# Patient Record
Sex: MALE | Race: BLACK OR AFRICAN AMERICAN | NOT HISPANIC OR LATINO | Employment: OTHER | URBAN - METROPOLITAN AREA
[De-identification: names, ages, dates, MRNs, and addresses within clinical notes are randomized per-mention and may not be internally consistent; named-entity substitution may affect disease eponyms.]

---

## 2017-01-16 ENCOUNTER — LAB VISIT (OUTPATIENT)
Dept: LAB | Facility: HOSPITAL | Age: 47
End: 2017-01-16
Attending: INTERNAL MEDICINE
Payer: MEDICARE

## 2017-01-16 ENCOUNTER — OFFICE VISIT (OUTPATIENT)
Dept: HEMATOLOGY/ONCOLOGY | Facility: CLINIC | Age: 47
End: 2017-01-16
Payer: MEDICARE

## 2017-01-16 VITALS
RESPIRATION RATE: 18 BRPM | HEART RATE: 101 BPM | BODY MASS INDEX: 29.86 KG/M2 | HEIGHT: 73 IN | DIASTOLIC BLOOD PRESSURE: 80 MMHG | OXYGEN SATURATION: 98 % | TEMPERATURE: 98 F | SYSTOLIC BLOOD PRESSURE: 122 MMHG | WEIGHT: 225.31 LBS

## 2017-01-16 DIAGNOSIS — B27.09 EBV MEDIATED PRIMARY LYMPHOMA OF LYMPH NODE: ICD-10-CM

## 2017-01-16 DIAGNOSIS — Z94.1 HISTORY OF HEART TRANSPLANT: ICD-10-CM

## 2017-01-16 DIAGNOSIS — D68.51 FACTOR V LEIDEN: Chronic | ICD-10-CM

## 2017-01-16 DIAGNOSIS — C83.38 DIFFUSE LARGE B-CELL LYMPHOMA OF LYMPH NODES OF MULTIPLE REGIONS: Primary | ICD-10-CM

## 2017-01-16 DIAGNOSIS — I82.90 VENOUS THROMBOEMBOLISM: ICD-10-CM

## 2017-01-16 DIAGNOSIS — C85.80 EBV MEDIATED PRIMARY LYMPHOMA OF LYMPH NODE: ICD-10-CM

## 2017-01-16 LAB — LDH SERPL L TO P-CCNC: 153 U/L

## 2017-01-16 PROCEDURE — 99214 OFFICE O/P EST MOD 30 MIN: CPT | Mod: S$PBB,,, | Performed by: INTERNAL MEDICINE

## 2017-01-16 PROCEDURE — 90688 IIV4 VACCINE SPLT 0.5 ML IM: CPT | Mod: PBBFAC,PO | Performed by: INTERNAL MEDICINE

## 2017-01-16 PROCEDURE — 99999 PR PBB SHADOW E&M-EST. PATIENT-LVL III: CPT | Mod: PBBFAC,,, | Performed by: INTERNAL MEDICINE

## 2017-01-16 PROCEDURE — 99213 OFFICE O/P EST LOW 20 MIN: CPT | Mod: PBBFAC,PO | Performed by: INTERNAL MEDICINE

## 2017-01-16 NOTE — PROGRESS NOTES
Reason for visit: Diffuse large B-cell lymphoma [PTLD]  Date of Diagnosis: August 2013    HPI:   The patient is a 46-year-old  male who presents to the hematology oncology clinic today for follow up of diffuse large B-cell lymphoma [PTLD].    The patient's diagnosis was made after a small bowel resection and liver biopsy. He has completed 6 cycles of chemotherapy with R-CHOP/CEOP in Dec 2013.   Today the patient reports that he feels well and has no specific complaints. He denies any abdominal pain, nausea or vomiting.  He denies any melena, hematochezia, hematemesis, hemoptysis or hematuria.  He denies any chest pain or shortness of breath.  He denies any fevers, chills or night sweats.  He denies any bowel or urinary complaints.  The patient is currently taking his therapeutic Lovenox injections and has been tolerating this well without any significant problems.  All of his interval clinical history available in [facility] has been reviewed.    PAST MEDICAL HISTORY:   1.  Dilated cardiomyopathy status post orthotopic heart transplant in October 2012 on chronic immunosuppression  2.  Factor V Leiden mutation  3.  Bilateral lower extremity DVT in August 2013  4.  Right brachial vein DVT in August 2013  5.  Peptic ulcer disease  6.  H. pylori gastritis  7.  Dyslipidemia  8.  Hypertension  9.  Corticosteroid-induced diabetes    SURGICAL HISTORY:   1.  Orthotopic heart transplantation in October 2012  2.  IVC filter placement in August 2011  3.  AICD placement and removal in September 2012  4.  Exploratory laparotomy with small bowel resection and wedge biopsy of the liver on August 7, 2013 for perforated viscus    FAMILY HISTORY: The patient stated that he cannot remember all of the details of his family history at this time.    SOCIAL HISTORY: He reports a 10-pack-year smoking history and quit in May 2011.  He reports drinking approximately 6 beers every day for about 20 years and quit this in May 2011.  He  reports a remote history of smoking marijuana and his last use was in 2001/2002.  He is single and does not have any children.  He lives with his mother in [place].  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  He is currently disabled.    ALLERGIES: NKDA    MEDICATIONS: [Medcard has been reviewed and/or reconciled.]    REVIEW OF SYSTEMS:   GENERAL: [No fevers, chills or sweats. No fatigue, weight loss or loss of appetite.]  HEENT: [No blurred vision, tinnitus, nasal discharge, sorethroat or dysphagia.]  HEART: [No chest pain, palpitations or shortness of breath.]    LUNGS: [No cough, hemoptysis or breathing problems.]  ABDOMEN: [No abdominal pain, nausea, vomiting, diarrhea, constipation or melena.]  GENITOURINARY: [No dysuria, bleeding or malodorous discharge.]  NEURO: [No headache, dizziness or vertigo.]  HEMATOLOGY: [No easy bruising, spontaneous bleeding or blood clots in the past].  MUSCULOSKELETAL: [No arthralgias, myalgias or bone pains.]  SKIN: [No rashes or skin lesions.]  PSYCHIATRY: [No depression or anxiety.]    PHYSICAL EXAMINATION:   VS: Reviewed on nurses notes.  APPEARANCE: The patient is a well-developed, well-nourished and well-groomed  male who appears in no acute distress.  HEENT: No scleral icterus. Both external auditory canals clear. No oral ulcers, lesions. Throat clear  HEAD: No sinus tenderness.  NECK: Supple. No palpable lymphadenopathy. Thyroid non-tender, no palpable masses  CHEST: Breath sounds clear bilaterally. No rales. No rhonchi. Unlabored respirations.  CARDIOVASCULAR: Normal S1, S2. Normal rate. Regular rhythm.  ABDOMEN: Bowel sounds normal. No tenderness. No abdominal distention. No hepatomegaly. No splenomegaly.  LYMPHATIC: No palpable supraclavicular, axillary nodes  EXTREMITIES: No clubbing, cyanosis, edema  SKIN: No lesions. No petechiae. No ecchymoses. No induration or nodules  NEUROLOGIC: No focal findings. Alert & Oriented x 3. Mood  appropriate to affect    LABS:   Reviewed    IMAGING:  Reviewed    IMPRESSION:  1.  Diffuse large B-cell lymphoma [stage IV] [PTLD] [EBV positive]  2.  Recurrent DVT    PLAN:  1. I had a detailed discussion with the patient today with regard to his current clinical situation.  His most recent PET/CT scan done in May 2015 showed that his lymphoma continues to be in complete remission after 6 cycles of chemotherapy with R-CHOP/R-CEOP.   2. Bone marrow biopsy in July 2014 which was done for further evaluation of leukopenia showed unremarkable results with no evidence of lymphoma/leukemia or myelodysplasia.  Cytogenetics were normal.  Immunoglobin gene rearrangement study was normal. Most recent CBC's show normal blood counts.  3.  He has been advised to follow up with the heart transplant team at [facility], [place] as per their recommendations.   4. Continue therapeutic lovenox injections for history of recurrent DVT. He will need lifelong anticoagulation.  5. Advised to maintain active lifestyle with regular exercise, good diet and to avoid excessive weight gain. He expressed understanding.    Follow up in 6 months with labs. He knows to call for any additional questions or new problems.    Pietro Martinez MD

## 2017-06-30 ENCOUNTER — TELEPHONE (OUTPATIENT)
Dept: TRANSPLANT | Facility: CLINIC | Age: 47
End: 2017-06-30

## 2017-06-30 NOTE — LETTER
[facility] [address] [place] [address]  Phone: [phone]   June 30, 2017    Alfonso Leger  [address]  [address]  [place] [address]  MRN: [id number]      Dear Alfonso Leger:    Congratulations on coming up to your 5 years post heart transplant!    We recommend you complete the following on an annual basis.  Our patients need to have a primary care physician near home.    For your annual visit, you will need the following tests completed:    · Annual visit with the transplant cardiologist and transplant coordinator  · Stress test or angiogram (cath)  · Chest x-ray within the last year  · Fasting labs: CBC, CMP, lipid panel, magnesium, medication levels, PSA screen for males,  and urinalysis  · You should see Dermatology annually for a full body skin exam  · You should see your Dentist twice a year  · Get a flu shot annually  · See your PCP for recommendations on colonoscopy and other preventative health measures    Please let me know if you have any further questions.  My direct number is [phone].  I have added your routine 3 month labs to your labs already scheduled for July 21 st. This does   Include a Prograf level. The prograf level needs to be drawn 12 hours after the night time dose.  Take your morning medications after you get your lab work drawn.  Sincerely,    Staci Reno RN, BSN  Post Heart Transplant Coordinator  [facility]  [address] [place] [phone]

## 2017-07-07 DIAGNOSIS — I82.90 VENOUS THROMBOEMBOLISM: ICD-10-CM

## 2017-07-09 RX ORDER — ENOXAPARIN SODIUM 100 MG/ML
INJECTION SUBCUTANEOUS
Qty: 60 SYRINGE | Refills: 4 | Status: SHIPPED | OUTPATIENT
Start: 2017-07-09 | End: 2018-11-02 | Stop reason: SDUPTHER

## 2017-07-11 RX ORDER — TACROLIMUS 1 MG/1
CAPSULE ORAL
Qty: 90 CAPSULE | Refills: 11 | Status: SHIPPED | OUTPATIENT
Start: 2017-07-11 | End: 2017-10-24 | Stop reason: SDUPTHER

## 2017-07-21 ENCOUNTER — LAB VISIT (OUTPATIENT)
Dept: LAB | Facility: HOSPITAL | Age: 47
End: 2017-07-21
Attending: INTERNAL MEDICINE
Payer: MEDICARE

## 2017-07-21 ENCOUNTER — OFFICE VISIT (OUTPATIENT)
Dept: HEMATOLOGY/ONCOLOGY | Facility: CLINIC | Age: 47
End: 2017-07-21
Payer: MEDICARE

## 2017-07-21 VITALS
BODY MASS INDEX: 29.27 KG/M2 | HEART RATE: 107 BPM | WEIGHT: 220.88 LBS | RESPIRATION RATE: 18 BRPM | OXYGEN SATURATION: 96 % | SYSTOLIC BLOOD PRESSURE: 120 MMHG | HEIGHT: 73 IN | TEMPERATURE: 98 F | DIASTOLIC BLOOD PRESSURE: 78 MMHG

## 2017-07-21 DIAGNOSIS — Z79.899 ENCOUNTER FOR LONG-TERM (CURRENT) USE OF MEDICATIONS: ICD-10-CM

## 2017-07-21 DIAGNOSIS — C85.80 EBV MEDIATED PRIMARY LYMPHOMA OF LYMPH NODE: ICD-10-CM

## 2017-07-21 DIAGNOSIS — R00.0 TACHYCARDIA: ICD-10-CM

## 2017-07-21 DIAGNOSIS — I82.90 VENOUS THROMBOEMBOLISM: ICD-10-CM

## 2017-07-21 DIAGNOSIS — Z94.1 STATUS POST HEART TRANSPLANT: ICD-10-CM

## 2017-07-21 DIAGNOSIS — B27.09 EBV MEDIATED PRIMARY LYMPHOMA OF LYMPH NODE: ICD-10-CM

## 2017-07-21 DIAGNOSIS — D68.51 FACTOR V LEIDEN: Chronic | ICD-10-CM

## 2017-07-21 DIAGNOSIS — C83.38 DIFFUSE LARGE B-CELL LYMPHOMA OF LYMPH NODES OF MULTIPLE REGIONS: Primary | ICD-10-CM

## 2017-07-21 DIAGNOSIS — C83.38 DIFFUSE LARGE B-CELL LYMPHOMA OF LYMPH NODES OF MULTIPLE REGIONS: ICD-10-CM

## 2017-07-21 LAB
ALBUMIN SERPL BCP-MCNC: 4.2 G/DL
ALP SERPL-CCNC: 133 U/L
ALT SERPL W/O P-5'-P-CCNC: 15 U/L
ANION GAP SERPL CALC-SCNC: 10 MMOL/L
AST SERPL-CCNC: 14 U/L
BASOPHILS # BLD AUTO: 0.02 K/UL
BASOPHILS NFR BLD: 0.4 %
BILIRUB SERPL-MCNC: 1 MG/DL
BUN SERPL-MCNC: 12 MG/DL
CALCIUM SERPL-MCNC: 9.7 MG/DL
CHLORIDE SERPL-SCNC: 104 MMOL/L
CO2 SERPL-SCNC: 26 MMOL/L
CREAT SERPL-MCNC: 1.8 MG/DL
DIFFERENTIAL METHOD: NORMAL
EOSINOPHIL # BLD AUTO: 0.2 K/UL
EOSINOPHIL NFR BLD: 4.2 %
ERYTHROCYTE [DISTWIDTH] IN BLOOD BY AUTOMATED COUNT: 13.2 %
EST. GFR  (AFRICAN AMERICAN): 51 ML/MIN/1.73 M^2
EST. GFR  (NON AFRICAN AMERICAN): 44 ML/MIN/1.73 M^2
GLUCOSE SERPL-MCNC: 105 MG/DL
HCT VFR BLD AUTO: 45.4 %
HGB BLD-MCNC: 15.3 G/DL
LDH SERPL L TO P-CCNC: 168 U/L
LYMPHOCYTES # BLD AUTO: 1.6 K/UL
LYMPHOCYTES NFR BLD: 33.9 %
MCH RBC QN AUTO: 29.7 PG
MCHC RBC AUTO-ENTMCNC: 33.7 G/DL
MCV RBC AUTO: 88 FL
MONOCYTES # BLD AUTO: 0.5 K/UL
MONOCYTES NFR BLD: 10.9 %
NEUTROPHILS # BLD AUTO: 2.4 K/UL
NEUTROPHILS NFR BLD: 50.6 %
PLATELET # BLD AUTO: 212 K/UL
PMV BLD AUTO: 9.2 FL
POTASSIUM SERPL-SCNC: 4.7 MMOL/L
PROT SERPL-MCNC: 8.4 G/DL
RBC # BLD AUTO: 5.16 M/UL
SODIUM SERPL-SCNC: 140 MMOL/L
WBC # BLD AUTO: 4.75 K/UL

## 2017-07-21 PROCEDURE — 99214 OFFICE O/P EST MOD 30 MIN: CPT | Mod: S$PBB,,, | Performed by: INTERNAL MEDICINE

## 2017-07-21 PROCEDURE — 99999 PR PBB SHADOW E&M-EST. PATIENT-LVL III: CPT | Mod: PBBFAC,,, | Performed by: INTERNAL MEDICINE

## 2017-07-21 PROCEDURE — 83615 LACTATE (LD) (LDH) ENZYME: CPT | Mod: PO

## 2017-07-21 PROCEDURE — 36415 COLL VENOUS BLD VENIPUNCTURE: CPT | Mod: PO

## 2017-07-21 PROCEDURE — 80053 COMPREHEN METABOLIC PANEL: CPT | Mod: PO

## 2017-07-21 PROCEDURE — 85025 COMPLETE CBC W/AUTO DIFF WBC: CPT | Mod: PO

## 2017-07-21 NOTE — PROGRESS NOTES
Reason for visit: Diffuse large B-cell lymphoma [PTLD]  Date of Diagnosis: August 2013    HPI:   The patient is a 47-year-old  male who presents to the hematology oncology clinic today for follow up of diffuse large B-cell lymphoma [PTLD].    The patient's diagnosis was made after a small bowel resection and liver biopsy. He has completed 6 cycles of chemotherapy with R-CHOP/CEOP in Dec 2013.   Today the patient reports that he feels well and has no specific complaints. He denies any abdominal pain, nausea or vomiting.  He denies any melena, hematochezia, hematemesis, hemoptysis or hematuria.  He denies any chest pain or shortness of breath.  He denies any fevers, chills or night sweats.  He denies any bowel or urinary complaints.  The patient is currently taking his therapeutic Lovenox injections and has been tolerating this well without any significant problems.  All of his interval clinical history available in [facility] has been reviewed.    PAST MEDICAL HISTORY:   1.  Dilated cardiomyopathy status post orthotopic heart transplant in October 2012 on chronic immunosuppression  2.  Factor V Leiden mutation  3.  Bilateral lower extremity DVT in August 2013  4.  Right brachial vein DVT in August 2013  5.  Peptic ulcer disease  6.  H. pylori gastritis  7.  Dyslipidemia  8.  Hypertension  9.  Corticosteroid-induced diabetes    SURGICAL HISTORY:   1.  Orthotopic heart transplantation in October 2012  2.  IVC filter placement in August 2011  3.  AICD placement and removal in September 2012  4.  Exploratory laparotomy with small bowel resection and wedge biopsy of the liver on August 7, 2013 for perforated viscus    FAMILY HISTORY: The patient stated that he cannot remember all of the details of his family history at this time.    SOCIAL HISTORY: He reports a 10-pack-year smoking history and quit in May 2011.  He reports drinking approximately 6 beers every day for about 20 years and quit this in May 2011.  He  reports a remote history of smoking marijuana and his last use was in 2001/2002.  He is single and does not have any children.  He lives with his mother in [place].  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    ALLERGIES: NKDA    MEDICATIONS: [Medcard has been reviewed and/or reconciled.]    REVIEW OF SYSTEMS:   GENERAL: [No fevers, chills or sweats. No fatigue, weight loss or loss of appetite.]  HEENT: [No blurred vision, tinnitus, nasal discharge, sorethroat or dysphagia.]  HEART: [No chest pain, palpitations or shortness of breath.]    LUNGS: [No cough, hemoptysis or breathing problems.]  ABDOMEN: [No abdominal pain, nausea, vomiting, diarrhea, constipation or melena.]  GENITOURINARY: [No dysuria, bleeding or malodorous discharge.]  NEURO: [No headache, dizziness or vertigo.]  HEMATOLOGY: [No easy bruising, spontaneous bleeding or blood clots in the past].  MUSCULOSKELETAL: [No arthralgias, myalgias or bone pains.]  SKIN: [No rashes or skin lesions.]  PSYCHIATRY: [No depression or anxiety.]    PHYSICAL EXAMINATION:   VS: Reviewed on nurses notes.  APPEARANCE: The patient is a well-developed, well-nourished and well-groomed  male who appears in no acute distress.  HEENT: No scleral icterus. Both external auditory canals clear. No oral ulcers, lesions. Throat clear  HEAD: No sinus tenderness.  NECK: Supple. No palpable lymphadenopathy. Thyroid non-tender, no palpable masses  CHEST: Breath sounds clear bilaterally. No rales. No rhonchi. Unlabored respirations.  CARDIOVASCULAR: Normal S1, S2. Normal rate. Regular rhythm.  ABDOMEN: Bowel sounds normal. No tenderness. No abdominal distention. No hepatomegaly. No splenomegaly.  LYMPHATIC: No palpable supraclavicular, axillary nodes  EXTREMITIES: No clubbing, cyanosis, edema  SKIN: No lesions. No petechiae. No ecchymoses. No induration or nodules  NEUROLOGIC: No focal findings. Alert & Oriented x 3. Mood  appropriate to affect    LABS:   Reviewed    IMAGING:  Reviewed    IMPRESSION:  1.  Diffuse large B-cell lymphoma [stage IV] [PTLD] [EBV positive]  2.  Recurrent DVT    PLAN:  1. I had a detailed discussion with the patient today with regard to his current clinical situation.  His most recent PET/CT scan done in May 2015 showed that his lymphoma continues to be in complete remission after 6 cycles of chemotherapy with R-CHOP/R-CEOP.   2. Bone marrow biopsy in July 2014 which was done for further evaluation of leukopenia showed unremarkable results with no evidence of lymphoma/leukemia or myelodysplasia.  Cytogenetics were normal.  Immunoglobin gene rearrangement study was normal. Most recent CBC's show normal blood counts.  3.  He has been advised to follow up with the heart transplant team at [facility], [place] as per their recommendations.   4. Continue therapeutic lovenox injections for history of recurrent DVT. He will need lifelong anticoagulation.  5. Advised to maintain active lifestyle with regular exercise, good diet and to avoid excessive weight gain. He expressed understanding.  6. He will follow up with Dr. Hale to update his routine health maintenance/annual physical exam.    Follow up in 6 months with labs. He knows to call for any additional questions or new problems.    Pietro Martinez MD

## 2017-07-24 ENCOUNTER — TELEPHONE (OUTPATIENT)
Dept: TRANSPLANT | Facility: CLINIC | Age: 47
End: 2017-07-24

## 2017-07-28 ENCOUNTER — TELEPHONE (OUTPATIENT)
Dept: INTERNAL MEDICINE | Facility: CLINIC | Age: 47
End: 2017-07-28

## 2017-07-28 ENCOUNTER — HOSPITAL ENCOUNTER (OUTPATIENT)
Dept: RADIOLOGY | Facility: HOSPITAL | Age: 47
Discharge: HOME | End: 2017-07-28
Attending: FAMILY MEDICINE
Payer: MEDICARE

## 2017-07-28 ENCOUNTER — OFFICE VISIT (OUTPATIENT)
Dept: INTERNAL MEDICINE | Facility: CLINIC | Age: 47
End: 2017-07-28
Payer: MEDICARE

## 2017-07-28 VITALS
TEMPERATURE: 97 F | HEIGHT: 72 IN | BODY MASS INDEX: 29.65 KG/M2 | WEIGHT: 218.94 LBS | DIASTOLIC BLOOD PRESSURE: 94 MMHG | HEART RATE: 110 BPM | SYSTOLIC BLOOD PRESSURE: 114 MMHG

## 2017-07-28 DIAGNOSIS — I10 ESSENTIAL HYPERTENSION: Primary | ICD-10-CM

## 2017-07-28 DIAGNOSIS — Z94.1 STATUS POST HEART TRANSPLANTATION: ICD-10-CM

## 2017-07-28 DIAGNOSIS — E11.9 CONTROLLED TYPE 2 DIABETES MELLITUS WITHOUT COMPLICATION, WITHOUT LONG-TERM CURRENT USE OF INSULIN: ICD-10-CM

## 2017-07-28 DIAGNOSIS — Z28.39 IMMUNIZATION DEFICIENCY: Primary | ICD-10-CM

## 2017-07-28 DIAGNOSIS — Z28.9 DELAYED IMMUNIZATIONS: ICD-10-CM

## 2017-07-28 DIAGNOSIS — C83.38 DIFFUSE LARGE B-CELL LYMPHOMA OF LYMPH NODES OF MULTIPLE REGIONS: ICD-10-CM

## 2017-07-28 PROCEDURE — 71020 XR CHEST PA AND LATERAL: CPT | Mod: TC,PO

## 2017-07-28 PROCEDURE — 99999 PR PBB SHADOW E&M-EST. PATIENT-LVL III: CPT | Mod: PBBFAC,,, | Performed by: FAMILY MEDICINE

## 2017-07-28 PROCEDURE — 71020 XR CHEST PA AND LATERAL: CPT | Mod: 26,,, | Performed by: RADIOLOGY

## 2017-07-28 PROCEDURE — 99214 OFFICE O/P EST MOD 30 MIN: CPT | Mod: S$PBB,,, | Performed by: FAMILY MEDICINE

## 2017-07-28 NOTE — TELEPHONE ENCOUNTER
Patient informed ok to get pneumo 23.  Scheduled nurse visit for 08/04 prior to ophthamology appt.

## 2017-07-28 NOTE — PROGRESS NOTES
Subjective:       Patient ID: Alfonso Leger is a 47 y.o. male.    Chief Complaint: Follow-up    Follow-up:       Pt is a 47 year old who is here for follow-up. Pt is a controlled DM with just diet. Is in need or Hga1C today with microalbumine and eye exam. Pt does have cardiac transplant and sees NO. Pt is in need of Optometry exam. Given immune compromise of DM consider Pneumo 23      Review of Systems   Constitutional: Negative.    Respiratory: Negative.    Cardiovascular: Negative.    Genitourinary: Negative.    Neurological: Negative.    Hematological: Negative.        Objective:      Physical Exam   Constitutional: He appears well-developed and well-nourished.   Cardiovascular: Normal rate and regular rhythm.    Pulmonary/Chest: Effort normal and breath sounds normal.   Feet:   Right Foot:   Protective Sensation: 10 sites tested. 9 sites sensed.   Skin Integrity: Positive for callus and dry skin.   Left Foot:   Protective Sensation: 10 sites tested. 9 sites sensed.   Skin Integrity: Positive for callus and dry skin.       Assessment:       1. Essential hypertension    2. Controlled type 2 diabetes mellitus without complication, without long-term current use of insulin    3. Diffuse large B-cell lymphoma of lymph nodes of multiple regions    4. Status post heart transplantation    5. Delayed immunizations        Plan:       Essential hypertension  Comments:  Pt is well controlled BP    Controlled type 2 diabetes mellitus without complication, without long-term current use of insulin  Comments:  Well controlled and will get HgA1C with micro  Orders:  -     Hemoglobin A1c; Future; Expected date: 07/28/2017  -     Microalbumin/creatinine urine ratio; Future; Expected date: 07/28/2017  -     Ambulatory referral to Optometry    Diffuse large B-cell lymphoma of lymph nodes of multiple regions  Comments:  1 year removed form chemo. Continue periodic check up with Hem/Onc    Status post heart  transplantation  Comments:  Will get Chest xray. pt does follow-up with transplant team  Orders:  -     X-Ray Chest PA And Lateral; Future; Expected date: 07/28/2017    Delayed immunizations  Comments:  Pt may need Pneumo 23 followed by 13.

## 2017-08-02 RX ORDER — PRAVASTATIN SODIUM 40 MG/1
40 TABLET ORAL DAILY
Qty: 30 TABLET | Refills: 6 | Status: SHIPPED | OUTPATIENT
Start: 2017-08-02 | End: 2018-03-30 | Stop reason: SDUPTHER

## 2017-08-03 ENCOUNTER — TELEPHONE (OUTPATIENT)
Dept: INTERNAL MEDICINE | Facility: CLINIC | Age: 47
End: 2017-08-03

## 2017-08-03 DIAGNOSIS — Z28.9 DELAYED IMMUNIZATIONS: Primary | ICD-10-CM

## 2017-08-04 ENCOUNTER — CLINICAL SUPPORT (OUTPATIENT)
Dept: INTERNAL MEDICINE | Facility: CLINIC | Age: 47
End: 2017-08-04
Payer: MEDICARE

## 2017-08-04 ENCOUNTER — OFFICE VISIT (OUTPATIENT)
Dept: OPHTHALMOLOGY | Facility: CLINIC | Age: 47
End: 2017-08-04
Payer: MEDICARE

## 2017-08-04 DIAGNOSIS — H52.203 MYOPIA WITH ASTIGMATISM AND PRESBYOPIA, BILATERAL: ICD-10-CM

## 2017-08-04 DIAGNOSIS — H52.13 MYOPIA WITH ASTIGMATISM AND PRESBYOPIA, BILATERAL: ICD-10-CM

## 2017-08-04 DIAGNOSIS — E11.9 TYPE 2 DIABETES MELLITUS WITHOUT RETINOPATHY: Primary | ICD-10-CM

## 2017-08-04 DIAGNOSIS — H40.003 GLAUCOMA SUSPECT, BILATERAL: ICD-10-CM

## 2017-08-04 DIAGNOSIS — H52.4 MYOPIA WITH ASTIGMATISM AND PRESBYOPIA, BILATERAL: ICD-10-CM

## 2017-08-04 PROCEDURE — 92015 DETERMINE REFRACTIVE STATE: CPT | Mod: ,,, | Performed by: OPTOMETRIST

## 2017-08-04 PROCEDURE — 99211 OFF/OP EST MAY X REQ PHY/QHP: CPT | Mod: PBBFAC,27,PO | Performed by: OPTOMETRIST

## 2017-08-04 PROCEDURE — 92004 COMPRE OPH EXAM NEW PT 1/>: CPT | Mod: S$PBB,,, | Performed by: OPTOMETRIST

## 2017-08-04 PROCEDURE — 99999 PR PBB SHADOW E&M-EST. PATIENT-LVL I: CPT | Mod: PBBFAC,,,

## 2017-08-04 PROCEDURE — 99999 PR PBB SHADOW E&M-EST. PATIENT-LVL I: CPT | Mod: PBBFAC,,, | Performed by: OPTOMETRIST

## 2017-08-04 PROCEDURE — 92250 FUNDUS PHOTOGRAPHY W/I&R: CPT | Mod: PBBFAC,PO | Performed by: OPTOMETRIST

## 2017-08-04 NOTE — PROGRESS NOTES
HPI     Diabetic Eye Exam    Additional comments: Yearly           Comments   NP to DNL. Last eye exam was about 5 years ago.  1. DM  HPI    Any vision changes since last exam: No  Eye pain: No  Other ocular symptoms: No    Do you wear currently wear glasses or contacts? Glasses    Interested in contacts today? No    Do you plan on getting new glasses today? If needed  Diabetic eye exam  Diagnosed with diabetes in 2013  Recent vision fluctuations No  Blood sugar: Doesn't check         Last edited by Tina Martínez, PCT on 8/4/2017  1:37 PM. (History)            Assessment /Plan     For exam results, see Encounter Report.    1. Type 2 diabetes mellitus without retinopathy  No diabetic retinopathy in either eye today. Reviewed importance of yearly dilated eye exams. Continue close care with PCP regarding diabetes.           2. Glaucoma suspect, bilateral  Color Fundus Photography - OU - Both Eyes    Posterior Segment OCT Optic Nerve- Both eyes    Lopez Visual Field - OU - Extended - Both Eyes       3. Myopia with astigmatism and presbyopia, bilateral  Eyeglass Final Rx     Eyeglass Final Rx       Sphere Cylinder Axis Dist VA    Right -2.75 +0.75 090 20/20-2    Left -2.75 +0.75 100 20/20    Type:  SVL    Expiration Date:  8/5/2018              Pt requested SV         RTC 1-6 wks for IOP check, 24-2VF, gOCT and pach  Discussed above and answered questions.

## 2017-09-05 ENCOUNTER — APPOINTMENT (OUTPATIENT)
Dept: OPHTHALMOLOGY | Facility: CLINIC | Age: 47
End: 2017-09-05
Payer: MEDICARE

## 2017-09-05 ENCOUNTER — OFFICE VISIT (OUTPATIENT)
Dept: OPHTHALMOLOGY | Facility: CLINIC | Age: 47
End: 2017-09-05
Payer: MEDICARE

## 2017-09-05 DIAGNOSIS — H40.003 GLAUCOMA SUSPECT, BILATERAL: Primary | ICD-10-CM

## 2017-09-05 PROCEDURE — 76514 ECHO EXAM OF EYE THICKNESS: CPT | Mod: PBBFAC,PO | Performed by: OPTOMETRIST

## 2017-09-05 PROCEDURE — 76514 ECHO EXAM OF EYE THICKNESS: CPT | Mod: 26,S$PBB,, | Performed by: OPTOMETRIST

## 2017-09-05 PROCEDURE — 92083 EXTENDED VISUAL FIELD XM: CPT | Mod: PBBFAC,PO | Performed by: OPTOMETRIST

## 2017-09-05 PROCEDURE — 99211 OFF/OP EST MAY X REQ PHY/QHP: CPT | Mod: PBBFAC,PO | Performed by: OPTOMETRIST

## 2017-09-05 PROCEDURE — 92012 INTRM OPH EXAM EST PATIENT: CPT | Mod: S$PBB,,, | Performed by: OPTOMETRIST

## 2017-09-05 PROCEDURE — 92133 CPTRZD OPH DX IMG PST SGM ON: CPT | Mod: PBBFAC,PO | Performed by: OPTOMETRIST

## 2017-09-05 PROCEDURE — 99999 PR PBB SHADOW E&M-EST. PATIENT-LVL I: CPT | Mod: PBBFAC,,, | Performed by: OPTOMETRIST

## 2017-09-06 NOTE — PROGRESS NOTES
HPI     Glaucoma Suspect    Additional comments: IOP check, HVF, gOCT and Pach           Comments   Last seen by DNL on 8/4/17 for DM exam. Patient here today for glaucoma   screening.  1. DM  2. Glaucoma Suspect  PT was last seen on 8/4/17 with DNL for full exam. PT was told to RTC  1-6 weeks for IOP check, gOCT, Pach and HVF.  Medication eye drops if any: None  Last HVF: 9/5/17  Last gOCT: 9/5/17  Last SDPs:8/4/17       Last edited by Tina Martínez, PCT on 9/5/2017  2:37 PM. (History)            Assessment /Plan     For exam results, see Encounter Report.    Glaucoma suspect, bilateral      Bilateral superior left quadrantanopsia-hx of stroke  No OAG VF defects  Borderline NFL thinning but bilateral PPA   IOP stable today  Monitor 6 months    RTC 6 months for IOP check or PRN  Discussed above and all questions were answered.

## 2017-09-12 ENCOUNTER — INITIAL CONSULT (OUTPATIENT)
Dept: DERMATOLOGY | Facility: CLINIC | Age: 47
End: 2017-09-12
Payer: MEDICARE

## 2017-09-12 DIAGNOSIS — D22.9 MULTIPLE NEVI: Primary | ICD-10-CM

## 2017-09-12 PROCEDURE — 99999 PR PBB SHADOW E&M-EST. PATIENT-LVL II: CPT | Mod: PBBFAC,,, | Performed by: DERMATOLOGY

## 2017-09-12 PROCEDURE — 99203 OFFICE O/P NEW LOW 30 MIN: CPT | Mod: S$PBB,,, | Performed by: DERMATOLOGY

## 2017-09-12 PROCEDURE — 99212 OFFICE O/P EST SF 10 MIN: CPT | Mod: PBBFAC,PO | Performed by: DERMATOLOGY

## 2017-09-12 NOTE — PROGRESS NOTES
Subjective:       Patient ID:  Alfonso Leger is a 47 y.o. male who presents for   Chief Complaint   Patient presents with    Skin Check     FBSE     Hx of dilated cardiomyopathy s/p orthotopic heart transplant in 2012 and diffuse large B-cell lymphoma completed 6 cycles of chemotherapy with R-CHOP/CEOP in 2013, currently followed Dr. Martinez.  He is currently on prograf. Here today to establish care and for skin check. This is a high risk patient here to check for the development of new lesions.          Review of Systems   Constitutional: Negative for fever and chills.   Gastrointestinal: Negative for nausea and vomiting.   Skin: Negative for daily sunscreen use, activity-related sunscreen use and recent sunburn.   Hematologic/Lymphatic: Does not bruise/bleed easily.        Objective:    Physical Exam   Constitutional: He appears well-developed and well-nourished. No distress.   Neurological: He is alert and oriented to person, place, and time. He is not disoriented.   Psychiatric: He has a normal mood and affect.   Skin:   Areas Examined (abnormalities noted in diagram):   Scalp / Hair Palpated and Inspected  Head / Face Inspection Performed  Neck Inspection Performed  Chest / Axilla Inspection Performed  Abdomen Inspection Performed  Genitals / Buttocks / Groin Inspection Performed  Back Inspection Performed  RUE Inspected  LUE Inspection Performed  RLE Inspected  LLE Inspection Performed  Nails and Digits Inspection Performed                   Diagram Legend     Evenly pigmented macule c/w junctional nevus     Flesh colored to evenly pigmented papule c/w intradermal nevus         Assessment / Plan:        Multiple nevi  Reassurance given.  Discussed ABCDEF of melanoma and changes for patient to look for.  AAD Handout given. Discussed importance of daily use of sunscreen.  Recommend yearly skin exam and monthly self exams, given hx of immunosuppression.              Return in about 1 year (around  9/12/2018).

## 2017-09-12 NOTE — PATIENT INSTRUCTIONS
Monitoring Moles  Moles, also called nevi, are small, pigmented (colored) marks on the skin. They have no known purpose. Many moles appear before age 30, but they also increase frequently as people age. Moles most often are benign (not cancer) and harmless. But some become cancerous. Thats why you need to watch the moles on your body and tell your health care provider about any concern you.    What are moles?  Moles are a type of pigmented jolene. Freckles, which often are sprinkled across the bridge of the nose, the cheeks, and the arms, are another type of pigmented jolene. Moles can appear on any part of the body. There are many types, sizes, and shapes of moles. Most moles are solid brown. In most cases, they are flat or dome-shaped, smooth, and have well-defined edges. Freckles are flat.  Why worry about moles?  Most moles are benign and dont require treatment. You can have moles removed if you dont like the way they look or feel. But moles that appear after you are 30 or that change in certain ways may become a problem. These moles may turn into melanoma, a type of skin cancer. Melanoma is one of the fastest growing cancers in the [place], but it is often curable if caught early. But this disease can be life-threatening, particularly when not diagnosed early. The more moles someone has, the higher the risk. Risk is also higher for those who have had more lifetime exposure to the sun, severe blistering sunburns, exposure to tanning beds, a prior personal history of cancer, and those with a family history of skin cancer. To manage your risk, its smart to check your moles for changes and ask your health care provider to perform a thorough skin exam when you have a physical exam. To do this, you first need to learn where your moles are. Then, be sure to check your moles each month.    Checking your moles  You can check many of your moles each month. You can do this right after you shower and before you get  dressed. Check your body from head to toe. Then, make a list of your moles. If you find any new moles or changes in your moles, call your health care provider. To check your moles, youll need:  · A full-length mirror  · A stool or chair to sit on while you check your feet  If you have a lot of moles, take digital photos of them each month. Make sure to take photos both up close and from a distance. These can help you see if any moles change over time.  When to seek medical treatment  See your health care provider if your moles hurt, itch, ooze, bleed, thicken, become crusty, or show other changes. Also, be sure to call your health care provider if your moles show any of the following signs of melanoma:  · A change in size, shape, color, or elevation  · Asymmetry (when the sides dont match)  · Ragged, notched, or blurred borders  · Varied colors within the same mole  · Size is larger than 5 mm or 6 mm in diameter (the size of a pencil eraser)  © 5900-3068 [employer]. [address] [place] [address]. All rights reserved. This information is not intended as a substitute for professional medical care. Always follow your healthcare professional's instructions.

## 2017-09-12 NOTE — LETTER
September 12, 2017      Pietro Martinez MD  [address] [place] [address]           [facility]  [address] [place] [address]  Phone: [phone]  Fax: [phone]          Patient: Alfonso Leger   MR Number: [id number]   YOB: 1970   Date of Visit: 9/12/2017       Dear Dr. Pietro Martinez:    Thank you for referring Alfonso Leger to me for evaluation. Attached you will find relevant portions of my assessment and plan of care.    If you have questions, please do not hesitate to call me. I look forward to following Alfonso Leger along with you.    Sincerely,    Nat Spears MD    Enclosure  CC:  No Recipients    If you would like to receive this communication electronically, please contact [email or web address] or [phone] to request more information on [employer] [facility] access.    For providers and/or their staff who would like to refer a patient to [facility], please contact us through our one-stop-shop provider referral line, [facility], at [phone].    If you feel you have received this communication in error or would no longer like to receive these types of communications, please e-mail [email or web address]

## 2017-10-24 RX ORDER — TACROLIMUS 1 MG/1
CAPSULE ORAL
Qty: 270 CAPSULE | Refills: 3 | Status: SHIPPED | OUTPATIENT
Start: 2017-10-24 | End: 2018-11-02 | Stop reason: SDUPTHER

## 2018-01-16 ENCOUNTER — TELEPHONE (OUTPATIENT)
Dept: TRANSPLANT | Facility: CLINIC | Age: 48
End: 2018-01-16

## 2018-01-16 ENCOUNTER — LAB VISIT (OUTPATIENT)
Dept: LAB | Facility: HOSPITAL | Age: 48
End: 2018-01-16
Attending: INTERNAL MEDICINE
Payer: MEDICARE

## 2018-01-16 ENCOUNTER — OFFICE VISIT (OUTPATIENT)
Dept: HEMATOLOGY/ONCOLOGY | Facility: CLINIC | Age: 48
End: 2018-01-16
Payer: MEDICARE

## 2018-01-16 ENCOUNTER — PATIENT MESSAGE (OUTPATIENT)
Dept: HEMATOLOGY/ONCOLOGY | Facility: CLINIC | Age: 48
End: 2018-01-16

## 2018-01-16 VITALS
OXYGEN SATURATION: 99 % | DIASTOLIC BLOOD PRESSURE: 79 MMHG | BODY MASS INDEX: 30.37 KG/M2 | HEART RATE: 108 BPM | HEIGHT: 72 IN | SYSTOLIC BLOOD PRESSURE: 114 MMHG | WEIGHT: 224.19 LBS | TEMPERATURE: 98 F

## 2018-01-16 DIAGNOSIS — D68.51 FACTOR V LEIDEN: Chronic | ICD-10-CM

## 2018-01-16 DIAGNOSIS — I82.90 VENOUS THROMBOEMBOLISM: ICD-10-CM

## 2018-01-16 DIAGNOSIS — C83.38 DIFFUSE LARGE B-CELL LYMPHOMA OF LYMPH NODES OF MULTIPLE REGIONS: ICD-10-CM

## 2018-01-16 DIAGNOSIS — Z79.01 CURRENT USE OF LONG TERM ANTICOAGULATION: ICD-10-CM

## 2018-01-16 DIAGNOSIS — Z85.72 HISTORY OF NON-HODGKIN'S LYMPHOMA: Primary | ICD-10-CM

## 2018-01-16 LAB
ALBUMIN SERPL BCP-MCNC: 4.2 G/DL
ALP SERPL-CCNC: 118 U/L
ALT SERPL W/O P-5'-P-CCNC: 15 U/L
ANION GAP SERPL CALC-SCNC: 11 MMOL/L
AST SERPL-CCNC: 17 U/L
BASOPHILS # BLD AUTO: 0.03 K/UL
BASOPHILS NFR BLD: 0.6 %
BILIRUB SERPL-MCNC: 1 MG/DL
BUN SERPL-MCNC: 13 MG/DL
CALCIUM SERPL-MCNC: 9.6 MG/DL
CHLORIDE SERPL-SCNC: 107 MMOL/L
CO2 SERPL-SCNC: 26 MMOL/L
CREAT SERPL-MCNC: 1.7 MG/DL
DIFFERENTIAL METHOD: NORMAL
EOSINOPHIL # BLD AUTO: 0.1 K/UL
EOSINOPHIL NFR BLD: 2.2 %
ERYTHROCYTE [DISTWIDTH] IN BLOOD BY AUTOMATED COUNT: 12.8 %
EST. GFR  (AFRICAN AMERICAN): 55 ML/MIN/1.73 M^2
EST. GFR  (NON AFRICAN AMERICAN): 47 ML/MIN/1.73 M^2
GLUCOSE SERPL-MCNC: 93 MG/DL
HCT VFR BLD AUTO: 44 %
HGB BLD-MCNC: 14.5 G/DL
LDH SERPL L TO P-CCNC: 169 U/L
LYMPHOCYTES # BLD AUTO: 1.6 K/UL
LYMPHOCYTES NFR BLD: 32.2 %
MCH RBC QN AUTO: 29.2 PG
MCHC RBC AUTO-ENTMCNC: 33 G/DL
MCV RBC AUTO: 89 FL
MONOCYTES # BLD AUTO: 0.4 K/UL
MONOCYTES NFR BLD: 8.8 %
NEUTROPHILS # BLD AUTO: 2.8 K/UL
NEUTROPHILS NFR BLD: 56.2 %
PLATELET # BLD AUTO: 221 K/UL
PMV BLD AUTO: 9.5 FL
POTASSIUM SERPL-SCNC: 3.8 MMOL/L
PROT SERPL-MCNC: 8.4 G/DL
RBC # BLD AUTO: 4.97 M/UL
SODIUM SERPL-SCNC: 144 MMOL/L
WBC # BLD AUTO: 4.9 K/UL

## 2018-01-16 PROCEDURE — 99999 PR PBB SHADOW E&M-EST. PATIENT-LVL III: CPT | Mod: PBBFAC,,, | Performed by: INTERNAL MEDICINE

## 2018-01-16 PROCEDURE — 99214 OFFICE O/P EST MOD 30 MIN: CPT | Mod: S$PBB,,, | Performed by: INTERNAL MEDICINE

## 2018-01-16 PROCEDURE — 36415 COLL VENOUS BLD VENIPUNCTURE: CPT | Mod: PO

## 2018-01-16 PROCEDURE — 83615 LACTATE (LD) (LDH) ENZYME: CPT | Mod: PO

## 2018-01-16 PROCEDURE — 85025 COMPLETE CBC W/AUTO DIFF WBC: CPT | Mod: PO

## 2018-01-16 PROCEDURE — 99213 OFFICE O/P EST LOW 20 MIN: CPT | Mod: PBBFAC,PO | Performed by: INTERNAL MEDICINE

## 2018-01-16 PROCEDURE — 80053 COMPREHEN METABOLIC PANEL: CPT | Mod: PO

## 2018-01-16 NOTE — TELEPHONE ENCOUNTER
Left a message on Pt's mother's phone to try and schedule some labs and appointments. Tried to call pt, phone does not take any incoming calls

## 2018-01-16 NOTE — PROGRESS NOTES
Reason for visit: Diffuse large B-cell lymphoma [PTLD]  Date of Diagnosis: August 2013    HPI:   The patient is a 47-year-old  male who presents to the hematology oncology clinic today for follow up of diffuse large B-cell lymphoma [PTLD].    The patient's diagnosis was made after a small bowel resection and liver biopsy. He has completed 6 cycles of chemotherapy with R-CHOP/CEOP in Dec 2013.   Today the patient reports that he feels well and has no specific complaints. He denies any abdominal pain, nausea or vomiting.  He denies any melena, hematochezia, hematemesis, hemoptysis or hematuria.  He denies any chest pain or shortness of breath.  He denies any fevers, chills or night sweats.  He denies any bowel or urinary complaints.  The patient is currently taking his therapeutic Lovenox injections and has been tolerating this well without any significant problems.  All of his interval clinical history available in [facility] has been reviewed.    PAST MEDICAL HISTORY:   1.  Dilated cardiomyopathy status post orthotopic heart transplant in October 2012 on chronic immunosuppression  2.  Factor V Leiden mutation  3.  Bilateral lower extremity DVT in August 2013  4.  Right brachial vein DVT in August 2013  5.  Peptic ulcer disease  6.  H. pylori gastritis  7.  Dyslipidemia  8.  Hypertension  9.  Corticosteroid-induced diabetes    SURGICAL HISTORY:   1.  Orthotopic heart transplantation in October 2012  2.  IVC filter placement in August 2011  3.  AICD placement and removal in September 2012  4.  Exploratory laparotomy with small bowel resection and wedge biopsy of the liver on August 7, 2013 for perforated viscus    FAMILY HISTORY: The patient stated that he cannot remember all of the details of his family history at this time.    SOCIAL HISTORY: He reports a 10-pack-year smoking history and quit in May 2011.  He reports drinking approximately 6 beers every day for about 20 years and quit this in May 2011.  He  reports a remote history of smoking marijuana and his last use was in 2001/2002.  He is single and does not have any children.  He lives with his mother in [place].  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  He is currently disabled.    ALLERGIES: NKDA    MEDICATIONS: [Medcard has been reviewed and/or reconciled.]    REVIEW OF SYSTEMS:   GENERAL: [No fevers, chills or sweats. No fatigue, weight loss or loss of appetite.]  HEENT: [No blurred vision, tinnitus, nasal discharge, sorethroat or dysphagia.]  HEART: [No chest pain, palpitations or shortness of breath.]    LUNGS: [No cough, hemoptysis or breathing problems.]  ABDOMEN: [No abdominal pain, nausea, vomiting, diarrhea, constipation or melena.]  GENITOURINARY: [No dysuria, bleeding or malodorous discharge.]  NEURO: [No headache, dizziness or vertigo.]  HEMATOLOGY: [No easy bruising, spontaneous bleeding or blood clots in the past].  MUSCULOSKELETAL: [No arthralgias, myalgias or bone pains.]  SKIN: [No rashes or skin lesions.]  PSYCHIATRY: [No depression or anxiety.]    PHYSICAL EXAMINATION:   VS: Reviewed on nurses notes.  APPEARANCE: The patient is a well-developed, well-nourished and well-groomed  male who appears in no acute distress.  HEENT: No scleral icterus. Both external auditory canals clear. No oral ulcers, lesions. Throat clear  HEAD: No sinus tenderness.  NECK: Supple. No palpable lymphadenopathy. Thyroid non-tender, no palpable masses  CHEST: Breath sounds clear bilaterally. No rales. No rhonchi. Unlabored respirations.  CARDIOVASCULAR: Normal S1, S2. Normal rate. Regular rhythm.  ABDOMEN: Bowel sounds normal. No tenderness. No abdominal distention. No hepatomegaly. No splenomegaly.  LYMPHATIC: No palpable supraclavicular, axillary nodes  EXTREMITIES: No clubbing, cyanosis, edema  SKIN: No lesions. No petechiae. No ecchymoses. No induration or nodules  NEUROLOGIC: No focal findings. Alert & Oriented x 3. Mood  appropriate to affect    LABS:   Reviewed    IMAGING:  Reviewed    IMPRESSION:  1.  Diffuse large B-cell lymphoma [stage IV] [PTLD] [EBV positive]  2.  Recurrent DVT    PLAN:  1. I had a detailed discussion with the patient today with regard to his current clinical situation.  His most recent PET/CT scan done in May 2015 showed that his lymphoma continues to be in complete remission after 6 cycles of chemotherapy with R-CHOP/R-CEOP.   2. Bone marrow biopsy in July 2014 which was done for further evaluation of leukopenia showed unremarkable results with no evidence of lymphoma/leukemia or myelodysplasia.  Cytogenetics were normal.  Immunoglobin gene rearrangement study was normal. Most recent CBC's show normal blood counts.  3.  He has been advised to follow up with the heart transplant team at [facility], [place] as per their recommendations.   4. Continue therapeutic lovenox injections for history of recurrent DVT. He will need lifelong anticoagulation.  5. Advised to maintain active lifestyle with regular exercise, good diet and to avoid excessive weight gain. He expressed understanding.  6. He will continue follow up with Dr. Hale for his routine health maintenance/annual physical exam.    Follow up in 6 months with labs. He knows to call for any additional questions or new problems.    Pietro Martinez MD

## 2018-01-16 NOTE — LETTER
[facility] [address] [place] [address]  Phone: [phone]   Alfonso Leger  [address]  [address]  [place] [address]  MRN: [id number]      Dear Alfonso Leger:    Congratulations on coming up to your 5 1/2 years post heart transplant!    We recommend you complete the following on an annual basis.  Our patients need to have a primary care physician near home.    For your annual visit, you will need the following tests completed:    · Annual visit with the transplant cardiologist and transplant coordinator  · Stress test or angiogram (cath)  · Chest x-ray within the last year  · Fasting labs: CBC, CMP, lipid panel, magnesium, medication (prograf) levels.  · You should see Dermatology annually for a full body skin exam  · You should see your Dentist twice a year  · Get a flu shot annually  · See your PCP for recommendations on colonoscopy and other preventative health measures    Please let me know if you have any further questions.  My direct number is [phone].  I would like to schedule your routine fasting lab work to check your prograf level and Cholesterol.  The prograf level needs to be drawn 12 hours after the night time dose. Take your morning medications after you get your lab work drawn.  Please call me at my direct phone number.  Sincerely,      Staci Reno RN, BSN  Post Heart Transplant Coordinator  [facility]  [address] [place] [id number] [phone]

## 2018-01-18 DIAGNOSIS — B27.09 EBV MEDIATED PRIMARY LYMPHOMA OF LYMPH NODE: ICD-10-CM

## 2018-01-18 DIAGNOSIS — C85.80 EBV MEDIATED PRIMARY LYMPHOMA OF LYMPH NODE: ICD-10-CM

## 2018-01-18 DIAGNOSIS — N18.30 STAGE 3 CHRONIC KIDNEY DISEASE: ICD-10-CM

## 2018-01-18 DIAGNOSIS — I82.90 VENOUS THROMBOEMBOLISM: ICD-10-CM

## 2018-01-18 DIAGNOSIS — I07.1 TRICUSPID VALVE INSUFFICIENCY, UNSPECIFIED ETIOLOGY: ICD-10-CM

## 2018-01-18 DIAGNOSIS — Z94.1 STATUS POST HEART TRANSPLANTATION: ICD-10-CM

## 2018-01-18 DIAGNOSIS — D84.9 IMMUNOSUPPRESSION: ICD-10-CM

## 2018-01-18 DIAGNOSIS — D68.51 FACTOR V LEIDEN: Chronic | ICD-10-CM

## 2018-01-18 DIAGNOSIS — I10 ESSENTIAL HYPERTENSION: ICD-10-CM

## 2018-01-18 DIAGNOSIS — C83.30 DIFFUSE LARGE B-CELL LYMPHOMA, UNSPECIFIED BODY REGION: ICD-10-CM

## 2018-01-19 RX ORDER — AMLODIPINE BESYLATE 10 MG/1
10 TABLET ORAL DAILY
Qty: 30 TABLET | Refills: 11 | Status: SHIPPED | OUTPATIENT
Start: 2018-01-19 | End: 2019-03-03 | Stop reason: SDUPTHER

## 2018-03-06 ENCOUNTER — OFFICE VISIT (OUTPATIENT)
Dept: OPHTHALMOLOGY | Facility: CLINIC | Age: 48
End: 2018-03-06
Payer: MEDICARE

## 2018-03-06 DIAGNOSIS — H40.013 OAG (OPEN ANGLE GLAUCOMA) SUSPECT, LOW RISK, BILATERAL: Primary | ICD-10-CM

## 2018-03-06 PROCEDURE — 92012 INTRM OPH EXAM EST PATIENT: CPT | Mod: S$PBB,,, | Performed by: OPTOMETRIST

## 2018-03-06 PROCEDURE — 99211 OFF/OP EST MAY X REQ PHY/QHP: CPT | Mod: PBBFAC,PO | Performed by: OPTOMETRIST

## 2018-03-06 PROCEDURE — 99999 PR PBB SHADOW E&M-EST. PATIENT-LVL I: CPT | Mod: PBBFAC,,, | Performed by: OPTOMETRIST

## 2018-03-06 NOTE — PROGRESS NOTES
HPI     Glaucoma Suspect    Additional comments: IOP check           Comments   PT was last seen on 9/5/17 with DNL for coag susp testing. PT was told to   rtc 6 months for IOP check.  Medication eye drops if any: None  Last HVF: 9/5/17  Last gOCT: 9/5/17  Last SDPs:8/4/17        Last edited by Shannan Collins MA on 3/6/2018  9:08 AM. (History)            Assessment /Plan     For exam results, see Encounter Report.    OAG (open angle glaucoma) suspect, low risk, bilateral      IOP stable today and within acceptable range OU  Monitor 6 months    RTC 6 months for DFE, 24-2VF and gOCT or PRN  Discussed above and all questions were answered.

## 2018-03-30 RX ORDER — PRAVASTATIN SODIUM 40 MG/1
40 TABLET ORAL DAILY
Qty: 30 TABLET | Refills: 3 | Status: SHIPPED | OUTPATIENT
Start: 2018-03-30 | End: 2018-09-27 | Stop reason: SDUPTHER

## 2018-06-08 ENCOUNTER — TELEPHONE (OUTPATIENT)
Dept: TRANSPLANT | Facility: CLINIC | Age: 48
End: 2018-06-08

## 2018-06-08 DIAGNOSIS — Z94.1 STATUS POST HEART TRANSPLANT: ICD-10-CM

## 2018-06-08 DIAGNOSIS — Z79.899 ENCOUNTER FOR LONG-TERM (CURRENT) USE OF MEDICATIONS: ICD-10-CM

## 2018-06-08 DIAGNOSIS — T86.20 COMPLICATION OF HEART TRANSPLANT, UNSPECIFIED COMPLICATION: ICD-10-CM

## 2018-06-08 NOTE — LETTER
[facility] [address] [place] [address]  Phone: [phone]   June 12, 2018    Alfonso Leger  [address]  [address]  [place] [address]  MRN: [id number]          Dear Alfonso Leger:    Congratulations to coming up to 6 years post heart transplant!    We recommend you complete the following on an annual basis.  Our patients need to have a primary care physician near home.    For your annual visit, you will need the following tests completed:    · Annual visit with the transplant cardiologist and transplant coordinator  · Stress test   · Chest x-ray within the last year  · Fasting labs: CBC, CMP, lipid panel, magnesium, Tacrolimus level, PSA screen for males, and urinalysis  · You should see Dermatology annually for a full body skin exam, Increased risk of skin cancer.  · You should see your Dentist twice a year  · Get a flu shot annually  · See your PCP for recommendations on colonoscopy and other preventative health measures    Please let me know if you have any further questions.  My direct number is [phone].    Sincerely,        Staci Reno RN, BSN  Heart Transplant Coordinator  [facility]  [address] [place] [id number] [phone]

## 2018-06-08 NOTE — TELEPHONE ENCOUNTER
Unable to reach pt at current home phone listed in Epic, Left a message on his mother's phone number asking for updated phone number and need for f/u. Understaing it is a hardship for him to travel to [place].

## 2018-06-12 NOTE — TELEPHONE ENCOUNTER
Unable to contact pt, letter sent for trying to have pt complete annual, has not had any tests done since 2016.

## 2018-06-15 ENCOUNTER — PATIENT MESSAGE (OUTPATIENT)
Dept: TRANSPLANT | Facility: CLINIC | Age: 48
End: 2018-06-15

## 2018-06-19 ENCOUNTER — LAB VISIT (OUTPATIENT)
Dept: LAB | Facility: HOSPITAL | Age: 48
End: 2018-06-19
Attending: INTERNAL MEDICINE
Payer: MEDICARE

## 2018-06-19 DIAGNOSIS — R00.0 TACHYCARDIA: ICD-10-CM

## 2018-06-19 DIAGNOSIS — Z79.899 ENCOUNTER FOR LONG-TERM (CURRENT) USE OF MEDICATIONS: ICD-10-CM

## 2018-06-19 DIAGNOSIS — Z94.1 STATUS POST HEART TRANSPLANT: ICD-10-CM

## 2018-06-19 LAB
ALBUMIN SERPL BCP-MCNC: 4 G/DL
ALP SERPL-CCNC: 100 U/L
ALT SERPL W/O P-5'-P-CCNC: 17 U/L
ANION GAP SERPL CALC-SCNC: 10 MMOL/L
AST SERPL-CCNC: 17 U/L
BASOPHILS # BLD AUTO: 0.03 K/UL
BASOPHILS NFR BLD: 0.7 %
BILIRUB SERPL-MCNC: 0.8 MG/DL
BNP SERPL-MCNC: 74 PG/ML
BUN SERPL-MCNC: 13 MG/DL
CALCIUM SERPL-MCNC: 8.9 MG/DL
CHLORIDE SERPL-SCNC: 105 MMOL/L
CHOLEST SERPL-MCNC: 132 MG/DL
CHOLEST/HDLC SERPL: 2.8 {RATIO}
CO2 SERPL-SCNC: 27 MMOL/L
CREAT SERPL-MCNC: 1.6 MG/DL
DIFFERENTIAL METHOD: ABNORMAL
EOSINOPHIL # BLD AUTO: 0 K/UL
EOSINOPHIL NFR BLD: 0.9 %
ERYTHROCYTE [DISTWIDTH] IN BLOOD BY AUTOMATED COUNT: 12.8 %
EST. GFR  (AFRICAN AMERICAN): 58.4 ML/MIN/1.73 M^2
EST. GFR  (NON AFRICAN AMERICAN): 50.6 ML/MIN/1.73 M^2
GLUCOSE SERPL-MCNC: 89 MG/DL
HCT VFR BLD AUTO: 42.9 %
HDLC SERPL-MCNC: 47 MG/DL
HDLC SERPL: 35.6 %
HGB BLD-MCNC: 13.8 G/DL
IMM GRANULOCYTES # BLD AUTO: 0.01 K/UL
IMM GRANULOCYTES NFR BLD AUTO: 0.2 %
LDLC SERPL CALC-MCNC: 72.2 MG/DL
LYMPHOCYTES # BLD AUTO: 1.4 K/UL
LYMPHOCYTES NFR BLD: 31.7 %
MAGNESIUM SERPL-MCNC: 1.9 MG/DL
MCH RBC QN AUTO: 29.2 PG
MCHC RBC AUTO-ENTMCNC: 32.2 G/DL
MCV RBC AUTO: 91 FL
MONOCYTES # BLD AUTO: 0.4 K/UL
MONOCYTES NFR BLD: 10 %
NEUTROPHILS # BLD AUTO: 2.5 K/UL
NEUTROPHILS NFR BLD: 56.5 %
NONHDLC SERPL-MCNC: 85 MG/DL
NRBC BLD-RTO: 0 /100 WBC
PLATELET # BLD AUTO: 205 K/UL
PMV BLD AUTO: 9.5 FL
POTASSIUM SERPL-SCNC: 3.6 MMOL/L
PROT SERPL-MCNC: 7.9 G/DL
RBC # BLD AUTO: 4.73 M/UL
SODIUM SERPL-SCNC: 142 MMOL/L
TRIGL SERPL-MCNC: 64 MG/DL
WBC # BLD AUTO: 4.41 K/UL

## 2018-06-19 PROCEDURE — 86833 HLA CLASS II HIGH DEFIN QUAL: CPT

## 2018-06-19 PROCEDURE — 83735 ASSAY OF MAGNESIUM: CPT

## 2018-06-19 PROCEDURE — 80061 LIPID PANEL: CPT

## 2018-06-19 PROCEDURE — 86833 HLA CLASS II HIGH DEFIN QUAL: CPT | Mod: 91

## 2018-06-19 PROCEDURE — 86832 HLA CLASS I HIGH DEFIN QUAL: CPT

## 2018-06-19 PROCEDURE — 36415 COLL VENOUS BLD VENIPUNCTURE: CPT | Mod: PO

## 2018-06-19 PROCEDURE — 80197 ASSAY OF TACROLIMUS: CPT

## 2018-06-19 PROCEDURE — 86977 RBC SERUM PRETX INCUBJ/INHIB: CPT | Mod: 91

## 2018-06-19 PROCEDURE — 80053 COMPREHEN METABOLIC PANEL: CPT

## 2018-06-19 PROCEDURE — 83880 ASSAY OF NATRIURETIC PEPTIDE: CPT

## 2018-06-19 PROCEDURE — 86832 HLA CLASS I HIGH DEFIN QUAL: CPT | Mod: 91

## 2018-06-19 PROCEDURE — 85025 COMPLETE CBC W/AUTO DIFF WBC: CPT

## 2018-06-20 LAB — TACROLIMUS BLD-MCNC: 5.5 NG/ML

## 2018-06-21 ENCOUNTER — PATIENT MESSAGE (OUTPATIENT)
Dept: TRANSPLANT | Facility: CLINIC | Age: 48
End: 2018-06-21

## 2018-06-21 LAB
CLASS I ANTIBODY COMMENTS - LUMINEX: NORMAL
CLASS II ANTIBODY COMMENTS - LUMINEX: NORMAL
DSA1 TESTING DATE: NORMAL
DSA2 TESTING DATE: NORMAL
SERUM COLLECTION DT - LUMINEX CLASS I: NORMAL
SERUM COLLECTION DT - LUMINEX CLASS II: NORMAL

## 2018-06-26 ENCOUNTER — TELEPHONE (OUTPATIENT)
Dept: TRANSPLANT | Facility: CLINIC | Age: 48
End: 2018-06-26

## 2018-06-26 DIAGNOSIS — Z94.1 STATUS POST HEART TRANSPLANT: Primary | ICD-10-CM

## 2018-06-26 DIAGNOSIS — Z79.899 LONG-TERM USE OF HIGH-RISK MEDICATION: ICD-10-CM

## 2018-06-26 DIAGNOSIS — R00.0 TACHYCARDIA: ICD-10-CM

## 2018-06-26 NOTE — TELEPHONE ENCOUNTER
Sent a message to pt on 6/21/18, pt has no phone this was to be the way of Comminication, Message unread   Today's date 6/26/18.

## 2018-07-02 LAB
C1Q1 TESTING DATE: NORMAL
C1Q1 TESTING DATE: NORMAL
C1Q2 TESTING DATE: NORMAL
CLASS I ANTIBODY COMMENTS - LUMINEX: NORMAL
CLASS II ANTIBODY COMMENTS - LUMINEX: NORMAL
SERUM COLLECTION DT - LUMINEX CLASS I: NORMAL
SERUM COLLECTION DT - LUMINEX CLASS II: NORMAL

## 2018-07-05 ENCOUNTER — TELEPHONE (OUTPATIENT)
Dept: TRANSPLANT | Facility: CLINIC | Age: 48
End: 2018-07-05

## 2018-07-05 ENCOUNTER — CONFERENCE (OUTPATIENT)
Dept: TRANSPLANT | Facility: CLINIC | Age: 48
End: 2018-07-05

## 2018-07-05 NOTE — TELEPHONE ENCOUNTER
Patient had labs drawn on 06/19/18. Patient doing well, tacrolimus level at range, on 10mg prednisone as well.     No changes to medications at this time, will be returning to see me in clinic in August at [facility] clinic, believe a DSE is scheduled at that time as well. This will be an early annual visit for him.

## 2018-07-05 NOTE — TELEPHONE ENCOUNTER
Sending a note in [facility] to patient regarding recent labwork that was reviewed by Dr. Manzano. No changes needed at present.

## 2018-07-27 ENCOUNTER — LAB VISIT (OUTPATIENT)
Dept: LAB | Facility: HOSPITAL | Age: 48
End: 2018-07-27
Attending: INTERNAL MEDICINE
Payer: MEDICARE

## 2018-07-27 ENCOUNTER — OFFICE VISIT (OUTPATIENT)
Dept: HEMATOLOGY/ONCOLOGY | Facility: CLINIC | Age: 48
End: 2018-07-27
Payer: MEDICARE

## 2018-07-27 VITALS
HEIGHT: 72 IN | SYSTOLIC BLOOD PRESSURE: 117 MMHG | TEMPERATURE: 98 F | WEIGHT: 222.88 LBS | OXYGEN SATURATION: 97 % | HEART RATE: 103 BPM | BODY MASS INDEX: 30.19 KG/M2 | DIASTOLIC BLOOD PRESSURE: 85 MMHG | RESPIRATION RATE: 18 BRPM

## 2018-07-27 DIAGNOSIS — I82.90 VENOUS THROMBOEMBOLISM: ICD-10-CM

## 2018-07-27 DIAGNOSIS — R00.0 TACHYCARDIA: ICD-10-CM

## 2018-07-27 DIAGNOSIS — D68.51 FACTOR V LEIDEN: Chronic | ICD-10-CM

## 2018-07-27 DIAGNOSIS — Z79.01 CURRENT USE OF LONG TERM ANTICOAGULATION: ICD-10-CM

## 2018-07-27 DIAGNOSIS — Z85.72 HISTORY OF NON-HODGKIN'S LYMPHOMA: Primary | ICD-10-CM

## 2018-07-27 DIAGNOSIS — Z94.1 STATUS POST HEART TRANSPLANT: ICD-10-CM

## 2018-07-27 DIAGNOSIS — Z79.899 ENCOUNTER FOR LONG-TERM (CURRENT) USE OF MEDICATIONS: ICD-10-CM

## 2018-07-27 LAB
ALBUMIN SERPL BCP-MCNC: 4.2 G/DL
ALP SERPL-CCNC: 98 U/L
ALT SERPL W/O P-5'-P-CCNC: 19 U/L
ANION GAP SERPL CALC-SCNC: 14 MMOL/L
AST SERPL-CCNC: 19 U/L
BASOPHILS # BLD AUTO: 0.04 K/UL
BASOPHILS NFR BLD: 0.8 %
BILIRUB SERPL-MCNC: 0.9 MG/DL
BNP SERPL-MCNC: 92 PG/ML
BUN SERPL-MCNC: 25 MG/DL
CALCIUM SERPL-MCNC: 9.9 MG/DL
CHLORIDE SERPL-SCNC: 104 MMOL/L
CHOLEST SERPL-MCNC: 127 MG/DL
CHOLEST/HDLC SERPL: 2.6 {RATIO}
CO2 SERPL-SCNC: 22 MMOL/L
CREAT SERPL-MCNC: 1.9 MG/DL
DIFFERENTIAL METHOD: NORMAL
EOSINOPHIL # BLD AUTO: 0.1 K/UL
EOSINOPHIL NFR BLD: 1.3 %
ERYTHROCYTE [DISTWIDTH] IN BLOOD BY AUTOMATED COUNT: 13.1 %
EST. GFR  (AFRICAN AMERICAN): 47.5 ML/MIN/1.73 M^2
EST. GFR  (NON AFRICAN AMERICAN): 41.1 ML/MIN/1.73 M^2
GLUCOSE SERPL-MCNC: 99 MG/DL
HCT VFR BLD AUTO: 46.1 %
HDLC SERPL-MCNC: 49 MG/DL
HDLC SERPL: 38.6 %
HGB BLD-MCNC: 15 G/DL
IMM GRANULOCYTES # BLD AUTO: 0 K/UL
IMM GRANULOCYTES NFR BLD AUTO: 0 %
LDLC SERPL CALC-MCNC: 66.8 MG/DL
LYMPHOCYTES # BLD AUTO: 1.6 K/UL
LYMPHOCYTES NFR BLD: 32.8 %
MAGNESIUM SERPL-MCNC: 1.9 MG/DL
MCH RBC QN AUTO: 29.4 PG
MCHC RBC AUTO-ENTMCNC: 32.5 G/DL
MCV RBC AUTO: 90 FL
MONOCYTES # BLD AUTO: 0.4 K/UL
MONOCYTES NFR BLD: 8.4 %
NEUTROPHILS # BLD AUTO: 2.7 K/UL
NEUTROPHILS NFR BLD: 56.7 %
NONHDLC SERPL-MCNC: 78 MG/DL
NRBC BLD-RTO: 0 /100 WBC
PLATELET # BLD AUTO: 265 K/UL
PMV BLD AUTO: 10.2 FL
POTASSIUM SERPL-SCNC: 4.2 MMOL/L
PROT SERPL-MCNC: 8.5 G/DL
RBC # BLD AUTO: 5.11 M/UL
SODIUM SERPL-SCNC: 140 MMOL/L
TRIGL SERPL-MCNC: 56 MG/DL
WBC # BLD AUTO: 4.76 K/UL

## 2018-07-27 PROCEDURE — 86833 HLA CLASS II HIGH DEFIN QUAL: CPT | Mod: 91

## 2018-07-27 PROCEDURE — 86833 HLA CLASS II HIGH DEFIN QUAL: CPT

## 2018-07-27 PROCEDURE — 99999 PR PBB SHADOW E&M-EST. PATIENT-LVL III: CPT | Mod: PBBFAC,,, | Performed by: INTERNAL MEDICINE

## 2018-07-27 PROCEDURE — 83880 ASSAY OF NATRIURETIC PEPTIDE: CPT

## 2018-07-27 PROCEDURE — 80197 ASSAY OF TACROLIMUS: CPT

## 2018-07-27 PROCEDURE — 86832 HLA CLASS I HIGH DEFIN QUAL: CPT | Mod: 91

## 2018-07-27 PROCEDURE — 80061 LIPID PANEL: CPT

## 2018-07-27 PROCEDURE — 99214 OFFICE O/P EST MOD 30 MIN: CPT | Mod: S$PBB,,, | Performed by: INTERNAL MEDICINE

## 2018-07-27 PROCEDURE — 86977 RBC SERUM PRETX INCUBJ/INHIB: CPT | Mod: 91

## 2018-07-27 PROCEDURE — 86832 HLA CLASS I HIGH DEFIN QUAL: CPT

## 2018-07-27 PROCEDURE — 83735 ASSAY OF MAGNESIUM: CPT

## 2018-07-27 PROCEDURE — 80053 COMPREHEN METABOLIC PANEL: CPT

## 2018-07-27 PROCEDURE — 85025 COMPLETE CBC W/AUTO DIFF WBC: CPT

## 2018-07-27 PROCEDURE — 99213 OFFICE O/P EST LOW 20 MIN: CPT | Mod: PBBFAC,PO | Performed by: INTERNAL MEDICINE

## 2018-07-27 PROCEDURE — 36415 COLL VENOUS BLD VENIPUNCTURE: CPT | Mod: PO

## 2018-07-27 NOTE — PROGRESS NOTES
Reason for visit: Diffuse large B-cell lymphoma [PTLD]  Date of Diagnosis: August 2013    HPI:   The patient is a 48-year-old  male who presents to the hematology oncology clinic today for follow up of diffuse large B-cell lymphoma [PTLD].    The patient's diagnosis was made after a small bowel resection and liver biopsy. He has completed 6 cycles of chemotherapy with R-CHOP/CEOP in Dec 2013.   Today the patient reports that he feels well and has no specific complaints. He denies any abdominal pain, nausea or vomiting.  He denies any melena, hematochezia, hematemesis, hemoptysis or hematuria.  He denies any chest pain or shortness of breath.  He denies any fevers, chills or night sweats.  He denies any bowel or urinary complaints.  The patient is currently taking his therapeutic Lovenox injections and has been tolerating this well without any significant problems.  All of his interval clinical history available in [facility] has been reviewed.    PAST MEDICAL HISTORY:   1.  Dilated cardiomyopathy status post orthotopic heart transplant in October 2012 on chronic immunosuppression  2.  Factor V Leiden mutation  3.  Bilateral lower extremity DVT in August 2013  4.  Right brachial vein DVT in August 2013  5.  Peptic ulcer disease  6.  H. pylori gastritis  7.  Dyslipidemia  8.  Hypertension  9.  Corticosteroid-induced diabetes    SURGICAL HISTORY:   1.  Orthotopic heart transplantation in October 2012  2.  IVC filter placement in August 2011  3.  AICD placement and removal in September 2012  4.  Exploratory laparotomy with small bowel resection and wedge biopsy of the liver on August 7, 2013 for perforated viscus    FAMILY HISTORY: The patient stated that he cannot remember all of the details of his family history at this time.    SOCIAL HISTORY: He reports a 10-pack-year smoking history and quit in May 2011.  He reports drinking approximately 6 beers every day for about 20 years and quit this in May 2011.  He  reports a remote history of smoking marijuana and his last use was in 2001/2002.  He is single and does not have any children.  He lives with his mother in [place].  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    ALLERGIES: NKDA    MEDICATIONS: [Medcard has been reviewed and/or reconciled.]    REVIEW OF SYSTEMS:   GENERAL: [No fevers, chills or sweats. No fatigue, weight loss or loss of appetite.]  HEENT: [No blurred vision, tinnitus, nasal discharge, sorethroat or dysphagia.]  HEART: [No chest pain, palpitations or shortness of breath.]    LUNGS: [No cough, hemoptysis or breathing problems.]  ABDOMEN: [No abdominal pain, nausea, vomiting, diarrhea, constipation or melena.]  GENITOURINARY: [No dysuria, bleeding or malodorous discharge.]  NEURO: [No headache, dizziness or vertigo.]  HEMATOLOGY: [No easy bruising, spontaneous bleeding or blood clots in the past].  MUSCULOSKELETAL: [No arthralgias, myalgias or bone pains.]  SKIN: [No rashes or skin lesions.]  PSYCHIATRY: [No depression or anxiety.]    PHYSICAL EXAMINATION:   VS: Reviewed on nurses notes.  APPEARANCE: The patient is a well-developed, well-nourished and well-groomed  male who appears in no acute distress.  HEENT: No scleral icterus. Both external auditory canals clear. No oral ulcers, lesions. Throat clear  HEAD: No sinus tenderness.  NECK: Supple. No palpable lymphadenopathy. Thyroid non-tender, no palpable masses  CHEST: Breath sounds clear bilaterally. No rales. No rhonchi. Unlabored respirations.  CARDIOVASCULAR: Normal S1, S2. Normal rate. Regular rhythm.  ABDOMEN: Bowel sounds normal. No tenderness. No abdominal distention. No hepatomegaly. No splenomegaly.  LYMPHATIC: No palpable supraclavicular, axillary nodes  EXTREMITIES: No clubbing, cyanosis, edema  SKIN: No lesions. No petechiae. No ecchymoses. No induration or nodules  NEUROLOGIC: No focal findings. Alert & Oriented x 3. Mood  appropriate to affect    LABS:   Reviewed    IMAGING:  Reviewed    IMPRESSION:  1.  Diffuse large B-cell lymphoma [stage IV] [PTLD] [EBV positive]  2.  Recurrent DVT    PLAN:  1. I had a detailed discussion with the patient today with regard to his current clinical situation.  His most recent PET/CT scan done in May 2015 showed that his lymphoma continues to be in complete remission after 6 cycles of chemotherapy with R-CHOP/R-CEOP.   2. Bone marrow biopsy in July 2014 which was done for further evaluation of leukopenia showed unremarkable results with no evidence of lymphoma/leukemia or myelodysplasia.  Cytogenetics were normal.  Immunoglobin gene rearrangement study was normal. Most recent CBC's show normal blood counts.  3.  He has been advised to follow up with the heart transplant team at [facility], [place] as per their recommendations.   4. Continue therapeutic lovenox injections for history of recurrent DVT. He will need lifelong anticoagulation.  5. Advised to maintain active lifestyle with regular exercise, good diet and to avoid excessive weight gain. He expressed understanding.  6. He will continue follow up with Dr. Hale for his routine health maintenance/annual physical exam.    Follow up in 6 months with labs. He knows to call for any additional questions or new problems.    Pietro Martinez MD

## 2018-07-28 LAB — TACROLIMUS BLD-MCNC: 7.8 NG/ML

## 2018-07-30 LAB
CLASS I ANTIBODY COMMENTS - LUMINEX: NORMAL
CLASS II ANTIBODY COMMENTS - LUMINEX: NORMAL
DSA1 TESTING DATE: NORMAL
DSA12 TESTING DATE: NORMAL
DSA2 TESTING DATE: NORMAL
SERUM COLLECTION DT - LUMINEX CLASS I: NORMAL
SERUM COLLECTION DT - LUMINEX CLASS II: NORMAL

## 2018-08-02 ENCOUNTER — OFFICE VISIT (OUTPATIENT)
Dept: INTERNAL MEDICINE | Facility: CLINIC | Age: 48
End: 2018-08-02
Payer: MEDICARE

## 2018-08-02 VITALS
HEIGHT: 72 IN | SYSTOLIC BLOOD PRESSURE: 116 MMHG | TEMPERATURE: 96 F | WEIGHT: 220.25 LBS | DIASTOLIC BLOOD PRESSURE: 82 MMHG | HEART RATE: 110 BPM | BODY MASS INDEX: 29.83 KG/M2

## 2018-08-02 DIAGNOSIS — B27.09 EBV MEDIATED PRIMARY LYMPHOMA OF LYMPH NODE: ICD-10-CM

## 2018-08-02 DIAGNOSIS — I42.8 NON-ISCHEMIC CARDIOMYOPATHY: ICD-10-CM

## 2018-08-02 DIAGNOSIS — D84.9 IMMUNOSUPPRESSION: ICD-10-CM

## 2018-08-02 DIAGNOSIS — I10 ESSENTIAL HYPERTENSION: ICD-10-CM

## 2018-08-02 DIAGNOSIS — Z12.5 SCREENING PSA (PROSTATE SPECIFIC ANTIGEN): ICD-10-CM

## 2018-08-02 DIAGNOSIS — Z94.1 STATUS POST HEART TRANSPLANTATION: ICD-10-CM

## 2018-08-02 DIAGNOSIS — E11.9 CONTROLLED TYPE 2 DIABETES MELLITUS WITHOUT COMPLICATION, WITHOUT LONG-TERM CURRENT USE OF INSULIN: Primary | ICD-10-CM

## 2018-08-02 DIAGNOSIS — C85.80 EBV MEDIATED PRIMARY LYMPHOMA OF LYMPH NODE: ICD-10-CM

## 2018-08-02 PROCEDURE — 99214 OFFICE O/P EST MOD 30 MIN: CPT | Mod: S$PBB,,, | Performed by: FAMILY MEDICINE

## 2018-08-02 PROCEDURE — 99999 PR PBB SHADOW E&M-EST. PATIENT-LVL III: CPT | Mod: PBBFAC,,, | Performed by: FAMILY MEDICINE

## 2018-08-02 PROCEDURE — 99213 OFFICE O/P EST LOW 20 MIN: CPT | Mod: PBBFAC,PO | Performed by: FAMILY MEDICINE

## 2018-08-02 NOTE — PROGRESS NOTES
Subjective:       Patient ID: Alfonso Leger is a 48 y.o. male.    Chief Complaint: Follow-up    F/U:      Pt is a 48 year old who is here for follow-up. Pt is a heart transplant pt who is followed by [facility]. Pt also see Hematology due to chronic DVT and on anticoagulation. BP is well controlled. Pt had issues of sugar when taking on a regular basis prednisone. Since lower dose the hgA1C has been well controlled      Review of Systems   Constitutional: Negative for activity change and unexpected weight change.   HENT: Negative for hearing loss, rhinorrhea and trouble swallowing.    Eyes: Negative for discharge and visual disturbance.   Respiratory: Negative for chest tightness and wheezing.    Cardiovascular: Negative for chest pain and palpitations.   Gastrointestinal: Negative for blood in stool, constipation, diarrhea and vomiting.   Endocrine: Negative for polydipsia and polyuria.   Genitourinary: Negative for difficulty urinating, hematuria and urgency.   Musculoskeletal: Negative for arthralgias, joint swelling and neck pain.   Neurological: Negative for weakness and headaches.   Psychiatric/Behavioral: Negative for confusion and dysphoric mood.       Objective:      Physical Exam   Constitutional: He is oriented to person, place, and time. He appears well-developed and well-nourished.   HENT:   Head: Normocephalic.   Eyes: EOM are normal. Pupils are equal, round, and reactive to light.   Neck: Normal range of motion. Neck supple. No JVD present. No thyromegaly present.   Cardiovascular: Normal rate and regular rhythm.    Pulmonary/Chest: Effort normal and breath sounds normal.   Abdominal: Soft. Bowel sounds are normal.   Musculoskeletal: Normal range of motion.   Lymphadenopathy:     He has no cervical adenopathy.   Neurological: He is alert and oriented to person, place, and time. He has normal reflexes.   Skin: Skin is warm and dry.   Psychiatric: He has a normal mood and affect. His behavior is  normal.       Assessment:       1. Controlled type 2 diabetes mellitus without complication, without long-term current use of insulin    2. Essential hypertension    3. Status post heart transplantation    4. 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 Immunosuppression    6. Non-ischemic cardiomyopathy    7. 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

## 2018-08-21 ENCOUNTER — HOSPITAL ENCOUNTER (OUTPATIENT)
Dept: RADIOLOGY | Facility: HOSPITAL | Age: 48
Discharge: HOME | End: 2018-08-21
Attending: INTERNAL MEDICINE
Payer: MEDICARE

## 2018-08-21 ENCOUNTER — CLINICAL SUPPORT (OUTPATIENT)
Dept: CARDIOLOGY | Facility: CLINIC | Age: 48
End: 2018-08-21
Attending: INTERNAL MEDICINE
Payer: MEDICARE

## 2018-08-21 ENCOUNTER — OFFICE VISIT (OUTPATIENT)
Dept: TRANSPLANT | Facility: CLINIC | Age: 48
End: 2018-08-21
Payer: MEDICARE

## 2018-08-21 VITALS
BODY MASS INDEX: 30.28 KG/M2 | SYSTOLIC BLOOD PRESSURE: 140 MMHG | DIASTOLIC BLOOD PRESSURE: 112 MMHG | HEART RATE: 96 BPM | WEIGHT: 223.56 LBS | HEIGHT: 72 IN

## 2018-08-21 DIAGNOSIS — Z79.899 LONG-TERM USE OF HIGH-RISK MEDICATION: ICD-10-CM

## 2018-08-21 DIAGNOSIS — N18.30 STAGE 3 CHRONIC KIDNEY DISEASE: ICD-10-CM

## 2018-08-21 DIAGNOSIS — Z79.01 CURRENT USE OF LONG TERM ANTICOAGULATION: ICD-10-CM

## 2018-08-21 DIAGNOSIS — Z85.72 HISTORY OF NON-HODGKIN'S LYMPHOMA: ICD-10-CM

## 2018-08-21 DIAGNOSIS — I07.1 TRICUSPID VALVE INSUFFICIENCY, UNSPECIFIED ETIOLOGY: ICD-10-CM

## 2018-08-21 DIAGNOSIS — Z94.1 STATUS POST HEART TRANSPLANT: ICD-10-CM

## 2018-08-21 DIAGNOSIS — Z94.1 STATUS POST HEART TRANSPLANTATION: ICD-10-CM

## 2018-08-21 DIAGNOSIS — I42.8 NON-ISCHEMIC CARDIOMYOPATHY: Primary | ICD-10-CM

## 2018-08-21 DIAGNOSIS — R00.0 TACHYCARDIA: ICD-10-CM

## 2018-08-21 LAB
DIASTOLIC DYSFUNCTION: YES
ESTIMATED PA SYSTOLIC PRESSURE: 48.18
RETIRED EF AND QEF - SEE NOTES: 35 (ref 55–65)
TRICUSPID VALVE REGURGITATION: ABNORMAL

## 2018-08-21 PROCEDURE — 99214 OFFICE O/P EST MOD 30 MIN: CPT | Mod: S$PBB,,, | Performed by: INTERNAL MEDICINE

## 2018-08-21 PROCEDURE — 71046 X-RAY EXAM CHEST 2 VIEWS: CPT | Mod: 26,,, | Performed by: RADIOLOGY

## 2018-08-21 PROCEDURE — 71046 X-RAY EXAM CHEST 2 VIEWS: CPT | Mod: TC

## 2018-08-21 PROCEDURE — 93306 TTE W/DOPPLER COMPLETE: CPT | Mod: PBBFAC | Performed by: INTERNAL MEDICINE

## 2018-08-21 PROCEDURE — 99212 OFFICE O/P EST SF 10 MIN: CPT | Mod: PBBFAC,25,PO | Performed by: INTERNAL MEDICINE

## 2018-08-21 PROCEDURE — 99999 PR PBB SHADOW E&M-EST. PATIENT-LVL II: CPT | Mod: PBBFAC,,, | Performed by: INTERNAL MEDICINE

## 2018-08-21 NOTE — PROGRESS NOTES
Subjective:   Mr. Leger is a 48 y.o. year old Black or  male who received a  - brain death heart transplant on 10/29/12.      CMV status:   Donor: negative   Recipient: negative    HPI      Mr. Leger is a 48 year old AAM  S/p OHT 10/29/12, with post-op severe TR, LVEF 40-45% (chronically down, repeat echo pending today), with Factor V leiden deficiency, with history of DVT on lovenox, HTN, CKD, HLP, PTLD, s/p R-CHOP/CEOP 2013, after which he remains followed closely by Dr. Martinez here in [place]. He states he feels great, denies having any problems with his cardiac status, breathing, or volume issues. Patient denies N/V/F/C, lightheadedness, dizziness, PND, orthopnea, LE edema, abdominal pain, abdominal pressure, chest pain, chest pressure, syncope, pre-syncope. Had angiogram last year in October with normal coronary arteries. Labs pending today. On tacrolimus and prednisone only for immunosuppression therapy.     [facility] 16:  Normal coronary arteries, diastolic dysfunction    Review of Systems   Constitution: Negative for decreased appetite, weight gain and weight loss.   Cardiovascular: Negative for chest pain, dyspnea on exertion, leg swelling, near-syncope, orthopnea and palpitations.   Respiratory: Negative for cough and shortness of breath.    Musculoskeletal: Negative for myalgias.   Gastrointestinal: Negative for jaundice.     DSE (16)  CONCLUSIONS     1 - Post-cardiac transplantation study.     2 - Moderately depressed left ventricular systolic function (EF 30-35%).     3 - Right ventricular enlargement with moderately depressed systolic function.     4 - Severe tricuspid regurgitation.     5 - Pulmonary hypertension. The estimated PA systolic pressure is 44 mmHg (may be an underestimation in the setting of such severe TR).     6 - Increased central venous pressure.     No evidence of stress induced myocardial ischemia.     Objective:   Physical Exam   Constitutional: He is  oriented to person, place, and time. He appears well-developed and well-nourished. He is active. He is not intubated.   BP (!) 140/112   Pulse 96   Ht 6' (1.829 m)   Wt 101.4 kg (223 lb 8.7 oz)   BMI 30.32 kg/m²      HENT:   Head: Normocephalic and atraumatic. Hair is normal.   Right Ear: External ear normal.   Left Ear: External ear normal.   Nose: Nose normal. No nasal deformity. No epistaxis.  No foreign bodies.   Mouth/Throat: Mucous membranes are normal. Mucous membranes are not cyanotic. No oropharyngeal exudate.   Eyes: Conjunctivae and EOM are normal. Pupils are equal, round, and reactive to light.   Neck: Neck supple. No hepatojugular reflux and no JVD present.   Cardiovascular: Normal rate, regular rhythm, normal heart sounds and normal pulses. Exam reveals no gallop.   Pulmonary/Chest: Effort normal and breath sounds normal. No apnea and no tachypnea. He is not intubated. No respiratory distress. He exhibits no tenderness.   Abdominal: Soft. Normal appearance and bowel sounds are normal. There is no tenderness. No hernia.   Musculoskeletal: Normal range of motion.   Neurological: He is alert and oriented to person, place, and time. He displays no seizure activity.   Skin: Skin is warm, dry and intact. No rash noted. No pallor.   Psychiatric: He has a normal mood and affect. His speech is normal and behavior is normal. Thought content normal. Cognition and memory are normal.   Nursing note and vitals reviewed.      Lab Results   Component Value Date    WBC 4.76 07/27/2018    HGB 15.0 07/27/2018    HCT 46.1 07/27/2018    MCV 90 07/27/2018     07/27/2018    CO2 22 (L) 07/27/2018    CREATININE 1.9 (H) 07/27/2018    CALCIUM 9.9 07/27/2018    ALBUMIN 4.2 07/27/2018    AST 19 07/27/2018    BNP 92 07/27/2018    ALT 19 07/27/2018    ALLOMAP 28 05/27/2016       Lab Results   Component Value Date    INR 1.1 07/08/2016    INR 1.1 10/16/2013    INR 1.4 (H) 08/07/2013       BNP   Date Value Ref Range Status    07/27/2018 92 0 - 99 pg/mL Final     Comment:     Values of less than 100 pg/ml are consistent with non-CHF populations.   06/19/2018 74 0 - 99 pg/mL Final     Comment:     Values of less than 100 pg/ml are consistent with non-CHF populations.   07/28/2017 72 0 - 99 pg/mL Final     Comment:     Values of less than 100 pg/ml are consistent with non-CHF populations.       Tacrolimus Lvl   Date Value Ref Range Status   07/27/2018 7.8 5.0 - 15.0 ng/mL Final     Comment:     Testing performed by Liquid Chromatography-Tandem  Mass Spectrometry (LC-MS/MS).  This test was developed and its performance characteristics  determined by [facility], Department of Pathology  and Laboratory Medicine in a manner consistent with CLIA  requirements. It has not been cleared or approved by the [place]  Food and Drug Administration.  This test is used for clinical   purposes.  It should not be regarded as investigational or for  research.           Assessment:     1. Non-ischemic cardiomyopathy    2. Status post heart transplantation    3. Tricuspid valve insufficiency, unspecified etiology    4. Stage 3 chronic kidney disease    5. Current use of long term anticoagulation    6. History of non-Hodgkin's lymphoma        Plan:   HTN stable.  Angiogram with no CAV, can either get regular echo or DSE this year, not as crucial given how recently he had angiogram, at time of annual evaluation.  CKD- baseline creatinine 1.7-1.9, will see what repeat labs are today.   Immunosuppression- goal tacrolimus level 5-8    Return instructions as set forth by post transplant schedule or as needed:    Clinic: Return for labs and/or biopsy weekly the first month, every two weeks during month 2 and then monthly for the first year at the provider or coordinator's discretion. During the second year, return to clinic every 3 months. Post transplant year 3-5 return every 6 months. There will be a comprehensive post transplant evaluation every year that  may include LHC/RHC/biopsy, stress test, echo, CXR, and other health screening exams.    In addition to the clinical assessment, I have ordered Allomap testing for this patient to assist in identification of moderate/severe acute cellular rejection (ACR) in a pt with stable Allograft function instead of endomyocardial biopsy.     Patient is reminded to call with any health changes as these can be early signs of transplant complications. Patient is advised to make sure any new medications or changes of old medications are discussed with a pharmacist or physician knowledgeable with transplant to avoid rejection/drug toxicity related to significant drug interactions.    Collette Manzano MD

## 2018-09-06 ENCOUNTER — OFFICE VISIT (OUTPATIENT)
Dept: OPHTHALMOLOGY | Facility: CLINIC | Age: 48
End: 2018-09-06
Payer: MEDICARE

## 2018-09-06 DIAGNOSIS — E11.9 TYPE 2 DIABETES MELLITUS WITHOUT RETINOPATHY: Primary | ICD-10-CM

## 2018-09-06 DIAGNOSIS — H52.13 MYOPIA WITH ASTIGMATISM AND PRESBYOPIA, BILATERAL: ICD-10-CM

## 2018-09-06 DIAGNOSIS — H40.013 OAG (OPEN ANGLE GLAUCOMA) SUSPECT, LOW RISK, BILATERAL: ICD-10-CM

## 2018-09-06 DIAGNOSIS — H52.203 MYOPIA WITH ASTIGMATISM AND PRESBYOPIA, BILATERAL: ICD-10-CM

## 2018-09-06 DIAGNOSIS — H52.4 MYOPIA WITH ASTIGMATISM AND PRESBYOPIA, BILATERAL: ICD-10-CM

## 2018-09-06 PROCEDURE — 92015 DETERMINE REFRACTIVE STATE: CPT | Mod: ,,, | Performed by: OPTOMETRIST

## 2018-09-06 PROCEDURE — 99999 PR PBB SHADOW E&M-EST. PATIENT-LVL I: CPT | Mod: PBBFAC,,, | Performed by: OPTOMETRIST

## 2018-09-06 PROCEDURE — 99211 OFF/OP EST MAY X REQ PHY/QHP: CPT | Mod: PBBFAC,PO,25 | Performed by: OPTOMETRIST

## 2018-09-06 PROCEDURE — 92083 EXTENDED VISUAL FIELD XM: CPT | Mod: PBBFAC,PO | Performed by: OPTOMETRIST

## 2018-09-06 PROCEDURE — 92014 COMPRE OPH EXAM EST PT 1/>: CPT | Mod: S$PBB,,, | Performed by: OPTOMETRIST

## 2018-09-06 PROCEDURE — 92133 CPTRZD OPH DX IMG PST SGM ON: CPT | Mod: PBBFAC,PO | Performed by: OPTOMETRIST

## 2018-09-06 NOTE — PROGRESS NOTES
HPI     Glaucoma Suspect      Additional comments: DFE, 24-2VF and gOCT              Comments     PTs last visit was 3/6/18 with DNL. PT was told to rtc 6 months for DFE,   24-2VF and gOCT.  Medication eye drops if any: none  Last HVF: 9/6/18  Last gOCT: 9/6/18  Last SDP: 8/4/17   Diabetic eye exam  Diagnosed with diabetes in 2013  Recent vision fluctuations no  Blood sugar: 5.7  HPI    Any vision changes since last exam: no  Eye pain: no  Other ocular symptoms: no    Do you wear currently wear glasses or contacts? gls    Interested in contacts today? no    Do you plan on getting new glasses today? If needed                  Last edited by Shannan Collins MA on 9/6/2018 10:43 AM. (History)            Assessment /Plan     For exam results, see Encounter Report.    Type 2 diabetes mellitus without retinopathy  No diabetic retinopathy OD, OS  Continue close care with PCP  Monitor 12 months    OAG (open angle glaucoma) suspect, low risk, bilateral  -     Lopez Visual Field - OU - Extended - Both Eyes  -     Posterior Segment OCT Optic Nerve- Both eyes  IOP stable as is gOCT and VF today  Monitor 6 months    Myopia with astigmatism and presbyopia, bilateral  Eyeglass Final Rx     Eyeglass Final Rx       Sphere Cylinder Axis    Right -2.75 +0.75 090    Left -2.75 +0.75 100    Expiration Date:  9/7/2019              Pt req SV distance only    RTC 6 months for IOP check or PRN  Discussed above and all questions were answered.

## 2018-09-27 RX ORDER — PRAVASTATIN SODIUM 40 MG/1
40 TABLET ORAL DAILY
Qty: 30 TABLET | Refills: 3 | Status: SHIPPED | OUTPATIENT
Start: 2018-09-27 | End: 2019-02-27 | Stop reason: SDUPTHER

## 2018-10-05 DIAGNOSIS — E11.9 TYPE 2 DIABETES MELLITUS WITHOUT COMPLICATION: ICD-10-CM

## 2018-11-02 DIAGNOSIS — I82.90 VENOUS THROMBOEMBOLISM: ICD-10-CM

## 2018-11-02 RX ORDER — TACROLIMUS 1 MG/1
CAPSULE ORAL
Qty: 270 CAPSULE | Refills: 2 | Status: SHIPPED | OUTPATIENT
Start: 2018-11-02 | End: 2019-08-30 | Stop reason: SDUPTHER

## 2018-11-02 RX ORDER — ENOXAPARIN SODIUM 100 MG/ML
INJECTION SUBCUTANEOUS
Qty: 60 SYRINGE | Refills: 5 | Status: SHIPPED | OUTPATIENT
Start: 2018-11-02

## 2019-01-25 ENCOUNTER — LAB VISIT (OUTPATIENT)
Dept: LAB | Facility: HOSPITAL | Age: 49
End: 2019-01-25
Attending: INTERNAL MEDICINE
Payer: MEDICARE

## 2019-01-25 DIAGNOSIS — Z94.1 STATUS POST HEART TRANSPLANT: ICD-10-CM

## 2019-01-25 DIAGNOSIS — I82.90 VENOUS THROMBOEMBOLISM: ICD-10-CM

## 2019-01-25 DIAGNOSIS — D68.51 FACTOR V LEIDEN: Chronic | ICD-10-CM

## 2019-01-25 DIAGNOSIS — T86.20 COMPLICATION OF HEART TRANSPLANT, UNSPECIFIED COMPLICATION: ICD-10-CM

## 2019-01-25 DIAGNOSIS — Z79.899 LONG-TERM USE OF HIGH-RISK MEDICATION: ICD-10-CM

## 2019-01-25 DIAGNOSIS — Z79.899 ENCOUNTER FOR LONG-TERM (CURRENT) USE OF MEDICATIONS: ICD-10-CM

## 2019-01-25 DIAGNOSIS — Z79.01 CURRENT USE OF LONG TERM ANTICOAGULATION: ICD-10-CM

## 2019-01-25 DIAGNOSIS — Z85.72 HISTORY OF NON-HODGKIN'S LYMPHOMA: ICD-10-CM

## 2019-01-25 DIAGNOSIS — R00.0 TACHYCARDIA: ICD-10-CM

## 2019-01-25 LAB
ALBUMIN SERPL BCP-MCNC: 4.2 G/DL
ALP SERPL-CCNC: 104 U/L
ALT SERPL W/O P-5'-P-CCNC: 14 U/L
ANION GAP SERPL CALC-SCNC: 12 MMOL/L
AST SERPL-CCNC: 14 U/L
BASOPHILS # BLD AUTO: 0.03 K/UL
BASOPHILS NFR BLD: 0.5 %
BILIRUB SERPL-MCNC: 1.2 MG/DL
BUN SERPL-MCNC: 21 MG/DL
CALCIUM SERPL-MCNC: 9.3 MG/DL
CHLORIDE SERPL-SCNC: 105 MMOL/L
CHOLEST SERPL-MCNC: 111 MG/DL
CHOLEST/HDLC SERPL: 2.2 {RATIO}
CO2 SERPL-SCNC: 26 MMOL/L
CREAT SERPL-MCNC: 1.8 MG/DL
DIFFERENTIAL METHOD: NORMAL
EOSINOPHIL # BLD AUTO: 0.1 K/UL
EOSINOPHIL NFR BLD: 0.9 %
ERYTHROCYTE [DISTWIDTH] IN BLOOD BY AUTOMATED COUNT: 12.6 %
EST. GFR  (AFRICAN AMERICAN): 51 ML/MIN/1.73 M^2
EST. GFR  (NON AFRICAN AMERICAN): 44 ML/MIN/1.73 M^2
GLUCOSE SERPL-MCNC: 96 MG/DL
HCT VFR BLD AUTO: 46.5 %
HDLC SERPL-MCNC: 51 MG/DL
HDLC SERPL: 45.9 %
HGB BLD-MCNC: 14.9 G/DL
IMM GRANULOCYTES # BLD AUTO: 0.01 K/UL
IMM GRANULOCYTES NFR BLD AUTO: 0.2 %
LDH SERPL L TO P-CCNC: 159 U/L
LDLC SERPL CALC-MCNC: 48.6 MG/DL
LYMPHOCYTES # BLD AUTO: 2 K/UL
LYMPHOCYTES NFR BLD: 33.6 %
MAGNESIUM SERPL-MCNC: 1.6 MG/DL
MCH RBC QN AUTO: 29.1 PG
MCHC RBC AUTO-ENTMCNC: 32 G/DL
MCV RBC AUTO: 91 FL
MONOCYTES # BLD AUTO: 0.5 K/UL
MONOCYTES NFR BLD: 8 %
NEUTROPHILS # BLD AUTO: 3.3 K/UL
NEUTROPHILS NFR BLD: 57 %
NONHDLC SERPL-MCNC: 60 MG/DL
NRBC BLD-RTO: 0 /100 WBC
PLATELET # BLD AUTO: 235 K/UL
PMV BLD AUTO: 9.3 FL
POTASSIUM SERPL-SCNC: 3.8 MMOL/L
PROT SERPL-MCNC: 8.5 G/DL
RBC # BLD AUTO: 5.12 M/UL
SODIUM SERPL-SCNC: 143 MMOL/L
TRIGL SERPL-MCNC: 57 MG/DL
WBC # BLD AUTO: 5.84 K/UL

## 2019-01-25 PROCEDURE — 80061 LIPID PANEL: CPT

## 2019-01-25 PROCEDURE — 83615 LACTATE (LD) (LDH) ENZYME: CPT

## 2019-01-25 PROCEDURE — 36415 COLL VENOUS BLD VENIPUNCTURE: CPT

## 2019-01-25 PROCEDURE — 83735 ASSAY OF MAGNESIUM: CPT

## 2019-01-25 PROCEDURE — 80197 ASSAY OF TACROLIMUS: CPT

## 2019-01-25 PROCEDURE — 80053 COMPREHEN METABOLIC PANEL: CPT

## 2019-01-25 PROCEDURE — 85025 COMPLETE CBC W/AUTO DIFF WBC: CPT

## 2019-01-26 LAB — TACROLIMUS BLD-MCNC: 8 NG/ML

## 2019-01-29 NOTE — PROGRESS NOTES
Subjective:       Patient ID: Alfonso Leger is a 48 y.o. male.    Chief Complaint: History of Hodgkins Lymphoma; Factor V Leiden; and Life long anticoagulation    HPI: Reason for visit: Diffuse large B-cell lymphoma [PTLD]  Date of Diagnosis: August 2013    48-year-old  male who presents to the hematology oncology clinic today for follow up of diffuse large B-cell lymphoma [PTLD].      The patient's diagnosis was made after a small bowel resection and liver biopsy. He has completed 6 cycles of chemotherapy with R-CHOP/CEOP in Dec 2013.     Today the patient reports that he feels well and has no specific complaints. He denies any abdominal pain, nausea or vomiting.  He denies any melena, hematochezia, hematemesis, hemoptysis or hematuria.  He denies any chest pain or shortness of breath.  He denies any fevers, chills or night sweats.  He denies any bowel or urinary complaints.    The patient is currently taking his therapeutic Lovenox injections and has been tolerating this well without any significant problems. History of Factor V Leiden mutation and  Bilateral lower extremity DVT in August 2013    All of his interval clinical history available in [facility] has been reviewed.    Pt denies any specific complaints- ROS is negative.     Not exercising- encouraged to exercise.     Review of Systems   Constitutional: Negative.  Negative for appetite change, chills, diaphoresis, fatigue, fever and unexpected weight change.   HENT: Negative.    Eyes: Negative.    Respiratory: Negative.  Negative for cough and shortness of breath.    Cardiovascular: Negative.  Negative for chest pain, palpitations and leg swelling.   Gastrointestinal: Negative.  Negative for abdominal distention, abdominal pain, blood in stool, constipation, diarrhea, nausea and vomiting.   Endocrine: Negative.    Genitourinary: Negative.  Negative for flank pain and hematuria.   Musculoskeletal: Negative.  Negative for arthralgias, back  pain and joint swelling.   Skin: Negative.  Negative for rash.   Allergic/Immunologic: Positive for immunocompromised state.   Neurological: Negative.  Negative for weakness, light-headedness and headaches.   Hematological: Negative.  Negative for adenopathy. Does not bruise/bleed easily.   Psychiatric/Behavioral: Negative.        Objective:      Physical Exam   Constitutional: He is oriented to person, place, and time. He appears well-developed and well-nourished. No distress.   HENT:   Head: Normocephalic and atraumatic.   Right Ear: External ear normal.   Left Ear: External ear normal.   Nose: Nose normal.   Mouth/Throat: Oropharynx is clear and moist. No oropharyngeal exudate.   Eyes: Conjunctivae and EOM are normal. Pupils are equal, round, and reactive to light. Right eye exhibits no discharge. Left eye exhibits no discharge. No scleral icterus.   Neck: Normal range of motion. Neck supple. No thyromegaly present.   Cardiovascular: Normal rate, regular rhythm and normal heart sounds. Exam reveals no gallop and no friction rub.   No murmur heard.  Pulmonary/Chest: Effort normal and breath sounds normal. No respiratory distress. He has no wheezes. He has no rales.   Abdominal: Soft. Bowel sounds are normal.   Musculoskeletal: Normal range of motion. He exhibits no edema.   Lymphadenopathy:        Head (right side): No submental, no submandibular, no tonsillar, no preauricular, no posterior auricular and no occipital adenopathy present.        Head (left side): No submental, no submandibular, no tonsillar, no preauricular, no posterior auricular and no occipital adenopathy present.     He has no cervical adenopathy.        Right cervical: No superficial cervical, no deep cervical and no posterior cervical adenopathy present.       Left cervical: No superficial cervical, no deep cervical and no posterior cervical adenopathy present.     He has no axillary adenopathy.        Right axillary: No pectoral and no  lateral adenopathy present.        Left axillary: No pectoral and no lateral adenopathy present.       Right: No inguinal and no supraclavicular adenopathy present.        Left: No inguinal and no supraclavicular adenopathy present.   Neurological: He is alert and oriented to person, place, and time. He has normal strength. He exhibits normal muscle tone. Gait normal.   Skin: Skin is warm and dry. No lesion and no rash noted.   Psychiatric: He has a normal mood and affect. His behavior is normal. Judgment and thought content normal.       Results for SAUL STEPHEN (MRN [id number]) as of 1/29/2019 18:01   Ref. Range 8/21/2018 08:07 1/25/2019 07:58   WBC Latest Ref Range: 3.90 - 12.70 K/uL 3.98 5.84   RBC Latest Ref Range: 4.60 - 6.20 M/uL 5.03 5.12   Hemoglobin Latest Ref Range: 14.0 - 18.0 g/dL 14.9 14.9   Hematocrit Latest Ref Range: 40.0 - 54.0 % 46.7 46.5   MCV Latest Ref Range: 82 - 98 fL 93 91   MCH Latest Ref Range: 27.0 - 31.0 pg 29.6 29.1   MCHC Latest Ref Range: 32.0 - 36.0 g/dL 31.9 (L) 32.0   RDW Latest Ref Range: 11.5 - 14.5 % 13.4 12.6   Platelets Latest Ref Range: 150 - 350 K/uL 243 235   MPV Latest Ref Range: 9.2 - 12.9 fL 9.9 9.3   Gran% Latest Ref Range: 38.0 - 73.0 % 48.2 57.0   Gran # (ANC) Latest Ref Range: 1.8 - 7.7 K/uL 1.9 3.3   Immature Granulocytes Latest Ref Range: 0.0 - 0.5 % 0.3 0.2   Immature Grans (Abs) Latest Ref Range: 0.00 - 0.04 K/uL 0.01 0.01   Lymph% Latest Ref Range: 18.0 - 48.0 % 39.4 33.6   Lymph # Latest Ref Range: 1.0 - 4.8 K/uL 1.6 2.0   Mono% Latest Ref Range: 4.0 - 15.0 % 10.1 8.0   Mono # Latest Ref Range: 0.3 - 1.0 K/uL 0.4 0.5   Eosinophil% Latest Ref Range: 0.0 - 8.0 % 1.0 0.9   Eos # Latest Ref Range: 0.0 - 0.5 K/uL 0.0 0.1   Basophil% Latest Ref Range: 0.0 - 1.9 % 1.0 0.5   Baso # Latest Ref Range: 0.00 - 0.20 K/uL 0.04 0.03   nRBC Latest Ref Range: 0 /100 WBC 0 0   Differential Method Unknown Automated Automated   Results for SAUL STEPHEN (MRN [id number])  as of 1/29/2019 18:01   Ref. Range 8/21/2018 08:07 1/25/2019 07:58   Sodium Latest Ref Range: 136 - 145 mmol/L 142 143   Potassium Latest Ref Range: 3.5 - 5.1 mmol/L 4.5 3.8   Chloride Latest Ref Range: 95 - 110 mmol/L 107 105   CO2 Latest Ref Range: 23 - 29 mmol/L 26 26   Anion Gap Latest Ref Range: 8 - 16 mmol/L 9 12   BUN, Bld Latest Ref Range: 6 - 20 mg/dL 8 21 (H)   Creatinine Latest Ref Range: 0.5 - 1.4 mg/dL 1.6 (H) 1.8 (H)   eGFR if non African American Latest Ref Range: >60 mL/min/1.73 m^2 50.6 (A) 44 (A)   eGFR if African American Latest Ref Range: >60 mL/min/1.73 m^2 58.4 (A) 51 (A)   Glucose Latest Ref Range: 70 - 110 mg/dL 101 96   Calcium Latest Ref Range: 8.7 - 10.5 mg/dL 9.6 9.3   Magnesium Latest Ref Range: 1.6 - 2.6 mg/dL  1.6   Alkaline Phosphatase Latest Ref Range: 55 - 135 U/L 105 104   Total Protein Latest Ref Range: 6.0 - 8.4 g/dL 8.0 8.5 (H)   Albumin Latest Ref Range: 3.5 - 5.2 g/dL 4.1 4.2   Total Bilirubin Latest Ref Range: 0.1 - 1.0 mg/dL 0.8 1.2 (H)   AST Latest Ref Range: 10 - 40 U/L 17 14   ALT Latest Ref Range: 10 - 44 U/L 16 14   Triglycerides Latest Ref Range: 30 - 150 mg/dL 72 57   Cholesterol Latest Ref Range: 120 - 199 mg/dL 109 (L) 111 (L)   HDL Latest Ref Range: 40 - 75 mg/dL 46 51   HDL/Chol Ratio Latest Ref Range: 20.0 - 50.0 % 42.2 45.9   LDL Cholesterol Latest Ref Range: 63.0 - 159.0 mg/dL 48.6 (L) 48.6 (L)   Non-HDL Cholesterol Latest Units: mg/dL 63 60   Total Cholesterol/HDL Ratio Latest Ref Range: 2.0 - 5.0  2.4 2.2   LD Latest Ref Range: 110 - 260 U/L  159     Assessment:       1. History of non-Hodgkin's lymphoma    2. Factor V Leiden    3. Venous thromboembolism    4. Current use of long term anticoagulation    5. 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 Immunosuppression    7. Status post heart transplantation        Plan:       1.      Most recent PET/CT scan done in May 2015 showed that his lymphoma continues to be in complete remission after 6 cycles of  chemotherapy with R-CHOP/R-CEOP  2.      Bone marrow biopsy in July 2014 which was done for further evaluation of leukopenia showed unremarkable results with no evidence of lymphoma/leukemia or myelodysplasia.  Cytogenetics were normal.  Immunoglobin gene rearrangement study was normal. Most recent CBC's show normal blood counts.  3.      Advised to follow up with the heart transplant team at [facility], [place] as per their recommendations  4.      Continue therapeutic lovenox injections for history of recurrent DVT. He will need lifelong anticoagulation.  5.      Maintain active lifestyle with regular exercise, good diet and to avoid excessive weight gain. He expressed understanding. Pt states not exercising now but will set a goal to start.   6.      follow up with Dr. Hale for his routine health maintenance/annual physical exam.  7.      Reviewed signs of recurrence and DVT to report to us ASAP- pt verbalizes understanding. RTC in 6 mons with cbc, cmp and ldh- Dr. Martinez reviewed labs and agrees with f/u plan.

## 2019-01-30 ENCOUNTER — OFFICE VISIT (OUTPATIENT)
Dept: HEMATOLOGY/ONCOLOGY | Facility: CLINIC | Age: 49
End: 2019-01-30
Payer: MEDICARE

## 2019-01-30 VITALS
OXYGEN SATURATION: 98 % | SYSTOLIC BLOOD PRESSURE: 128 MMHG | RESPIRATION RATE: 16 BRPM | DIASTOLIC BLOOD PRESSURE: 90 MMHG | WEIGHT: 227.31 LBS | BODY MASS INDEX: 30.79 KG/M2 | TEMPERATURE: 98 F | HEART RATE: 105 BPM | HEIGHT: 72 IN

## 2019-01-30 DIAGNOSIS — Z85.72 HISTORY OF NON-HODGKIN'S LYMPHOMA: Primary | ICD-10-CM

## 2019-01-30 DIAGNOSIS — B27.09 EBV MEDIATED PRIMARY LYMPHOMA OF LYMPH NODE: ICD-10-CM

## 2019-01-30 DIAGNOSIS — D68.51 FACTOR V LEIDEN: Chronic | ICD-10-CM

## 2019-01-30 DIAGNOSIS — C85.80 EBV MEDIATED PRIMARY LYMPHOMA OF LYMPH NODE: ICD-10-CM

## 2019-01-30 DIAGNOSIS — Z79.01 CURRENT USE OF LONG TERM ANTICOAGULATION: ICD-10-CM

## 2019-01-30 DIAGNOSIS — D84.9 IMMUNOSUPPRESSION: ICD-10-CM

## 2019-01-30 DIAGNOSIS — I82.90 VENOUS THROMBOEMBOLISM: ICD-10-CM

## 2019-01-30 DIAGNOSIS — Z94.1 STATUS POST HEART TRANSPLANTATION: ICD-10-CM

## 2019-01-30 PROCEDURE — 99213 OFFICE O/P EST LOW 20 MIN: CPT | Mod: S$PBB,,, | Performed by: NURSE PRACTITIONER

## 2019-01-30 PROCEDURE — 99213 PR OFFICE/OUTPT VISIT, EST, LEVL III, 20-29 MIN: ICD-10-PCS | Mod: S$PBB,,, | Performed by: NURSE PRACTITIONER

## 2019-01-30 PROCEDURE — 99214 OFFICE O/P EST MOD 30 MIN: CPT | Mod: PBBFAC,PN | Performed by: NURSE PRACTITIONER

## 2019-01-30 PROCEDURE — 99999 PR PBB SHADOW E&M-EST. PATIENT-LVL IV: ICD-10-PCS | Mod: PBBFAC,,, | Performed by: NURSE PRACTITIONER

## 2019-01-30 PROCEDURE — 99999 PR PBB SHADOW E&M-EST. PATIENT-LVL IV: CPT | Mod: PBBFAC,,, | Performed by: NURSE PRACTITIONER

## 2019-02-27 RX ORDER — PRAVASTATIN SODIUM 40 MG/1
40 TABLET ORAL DAILY
Qty: 90 TABLET | Refills: 3 | Status: SHIPPED | OUTPATIENT
Start: 2019-02-27 | End: 2022-01-15 | Stop reason: SDUPTHER

## 2019-03-03 DIAGNOSIS — D84.9 IMMUNOSUPPRESSION: ICD-10-CM

## 2019-03-03 DIAGNOSIS — I07.1 TRICUSPID VALVE INSUFFICIENCY, UNSPECIFIED ETIOLOGY: ICD-10-CM

## 2019-03-03 DIAGNOSIS — I10 ESSENTIAL HYPERTENSION: ICD-10-CM

## 2019-03-03 DIAGNOSIS — N18.30 STAGE 3 CHRONIC KIDNEY DISEASE: ICD-10-CM

## 2019-03-03 DIAGNOSIS — B27.09 EBV MEDIATED PRIMARY LYMPHOMA OF LYMPH NODE: ICD-10-CM

## 2019-03-03 DIAGNOSIS — Z94.1 STATUS POST HEART TRANSPLANTATION: ICD-10-CM

## 2019-03-03 DIAGNOSIS — I82.90 VENOUS THROMBOEMBOLISM: ICD-10-CM

## 2019-03-03 DIAGNOSIS — C85.80 EBV MEDIATED PRIMARY LYMPHOMA OF LYMPH NODE: ICD-10-CM

## 2019-03-03 DIAGNOSIS — D68.51 FACTOR V LEIDEN: Chronic | ICD-10-CM

## 2019-03-03 DIAGNOSIS — C83.30 DIFFUSE LARGE B-CELL LYMPHOMA, UNSPECIFIED BODY REGION: ICD-10-CM

## 2019-03-04 RX ORDER — AMLODIPINE BESYLATE 10 MG/1
10 TABLET ORAL DAILY
Qty: 30 TABLET | Refills: 11 | Status: SHIPPED | OUTPATIENT
Start: 2019-03-04 | End: 2021-01-06

## 2019-03-07 ENCOUNTER — OFFICE VISIT (OUTPATIENT)
Dept: OPHTHALMOLOGY | Facility: CLINIC | Age: 49
End: 2019-03-07
Payer: MEDICARE

## 2019-03-07 DIAGNOSIS — H40.013 OAG (OPEN ANGLE GLAUCOMA) SUSPECT, LOW RISK, BILATERAL: Primary | ICD-10-CM

## 2019-03-07 PROCEDURE — 99999 PR PBB SHADOW E&M-EST. PATIENT-LVL I: ICD-10-PCS | Mod: PBBFAC,,, | Performed by: OPTOMETRIST

## 2019-03-07 PROCEDURE — 92012 PR EYE EXAM, EST PATIENT,INTERMED: ICD-10-PCS | Mod: S$PBB,,, | Performed by: OPTOMETRIST

## 2019-03-07 PROCEDURE — 99211 OFF/OP EST MAY X REQ PHY/QHP: CPT | Mod: PBBFAC,PN | Performed by: OPTOMETRIST

## 2019-03-07 PROCEDURE — 92012 INTRM OPH EXAM EST PATIENT: CPT | Mod: S$PBB,,, | Performed by: OPTOMETRIST

## 2019-03-07 PROCEDURE — 99999 PR PBB SHADOW E&M-EST. PATIENT-LVL I: CPT | Mod: PBBFAC,,, | Performed by: OPTOMETRIST

## 2019-03-07 NOTE — PROGRESS NOTES
HPI     Glaucoma Suspect      Additional comments: IOP check              Comments     PT was last seen on 9/6/18 with DNL for full exam. PT was told to rtc 6   months for IOP check.   Medication eye drops if any: none  Last HVF: 9/6/18  Last gOCT: 9/6/18  Last SDP: 8/4/17           Last edited by Shannan Collins MA on 3/7/2019 10:09 AM. (History)            Assessment /Plan     For exam results, see Encounter Report.    OAG (open angle glaucoma) suspect, low risk, bilateral      IOP stable today and within acceptable range  No tx  Monitor 6 months    RTC 6 months for dilated exam with 24-2VF, gOCT and dilation or PRN  Discussed above and all questions were answered.

## 2019-03-08 DIAGNOSIS — E11.9 TYPE 2 DIABETES MELLITUS WITHOUT COMPLICATION: ICD-10-CM

## 2019-05-15 ENCOUNTER — LAB VISIT (OUTPATIENT)
Dept: LAB | Facility: HOSPITAL | Age: 49
End: 2019-05-15
Payer: MEDICARE

## 2019-05-15 DIAGNOSIS — T86.20 COMPLICATION OF HEART TRANSPLANT, UNSPECIFIED COMPLICATION: ICD-10-CM

## 2019-05-15 DIAGNOSIS — Z94.1 STATUS POST HEART TRANSPLANT: ICD-10-CM

## 2019-05-15 DIAGNOSIS — R00.0 TACHYCARDIA: ICD-10-CM

## 2019-05-15 DIAGNOSIS — Z79.899 LONG-TERM USE OF HIGH-RISK MEDICATION: ICD-10-CM

## 2019-05-15 DIAGNOSIS — Z79.899 ENCOUNTER FOR LONG-TERM (CURRENT) USE OF MEDICATIONS: ICD-10-CM

## 2019-05-15 LAB
ALBUMIN SERPL BCP-MCNC: 3.7 G/DL (ref 3.5–5.2)
ALP SERPL-CCNC: 107 U/L (ref 55–135)
ALT SERPL W/O P-5'-P-CCNC: 13 U/L (ref 10–44)
ANION GAP SERPL CALC-SCNC: 9 MMOL/L (ref 8–16)
AST SERPL-CCNC: 13 U/L (ref 10–40)
BASOPHILS # BLD AUTO: 0.03 K/UL (ref 0–0.2)
BASOPHILS NFR BLD: 0.7 % (ref 0–1.9)
BILIRUB SERPL-MCNC: 0.5 MG/DL (ref 0.1–1)
BUN SERPL-MCNC: 13 MG/DL (ref 6–20)
CALCIUM SERPL-MCNC: 9.9 MG/DL (ref 8.7–10.5)
CHLORIDE SERPL-SCNC: 104 MMOL/L (ref 95–110)
CHOLEST SERPL-MCNC: 120 MG/DL (ref 120–199)
CHOLEST/HDLC SERPL: 2.6 {RATIO} (ref 2–5)
CO2 SERPL-SCNC: 28 MMOL/L (ref 23–29)
CREAT SERPL-MCNC: 1.4 MG/DL (ref 0.5–1.4)
DIFFERENTIAL METHOD: ABNORMAL
EOSINOPHIL # BLD AUTO: 0.1 K/UL (ref 0–0.5)
EOSINOPHIL NFR BLD: 1.7 % (ref 0–8)
ERYTHROCYTE [DISTWIDTH] IN BLOOD BY AUTOMATED COUNT: 13.1 % (ref 11.5–14.5)
EST. GFR  (AFRICAN AMERICAN): >60 ML/MIN/1.73 M^2
EST. GFR  (NON AFRICAN AMERICAN): 59.4 ML/MIN/1.73 M^2
GLUCOSE SERPL-MCNC: 93 MG/DL (ref 70–110)
HCT VFR BLD AUTO: 44.5 % (ref 40–54)
HDLC SERPL-MCNC: 46 MG/DL (ref 40–75)
HDLC SERPL: 38.3 % (ref 20–50)
HGB BLD-MCNC: 14.2 G/DL (ref 14–18)
IMM GRANULOCYTES # BLD AUTO: 0.02 K/UL (ref 0–0.04)
IMM GRANULOCYTES NFR BLD AUTO: 0.5 % (ref 0–0.5)
LDLC SERPL CALC-MCNC: 57.6 MG/DL (ref 63–159)
LYMPHOCYTES # BLD AUTO: 1.7 K/UL (ref 1–4.8)
LYMPHOCYTES NFR BLD: 41 % (ref 18–48)
MCH RBC QN AUTO: 29.3 PG (ref 27–31)
MCHC RBC AUTO-ENTMCNC: 31.9 G/DL (ref 32–36)
MCV RBC AUTO: 92 FL (ref 82–98)
MONOCYTES # BLD AUTO: 0.5 K/UL (ref 0.3–1)
MONOCYTES NFR BLD: 11.1 % (ref 4–15)
NEUTROPHILS # BLD AUTO: 1.8 K/UL (ref 1.8–7.7)
NEUTROPHILS NFR BLD: 45 % (ref 38–73)
NONHDLC SERPL-MCNC: 74 MG/DL
NRBC BLD-RTO: 0 /100 WBC
PLATELET # BLD AUTO: 296 K/UL (ref 150–350)
PMV BLD AUTO: 9.9 FL (ref 9.2–12.9)
POTASSIUM SERPL-SCNC: 3.5 MMOL/L (ref 3.5–5.1)
PROT SERPL-MCNC: 8 G/DL (ref 6–8.4)
RBC # BLD AUTO: 4.84 M/UL (ref 4.6–6.2)
SODIUM SERPL-SCNC: 141 MMOL/L (ref 136–145)
TRIGL SERPL-MCNC: 82 MG/DL (ref 30–150)
WBC # BLD AUTO: 4.07 K/UL (ref 3.9–12.7)

## 2019-05-15 PROCEDURE — 80197 ASSAY OF TACROLIMUS: CPT

## 2019-05-15 PROCEDURE — 86833 HLA CLASS II HIGH DEFIN QUAL: CPT

## 2019-05-15 PROCEDURE — 36415 COLL VENOUS BLD VENIPUNCTURE: CPT

## 2019-05-15 PROCEDURE — 85025 COMPLETE CBC W/AUTO DIFF WBC: CPT

## 2019-05-15 PROCEDURE — 86832 HLA CLASS I HIGH DEFIN QUAL: CPT | Mod: 59

## 2019-05-15 PROCEDURE — 80053 COMPREHEN METABOLIC PANEL: CPT

## 2019-05-15 PROCEDURE — 86977 RBC SERUM PRETX INCUBJ/INHIB: CPT | Mod: 59

## 2019-05-15 PROCEDURE — 80061 LIPID PANEL: CPT

## 2019-05-15 PROCEDURE — 86832 HLA CLASS I HIGH DEFIN QUAL: CPT

## 2019-05-15 PROCEDURE — 86833 HLA CLASS II HIGH DEFIN QUAL: CPT | Mod: 59

## 2019-05-16 ENCOUNTER — PATIENT MESSAGE (OUTPATIENT)
Dept: TRANSPLANT | Facility: CLINIC | Age: 49
End: 2019-05-16

## 2019-05-16 DIAGNOSIS — Z94.1 STATUS POST HEART TRANSPLANT: Primary | ICD-10-CM

## 2019-05-16 LAB
CLASS I ANTIBODY COMMENTS - LUMINEX: NORMAL
CLASS II ANTIBODY COMMENTS - LUMINEX: NORMAL
DSA1 TESTING DATE: NORMAL
DSA12 TESTING DATE: NORMAL
DSA2 TESTING DATE: NORMAL
SERUM COLLECTION DT - LUMINEX CLASS I: NORMAL
SERUM COLLECTION DT - LUMINEX CLASS II: NORMAL
TACROLIMUS BLD-MCNC: 7.9 NG/ML (ref 5–15)

## 2019-05-24 LAB
C1Q1 TESTING DATE: NORMAL
C1Q2 TESTING DATE: NORMAL
CLASS I ANTIBODY COMMENTS - LUMINEX: NORMAL
CLASS II ANTIBODY COMMENTS - LUMINEX: NORMAL
HC1Q TESTING DATE: NORMAL
SERUM COLLECTION DT - LUMINEX CLASS I: NORMAL
SERUM COLLECTION DT - LUMINEX CLASS II: NORMAL

## 2019-06-13 DIAGNOSIS — I47.20 VENTRICULAR TACHYCARDIA: ICD-10-CM

## 2019-06-13 DIAGNOSIS — Z94.1 STATUS POST HEART TRANSPLANT: Primary | ICD-10-CM

## 2019-06-13 DIAGNOSIS — Z94.1 STATUS POST HEART TRANSPLANT: ICD-10-CM

## 2019-06-13 DIAGNOSIS — Z79.899 ENCOUNTER FOR LONG-TERM (CURRENT) USE OF MEDICATIONS: ICD-10-CM

## 2019-06-13 DIAGNOSIS — T86.20 COMPLICATION OF HEART TRANSPLANT, UNSPECIFIED COMPLICATION: ICD-10-CM

## 2019-06-13 DIAGNOSIS — I10 ESSENTIAL HYPERTENSION: ICD-10-CM

## 2019-07-23 ENCOUNTER — LAB VISIT (OUTPATIENT)
Dept: LAB | Facility: HOSPITAL | Age: 49
End: 2019-07-23
Attending: OBSTETRICS & GYNECOLOGY
Payer: MEDICARE

## 2019-07-23 DIAGNOSIS — I82.90 VENOUS THROMBOEMBOLISM: ICD-10-CM

## 2019-07-23 DIAGNOSIS — C85.80 EBV MEDIATED PRIMARY LYMPHOMA OF LYMPH NODE: ICD-10-CM

## 2019-07-23 DIAGNOSIS — D84.9 IMMUNOSUPPRESSION: ICD-10-CM

## 2019-07-23 DIAGNOSIS — Z85.72 HISTORY OF NON-HODGKIN'S LYMPHOMA: ICD-10-CM

## 2019-07-23 DIAGNOSIS — D68.51 FACTOR V LEIDEN: Chronic | ICD-10-CM

## 2019-07-23 DIAGNOSIS — B27.09 EBV MEDIATED PRIMARY LYMPHOMA OF LYMPH NODE: ICD-10-CM

## 2019-07-23 DIAGNOSIS — Z94.1 STATUS POST HEART TRANSPLANTATION: ICD-10-CM

## 2019-07-23 DIAGNOSIS — Z79.01 CURRENT USE OF LONG TERM ANTICOAGULATION: ICD-10-CM

## 2019-07-23 LAB
ALBUMIN SERPL BCP-MCNC: 3.9 G/DL (ref 3.5–5.2)
ALP SERPL-CCNC: 99 U/L (ref 55–135)
ALT SERPL W/O P-5'-P-CCNC: 12 U/L (ref 10–44)
ANION GAP SERPL CALC-SCNC: 8 MMOL/L (ref 8–16)
AST SERPL-CCNC: 11 U/L (ref 10–40)
BASOPHILS # BLD AUTO: 0.04 K/UL (ref 0–0.2)
BASOPHILS NFR BLD: 0.9 % (ref 0–1.9)
BILIRUB SERPL-MCNC: 0.8 MG/DL (ref 0.1–1)
BUN SERPL-MCNC: 9 MG/DL (ref 6–20)
CALCIUM SERPL-MCNC: 9.1 MG/DL (ref 8.7–10.5)
CHLORIDE SERPL-SCNC: 106 MMOL/L (ref 95–110)
CO2 SERPL-SCNC: 28 MMOL/L (ref 23–29)
CREAT SERPL-MCNC: 1.5 MG/DL (ref 0.5–1.4)
DIFFERENTIAL METHOD: ABNORMAL
EOSINOPHIL # BLD AUTO: 0.1 K/UL (ref 0–0.5)
EOSINOPHIL NFR BLD: 1.6 % (ref 0–8)
ERYTHROCYTE [DISTWIDTH] IN BLOOD BY AUTOMATED COUNT: 13.2 % (ref 11.5–14.5)
EST. GFR  (AFRICAN AMERICAN): >60 ML/MIN/1.73 M^2
EST. GFR  (NON AFRICAN AMERICAN): 54 ML/MIN/1.73 M^2
GLUCOSE SERPL-MCNC: 90 MG/DL (ref 70–110)
HCT VFR BLD AUTO: 43.4 % (ref 40–54)
HGB BLD-MCNC: 14.2 G/DL (ref 14–18)
IMM GRANULOCYTES # BLD AUTO: 0 K/UL (ref 0–0.04)
IMM GRANULOCYTES NFR BLD AUTO: 0 % (ref 0–0.5)
LDH SERPL L TO P-CCNC: 157 U/L (ref 110–260)
LYMPHOCYTES # BLD AUTO: 2 K/UL (ref 1–4.8)
LYMPHOCYTES NFR BLD: 48 % (ref 18–48)
MCH RBC QN AUTO: 29.3 PG (ref 27–31)
MCHC RBC AUTO-ENTMCNC: 32.7 G/DL (ref 32–36)
MCV RBC AUTO: 90 FL (ref 82–98)
MONOCYTES # BLD AUTO: 0.5 K/UL (ref 0.3–1)
MONOCYTES NFR BLD: 12.7 % (ref 4–15)
NEUTROPHILS # BLD AUTO: 1.6 K/UL (ref 1.8–7.7)
NEUTROPHILS NFR BLD: 36.8 % (ref 38–73)
NRBC BLD-RTO: 0 /100 WBC
PLATELET # BLD AUTO: 218 K/UL (ref 150–350)
PMV BLD AUTO: 9.1 FL (ref 9.2–12.9)
POTASSIUM SERPL-SCNC: 3.6 MMOL/L (ref 3.5–5.1)
PROT SERPL-MCNC: 8 G/DL (ref 6–8.4)
RBC # BLD AUTO: 4.85 M/UL (ref 4.6–6.2)
SODIUM SERPL-SCNC: 142 MMOL/L (ref 136–145)
WBC # BLD AUTO: 4.25 K/UL (ref 3.9–12.7)

## 2019-07-23 PROCEDURE — 36415 COLL VENOUS BLD VENIPUNCTURE: CPT

## 2019-07-23 PROCEDURE — 85025 COMPLETE CBC W/AUTO DIFF WBC: CPT

## 2019-07-23 PROCEDURE — 80053 COMPREHEN METABOLIC PANEL: CPT

## 2019-07-23 PROCEDURE — 83615 LACTATE (LD) (LDH) ENZYME: CPT

## 2019-07-30 ENCOUNTER — OFFICE VISIT (OUTPATIENT)
Dept: HEMATOLOGY/ONCOLOGY | Facility: CLINIC | Age: 49
End: 2019-07-30
Payer: MEDICARE

## 2019-07-30 VITALS
SYSTOLIC BLOOD PRESSURE: 133 MMHG | TEMPERATURE: 98 F | OXYGEN SATURATION: 96 % | HEIGHT: 72 IN | DIASTOLIC BLOOD PRESSURE: 93 MMHG | WEIGHT: 227.5 LBS | RESPIRATION RATE: 17 BRPM | HEART RATE: 103 BPM | BODY MASS INDEX: 30.81 KG/M2

## 2019-07-30 DIAGNOSIS — D68.51 FACTOR V LEIDEN: Primary | Chronic | ICD-10-CM

## 2019-07-30 DIAGNOSIS — Z85.72 HISTORY OF NON-HODGKIN'S LYMPHOMA: ICD-10-CM

## 2019-07-30 DIAGNOSIS — Z79.01 CURRENT USE OF LONG TERM ANTICOAGULATION: ICD-10-CM

## 2019-07-30 DIAGNOSIS — D84.9 IMMUNOSUPPRESSION: ICD-10-CM

## 2019-07-30 PROCEDURE — 99215 PR OFFICE/OUTPT VISIT, EST, LEVL V, 40-54 MIN: ICD-10-PCS | Mod: S$PBB,,, | Performed by: NURSE PRACTITIONER

## 2019-07-30 PROCEDURE — 99213 OFFICE O/P EST LOW 20 MIN: CPT | Mod: PBBFAC | Performed by: NURSE PRACTITIONER

## 2019-07-30 PROCEDURE — 99999 PR PBB SHADOW E&M-EST. PATIENT-LVL III: ICD-10-PCS | Mod: PBBFAC,,, | Performed by: NURSE PRACTITIONER

## 2019-07-30 PROCEDURE — 99215 OFFICE O/P EST HI 40 MIN: CPT | Mod: S$PBB,,, | Performed by: NURSE PRACTITIONER

## 2019-07-30 PROCEDURE — 99999 PR PBB SHADOW E&M-EST. PATIENT-LVL III: CPT | Mod: PBBFAC,,, | Performed by: NURSE PRACTITIONER

## 2019-07-30 NOTE — PROGRESS NOTES
Subjective:      Patient ID: Alfonso Leger is a 49 y.o. male.    Chief Complaint: History of lymphoma; lab discussion     Reason for visit: Diffuse large B-cell lymphoma [PTLD]  Date of Diagnosis: August 2013     HPI 49-year-old  male who presents to the hematology oncology clinic today for follow up of diffuse large B-cell lymphoma [PTLD] and positive Factor V Leiden.  The patient has previously been followed by Krys Loza NP and Dr. Pietro Martinez in the clinic.  Today is the first time I am evaluating/assessing the patient.       The patient's diagnosis was made after a small bowel resection and liver biopsy. He has completed 6 cycles of chemotherapy with R-CHOP/CEOP in Dec 2013.       The patient is currently taking his therapeutic Lovenox injections and has been tolerating this well without any significant problems. History of Factor V Leiden mutation and  Bilateral lower extremity DVT in August 2013.       All of his interval clinical history available in [facility] has been reviewed.     Today the patient reports that he feels well and has no specific complaints. He denies any abdominal pain, nausea or vomiting.  He denies any melena, hematochezia, hematemesis, hemoptysis or hematuria.  He denies any chest pain or shortness of breath.  He denies any fevers, chills or night sweats.  He denies any bowel or urinary complaints.  He denies swollen lymph nodes.  He had heart transplant in October 2012 and is followed by heart transplant team in [place] - currently on prograf.  States compliance with twice daily lovenox injections.         Social History     Socioeconomic History    Marital status: Single     Spouse name: Not on file    Number of children: Not on file    Years of education: Not on file    Highest education level: Not on file   Occupational History    Not on file   Social Needs    Financial resource strain: Not on file    Food insecurity:     Worry: Not on file      Inability: Not on file    Transportation needs:     Medical: Not on file     Non-medical: Not on file   Tobacco Use    Smoking status: Former Smoker     Last attempt to quit: 2011     Years since quittin.2    Smokeless tobacco: Former User   Substance and Sexual Activity    Alcohol use: No    Drug use: No    Sexual activity: Yes     Partners: Female     Birth control/protection: None   Lifestyle    Physical activity:     Days per week: Not on file     Minutes per session: Not on file    Stress: Not on file   Relationships    Social connections:     Talks on phone: Not on file     Gets together: Not on file     Attends Worship service: Not on file     Active member of club or organization: Not on file     Attends meetings of clubs or organizations: Not on file     Relationship status: Not on file   Other Topics Concern    Not on file   Social History Narrative    Single, lives alone, no children.  Worked at [employer] prior to becoming disabled.       Family History   Problem Relation Age of Onset    Diabetes Maternal Uncle     Cancer Paternal Grandfather     Celiac disease Neg Hx     Cirrhosis Neg Hx     Colon cancer Neg Hx     Colon polyps Neg Hx     Crohn's disease Neg Hx     Esophageal cancer Neg Hx     Inflammatory bowel disease Neg Hx     Liver cancer Neg Hx     Liver disease Neg Hx     Rectal cancer Neg Hx     Stomach cancer Neg Hx     Ulcerative colitis Neg Hx        Past Surgical History:   Procedure Laterality Date    ABDOMINAL SURGERY      CARDIAC CATHETERIZATION      CATHETERIZATION, HEART, LEFT N/A 2012    Performed by Nahum Wells MD at [facility] CATH LAB    CATHETERIZATION, HEART, RIGHT, WITH BIOPSY N/A 2012    Performed by Lizz Aguirre MD at [facility] CATH LAB    EGD (ESOPHAGOGASTRODUODENOSCOPY) N/A 2013    Performed by Patrick Walden MD at [facility] ENDO (2ND FLR)    EGD (ESOPHAGOGASTRODUODENOSCOPY) Left 2013    Performed by Messi HINDS  MD Juanito at [facility] ENDO (2ND FLR)    EXPLORATORY-LAPAROTOMY N/A 8/7/2013    Performed by Bulmaro Vicente MD at [facility] OR 2ND FLR    HEART CATH-LEFT Left 7/25/2016    Performed by Armond Obregon MD at [facility] CATH LAB    HEART TRANSPLANT      INSERTION, PORT-A-CATH Right 9/17/2013    [facility] Leonid Preston [facility] OR    IVC FILTER RETRIEVAL      RESECTION-TUMOR N/A 8/7/2013    Performed by Bulmaro Vicente MD at [facility] OR 2ND FLR       Past Medical History:   Diagnosis Date    Anticoagulant long-term use     Blood transfusion     Cardiomyopathy     CHF (congestive heart failure)     DVT (deep venous thrombosis)     5/2011    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  Negative for appetite change, chills, diaphoresis, fatigue, fever and unexpected weight change.   HENT: Negative.    Eyes: Negative.    Respiratory: Negative.  Negative for cough and shortness of breath.    Cardiovascular: Negative.  Negative for chest pain, palpitations and leg swelling.   Gastrointestinal: Negative.  Negative for abdominal distention, abdominal pain, blood in stool, constipation, diarrhea, nausea and vomiting.   Endocrine: Negative.    Genitourinary: Negative.  Negative for flank pain and hematuria.   Musculoskeletal: Negative.  Negative for arthralgias, back pain and joint swelling.   Skin: Negative.  Negative for rash.   Allergic/Immunologic: Positive for immunocompromised state.   Neurological: Negative.  Negative for weakness, light-headedness and headaches.   Hematological: Negative.  Negative for adenopathy. Does not bruise/bleed easily.   Psychiatric/Behavioral: Negative.           Medication List with Changes/Refills    Current Medications    AMLODIPINE (NORVASC) 10 MG TABLET    TAKE 1 TABLET (10 MG TOTAL) BY MOUTH ONCE DAILY.    BLOOD SUGAR DIAGNOSTIC STRP    1 strip by Misc.(Non-Drug; Combo Route) route 3 (three) times daily.    ENOXAPARIN (LOVENOX) 100 MG/ML SYRG    INJECT 1 ML (100 MG TOTAL) INTO THE SKIN EVERY 12 (TWELVE) HOURS. (60 SYRINGES)    PRAVASTATIN (PRAVACHOL) 40 MG TABLET    Take 1 tablet (40 mg total) by mouth once daily.    TACROLIMUS (PROGRAF) 1 MG CAP    TAKE 1 MG BY MOUTH IN THE MORNING AND 2 MG BY MOUTH IN THE EVENING 11/3/17        Objective:     Vitals:    07/30/19 1041   BP: (!) 133/93   Pulse: 103   Resp: 17   Temp: 98.1 °F (36.7 °C)       Physical Exam   Constitutional: He is oriented to person, place, and time. He appears well-developed and well-nourished. No distress.   HENT:   Head: Normocephalic and atraumatic.   Right Ear: External ear normal.   Left Ear: External ear normal.   Nose: Nose normal.   Mouth/Throat: No oropharyngeal exudate.   Eyes: Pupils are equal, round, and reactive to light. Conjunctivae and EOM are normal. Right eye exhibits no discharge. Left eye exhibits no discharge. No scleral icterus.   Neck: Normal range of motion. Neck supple. No thyromegaly present.   Cardiovascular: Normal rate, regular rhythm, S1 normal, S2 normal and normal heart sounds. Exam reveals no gallop and no friction rub.   No murmur heard.  Pulmonary/Chest: Effort normal and breath sounds normal. No accessory muscle usage. No apnea, no tachypnea and no bradypnea. No respiratory distress. He has no wheezes. He has no rales.   Abdominal: Soft. Bowel sounds are normal.   Musculoskeletal: Normal range of motion. He exhibits no edema.   Lymphadenopathy:        Head (right side): No submental, no submandibular, no tonsillar, no preauricular, no posterior auricular and no occipital adenopathy present.        Head (left side): No submental, no submandibular, no tonsillar, no preauricular, no posterior auricular and no  occipital adenopathy present.     He has no cervical adenopathy.        Right cervical: No superficial cervical, no deep cervical and no posterior cervical adenopathy present.       Left cervical: No superficial cervical, no deep cervical and no posterior cervical adenopathy present.        Right axillary: No pectoral and no lateral adenopathy present.        Left axillary: No pectoral and no lateral adenopathy present.No inguinal adenopathy noted on the right or left side.        Right: No supraclavicular adenopathy present.        Left: No supraclavicular adenopathy present.   Neurological: He is alert and oriented to person, place, and time. He has normal strength. He exhibits normal muscle tone. Gait normal.   Skin: Skin is warm and dry. No lesion and no rash noted.   Psychiatric: He has a normal mood and affect. His behavior is normal. Judgment and thought content normal.       Assessment:     Problem List Items Addressed This Visit        Immunology/Multi System    Immunosuppression    Relevant Orders    CBC auto differential    Comprehensive metabolic panel       Hematology    Factor V Leiden - Primary (Chronic)    Relevant Orders    CBC auto differential    Comprehensive metabolic panel    Current use of long term anticoagulation    Relevant Orders    CBC auto differential    Comprehensive metabolic panel       Oncology    History of non-Hodgkin's lymphoma    Relevant Orders    CBC auto differential    Comprehensive metabolic panel    Lactate dehydrogenase        Lab Results   Component Value Date    WBC 4.25 07/23/2019    HGB 14.2 07/23/2019    HCT 43.4 07/23/2019    MCV 90 07/23/2019     07/23/2019     CMP  Sodium   Date Value Ref Range Status   07/23/2019 142 136 - 145 mmol/L Final     Potassium   Date Value Ref Range Status   07/23/2019 3.6 3.5 - 5.1 mmol/L Final     Chloride   Date Value Ref Range Status   07/23/2019 106 95 - 110 mmol/L Final     CO2   Date Value Ref Range Status   07/23/2019 28  23 - 29 mmol/L Final     Glucose   Date Value Ref Range Status   07/23/2019 90 70 - 110 mg/dL Final     BUN, Bld   Date Value Ref Range Status   07/23/2019 9 6 - 20 mg/dL Final     Creatinine   Date Value Ref Range Status   07/23/2019 1.5 (H) 0.5 - 1.4 mg/dL Final     Calcium   Date Value Ref Range Status   07/23/2019 9.1 8.7 - 10.5 mg/dL Final     Total Protein   Date Value Ref Range Status   07/23/2019 8.0 6.0 - 8.4 g/dL Final     Albumin   Date Value Ref Range Status   07/23/2019 3.9 3.5 - 5.2 g/dL Final     Total Bilirubin   Date Value Ref Range Status   07/23/2019 0.8 0.1 - 1.0 mg/dL Final     Comment:     For infants and newborns, interpretation of results should be based  on gestational age, weight and in agreement with clinical  observations.  Premature Infant recommended reference ranges:  Up to 24 hours.............<8.0 mg/dL  Up to 48 hours............<12.0 mg/dL  3-5 days..................<15.0 mg/dL  6-29 days.................<15.0 mg/dL       Alkaline Phosphatase   Date Value Ref Range Status   07/23/2019 99 55 - 135 U/L Final     AST   Date Value Ref Range Status   07/23/2019 11 10 - 40 U/L Final     ALT   Date Value Ref Range Status   07/23/2019 12 10 - 44 U/L Final     Anion Gap   Date Value Ref Range Status   07/23/2019 8 8 - 16 mmol/L Final     eGFR if    Date Value Ref Range Status   07/23/2019 >60 >60 mL/min/1.73 m^2 Final     eGFR if non    Date Value Ref Range Status   07/23/2019 54 (A) >60 mL/min/1.73 m^2 Final     Comment:     Calculation used to obtain the estimated glomerular filtration  rate (eGFR) is the CKD-EPI equation.          Plan:   Factor V Leiden  -     CBC auto differential; Future; Expected date: 07/30/2019  -     Comprehensive metabolic panel; Future; Expected date: 07/30/2019    Current use of long term anticoagulation  -     CBC auto differential; Future; Expected date: 07/30/2019  -     Comprehensive metabolic panel; Future; Expected date:  07/30/2019    History of non-Hodgkin's lymphoma  -     CBC auto differential; Future; Expected date: 07/30/2019  -     Comprehensive metabolic panel; Future; Expected date: 07/30/2019  -     Lactate dehydrogenase; Future; Expected date: 07/30/2019    Immunosuppression  -     CBC auto differential; Future; Expected date: 07/30/2019  -     Comprehensive metabolic panel; Future; Expected date: 07/30/2019    He knows that he will need to continue life long anticoagulation due to Factor V Leiden mutation.  He will follow up in 6 months with repeat labs - cbc, cmp, LDH.  He will continue to monitor for s/s of bleeding.  He will follow up sooner for concerning symptoms including - fever, chills, night sweats, swollen lymph nodes, or unintentional weight loss.         I will review assessment/plan with collaborating physician, Dr. Pietro Martinez.    Thank You,  DONNA Block-C

## 2019-08-20 ENCOUNTER — DOCUMENTATION ONLY (OUTPATIENT)
Dept: CARDIOLOGY | Facility: CLINIC | Age: 49
End: 2019-08-20

## 2019-08-20 ENCOUNTER — CLINICAL SUPPORT (OUTPATIENT)
Dept: CARDIOLOGY | Facility: CLINIC | Age: 49
End: 2019-08-20
Attending: NURSE PRACTITIONER
Payer: MEDICARE

## 2019-08-20 ENCOUNTER — HOSPITAL ENCOUNTER (OUTPATIENT)
Dept: RADIOLOGY | Facility: HOSPITAL | Age: 49
Discharge: HOME | End: 2019-08-20
Attending: INTERNAL MEDICINE
Payer: MEDICARE

## 2019-08-20 ENCOUNTER — OFFICE VISIT (OUTPATIENT)
Dept: TRANSPLANT | Facility: CLINIC | Age: 49
End: 2019-08-20
Payer: MEDICARE

## 2019-08-20 ENCOUNTER — CLINICAL SUPPORT (OUTPATIENT)
Dept: CARDIOLOGY | Facility: CLINIC | Age: 49
End: 2019-08-20
Attending: INTERNAL MEDICINE
Payer: MEDICARE

## 2019-08-20 VITALS
SYSTOLIC BLOOD PRESSURE: 133 MMHG | BODY MASS INDEX: 30.54 KG/M2 | HEART RATE: 100 BPM | DIASTOLIC BLOOD PRESSURE: 93 MMHG | WEIGHT: 225.5 LBS | HEIGHT: 72 IN

## 2019-08-20 DIAGNOSIS — D68.51 FACTOR V LEIDEN: Chronic | ICD-10-CM

## 2019-08-20 DIAGNOSIS — Z94.1 STATUS POST HEART TRANSPLANT: ICD-10-CM

## 2019-08-20 DIAGNOSIS — I42.8 NON-ISCHEMIC CARDIOMYOPATHY: ICD-10-CM

## 2019-08-20 DIAGNOSIS — I10 ESSENTIAL HYPERTENSION: ICD-10-CM

## 2019-08-20 DIAGNOSIS — D84.9 IMMUNOSUPPRESSION: ICD-10-CM

## 2019-08-20 DIAGNOSIS — Z79.01 CURRENT USE OF LONG TERM ANTICOAGULATION: ICD-10-CM

## 2019-08-20 DIAGNOSIS — T86.20 COMPLICATION OF HEART TRANSPLANT, UNSPECIFIED COMPLICATION: ICD-10-CM

## 2019-08-20 DIAGNOSIS — Z94.1 STATUS POST HEART TRANSPLANTATION: ICD-10-CM

## 2019-08-20 DIAGNOSIS — I07.1 TRICUSPID VALVE INSUFFICIENCY, UNSPECIFIED ETIOLOGY: ICD-10-CM

## 2019-08-20 DIAGNOSIS — Z79.899 ENCOUNTER FOR LONG-TERM (CURRENT) USE OF MEDICATIONS: ICD-10-CM

## 2019-08-20 DIAGNOSIS — N18.30 STAGE 3 CHRONIC KIDNEY DISEASE: Primary | ICD-10-CM

## 2019-08-20 PROCEDURE — C8930 TTE W OR W/O CONTR, CONT ECG: HCPCS | Mod: PBBFAC | Performed by: INTERNAL MEDICINE

## 2019-08-20 PROCEDURE — 93351 PHARMACOLOGICAL STRESS ECHO: ICD-10-PCS | Mod: 26,S$PBB,, | Performed by: INTERNAL MEDICINE

## 2019-08-20 PROCEDURE — 99999 PR PBB SHADOW E&M-EST. PATIENT-LVL II: ICD-10-PCS | Mod: PBBFAC,,, | Performed by: INTERNAL MEDICINE

## 2019-08-20 PROCEDURE — 71046 XR CHEST PA AND LATERAL: ICD-10-PCS | Mod: 26,,, | Performed by: RADIOLOGY

## 2019-08-20 PROCEDURE — 99214 OFFICE O/P EST MOD 30 MIN: CPT | Mod: S$PBB,,, | Performed by: INTERNAL MEDICINE

## 2019-08-20 PROCEDURE — 71046 X-RAY EXAM CHEST 2 VIEWS: CPT | Mod: TC

## 2019-08-20 PROCEDURE — 99212 OFFICE O/P EST SF 10 MIN: CPT | Mod: PBBFAC,25,PO | Performed by: INTERNAL MEDICINE

## 2019-08-20 PROCEDURE — 99214 PR OFFICE/OUTPT VISIT, EST, LEVL IV, 30-39 MIN: ICD-10-PCS | Mod: S$PBB,,, | Performed by: INTERNAL MEDICINE

## 2019-08-20 PROCEDURE — 93321 DOPPLER ECHO F-UP/LMTD STD: CPT | Mod: 26,S$PBB,, | Performed by: INTERNAL MEDICINE

## 2019-08-20 PROCEDURE — 93321 PHARMACOLOGICAL STRESS ECHO: ICD-10-PCS | Mod: 26,S$PBB,, | Performed by: INTERNAL MEDICINE

## 2019-08-20 PROCEDURE — 93351 STRESS TTE COMPLETE: CPT | Mod: 26,S$PBB,, | Performed by: INTERNAL MEDICINE

## 2019-08-20 PROCEDURE — 71046 X-RAY EXAM CHEST 2 VIEWS: CPT | Mod: 26,,, | Performed by: RADIOLOGY

## 2019-08-20 PROCEDURE — 99999 PR PBB SHADOW E&M-EST. PATIENT-LVL II: CPT | Mod: PBBFAC,,, | Performed by: INTERNAL MEDICINE

## 2019-08-20 NOTE — PROGRESS NOTES
Subjective:   Mr. Leger is a 49 y.o. year old Black or  male who received a  - brain death heart transplant on 10/29/12.      CMV status:   Donor: negative   Recipient:negative    HPI  Mr. Leger is a 48 year old AAM  S/p OHT 10/29/12, with post-op severe TR, LVEF 35 - 40 % (chronically down), with Factor V leiden deficiency, with history of DVT on lovenox, HTN, CKD, HLP, PTLD, s/p R-CHOP/CEOP 2013, after which he remains followed closely by Dr. Martinez here in .    Despite taking only four meds (including lovenox) he feels well.  He states he feels great, denies having any problems with his cardiac status, breathing, or volume issues  Current immuno tacrolimus /2 NO prednisone   [facility] 16:  Normal coronary arteries, diastolic dysfunction       Review of Systems   Constitution: Negative for decreased appetite, weight gain and weight loss.   Cardiovascular: Negative for chest pain, dyspnea on exertion, leg swelling, near-syncope, orthopnea and palpitations.   Respiratory: Negative for cough and shortness of breath.    Musculoskeletal: Negative for myalgias.   Gastrointestinal: Negative for jaundice.       Objective:     Physical Exam   Constitutional: He is oriented to person, place, and time. He appears well-developed and well-nourished. He is active. He is not intubated.   BP (!) 133/93   Pulse 100   Ht 6' (1.829 m)   Wt 102.3 kg (225 lb 8.5 oz)   BMI 30.59 kg/m²         HENT:   Head: Normocephalic and atraumatic. Hair is normal.   Right Ear: External ear normal.   Left Ear: External ear normal.   Nose: Nose normal. No nasal deformity. No epistaxis.  No foreign bodies.   Mouth/Throat: Mucous membranes are normal. Mucous membranes are not cyanotic. No oropharyngeal exudate.   Eyes: Pupils are equal, round, and reactive to light. Conjunctivae and EOM are normal.   Neck: Neck supple. No hepatojugular reflux present. JVD: prominent TR wave    Cardiovascular: Normal rate, regular  rhythm and normal pulses. Exam reveals no gallop.   Murmur heard.   Medium-pitched blowing midsystolic murmur is present with a grade of 4/6 at the upper right sternal border radiating to the lower right sternal border. Consitent with TR   Pulmonary/Chest: Effort normal and breath sounds normal. No apnea and no tachypnea. He is not intubated. No respiratory distress. He exhibits no tenderness.   Abdominal: Soft. Normal appearance and bowel sounds are normal. There is no tenderness. No hernia.   Musculoskeletal: Normal range of motion.   Neurological: He is alert and oriented to person, place, and time. He displays no seizure activity.   Skin: Skin is warm, dry and intact. No rash noted. No pallor.   Psychiatric: He has a normal mood and affect. His speech is normal and behavior is normal. Thought content normal. Cognition and memory are normal.       Lab Results   Component Value Date    WBC 4.25 07/23/2019    HGB 14.2 07/23/2019    HCT 43.4 07/23/2019    MCV 90 07/23/2019     07/23/2019    CO2 28 07/23/2019    CREATININE 1.5 (H) 07/23/2019    CALCIUM 9.1 07/23/2019    ALBUMIN 3.9 07/23/2019    AST 11 07/23/2019    BNP 92 07/27/2018    ALT 12 07/23/2019    ALLOMAP 28 05/27/2016       Lab Results   Component Value Date    INR 1.1 07/08/2016    INR 1.1 10/16/2013    INR 1.4 (H) 08/07/2013       BNP   Date Value Ref Range Status   07/27/2018 92 0 - 99 pg/mL Final     Comment:     Values of less than 100 pg/ml are consistent with non-CHF populations.   06/19/2018 74 0 - 99 pg/mL Final     Comment:     Values of less than 100 pg/ml are consistent with non-CHF populations.   07/28/2017 72 0 - 99 pg/mL Final     Comment:     Values of less than 100 pg/ml are consistent with non-CHF populations.       LD   Date Value Ref Range Status   07/23/2019 157 110 - 260 U/L Final     Comment:     Results are increased in hemolyzed samples.   01/25/2019 159 110 - 260 U/L Final     Comment:     Results are increased in hemolyzed  samples.   01/16/2018 169 110 - 260 U/L Final     Comment:     Results are increased in hemolyzed samples.       Tacrolimus Lvl   Date Value Ref Range Status   05/15/2019 7.9 5.0 - 15.0 ng/mL Final     Comment:     Testing performed by Liquid Chromatography-Tandem  Mass Spectrometry (LC-MS/MS).  This test was developed and its performance characteristics  determined by [facility], Department of Pathology  and Laboratory Medicine in a manner consistent with CLIA  requirements. It has not been cleared or approved by the [place]  Food and Drug Administration.  This test is used for clinical   purposes.  It should not be regarded as investigational or for  research.         Assessment:     No diagnosis found.    Plan:   Angiogram with no CAV in 2016 when he has LV dysfunction (suspect some of his LV dysfunction is related to RV dysfunction )   Can either get regular echo or DSE at time of annual    CKD 3 -  will see what repeat labs are today.  Can get angiogram if needed for with his level of CKD   Immunosuppression- goal tacrolimus level 5-8    Continue lovenox - as he will need to continue life long anticoagulation due to Factor V Leiden mutation.      Return instructions as set forth by post transplant schedule or as needed:    Clinic: Return for labs and/or biopsy weekly the first month, every two weeks during month 2 and then monthly for the first year at the provider or coordinator's discretion. During the second year, return to clinic every 3 months. Post transplant year 3-5 return every 6 months. There will be a comprehensive post transplant evaluation every year that may include LHC/RHC/biopsy, stress test, echo, CXR, and other health screening exams.    In addition to the clinical assessment, I have ordered Allomap testing for this patient to assist in identification of moderate/severe acute cellular rejection (ACR) in a pt with stable Allograft function instead of endomyocardial biopsy.     Patient is  reminded to call with any health changes as these can be early signs of transplant complications. Patient is advised to make sure any new medications or changes of old medications are discussed with a pharmacist or physician knowledgeable with transplant to avoid rejection/drug toxicity related to significant drug interactions.    UNOS Patient Status  Functional Status: 60% - Requires occasional assistance but is able to care for needs  Physical Capacity: No Limitations  Working for Income: Unknown    Vannesa Wells MD

## 2019-08-20 NOTE — PROGRESS NOTES
Pt presented for a DSE today.  This study was performed in conjunction with Optison contrast agent because of poor endocardial visualization.  Procedure was explained to the patient, he verbalized understanding and signed the consent.  IV, 24ga x 1 attempts, was started in the left AC using aseptic technique.  Administered a total of 3 ml of Optison (lot # [id number], expiration date 09/03/2020).  Patient tolerated the procedure well.  IV discontinued, pressure dressing applied.

## 2019-08-21 LAB
DIASTOLIC DYSFUNCTION: NO
ESTIMATED PA SYSTOLIC PRESSURE: 41.01
RETIRED EF AND QEF - SEE NOTES: 50 (ref 55–65)
TRICUSPID VALVE REGURGITATION: ABNORMAL

## 2019-08-30 DIAGNOSIS — Z94.1 STATUS POST HEART TRANSPLANTATION: Primary | ICD-10-CM

## 2019-08-30 RX ORDER — TACROLIMUS 1 MG/1
CAPSULE ORAL
Qty: 270 CAPSULE | Refills: 2 | Status: SHIPPED | OUTPATIENT
Start: 2019-08-30 | End: 2020-12-31 | Stop reason: SDUPTHER

## 2019-09-12 ENCOUNTER — PATIENT OUTREACH (OUTPATIENT)
Dept: ADMINISTRATIVE | Facility: HOSPITAL | Age: 49
End: 2019-09-12

## 2019-11-29 ENCOUNTER — PATIENT OUTREACH (OUTPATIENT)
Dept: ADMINISTRATIVE | Facility: HOSPITAL | Age: 49
End: 2019-11-29

## 2019-12-02 ENCOUNTER — PATIENT OUTREACH (OUTPATIENT)
Dept: ADMINISTRATIVE | Facility: HOSPITAL | Age: 49
End: 2019-12-02

## 2020-01-03 ENCOUNTER — LAB VISIT (OUTPATIENT)
Dept: LAB | Facility: HOSPITAL | Age: 50
End: 2020-01-03
Attending: NURSE PRACTITIONER
Payer: MEDICARE

## 2020-01-03 ENCOUNTER — OFFICE VISIT (OUTPATIENT)
Dept: HEMATOLOGY/ONCOLOGY | Facility: CLINIC | Age: 50
End: 2020-01-03
Payer: MEDICARE

## 2020-01-03 VITALS
TEMPERATURE: 98 F | OXYGEN SATURATION: 99 % | DIASTOLIC BLOOD PRESSURE: 98 MMHG | HEIGHT: 72 IN | SYSTOLIC BLOOD PRESSURE: 131 MMHG | WEIGHT: 219.38 LBS | RESPIRATION RATE: 18 BRPM | HEART RATE: 107 BPM | BODY MASS INDEX: 29.71 KG/M2

## 2020-01-03 DIAGNOSIS — N28.9 FUNCTION KIDNEY DECREASED: ICD-10-CM

## 2020-01-03 DIAGNOSIS — E87.6 HYPOKALEMIA: ICD-10-CM

## 2020-01-03 DIAGNOSIS — D68.51 FACTOR V LEIDEN: Chronic | ICD-10-CM

## 2020-01-03 DIAGNOSIS — R79.89 BLOOD CREATININE INCREASED COMPARED WITH PRIOR MEASUREMENT: ICD-10-CM

## 2020-01-03 DIAGNOSIS — Z86.718 HISTORY OF DVT (DEEP VEIN THROMBOSIS): ICD-10-CM

## 2020-01-03 DIAGNOSIS — R74.8 ELEVATED LIVER ENZYMES: ICD-10-CM

## 2020-01-03 DIAGNOSIS — Z85.72 HISTORY OF NON-HODGKIN'S LYMPHOMA: Primary | ICD-10-CM

## 2020-01-03 DIAGNOSIS — D84.9 IMMUNOSUPPRESSION: ICD-10-CM

## 2020-01-03 DIAGNOSIS — Z85.72 HISTORY OF NON-HODGKIN'S LYMPHOMA: ICD-10-CM

## 2020-01-03 DIAGNOSIS — Z79.01 CURRENT USE OF LONG TERM ANTICOAGULATION: ICD-10-CM

## 2020-01-03 LAB
ALBUMIN SERPL BCP-MCNC: 4.3 G/DL (ref 3.5–5.2)
ALP SERPL-CCNC: 89 U/L (ref 55–135)
ALT SERPL W/O P-5'-P-CCNC: 11 U/L (ref 10–44)
ANION GAP SERPL CALC-SCNC: 12 MMOL/L (ref 8–16)
AST SERPL-CCNC: 14 U/L (ref 10–40)
BASOPHILS # BLD AUTO: 0.02 K/UL (ref 0–0.2)
BASOPHILS NFR BLD: 0.4 % (ref 0–1.9)
BILIRUB SERPL-MCNC: 1.7 MG/DL (ref 0.1–1)
BUN SERPL-MCNC: 18 MG/DL (ref 6–20)
CALCIUM SERPL-MCNC: 9.9 MG/DL (ref 8.7–10.5)
CHLORIDE SERPL-SCNC: 104 MMOL/L (ref 95–110)
CO2 SERPL-SCNC: 26 MMOL/L (ref 23–29)
CREAT SERPL-MCNC: 2 MG/DL (ref 0.5–1.4)
DIFFERENTIAL METHOD: NORMAL
EOSINOPHIL # BLD AUTO: 0.1 K/UL (ref 0–0.5)
EOSINOPHIL NFR BLD: 1.1 % (ref 0–8)
ERYTHROCYTE [DISTWIDTH] IN BLOOD BY AUTOMATED COUNT: 13.1 % (ref 11.5–14.5)
EST. GFR  (AFRICAN AMERICAN): 44 ML/MIN/1.73 M^2
EST. GFR  (NON AFRICAN AMERICAN): 38 ML/MIN/1.73 M^2
GLUCOSE SERPL-MCNC: 93 MG/DL (ref 70–110)
HCT VFR BLD AUTO: 46.9 % (ref 40–54)
HGB BLD-MCNC: 15.3 G/DL (ref 14–18)
IMM GRANULOCYTES # BLD AUTO: 0.01 K/UL (ref 0–0.04)
IMM GRANULOCYTES NFR BLD AUTO: 0.2 % (ref 0–0.5)
LDH SERPL L TO P-CCNC: 150 U/L (ref 110–260)
LYMPHOCYTES # BLD AUTO: 1.7 K/UL (ref 1–4.8)
LYMPHOCYTES NFR BLD: 37.3 % (ref 18–48)
MCH RBC QN AUTO: 30.1 PG (ref 27–31)
MCHC RBC AUTO-ENTMCNC: 32.6 G/DL (ref 32–36)
MCV RBC AUTO: 92 FL (ref 82–98)
MONOCYTES # BLD AUTO: 0.5 K/UL (ref 0.3–1)
MONOCYTES NFR BLD: 11.4 % (ref 4–15)
NEUTROPHILS # BLD AUTO: 2.3 K/UL (ref 1.8–7.7)
NEUTROPHILS NFR BLD: 49.6 % (ref 38–73)
NRBC BLD-RTO: 0 /100 WBC
PLATELET # BLD AUTO: 262 K/UL (ref 150–350)
PMV BLD AUTO: 9.2 FL (ref 9.2–12.9)
POTASSIUM SERPL-SCNC: 3.4 MMOL/L (ref 3.5–5.1)
PROT SERPL-MCNC: 8.4 G/DL (ref 6–8.4)
RBC # BLD AUTO: 5.09 M/UL (ref 4.6–6.2)
SODIUM SERPL-SCNC: 142 MMOL/L (ref 136–145)
WBC # BLD AUTO: 4.58 K/UL (ref 3.9–12.7)

## 2020-01-03 PROCEDURE — 83615 LACTATE (LD) (LDH) ENZYME: CPT

## 2020-01-03 PROCEDURE — 36415 COLL VENOUS BLD VENIPUNCTURE: CPT

## 2020-01-03 PROCEDURE — 99214 OFFICE O/P EST MOD 30 MIN: CPT | Mod: PBBFAC | Performed by: NURSE PRACTITIONER

## 2020-01-03 PROCEDURE — 99999 PR PBB SHADOW E&M-EST. PATIENT-LVL IV: ICD-10-PCS | Mod: PBBFAC,,, | Performed by: NURSE PRACTITIONER

## 2020-01-03 PROCEDURE — 99214 PR OFFICE/OUTPT VISIT, EST, LEVL IV, 30-39 MIN: ICD-10-PCS | Mod: S$PBB,,, | Performed by: NURSE PRACTITIONER

## 2020-01-03 PROCEDURE — 99999 PR PBB SHADOW E&M-EST. PATIENT-LVL IV: CPT | Mod: PBBFAC,,, | Performed by: NURSE PRACTITIONER

## 2020-01-03 PROCEDURE — 99214 OFFICE O/P EST MOD 30 MIN: CPT | Mod: S$PBB,,, | Performed by: NURSE PRACTITIONER

## 2020-01-03 PROCEDURE — 80053 COMPREHEN METABOLIC PANEL: CPT

## 2020-01-03 RX ORDER — POTASSIUM CHLORIDE 20 MEQ/1
20 TABLET, EXTENDED RELEASE ORAL DAILY
Qty: 4 TABLET | Refills: 1 | Status: SHIPPED | OUTPATIENT
Start: 2020-01-03 | End: 2020-01-07

## 2020-01-03 NOTE — PROGRESS NOTES
Subjective:      Patient ID: Alfonso Leger is a 49 y.o. male.    Chief Complaint: History of lymphoma; lab discussion     Reason for visit: Diffuse large B-cell lymphoma [PTLD]  Date of Diagnosis: August 2013     HPI 49-year-old  male who presents to the hematology oncology clinic today for follow up of diffuse large B-cell lymphoma [PTLD] and positive Factor V Leiden.       The patient's diagnosis was made after a small bowel resection and liver biopsy. He has completed 6 cycles of chemotherapy with R-CHOP/CEOP in Dec 2013.       The patient is currently taking his therapeutic Lovenox injections twice daily and has been tolerating this well without any significant problems. History of Factor V Leiden mutation and  Bilateral lower extremity DVT in August 2013.  He had heart transplant in October 2012 and is followed by heart transplant team in [place] - currently on prograf.     All of his interval clinical history available in [facility] has been reviewed.     Today the patient reports that he feels well and has no specific complaints. He denies any abdominal pain, nausea or vomiting.  He denies any melena, hematochezia, hematemesis, hemoptysis or hematuria.  He denies any chest pain or shortness of breath.  He denies any fevers, chills or night sweats.  He denies any bowel or urinary complaints.  He denies swollen lymph nodes.    States compliance with twice daily lovenox injections.  Has had two consecutive CMPs which have show elevated T. Felice.  Latest level 1.7.  Denies know liver disease.  Also with increased creatinine of 2.0 and decreased egfr.  States has seen nephrologist in the past, but unable to find nephrology notes in the system over the past few years.  Potassium slightly low at 3.4.        Social History     Socioeconomic History    Marital status: Single     Spouse name: Not on file    Number of children: Not on file    Years of education: Not on file    Highest education  level: Not on file   Occupational History    Not on file   Social Needs    Financial resource strain: Not on file    Food insecurity:     Worry: Not on file     Inability: Not on file    Transportation needs:     Medical: Not on file     Non-medical: Not on file   Tobacco Use    Smoking status: Former Smoker     Last attempt to quit: 2011     Years since quittin.6    Smokeless tobacco: Former User   Substance and Sexual Activity    Alcohol use: No    Drug use: No    Sexual activity: Yes     Partners: Female     Birth control/protection: None   Lifestyle    Physical activity:     Days per week: Not on file     Minutes per session: Not on file    Stress: Not on file   Relationships    Social connections:     Talks on phone: Not on file     Gets together: Not on file     Attends Restorationist service: Not on file     Active member of club or organization: Not on file     Attends meetings of clubs or organizations: Not on file     Relationship status: Not on file   Other Topics Concern    Not on file   Social History Narrative    Single, lives alone, no children.  Worked at [employer] prior to becoming disabled.       Family History   Problem Relation Age of Onset    Diabetes Maternal Uncle     Cancer Paternal Grandfather     Celiac disease Neg Hx     Cirrhosis Neg Hx     Colon cancer Neg Hx     Colon polyps Neg Hx     Crohn's disease Neg Hx     Esophageal cancer Neg Hx     Inflammatory bowel disease Neg Hx     Liver cancer Neg Hx     Liver disease Neg Hx     Rectal cancer Neg Hx     Stomach cancer Neg Hx     Ulcerative colitis Neg Hx        Past Surgical History:   Procedure Laterality Date    ABDOMINAL SURGERY      CARDIAC CATHETERIZATION      HEART TRANSPLANT      IVC FILTER RETRIEVAL         Past Medical History:   Diagnosis Date    Anticoagulant long-term use     Blood transfusion     Cardiomyopathy     CHF (congestive heart failure)     DVT (deep venous thrombosis)     2011     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  Negative for appetite change, chills, diaphoresis, fatigue, fever and unexpected weight change.   HENT: Negative.    Eyes: Negative.    Respiratory: Negative.  Negative for cough and shortness of breath.    Cardiovascular: Negative.  Negative for chest pain, palpitations and leg swelling.   Gastrointestinal: Negative.  Negative for abdominal distention, abdominal pain, blood in stool, constipation, diarrhea, nausea and vomiting.   Endocrine: Negative.    Genitourinary: Negative.  Negative for flank pain and hematuria.   Musculoskeletal: Negative.  Negative for arthralgias, back pain and joint swelling.   Skin: Negative.  Negative for rash.   Allergic/Immunologic: Positive for immunocompromised state.   Neurological: Negative.  Negative for weakness, light-headedness and headaches.   Hematological: Negative.  Negative for adenopathy. Does not bruise/bleed easily.   Psychiatric/Behavioral: Negative.           Medication List with Changes/Refills   New Medications    POTASSIUM CHLORIDE SA (K-DUR,KLOR-CON) 20 MEQ TABLET    Take 1 tablet (20 mEq total) by mouth once daily. for 4 days   Current Medications    AMLODIPINE (NORVASC) 10 MG TABLET    TAKE 1 TABLET (10 MG TOTAL) BY MOUTH ONCE DAILY.    BLOOD SUGAR DIAGNOSTIC STRP    1 strip by Misc.(Non-Drug; Combo Route) route 3 (three) times daily.    ENOXAPARIN (LOVENOX) 100 MG/ML SYRG    INJECT 1 ML (100 MG TOTAL) INTO THE SKIN EVERY 12 (TWELVE) HOURS. (60 SYRINGES)    PRAVASTATIN (PRAVACHOL) 40 MG TABLET    Take 1 tablet (40 mg total) by mouth once daily.    TACROLIMUS (PROGRAF) 1 MG CAP    TAKE 1 MG BY MOUTH IN THE MORNING AND 2 MG BY MOUTH IN THE  EVENING 11/3/17        Objective:     Vitals:    01/03/20 0937   BP: (!) 131/98   Pulse: 107   Resp: 18   Temp: 97.7 °F (36.5 °C)       Physical Exam   Constitutional: He is oriented to person, place, and time. He appears well-developed and well-nourished. No distress.   HENT:   Head: Normocephalic and atraumatic.   Right Ear: External ear normal.   Left Ear: External ear normal.   Nose: Nose normal.   Mouth/Throat: No oropharyngeal exudate.   Eyes: Pupils are equal, round, and reactive to light. Conjunctivae and EOM are normal. Right eye exhibits no discharge. Left eye exhibits no discharge. No scleral icterus.   Neck: Normal range of motion. Neck supple. No thyromegaly present.   Cardiovascular: Regular rhythm, S1 normal and S2 normal. Tachycardia present. Exam reveals no gallop and no friction rub.   Murmur heard.   Systolic murmur is present with a grade of 1/6.  Pulmonary/Chest: Effort normal and breath sounds normal. No accessory muscle usage. No apnea, no tachypnea and no bradypnea. No respiratory distress. He has no wheezes. He has no rales.   Abdominal: Soft. Bowel sounds are normal.   Musculoskeletal: Normal range of motion. He exhibits no edema.   Lymphadenopathy:        Head (right side): No submental, no submandibular, no tonsillar, no preauricular, no posterior auricular and no occipital adenopathy present.        Head (left side): No submental, no submandibular, no tonsillar, no preauricular, no posterior auricular and no occipital adenopathy present.     He has no cervical adenopathy.        Right cervical: No superficial cervical, no deep cervical and no posterior cervical adenopathy present.       Left cervical: No superficial cervical, no deep cervical and no posterior cervical adenopathy present.        Right axillary: No pectoral and no lateral adenopathy present.        Left axillary: No pectoral and no lateral adenopathy present.No inguinal adenopathy noted on the right or left side.         Right: No supraclavicular adenopathy present.        Left: No supraclavicular adenopathy present.   Neurological: He is alert and oriented to person, place, and time. He has normal strength. He exhibits normal muscle tone. Gait normal.   Skin: Skin is warm and dry. No lesion and no rash noted.   Psychiatric: He has a normal mood and affect. His behavior is normal. Judgment and thought content normal.       Assessment:     Problem List Items Addressed This Visit        Hematology    Factor V Leiden (Chronic)    Relevant Orders    Comprehensive metabolic panel    CBC auto differential       Oncology    History of non-Hodgkin's lymphoma    Relevant Orders    Comprehensive metabolic panel    CBC auto differential    Lactate dehydrogenase      Other Visit Diagnoses     Hypokalemia    -  Primary    Relevant Medications    potassium chloride SA (K-DUR,KLOR-CON) 20 MEQ tablet    Other Relevant Orders    Comprehensive metabolic panel    Magnesium    Comprehensive metabolic panel    Blood creatinine increased compared with prior measurement        Relevant Orders    Ambulatory consult to Nephrology    Comprehensive metabolic panel    Function kidney decreased        Relevant Orders    Ambulatory consult to Nephrology    Comprehensive metabolic panel    Elevated liver enzymes        Relevant Orders    US Abdomen Complete    Comprehensive metabolic panel    History of DVT (deep vein thrombosis)        Relevant Orders    Comprehensive metabolic panel    CBC auto differential    Prostate cancer screening        Screening for prostate cancer        Encounter for prostate cancer screening            Lab Results   Component Value Date    WBC 4.58 01/03/2020    HGB 15.3 01/03/2020    HCT 46.9 01/03/2020    MCV 92 01/03/2020     01/03/2020     BMP  Lab Results   Component Value Date     01/03/2020    K 3.4 (L) 01/03/2020     01/03/2020    CO2 26 01/03/2020    BUN 18 01/03/2020    CREATININE 2.0 (H) 01/03/2020     CALCIUM 9.9 01/03/2020    ANIONGAP 12 01/03/2020    ESTGFRAFRICA 44 (A) 01/03/2020    EGFRNONAA 38 (A) 01/03/2020     Lab Results   Component Value Date    ALT 11 01/03/2020    AST 14 01/03/2020    ALKPHOS 89 01/03/2020    BILITOT 1.7 (H) 01/03/2020     LDH WNL  Plan:   Hypokalemia  -     potassium chloride SA (K-DUR,KLOR-CON) 20 MEQ tablet; Take 1 tablet (20 mEq total) by mouth once daily. for 4 days  Dispense: 4 tablet; Refill: 1  -     Comprehensive metabolic panel; Future; Expected date: 01/03/2020  -     Magnesium; Future; Expected date: 01/03/2020  -     Comprehensive metabolic panel; Future; Expected date: 01/03/2020    Blood creatinine increased compared with prior measurement  -     Ambulatory consult to Nephrology  -     Comprehensive metabolic panel; Future; Expected date: 01/03/2020    Function kidney decreased  -     Ambulatory consult to Nephrology  -     Comprehensive metabolic panel; Future; Expected date: 01/03/2020    Elevated liver enzymes  -     US Abdomen Complete; Future; Expected date: 01/03/2020  -     Comprehensive metabolic panel; Future; Expected date: 01/03/2020    Factor V Leiden  -     Comprehensive metabolic panel; Future; Expected date: 01/03/2020  -     CBC auto differential; Future; Expected date: 01/03/2020    History of non-Hodgkin's lymphoma  -     Comprehensive metabolic panel; Future; Expected date: 01/03/2020  -     CBC auto differential; Future; Expected date: 01/03/2020  -     Lactate dehydrogenase; Future; Expected date: 01/03/2020    History of DVT (deep vein thrombosis)  -     Comprehensive metabolic panel; Future; Expected date: 01/03/2020  -     CBC auto differential; Future; Expected date: 01/03/2020    Prostate cancer screening    Screening for prostate cancer    Encounter for prostate cancer screening    He knows that he will need to continue life long anticoagulation due to Factor V Leiden mutation.  He will follow up in 6 months with repeat labs - cbc, cmp, LDH.   He will continue to monitor for s/s of bleeding.  He will follow up sooner for concerning symptoms including - fever, chills, night sweats, swollen lymph nodes, or unintentional weight loss.   Will order abdominal US to evaluate liver.  Will send to nephrology for worsening kidney function with elevated creatinine.        Thank You,  DONNA Block-C

## 2020-01-08 ENCOUNTER — HOSPITAL ENCOUNTER (OUTPATIENT)
Dept: RADIOLOGY | Facility: HOSPITAL | Age: 50
Discharge: HOME | End: 2020-01-08
Attending: NURSE PRACTITIONER
Payer: MEDICARE

## 2020-01-08 DIAGNOSIS — R74.8 ELEVATED LIVER ENZYMES: ICD-10-CM

## 2020-01-08 PROCEDURE — 76700 US EXAM ABDOM COMPLETE: CPT | Mod: TC

## 2020-01-10 ENCOUNTER — LAB VISIT (OUTPATIENT)
Dept: LAB | Facility: HOSPITAL | Age: 50
End: 2020-01-10
Attending: NURSE PRACTITIONER
Payer: MEDICARE

## 2020-01-10 ENCOUNTER — TELEPHONE (OUTPATIENT)
Dept: HEMATOLOGY/ONCOLOGY | Facility: HOSPITAL | Age: 50
End: 2020-01-10

## 2020-01-10 DIAGNOSIS — E87.6 HYPOKALEMIA: ICD-10-CM

## 2020-01-10 DIAGNOSIS — E83.42 HYPOMAGNESEMIA: Primary | ICD-10-CM

## 2020-01-10 DIAGNOSIS — N18.9 CHRONIC KIDNEY DISEASE, UNSPECIFIED CKD STAGE: ICD-10-CM

## 2020-01-10 LAB
ALBUMIN SERPL BCP-MCNC: 3.8 G/DL (ref 3.5–5.2)
ALP SERPL-CCNC: 100 U/L (ref 55–135)
ALT SERPL W/O P-5'-P-CCNC: 22 U/L (ref 10–44)
ANION GAP SERPL CALC-SCNC: 9 MMOL/L (ref 8–16)
AST SERPL-CCNC: 18 U/L (ref 10–40)
BILIRUB SERPL-MCNC: 0.6 MG/DL (ref 0.1–1)
BUN SERPL-MCNC: 9 MG/DL (ref 6–20)
CALCIUM SERPL-MCNC: 8.7 MG/DL (ref 8.7–10.5)
CHLORIDE SERPL-SCNC: 105 MMOL/L (ref 95–110)
CO2 SERPL-SCNC: 28 MMOL/L (ref 23–29)
CREAT SERPL-MCNC: 1.5 MG/DL (ref 0.5–1.4)
EST. GFR  (AFRICAN AMERICAN): >60 ML/MIN/1.73 M^2
EST. GFR  (NON AFRICAN AMERICAN): 54 ML/MIN/1.73 M^2
GLUCOSE SERPL-MCNC: 103 MG/DL (ref 70–110)
MAGNESIUM SERPL-MCNC: 1.3 MG/DL (ref 1.6–2.6)
POTASSIUM SERPL-SCNC: 3.6 MMOL/L (ref 3.5–5.1)
PROT SERPL-MCNC: 7.9 G/DL (ref 6–8.4)
SODIUM SERPL-SCNC: 142 MMOL/L (ref 136–145)

## 2020-01-10 PROCEDURE — 36415 COLL VENOUS BLD VENIPUNCTURE: CPT

## 2020-01-10 PROCEDURE — 83735 ASSAY OF MAGNESIUM: CPT

## 2020-01-10 PROCEDURE — 80053 COMPREHEN METABOLIC PANEL: CPT

## 2020-01-11 NOTE — TELEPHONE ENCOUNTER
Called to leave message regarding patient's low magnesium level of 1.3.  Would like the patient to start mag ox 200 mg PO daily.  Will recheck mag level in one week.

## 2020-01-13 ENCOUNTER — TELEPHONE (OUTPATIENT)
Dept: HEMATOLOGY/ONCOLOGY | Facility: CLINIC | Age: 50
End: 2020-01-13

## 2020-01-13 NOTE — TELEPHONE ENCOUNTER
----- Message from Darby Laguna NP sent at 1/10/2020  6:24 PM CST -----  Please let him know that previous hepatic masses have resolved.  Thank you

## 2020-01-14 ENCOUNTER — TELEPHONE (OUTPATIENT)
Dept: HEMATOLOGY/ONCOLOGY | Facility: CLINIC | Age: 50
End: 2020-01-14

## 2020-01-17 ENCOUNTER — OFFICE VISIT (OUTPATIENT)
Dept: NEPHROLOGY | Facility: CLINIC | Age: 50
End: 2020-01-17
Payer: MEDICARE

## 2020-01-17 VITALS
BODY MASS INDEX: 30.97 KG/M2 | DIASTOLIC BLOOD PRESSURE: 98 MMHG | HEART RATE: 98 BPM | HEIGHT: 72 IN | SYSTOLIC BLOOD PRESSURE: 130 MMHG | WEIGHT: 228.63 LBS

## 2020-01-17 DIAGNOSIS — I10 HTN (HYPERTENSION), BENIGN: Primary | ICD-10-CM

## 2020-01-17 DIAGNOSIS — E83.42 HYPOMAGNESEMIA: ICD-10-CM

## 2020-01-17 DIAGNOSIS — E87.6 HYPOKALEMIA: ICD-10-CM

## 2020-01-17 DIAGNOSIS — N18.30 CHRONIC KIDNEY DISEASE (CKD), STAGE III (MODERATE): ICD-10-CM

## 2020-01-17 PROCEDURE — 99999 PR PBB SHADOW E&M-EST. PATIENT-LVL III: CPT | Mod: PBBFAC,,, | Performed by: INTERNAL MEDICINE

## 2020-01-17 PROCEDURE — 99999 PR PBB SHADOW E&M-EST. PATIENT-LVL III: ICD-10-PCS | Mod: PBBFAC,,, | Performed by: INTERNAL MEDICINE

## 2020-01-17 PROCEDURE — 99213 OFFICE O/P EST LOW 20 MIN: CPT | Mod: PBBFAC | Performed by: INTERNAL MEDICINE

## 2020-01-17 PROCEDURE — 99205 PR OFFICE/OUTPT VISIT, NEW, LEVL V, 60-74 MIN: ICD-10-PCS | Mod: S$PBB,,, | Performed by: INTERNAL MEDICINE

## 2020-01-17 PROCEDURE — 99205 OFFICE O/P NEW HI 60 MIN: CPT | Mod: S$PBB,,, | Performed by: INTERNAL MEDICINE

## 2020-01-17 RX ORDER — POTASSIUM CHLORIDE 750 MG/1
10 TABLET, EXTENDED RELEASE ORAL DAILY
Qty: 30 TABLET | Refills: 9 | Status: SHIPPED | OUTPATIENT
Start: 2020-01-17 | End: 2021-01-06

## 2020-01-17 RX ORDER — LANOLIN ALCOHOL/MO/W.PET/CERES
400 CREAM (GRAM) TOPICAL DAILY
Qty: 30 TABLET | Refills: 9 | Status: SHIPPED | OUTPATIENT
Start: 2020-01-17 | End: 2021-01-06 | Stop reason: SDUPTHER

## 2020-01-17 NOTE — PROGRESS NOTES
Subjective:       Patient ID: Alfonso Leger is a 49 y.o.   male who presents for new evaluation of CKD stage 2/3, Hypokalemia     Mariano Hale MD      HPI: Alfonso Leger is a pleasant 49-year-old  man with history of hypertension, CKD stage 2/3, cardiomyopathy status post heart transplant in 2012, followed by Heart Transplant Team, history of B-cell lymphoma, factor 5 laden, seen in office today in consultation for above medical problems on referral from his oncologist.  Pertinent previous provider notes and lab results were reviewed.  His serum creatinine fluctuates between 1.5 and 2 mg/dL depending on his fluid status.  He has been on Prograf, 1 mg in a.m. and 2 mg in p.m. for immunosuppression.  Denies NSAID use.  No family history of kidney disease.  Mild hypokalemia and hypomagnesemia noted on the recent labs.        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    Factor V Leiden 9/7/2012    Hypertension     Other and unspecified hyperlipidemia 10/11/2013    Stroke     Type II or unspecified type diabetes mellitus without mention of complication, uncontrolled 10/11/2013       Past Surgical History:   Procedure Laterality Date    ABDOMINAL SURGERY      CARDIAC CATHETERIZATION      HEART TRANSPLANT      IVC FILTER RETRIEVAL         Review of patient's allergies indicates:  No Known Allergies    Current Outpatient Medications on File Prior to Visit   Medication Sig Dispense Refill    amLODIPine (NORVASC) 10 MG tablet TAKE 1 TABLET (10 MG TOTAL) BY MOUTH ONCE DAILY. 30 tablet 11    enoxaparin (LOVENOX) 100 mg/mL Syrg INJECT 1 ML (100 MG TOTAL) INTO THE SKIN EVERY 12 (TWELVE) HOURS. (60 SYRINGES)  60 Syringe 5    pravastatin (PRAVACHOL) 40 MG tablet Take 1 tablet (40 mg total) by mouth once daily. 90 tablet 3    tacrolimus (PROGRAF) 1 MG Cap TAKE 1 MG BY MOUTH IN THE MORNING AND 2 MG BY MOUTH IN THE EVENING 11/3/17 270 capsule 2    blood sugar diagnostic Strp 1 strip by Misc.(Non-Drug; Combo Route) route 3 (three) times daily. (Patient not taking: Reported on 1/17/2020) 100 strip 11     No current facility-administered medications on file prior to visit.        FH: denies FH of kidney disease     SH : Lives at home , on disability , no smoking or alcohol, no biological children , heart failure in family         Review of Systems   Constitutional: Negative for activity change and appetite change.   HENT: Negative for congestion and facial swelling.    Eyes: Negative for pain, discharge and redness.   Respiratory: Negative for apnea, cough and chest tightness.    Cardiovascular: Negative for chest pain, palpitations and leg swelling.   Gastrointestinal: Negative for abdominal distention.   Genitourinary: Negative for difficulty urinating, dysuria and frequency.   Musculoskeletal: Negative for neck pain and neck stiffness.   Skin: Negative for color change, rash and wound.   Neurological: Negative for dizziness, weakness and numbness.   Psychiatric/Behavioral: Negative for sleep disturbance.   All other systems reviewed and are negative.    :            Objective:         Vitals:    01/17/20 1005   BP: (!) 130/98   Pulse: 98   Weight: 103.7 kg (228 lb 9.9 oz)   Height: 6' (1.829 m)             Physical Exam   Constitutional: He is oriented to person, place, and time. He appears well-developed and well-nourished. No distress.   HENT:   Head: Normocephalic and atraumatic.   Eyes: Pupils are equal, round, and reactive to light.   Neck: Normal range of motion. Neck supple. No tracheal deviation present. No thyromegaly present.   Cardiovascular: Normal rate, regular rhythm and intact distal pulses. Exam reveals no  gallop and no friction rub.   Murmur heard.  SM in [place]   Pulmonary/Chest: Effort normal and breath sounds normal. He has no wheezes. He has no rales.   Abdominal: Soft. He exhibits no mass. There is no tenderness. There is no rebound and no guarding.   Musculoskeletal: Normal range of motion. He exhibits no edema.   Lymphadenopathy:     He has no cervical adenopathy.   Neurological: He is alert and oriented to person, place, and time.   Skin: Skin is warm. No rash noted. He is not diaphoretic. No erythema.   Nursing note and vitals reviewed.    :          Labs:    Lab Results   Component Value Date    CREATININE 1.5 (H) 01/10/2020    BUN 9 01/10/2020     01/10/2020    K 3.6 01/10/2020     01/10/2020    CO2 28 01/10/2020       Lab Results   Component Value Date    WBC 4.58 01/03/2020    HGB 15.3 01/03/2020    HCT 46.9 01/03/2020    MCV 92 01/03/2020     01/03/2020         Lab Results   Component Value Date    CALCIUM 8.7 01/10/2020    PHOS 2.9 10/19/2013       Lab Results   Component Value Date    ALBUMIN 3.8 01/10/2020           Impression and Plan :  49-year-old  man with history of hypertension, CKD stage 2/3, cardiomyopathy, status post heart transplant in 2012, congestive heart failure with LV ejection fraction about 35%, seen in office today in  Consultation for following medical problems    1.  CKD stage 2/3 - recent serum creatinine is stable at 1.5 mg/dL, chronic kidney disease is multifactorial, he has been on Prograf for a long time, has history of hypertension, discussed adequate fluid intake, advised patient to avoid NSAID use, handout on the same was given today,    2.  Status post heart transplant 2012, currently on Prograf 1 mg in a.m. and 2 mg in p.m.,    3.  Hypertension - currently on amlodipine, patient reports his blood pressure has been slightly elevated especially on the diastolic for a long time is closely followed by transplant team, discussed low-salt  diet, in the past he was on lisinopril and not clear why this medication was discontinued, if blood pressures remain elevated we can enroll him in digital blood pressure monitoring program,    4.  Hypokalemia - will start him on potassium chloride 10 mEq daily,    5.  Hypomagnesemia - will start him on magnesium oxide 400 mg daily, repeat labs in a week,    6.  Congestive heart failure - he is euvolemic on exam, discussed salt and fluid restriction,       return to clinic in about 8 weeks, more than 60 min of face-to-face time was spent with the patient discussing labs and plan of care, also time was spent reviewing his record,    Jared Arellano MD

## 2020-01-17 NOTE — PATIENT INSTRUCTIONS
Avoid NSAID pain medications such as advil, aleve, motrin, ibuprofen, naprosyn, meloxicam, diclofenac, mobic.       Start Potassium chloride 10 meq once a day     Magnesium oxide 400 mg tablet daily       Low salt diet

## 2020-01-17 NOTE — LETTER
January 17, 2020      [place]  [address] [place] [address]           [facility]  [address]  [place] [address]  Phone: [phone]  Fax: [phone]          Patient: Alfonso Leger   MR Number: [id number]   YOB: 1970   Date of Visit: 1/17/2020       Dear Darby Laguna:    Thank you for referring Alfonso Leger to me for evaluation. Attached you will find relevant portions of my assessment and plan of care.    If you have questions, please do not hesitate to call me. I look forward to following Alfonso Leger along with you.    Sincerely,    Jared Arellano MD    Enclosure  CC:  No Recipients    If you would like to receive this communication electronically, please contact [email or web address] or [phone] to request more information on [facility] [employer] access.    For providers and/or their staff who would like to refer a patient to [facility], please contact us through our one-stop-shop provider referral line, [facility], at [phone].    If you feel you have received this communication in error or would no longer like to receive these types of communications, please e-mail [email or web address]

## 2020-01-24 ENCOUNTER — LAB VISIT (OUTPATIENT)
Dept: LAB | Facility: HOSPITAL | Age: 50
End: 2020-01-24
Payer: MEDICARE

## 2020-01-24 DIAGNOSIS — E83.42 HYPOMAGNESEMIA: ICD-10-CM

## 2020-01-24 DIAGNOSIS — E87.6 HYPOKALEMIA: ICD-10-CM

## 2020-01-24 DIAGNOSIS — N18.30 CHRONIC KIDNEY DISEASE (CKD), STAGE III (MODERATE): ICD-10-CM

## 2020-01-24 LAB
ALBUMIN SERPL BCP-MCNC: 3.9 G/DL (ref 3.5–5.2)
ANION GAP SERPL CALC-SCNC: 11 MMOL/L (ref 8–16)
BUN SERPL-MCNC: 16 MG/DL (ref 6–20)
CALCIUM SERPL-MCNC: 9.8 MG/DL (ref 8.7–10.5)
CHLORIDE SERPL-SCNC: 103 MMOL/L (ref 95–110)
CO2 SERPL-SCNC: 27 MMOL/L (ref 23–29)
CREAT SERPL-MCNC: 1.9 MG/DL (ref 0.5–1.4)
EST. GFR  (AFRICAN AMERICAN): 47 ML/MIN/1.73 M^2
EST. GFR  (NON AFRICAN AMERICAN): 41 ML/MIN/1.73 M^2
GLUCOSE SERPL-MCNC: 107 MG/DL (ref 70–110)
MAGNESIUM SERPL-MCNC: 1.7 MG/DL (ref 1.6–2.6)
PHOSPHATE SERPL-MCNC: 2.3 MG/DL (ref 2.7–4.5)
POTASSIUM SERPL-SCNC: 3.6 MMOL/L (ref 3.5–5.1)
SODIUM SERPL-SCNC: 141 MMOL/L (ref 136–145)

## 2020-01-24 PROCEDURE — 36415 COLL VENOUS BLD VENIPUNCTURE: CPT

## 2020-01-24 PROCEDURE — 80069 RENAL FUNCTION PANEL: CPT

## 2020-01-24 PROCEDURE — 83735 ASSAY OF MAGNESIUM: CPT

## 2020-01-27 ENCOUNTER — TELEPHONE (OUTPATIENT)
Dept: NEPHROLOGY | Facility: CLINIC | Age: 50
End: 2020-01-27

## 2020-01-27 ENCOUNTER — TELEPHONE (OUTPATIENT)
Dept: HEMATOLOGY/ONCOLOGY | Facility: CLINIC | Age: 50
End: 2020-01-27

## 2020-01-27 DIAGNOSIS — N18.30 CHRONIC KIDNEY DISEASE (CKD), STAGE III (MODERATE): Primary | ICD-10-CM

## 2020-01-27 NOTE — TELEPHONE ENCOUNTER
Informed patient of creatinine and to increase fluid intake. Labs scheduled on 2/4/20. Patient verbalized understanding.

## 2020-01-27 NOTE — TELEPHONE ENCOUNTER
Patient notified of u/s and lab results, start magnesium, and repeat labs, verbalized understanding.

## 2020-01-27 NOTE — TELEPHONE ENCOUNTER
Lab Results   Component Value Date    CREATININE 1.9 (H) 01/24/2020    BUN 16 01/24/2020     01/24/2020    K 3.6 01/24/2020     01/24/2020    CO2 27 01/24/2020       Recent serum creatinine was slightly elevated compared to previous labs, increased from 1.5-1.9, please discussed adequate fluid intake, repeat labs next week,    Dr Arellano

## 2020-01-27 NOTE — TELEPHONE ENCOUNTER
----- Message from Casi Thomson sent at 1/27/2020 12:58 PM CST -----  Contact: patient  As per letter patient is calling concerning his test results. Please call patient @ [phone].

## 2020-01-27 NOTE — TELEPHONE ENCOUNTER
----- Message from Brenna Corral sent at 1/27/2020  1:56 PM CST -----  Patient returning call..[phone] ([place])

## 2020-01-28 ENCOUNTER — LAB VISIT (OUTPATIENT)
Dept: LAB | Facility: HOSPITAL | Age: 50
End: 2020-01-28
Attending: NURSE PRACTITIONER
Payer: MEDICARE

## 2020-01-28 DIAGNOSIS — Z86.718 HISTORY OF DVT (DEEP VEIN THROMBOSIS): ICD-10-CM

## 2020-01-28 DIAGNOSIS — D68.51 FACTOR V LEIDEN: Chronic | ICD-10-CM

## 2020-01-28 DIAGNOSIS — Z85.72 HISTORY OF NON-HODGKIN'S LYMPHOMA: ICD-10-CM

## 2020-01-28 DIAGNOSIS — R79.89 BLOOD CREATININE INCREASED COMPARED WITH PRIOR MEASUREMENT: ICD-10-CM

## 2020-01-28 DIAGNOSIS — E87.6 HYPOKALEMIA: ICD-10-CM

## 2020-01-28 DIAGNOSIS — N28.9 FUNCTION KIDNEY DECREASED: ICD-10-CM

## 2020-01-28 DIAGNOSIS — R74.8 ELEVATED LIVER ENZYMES: ICD-10-CM

## 2020-01-28 LAB
ALBUMIN SERPL BCP-MCNC: 3.9 G/DL (ref 3.5–5.2)
ALP SERPL-CCNC: 118 U/L (ref 55–135)
ALT SERPL W/O P-5'-P-CCNC: 19 U/L (ref 10–44)
ANION GAP SERPL CALC-SCNC: 10 MMOL/L (ref 8–16)
AST SERPL-CCNC: 15 U/L (ref 10–40)
BILIRUB SERPL-MCNC: 0.8 MG/DL (ref 0.1–1)
BUN SERPL-MCNC: 6 MG/DL (ref 6–20)
CALCIUM SERPL-MCNC: 9.2 MG/DL (ref 8.7–10.5)
CHLORIDE SERPL-SCNC: 102 MMOL/L (ref 95–110)
CO2 SERPL-SCNC: 28 MMOL/L (ref 23–29)
CREAT SERPL-MCNC: 1.4 MG/DL (ref 0.5–1.4)
EST. GFR  (AFRICAN AMERICAN): >60 ML/MIN/1.73 M^2
EST. GFR  (NON AFRICAN AMERICAN): 59 ML/MIN/1.73 M^2
GLUCOSE SERPL-MCNC: 103 MG/DL (ref 70–110)
POTASSIUM SERPL-SCNC: 3.4 MMOL/L (ref 3.5–5.1)
PROT SERPL-MCNC: 8 G/DL (ref 6–8.4)
SODIUM SERPL-SCNC: 140 MMOL/L (ref 136–145)

## 2020-01-28 PROCEDURE — 80053 COMPREHEN METABOLIC PANEL: CPT

## 2020-01-28 PROCEDURE — 36415 COLL VENOUS BLD VENIPUNCTURE: CPT

## 2020-01-29 ENCOUNTER — TELEPHONE (OUTPATIENT)
Dept: HEMATOLOGY/ONCOLOGY | Facility: CLINIC | Age: 50
End: 2020-01-29

## 2020-01-29 NOTE — TELEPHONE ENCOUNTER
----- Message from Darby Laguna NP sent at 1/29/2020  8:57 AM CST -----  Can you please let him know that his kidney function is looking much better.  His potassium is slightly low at 3.4.  Would like him to increase his potassium rich foods.  Please review list with patient.  Thank you,  HOMER Lambert

## 2020-01-29 NOTE — PROGRESS NOTES
Can you please let him know that his kidney function is looking much better.  His potassium is slightly low at 3.4.  Would like him to increase his potassium rich foods.  Please review list with patient.  Thank you,  HOMER Lambert

## 2020-02-04 ENCOUNTER — LAB VISIT (OUTPATIENT)
Dept: LAB | Facility: HOSPITAL | Age: 50
End: 2020-02-04
Attending: INTERNAL MEDICINE
Payer: MEDICARE

## 2020-02-04 DIAGNOSIS — N18.30 CHRONIC KIDNEY DISEASE (CKD), STAGE III (MODERATE): ICD-10-CM

## 2020-02-04 PROCEDURE — 80069 RENAL FUNCTION PANEL: CPT

## 2020-02-04 PROCEDURE — 36415 COLL VENOUS BLD VENIPUNCTURE: CPT

## 2020-02-05 LAB
ALBUMIN SERPL BCP-MCNC: 4.1 G/DL (ref 3.5–5.2)
ANION GAP SERPL CALC-SCNC: 12 MMOL/L (ref 8–16)
BUN SERPL-MCNC: 11 MG/DL (ref 6–20)
CALCIUM SERPL-MCNC: 9.2 MG/DL (ref 8.7–10.5)
CHLORIDE SERPL-SCNC: 105 MMOL/L (ref 95–110)
CO2 SERPL-SCNC: 26 MMOL/L (ref 23–29)
CREAT SERPL-MCNC: 1.5 MG/DL (ref 0.5–1.4)
EST. GFR  (AFRICAN AMERICAN): >60 ML/MIN/1.73 M^2
EST. GFR  (NON AFRICAN AMERICAN): 54.3 ML/MIN/1.73 M^2
GLUCOSE SERPL-MCNC: 85 MG/DL (ref 70–110)
PHOSPHATE SERPL-MCNC: 2.2 MG/DL (ref 2.7–4.5)
POTASSIUM SERPL-SCNC: 3.7 MMOL/L (ref 3.5–5.1)
SODIUM SERPL-SCNC: 143 MMOL/L (ref 136–145)

## 2020-02-17 ENCOUNTER — TELEPHONE (OUTPATIENT)
Dept: TRANSPLANT | Facility: CLINIC | Age: 50
End: 2020-02-17

## 2020-02-17 DIAGNOSIS — Z79.899 ENCOUNTER FOR LONG-TERM (CURRENT) USE OF MEDICATIONS: ICD-10-CM

## 2020-02-17 DIAGNOSIS — Z94.1 STATUS POST HEART TRANSPLANT: ICD-10-CM

## 2020-02-17 DIAGNOSIS — E78.2 MIXED HYPERLIPIDEMIA: ICD-10-CM

## 2020-02-17 DIAGNOSIS — T86.20 COMPLICATION OF HEART TRANSPLANT, UNSPECIFIED COMPLICATION: ICD-10-CM

## 2020-02-17 NOTE — LETTER
[facility] [address] [place] [address]  Phone: [phone]   February 17, 2020    Alfonso Leger  [address]  [address]  [facility] [address]          Dear Alfonso Leger,  MRN: [id number]    I hope you are well. I have added a Tacrolimus level to your lab appointment already scheduled in March. Please let me know if you have any needs to reschedule   You lab visit.    If you have any questions or concerns, please don't hesitate to call.    Sincerely,      Staci Reno RN, BSN  Post Heart Transplant Coordinator   [facility]  [address] [place] [id number] [phone]

## 2020-02-17 NOTE — TELEPHONE ENCOUNTER
Orders placed for lab work and letter sent to pt explaining labs added to another lab appt in March.

## 2020-03-02 ENCOUNTER — LAB VISIT (OUTPATIENT)
Dept: LAB | Facility: HOSPITAL | Age: 50
End: 2020-03-02
Attending: INTERNAL MEDICINE
Payer: MEDICARE

## 2020-03-02 DIAGNOSIS — N18.30 CHRONIC KIDNEY DISEASE (CKD), STAGE III (MODERATE): ICD-10-CM

## 2020-03-02 LAB
ALBUMIN SERPL BCP-MCNC: 4 G/DL (ref 3.5–5.2)
ANION GAP SERPL CALC-SCNC: 8 MMOL/L (ref 8–16)
BUN SERPL-MCNC: 10 MG/DL (ref 6–20)
CALCIUM SERPL-MCNC: 9.3 MG/DL (ref 8.7–10.5)
CHLORIDE SERPL-SCNC: 104 MMOL/L (ref 95–110)
CO2 SERPL-SCNC: 25 MMOL/L (ref 23–29)
CREAT SERPL-MCNC: 1.5 MG/DL (ref 0.5–1.4)
EST. GFR  (AFRICAN AMERICAN): >60 ML/MIN/1.73 M^2
EST. GFR  (NON AFRICAN AMERICAN): 54 ML/MIN/1.73 M^2
GLUCOSE SERPL-MCNC: 101 MG/DL (ref 70–110)
MAGNESIUM SERPL-MCNC: 1.5 MG/DL (ref 1.6–2.6)
PHOSPHATE SERPL-MCNC: 1.7 MG/DL (ref 2.7–4.5)
POTASSIUM SERPL-SCNC: 3.9 MMOL/L (ref 3.5–5.1)
PTH-INTACT SERPL-MCNC: 329.8 PG/ML (ref 9–77)
SODIUM SERPL-SCNC: 137 MMOL/L (ref 136–145)

## 2020-03-02 PROCEDURE — 83735 ASSAY OF MAGNESIUM: CPT

## 2020-03-02 PROCEDURE — 83970 ASSAY OF PARATHORMONE: CPT

## 2020-03-02 PROCEDURE — 80069 RENAL FUNCTION PANEL: CPT

## 2020-03-02 PROCEDURE — 82306 VITAMIN D 25 HYDROXY: CPT

## 2020-03-02 PROCEDURE — 36415 COLL VENOUS BLD VENIPUNCTURE: CPT

## 2020-03-03 LAB — 25(OH)D3+25(OH)D2 SERPL-MCNC: 4 NG/ML (ref 30–96)

## 2020-03-04 ENCOUNTER — OFFICE VISIT (OUTPATIENT)
Dept: NEPHROLOGY | Facility: CLINIC | Age: 50
End: 2020-03-04
Payer: MEDICARE

## 2020-03-04 VITALS
WEIGHT: 225.5 LBS | HEART RATE: 98 BPM | SYSTOLIC BLOOD PRESSURE: 124 MMHG | DIASTOLIC BLOOD PRESSURE: 90 MMHG | HEIGHT: 72 IN | BODY MASS INDEX: 30.54 KG/M2

## 2020-03-04 DIAGNOSIS — I10 HTN (HYPERTENSION), BENIGN: ICD-10-CM

## 2020-03-04 DIAGNOSIS — E83.42 HYPOMAGNESEMIA: ICD-10-CM

## 2020-03-04 DIAGNOSIS — N18.30 CHRONIC KIDNEY DISEASE (CKD), STAGE III (MODERATE): Primary | ICD-10-CM

## 2020-03-04 PROCEDURE — 99215 PR OFFICE/OUTPT VISIT, EST, LEVL V, 40-54 MIN: ICD-10-PCS | Mod: S$PBB,,, | Performed by: INTERNAL MEDICINE

## 2020-03-04 PROCEDURE — 99999 PR PBB SHADOW E&M-EST. PATIENT-LVL III: CPT | Mod: PBBFAC,,, | Performed by: INTERNAL MEDICINE

## 2020-03-04 PROCEDURE — 99213 OFFICE O/P EST LOW 20 MIN: CPT | Mod: PBBFAC | Performed by: INTERNAL MEDICINE

## 2020-03-04 PROCEDURE — 99215 OFFICE O/P EST HI 40 MIN: CPT | Mod: S$PBB,,, | Performed by: INTERNAL MEDICINE

## 2020-03-04 PROCEDURE — 99999 PR PBB SHADOW E&M-EST. PATIENT-LVL III: ICD-10-PCS | Mod: PBBFAC,,, | Performed by: INTERNAL MEDICINE

## 2020-03-04 RX ORDER — ERGOCALCIFEROL 1.25 MG/1
50000 CAPSULE ORAL
Qty: 12 CAPSULE | Refills: 3 | Status: SHIPPED | OUTPATIENT
Start: 2020-03-04 | End: 2020-09-14 | Stop reason: ALTCHOICE

## 2020-03-04 RX ORDER — OLMESARTAN MEDOXOMIL 5 MG/1
5 TABLET ORAL NIGHTLY
Qty: 90 TABLET | Refills: 3 | Status: SHIPPED | OUTPATIENT
Start: 2020-03-04 | End: 2020-09-14 | Stop reason: ALTCHOICE

## 2020-03-04 NOTE — PROGRESS NOTES
Subjective:       Patient ID: Alfonso Leger is a 49 y.o.    male who presents for follow-up evaluation of CKD stage 2/3, Hypokalemia , HTN       Mariano Hale MD      HPI : Alfonso Leger is a pleasant 49-year-old  man with history of hypertension, CKD stage 2/3, cardiomyopathy status post heart transplant in 2012, followed by Heart Transplant Team, history of B-cell lymphoma, factor 5 laden, seen in office today in f/u for above medical problems .  serum creatinine fluctuates between 1.5 and 2 mg/dL depending on his fluid status.  He has been on Prograf, 1 mg in a.m. and 2 mg in p.m. for immunosuppression.  Denies NSAID use.  No family history of kidney disease.          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  Dilated cardiomyopathy status post orthotopic heart transplant in October 2012 on chronic immunosuppression  2.  Factor V Leiden mutation  3.  Bilateral lower extremity DVT in August 2013  4.  Right brachial vein DVT in August 2013  5.  Peptic ulcer disease  6.  H. pylori gastritis  7.  Dyslipidemia  8.  Hypertension  9.  Corticosteroid-induced diabetes     SURGICAL HISTORY:   1.  Orthotopic heart transplantation in October 2012  2.  IVC filter placement in August 2011  3.  AICD placement and removal in September 2012  4.  Exploratory laparotomy with small bowel resection and wedge biopsy of the liver on August 7, 2013 for perforated  tommy       Current Outpatient Medications on File Prior to Visit   Medication Sig Dispense Refill    amLODIPine (NORVASC) 10 MG tablet TAKE 1 TABLET (10 MG TOTAL) BY MOUTH ONCE DAILY. 30 tablet 11    blood sugar diagnostic Strp 1 strip by Misc.(Non-Drug; Combo Route) route 3 (three) times daily. 100 strip 11    enoxaparin (LOVENOX) 100 mg/mL Syrg INJECT 1 ML (100 MG TOTAL) INTO THE SKIN EVERY 12 (TWELVE) HOURS. (60 SYRINGES) 60 Syringe 5    magnesium oxide (MAG-OX) 400 mg (241.3 mg magnesium) tablet Take 1 tablet (400 mg total) by mouth once daily. 30 tablet 9    potassium chloride (KLOR-CON) 10 MEQ TbSR Take 1 tablet (10 mEq total) by mouth once daily. 30 tablet 9    pravastatin (PRAVACHOL) 40 MG tablet Take 1 tablet (40 mg total) by mouth once daily. 90 tablet 3    tacrolimus (PROGRAF) 1 MG Cap TAKE 1 MG BY MOUTH IN THE MORNING AND 2 MG BY MOUTH IN THE EVENING 11/3/17 270 capsule 2     No current facility-administered medications on file prior to visit.        FH: denies FH of kidney disease      SH : Lives at home , on disability , no smoking or alcohol, no biological children , heart failure in family     Review of Systems  :      Constitutional: Negative for activity change and appetite change.   HENT: Negative for congestion and facial swelling.    Eyes: Negative for pain, discharge and redness.   Respiratory: Negative for apnea, cough and chest tightness.    Cardiovascular: Negative for chest pain, palpitations and leg swelling.   Gastrointestinal: Negative for abdominal distention.   Genitourinary: Negative for difficulty urinating, dysuria and frequency.   Musculoskeletal: Negative for neck pain and neck stiffness.   Skin: Negative for color change, rash and wound.   Neurological: Negative for dizziness, weakness and numbness.   Psychiatric/Behavioral: Negative for sleep disturbance.   All other systems reviewed and are negative.    :        Objective:             Vitals:    03/04/20 0946   BP: (!)  124/90   Pulse: 98         Weight 225 lb, previous weight was 228 lb      Physical Exam  :    Constitutional: He is oriented to person, place, and time. He appears well-developed and well-nourished. No distress.   HENT: Head: Normocephalic and atraumatic.   Eyes: Pupils are equal, round, and reactive to light.   Neck: Normal range of motion. Neck supple. No tracheal deviation present. No thyromegaly present.   Cardiovascular: Normal rate, regular rhythm and intact distal pulses. Exam reveals no gallop and no friction rub.   Murmur heard. SM in [place]   Pulmonary/Chest: Effort normal and breath sounds normal. He has no wheezes. He has no rales.   Abdominal: Soft. He exhibits no mass. There is no tenderness. There is no rebound and no guarding.   Musculoskeletal: Normal range of motion. He exhibits no edema.   Lymphadenopathy:   He has no cervical adenopathy.   Neurological: He is alert and oriented to person, place, and time.   Skin: Skin is warm. No rash noted. He is not diaphoretic. No erythema.   Nursing note and vitals reviewed.      Labs:    Lab Results   Component Value Date    CREATININE 1.5 (H) 03/02/2020    BUN 10 03/02/2020     03/02/2020    K 3.9 03/02/2020     03/02/2020    CO2 25 03/02/2020       Lab Results   Component Value Date    WBC 4.58 01/03/2020    HGB 15.3 01/03/2020    HCT 46.9 01/03/2020    MCV 92 01/03/2020     01/03/2020         Lab Results   Component Value Date    .8 (H) 03/02/2020    CALCIUM 9.3 03/02/2020    PHOS 1.7 (L) 03/02/2020       Lab Results   Component Value Date    ALBUMIN 4.0 03/02/2020       Lab Results   Component Value Date    URICACID 5.1 09/10/2013       Lab Results   Component Value Date    HGBA1C 5.7 (H) 08/21/2018          Impression and Plan :  49-year-old  man with history of hypertension, CKD stage 2/3, cardiomyopathy, status post heart transplant in 2012, congestive heart failure with LV ejection fraction about 35%, seen in  office today in f/u  for following medical problems     1.  CKD stage 2/3 - recent serum creatinine is stable at 1.5 mg/dL, chronic kidney disease is multifactorial, he has been on Prograf for a long time, has history of hypertension, discussed adequate fluid intake, advised patient to avoid NSAID use, handout on the same was given today,     2.  Status post heart transplant 2012, currently on Prograf 1 mg in a.m. and 2 mg in p.m.,     3.  Hypertension - currently on amlodipine, patient reports his blood pressure has been slightly elevated especially on the diastolic for a long time is closely followed by transplant team, discussed low-salt diet, in the past he was on lisinopril and not clear why this medication was discontinued, will add Benicar 5 mg daily, check renal panel in a week,     4.  Hypokalemia - on potassium chloride 10 mEq daily, k better      5.  Hypomagnesemia - cont  magnesium oxide 400 mg daily,      6.  Congestive heart failure - he is euvolemic on exam, discussed salt and fluid restriction,     7.  Hyperparathyroidism - primary, will monitor serum calciu levels closely, low vitamin-D levels noted, will start ergo calciferol 80834 units once a week,    8.  Hypophosphatemia - discussed phosphorus rich diet, handout given today,        return to clinic in about  4 months,  more than 40 min of face-to-face time was spent with the patient discussing labs and plan of care,      Jared Arellano MD

## 2020-03-04 NOTE — PATIENT INSTRUCTIONS
Avoid NSAID pain medications such as advil, aleve, motrin, ibuprofen, naprosyn, meloxicam, diclofenac, mobic.     Phos rich diet as discussed     Start Vit D once a week    Start Benicar 5 mg daily in PM

## 2020-03-11 ENCOUNTER — TELEPHONE (OUTPATIENT)
Dept: NEPHROLOGY | Facility: CLINIC | Age: 50
End: 2020-03-11

## 2020-03-11 ENCOUNTER — LAB VISIT (OUTPATIENT)
Dept: LAB | Facility: HOSPITAL | Age: 50
End: 2020-03-11
Attending: INTERNAL MEDICINE
Payer: MEDICARE

## 2020-03-11 DIAGNOSIS — E83.39 HYPOPHOSPHATEMIA: Primary | ICD-10-CM

## 2020-03-11 DIAGNOSIS — N18.30 CHRONIC KIDNEY DISEASE (CKD), STAGE III (MODERATE): ICD-10-CM

## 2020-03-11 LAB
ALBUMIN SERPL BCP-MCNC: 3.9 G/DL (ref 3.5–5.2)
ANION GAP SERPL CALC-SCNC: 13 MMOL/L (ref 8–16)
BUN SERPL-MCNC: 5 MG/DL (ref 6–20)
CALCIUM SERPL-MCNC: 9.2 MG/DL (ref 8.7–10.5)
CHLORIDE SERPL-SCNC: 100 MMOL/L (ref 95–110)
CO2 SERPL-SCNC: 29 MMOL/L (ref 23–29)
CREAT SERPL-MCNC: 1.4 MG/DL (ref 0.5–1.4)
EST. GFR  (AFRICAN AMERICAN): >60 ML/MIN/1.73 M^2
EST. GFR  (NON AFRICAN AMERICAN): 59 ML/MIN/1.73 M^2
GLUCOSE SERPL-MCNC: 104 MG/DL (ref 70–110)
PHOSPHATE SERPL-MCNC: 1.3 MG/DL (ref 2.7–4.5)
POTASSIUM SERPL-SCNC: 3.5 MMOL/L (ref 3.5–5.1)
SODIUM SERPL-SCNC: 142 MMOL/L (ref 136–145)

## 2020-03-11 PROCEDURE — 36415 COLL VENOUS BLD VENIPUNCTURE: CPT

## 2020-03-11 PROCEDURE — 80069 RENAL FUNCTION PANEL: CPT

## 2020-03-11 RX ORDER — SODIUM,POTASSIUM PHOSPHATES 280-250MG
1 POWDER IN PACKET (EA) ORAL 3 TIMES DAILY
Qty: 90 PACKET | Refills: 3 | COMMUNITY
Start: 2020-03-11 | End: 2020-09-14 | Stop reason: ALTCHOICE

## 2020-03-11 NOTE — PROGRESS NOTES
Lab Results   Component Value Date    .8 (H) 03/02/2020    CALCIUM 9.2 03/11/2020    PHOS 1.3 (L) 03/11/2020       Lab Results   Component Value Date    CREATININE 1.4 03/11/2020    BUN 5 (L) 03/11/2020     03/11/2020    K 3.5 03/11/2020     03/11/2020    CO2 29 03/11/2020       Pt cont to have low phosphorus, will start Neutraphos 3 times a day, repeat lab in few days    Dr Arellano

## 2020-03-24 ENCOUNTER — PATIENT MESSAGE (OUTPATIENT)
Dept: TRANSPLANT | Facility: CLINIC | Age: 50
End: 2020-03-24

## 2020-06-29 ENCOUNTER — TELEPHONE (OUTPATIENT)
Dept: TRANSPLANT | Facility: CLINIC | Age: 50
End: 2020-06-29

## 2020-06-29 DIAGNOSIS — Z94.1 STATUS POST HEART TRANSPLANT: Primary | ICD-10-CM

## 2020-07-02 ENCOUNTER — TELEPHONE (OUTPATIENT)
Dept: TRANSPLANT | Facility: CLINIC | Age: 50
End: 2020-07-02

## 2020-07-02 DIAGNOSIS — T86.20 COMPLICATION OF HEART TRANSPLANT, UNSPECIFIED COMPLICATION: ICD-10-CM

## 2020-07-02 DIAGNOSIS — Z79.899 ENCOUNTER FOR LONG-TERM (CURRENT) USE OF MEDICATIONS: ICD-10-CM

## 2020-07-02 DIAGNOSIS — Z94.1 STATUS POST HEART TRANSPLANT: ICD-10-CM

## 2020-07-02 DIAGNOSIS — E78.2 MIXED HYPERLIPIDEMIA: ICD-10-CM

## 2020-08-03 ENCOUNTER — TELEPHONE (OUTPATIENT)
Dept: TRANSPLANT | Facility: CLINIC | Age: 50
End: 2020-08-03

## 2020-08-03 DIAGNOSIS — Z79.899 ENCOUNTER FOR LONG-TERM (CURRENT) USE OF MEDICATIONS: ICD-10-CM

## 2020-08-03 DIAGNOSIS — Z94.1 STATUS POST HEART TRANSPLANT: ICD-10-CM

## 2020-08-03 DIAGNOSIS — T86.20 COMPLICATION OF HEART TRANSPLANT, UNSPECIFIED COMPLICATION: ICD-10-CM

## 2020-08-04 ENCOUNTER — HOSPITAL ENCOUNTER (OUTPATIENT)
Dept: RADIOLOGY | Facility: HOSPITAL | Age: 50
Discharge: HOME | End: 2020-08-04
Attending: INTERNAL MEDICINE
Payer: MEDICARE

## 2020-08-04 ENCOUNTER — HOSPITAL ENCOUNTER (OUTPATIENT)
Dept: PULMONOLOGY | Facility: HOSPITAL | Age: 50
Discharge: HOME | End: 2020-08-04
Attending: INTERNAL MEDICINE
Payer: MEDICARE

## 2020-08-04 ENCOUNTER — HOSPITAL ENCOUNTER (OUTPATIENT)
Dept: CARDIOLOGY | Facility: HOSPITAL | Age: 50
Discharge: HOME | End: 2020-08-04
Attending: INTERNAL MEDICINE
Payer: MEDICARE

## 2020-08-04 VITALS
HEIGHT: 72 IN | BODY MASS INDEX: 30.48 KG/M2 | SYSTOLIC BLOOD PRESSURE: 135 MMHG | WEIGHT: 225 LBS | DIASTOLIC BLOOD PRESSURE: 104 MMHG

## 2020-08-04 VITALS — BODY MASS INDEX: 30.59 KG/M2 | WEIGHT: 225.5 LBS

## 2020-08-04 DIAGNOSIS — T86.20 COMPLICATION OF HEART TRANSPLANT, UNSPECIFIED COMPLICATION: ICD-10-CM

## 2020-08-04 DIAGNOSIS — E78.2 MIXED HYPERLIPIDEMIA: ICD-10-CM

## 2020-08-04 DIAGNOSIS — Z94.1 STATUS POST HEART TRANSPLANT: ICD-10-CM

## 2020-08-04 DIAGNOSIS — Z79.899 ENCOUNTER FOR LONG-TERM (CURRENT) USE OF MEDICATIONS: ICD-10-CM

## 2020-08-04 LAB
AORTIC ROOT ANNULUS: 3.96 CM
ASCENDING AORTA: 3.63 CM
AV INDEX (PROSTH): 0.64
AV MEAN GRADIENT: 2 MMHG
AV PEAK GRADIENT: 5 MMHG
AV VALVE AREA: 2.22 CM2
AV VELOCITY RATIO: 0.67
BSA FOR ECHO PROCEDURE: 2.28 M2
CV ECHO LV RWT: 0.47 CM
CV STRESS BASE HR: 100 BPM
DIASTOLIC BLOOD PRESSURE: 103 MMHG
DOP CALC AO PEAK VEL: 1.11 M/S
DOP CALC AO VTI: 21.82 CM
DOP CALC LVOT AREA: 3.5 CM2
DOP CALC LVOT DIAMETER: 2.11 CM
DOP CALC LVOT PEAK VEL: 0.74 M/S
DOP CALC LVOT STROKE VOLUME: 48.44 CM3
DOP CALC RVOT PEAK VEL: 0.56 M/S
DOP CALC RVOT VTI: 11.97 CM
DOP CALCLVOT PEAK VEL VTI: 13.86 CM
E WAVE DECELERATION TIME: 187.84 MSEC
E/A RATIO: 1.84
E/E' RATIO: 5.04 M/S
ECHO LV POSTERIOR WALL: 1.18 CM (ref 0.6–1.1)
FRACTIONAL SHORTENING: 29 % (ref 28–44)
INTERVENTRICULAR SEPTUM: 1.21 CM (ref 0.6–1.1)
IVRT: 68.51 MSEC
LA MAJOR: 4.88 CM
LA MINOR: 2.19 CM
LA WIDTH: 4.84 CM
LEFT ATRIUM SIZE: 4.09 CM
LEFT ATRIUM VOLUME INDEX: 22.7 ML/M2
LEFT ATRIUM VOLUME: 50.87 CM3
LEFT INTERNAL DIMENSION IN SYSTOLE: 3.59 CM (ref 2.1–4)
LEFT VENTRICLE DIASTOLIC VOLUME INDEX: 53.42 ML/M2
LEFT VENTRICLE DIASTOLIC VOLUME: 119.64 ML
LEFT VENTRICLE MASS INDEX: 105 G/M2
LEFT VENTRICLE SYSTOLIC VOLUME INDEX: 24.1 ML/M2
LEFT VENTRICLE SYSTOLIC VOLUME: 54.04 ML
LEFT VENTRICULAR INTERNAL DIMENSION IN DIASTOLE: 5.03 CM (ref 3.5–6)
LEFT VENTRICULAR MASS: 234.6 G
LV LATERAL E/E' RATIO: 4.86 M/S
LV SEPTAL E/E' RATIO: 5.23 M/S
MV PEAK A VEL: 0.37 M/S
MV PEAK E VEL: 0.68 M/S
MV STENOSIS PRESSURE HALF TIME: 54.47 MS
MV VALVE AREA P 1/2 METHOD: 4.04 CM2
OHS CV CPX 85 PERCENT MAX PREDICTED HEART RATE MALE: 145
OHS CV CPX MAX PREDICTED HEART RATE: 171
OHS CV CPX PATIENT IS FEMALE: 0
OHS CV CPX PATIENT IS MALE: 1
OHS CV CPX PEAK DIASTOLIC BLOOD PRESSURE: 100 MMHG
OHS CV CPX PEAK HEAR RATE: 148 BPM
OHS CV CPX PEAK RATE PRESSURE PRODUCT: ABNORMAL
OHS CV CPX PEAK SYSTOLIC BLOOD PRESSURE: 167 MMHG
OHS CV CPX PERCENT MAX PREDICTED HEART RATE ACHIEVED: 87
OHS CV CPX RATE PRESSURE PRODUCT PRESENTING: ABNORMAL
PISA TR MAX VEL: 2.62 M/S
PV MEAN GRADIENT: 0.73 MMHG
RA MAJOR: 5.64 CM
RA PRESSURE: 8 MMHG
RIGHT VENTRICULAR END-DIASTOLIC DIMENSION: 3.93 CM
SINUS: 4.08 CM
STJ: 3.57 CM
STRESS ECHO POST EXERCISE DUR MIN: 7 MINUTES
STRESS ECHO POST EXERCISE DUR SEC: 0 SECONDS
SYSTOLIC BLOOD PRESSURE: 136 MMHG
TDI LATERAL: 0.14 M/S
TDI SEPTAL: 0.13 M/S
TDI: 0.14 M/S
TR MAX PG: 27 MMHG
TRICUSPID ANNULAR PLANE SYSTOLIC EXCURSION: 1.69 CM
TV REST PULMONARY ARTERY PRESSURE: 35 MMHG

## 2020-08-04 PROCEDURE — 93351 STRESS ECHO (CUPID ONLY): ICD-10-PCS | Mod: 26,,, | Performed by: INTERNAL MEDICINE

## 2020-08-04 PROCEDURE — 63600175 PHARM REV CODE 636 W HCPCS: Performed by: INTERNAL MEDICINE

## 2020-08-04 PROCEDURE — 93351 STRESS TTE COMPLETE: CPT | Mod: 26,,, | Performed by: INTERNAL MEDICINE

## 2020-08-04 PROCEDURE — 71046 X-RAY EXAM CHEST 2 VIEWS: CPT | Mod: TC

## 2020-08-04 PROCEDURE — 93351 STRESS TTE COMPLETE: CPT

## 2020-08-04 PROCEDURE — 71046 X-RAY EXAM CHEST 2 VIEWS: CPT | Mod: 26,,, | Performed by: RADIOLOGY

## 2020-08-04 PROCEDURE — 71046 XR CHEST PA AND LATERAL: ICD-10-PCS | Mod: 26,,, | Performed by: RADIOLOGY

## 2020-08-04 RX ORDER — DOBUTAMINE HYDROCHLORIDE 400 MG/100ML
10 INJECTION INTRAVENOUS CONTINUOUS
Status: DISCONTINUED | OUTPATIENT
Start: 2020-08-04 | End: 2020-08-05 | Stop reason: HOSPADM

## 2020-08-04 RX ORDER — DOBUTAMINE HYDROCHLORIDE 400 MG/100ML
10 INJECTION INTRAVENOUS CONTINUOUS
Status: DISCONTINUED | OUTPATIENT
Start: 2020-08-04 | End: 2020-08-04

## 2020-08-04 RX ADMIN — DOBUTAMINE IN DEXTROSE 10 MCG/KG/MIN: 400 INJECTION, SOLUTION INTRAVENOUS at 11:08

## 2020-08-05 ENCOUNTER — LAB VISIT (OUTPATIENT)
Dept: LAB | Facility: HOSPITAL | Age: 50
End: 2020-08-05
Attending: INTERNAL MEDICINE
Payer: MEDICARE

## 2020-08-05 ENCOUNTER — TELEPHONE (OUTPATIENT)
Dept: TRANSPLANT | Facility: CLINIC | Age: 50
End: 2020-08-05

## 2020-08-05 ENCOUNTER — DOCUMENTATION ONLY (OUTPATIENT)
Dept: CARDIOLOGY | Facility: CLINIC | Age: 50
End: 2020-08-05

## 2020-08-05 DIAGNOSIS — Z94.1 STATUS POST HEART TRANSPLANTATION: Primary | ICD-10-CM

## 2020-08-05 DIAGNOSIS — T86.20 COMPLICATION OF HEART TRANSPLANT, UNSPECIFIED COMPLICATION: ICD-10-CM

## 2020-08-05 DIAGNOSIS — E78.2 MIXED HYPERLIPIDEMIA: ICD-10-CM

## 2020-08-05 DIAGNOSIS — Z94.1 STATUS POST HEART TRANSPLANT: ICD-10-CM

## 2020-08-05 DIAGNOSIS — Z48.298 TRANSPLANT FOLLOW-UP: ICD-10-CM

## 2020-08-05 DIAGNOSIS — Z79.899 ENCOUNTER FOR LONG-TERM (CURRENT) USE OF MEDICATIONS: ICD-10-CM

## 2020-08-05 DIAGNOSIS — I10 ESSENTIAL HYPERTENSION: ICD-10-CM

## 2020-08-05 LAB
ALBUMIN SERPL BCP-MCNC: 4.1 G/DL (ref 3.5–5.2)
ALP SERPL-CCNC: 86 U/L (ref 55–135)
ALT SERPL W/O P-5'-P-CCNC: 16 U/L (ref 10–44)
ANION GAP SERPL CALC-SCNC: 14 MMOL/L (ref 8–16)
AST SERPL-CCNC: 18 U/L (ref 10–40)
BILIRUB SERPL-MCNC: 1.1 MG/DL (ref 0.1–1)
BUN SERPL-MCNC: 7 MG/DL (ref 6–20)
CALCIUM SERPL-MCNC: 8.8 MG/DL (ref 8.7–10.5)
CHLORIDE SERPL-SCNC: 106 MMOL/L (ref 95–110)
CO2 SERPL-SCNC: 22 MMOL/L (ref 23–29)
CREAT SERPL-MCNC: 1.4 MG/DL (ref 0.5–1.4)
EST. GFR  (AFRICAN AMERICAN): >60 ML/MIN/1.73 M^2
EST. GFR  (NON AFRICAN AMERICAN): 58.6 ML/MIN/1.73 M^2
ESTIMATED AVG GLUCOSE: 126 MG/DL (ref 68–131)
GLUCOSE SERPL-MCNC: 95 MG/DL (ref 70–110)
HBA1C MFR BLD HPLC: 6 % (ref 4–5.6)
POTASSIUM SERPL-SCNC: 2.9 MMOL/L (ref 3.5–5.1)
PROT SERPL-MCNC: 8.3 G/DL (ref 6–8.4)
SODIUM SERPL-SCNC: 142 MMOL/L (ref 136–145)
T4 SERPL-MCNC: 8.3 UG/DL (ref 4.5–11.5)
TSH SERPL DL<=0.005 MIU/L-ACNC: 1.27 UIU/ML (ref 0.4–4)

## 2020-08-05 PROCEDURE — 80053 COMPREHEN METABOLIC PANEL: CPT

## 2020-08-05 PROCEDURE — 36415 COLL VENOUS BLD VENIPUNCTURE: CPT

## 2020-08-05 PROCEDURE — 86832 HLA CLASS I HIGH DEFIN QUAL: CPT

## 2020-08-05 PROCEDURE — 86833 HLA CLASS II HIGH DEFIN QUAL: CPT | Mod: 59

## 2020-08-05 PROCEDURE — 84443 ASSAY THYROID STIM HORMONE: CPT

## 2020-08-05 PROCEDURE — 83036 HEMOGLOBIN GLYCOSYLATED A1C: CPT

## 2020-08-05 PROCEDURE — 86832 HLA CLASS I HIGH DEFIN QUAL: CPT | Mod: 59

## 2020-08-05 PROCEDURE — 86977 RBC SERUM PRETX INCUBJ/INHIB: CPT | Mod: 59

## 2020-08-05 PROCEDURE — 84436 ASSAY OF TOTAL THYROXINE: CPT

## 2020-08-05 NOTE — TELEPHONE ENCOUNTER
BRUCE set up a [facility] reservation for 8/10-8/11 conf#[id number]. BRUCE contacted pt on his cell () to confirm reservation. Pt was able to write the information down. SW remains available.

## 2020-08-05 NOTE — PROGRESS NOTES
OUTPATIENT CATHETERIZATION INSTRUCTIONS    You have been scheduled for a procedure in the catheterization lab on Monday, August 10, 2020.     Please report to the Cardiology Waiting Area on the Third floor of the hospital and check in at 7 AM.   You will then be taken to the SSCU (Short Stay Cardiac Unit) and prepared for your procedure. Please be aware that this is not the time of your procedure but the time you are to arrive. The procedures are scheduled on an hourly basis; however, emergency cases take precedence over all other cases.       You may not have anything to eat or drink after midnight the night before your test. You may take your regular morning medications with water. If there are any medications that you should not take you will be instructed to hold them that morning. If you are diabetic and on Metformin (Glucophage) do not take it the day before, the day of, and for 2 days after your procedure.      The procedure will take 1-2 hours to perform. After the procedure, you will return to SSCU on the third floor of the hospital. You will need to lie still (or keep your arm still) for the next 4 to 6 hours to minimize bleeding from the puncture site. Your family may remain in the room with you during this time.       You may be able to be discharged home that same afternoon if there is someone to drive you home and there were no complications. If you have one of the balloon, stent, or device procedures you may spend the night in the hospital. Your doctor will determine, based on your progress, the date and time of your discharge. The results of your procedure will be discussed with you before you are discharged. Any further testing or procedures will be scheduled for you either before you leave or you will be called with these appointments.       If you should have any questions, concerns, or need to change the date of your procedure, please call  TERRANCE Castaneda @ [phone]    Special  Instructions:    Drink plenty of water the day before and after your procedure.       THE ABOVE INSTRUCTIONS WERE GIVEN TO THE PATIENT VERBALLY AND THEY VERBALIZED UNDERSTANDING.  THEY DO NOT REQUIRE ANY SPECIAL NEEDS AND DO NOT HAVE ANY LEARNING BARRIERS.          Directions for Reporting to Cardiology Waiting Area in the Hospital  If you park in the Parking Garage:  Take elevators to the1st floor of the parking garage.  Continue past the gift shop, coffee shop, and piano.  Take a right and go to the gold elevators. (Elevator B)  Take the elevator to the 3rd floor.  Follow the arrow on the sign on the wall that says Cath Lab Registration/EP Lab Registration.  Follow the long hallway all the way around until you come to a big open area.  This is the registration area.  Check in at Reception Desk.    OR    If family is dropping you off:  Have them drop you off at the front of the Hospital under the green overhang.  Enter through the doors and take a right.  Take the E elevators to the 3rd floor Cardiology Waiting Area.  Check in at the Reception Desk in the waiting room.

## 2020-08-05 NOTE — TELEPHONE ENCOUNTER
----- Message from Staci Reno sent at 8/5/2020 10:04 AM CDT -----  Regarding: Pt is coming for RHC/LHC BIOPSY Monday 8/10 needs a [facility] for 8/10 no ride until 8/11  Post heart tx, EF dropped on stress echo to 15 %  Coming Monday for LHC/RHC biopsy, he will need to stay over night in [facility]   very limited means.  he wont have a ride back home until Tuesday     Staci

## 2020-08-06 NOTE — TELEPHONE ENCOUNTER
Spoke with pt and his mother this morning regarding coming next Monday for a RHC/BX and LHC, Interventional cardiology has also called pt and explained the appt's and a COVID test on Friday in [place].    Explained to pt and his mother decrease in Ejection Fraction to 15 %  Pt denies edema, SOB at rest or with exertion, states that he has been feeling well. Pt needed to have HLA labs drawn and will go to lab today to get drawn for HLA and repeat CMP.

## 2020-08-07 ENCOUNTER — LAB VISIT (OUTPATIENT)
Dept: OTOLARYNGOLOGY | Facility: CLINIC | Age: 50
End: 2020-08-07
Payer: MEDICARE

## 2020-08-07 DIAGNOSIS — Z94.1 STATUS POST HEART TRANSPLANTATION: ICD-10-CM

## 2020-08-07 PROCEDURE — U0003 INFECTIOUS AGENT DETECTION BY NUCLEIC ACID (DNA OR RNA); SEVERE ACUTE RESPIRATORY SYNDROME CORONAVIRUS 2 (SARS-COV-2) (CORONAVIRUS DISEASE [COVID-19]), AMPLIFIED PROBE TECHNIQUE, MAKING USE OF HIGH THROUGHPUT TECHNOLOGIES AS DESCRIBED BY CMS-2020-01-R: HCPCS

## 2020-08-09 LAB — SARS-COV-2 RNA RESP QL NAA+PROBE: NOT DETECTED

## 2020-08-10 ENCOUNTER — HOSPITAL ENCOUNTER (OUTPATIENT)
Facility: HOSPITAL | Age: 50
Discharge: HOME | End: 2020-08-10
Attending: INTERNAL MEDICINE | Admitting: INTERNAL MEDICINE
Payer: MEDICARE

## 2020-08-10 VITALS
HEART RATE: 95 BPM | SYSTOLIC BLOOD PRESSURE: 147 MMHG | HEIGHT: 72 IN | OXYGEN SATURATION: 100 % | WEIGHT: 225 LBS | TEMPERATURE: 97 F | RESPIRATION RATE: 16 BRPM | DIASTOLIC BLOOD PRESSURE: 104 MMHG | BODY MASS INDEX: 30.48 KG/M2

## 2020-08-10 DIAGNOSIS — Z94.1 TRANSPLANTED HEART: ICD-10-CM

## 2020-08-10 DIAGNOSIS — Z94.1 STATUS POST HEART TRANSPLANTATION: Primary | ICD-10-CM

## 2020-08-10 DIAGNOSIS — N18.30 STAGE 3 CHRONIC KIDNEY DISEASE: ICD-10-CM

## 2020-08-10 DIAGNOSIS — I25.10 CAD (CORONARY ARTERY DISEASE): ICD-10-CM

## 2020-08-10 DIAGNOSIS — Z94.1 HEART TRANSPLANTED: ICD-10-CM

## 2020-08-10 LAB
ABO + RH BLD: NORMAL
ANION GAP SERPL CALC-SCNC: 10 MMOL/L (ref 8–16)
BASOPHILS # BLD AUTO: 0.03 K/UL (ref 0–0.2)
BASOPHILS NFR BLD: 0.8 % (ref 0–1.9)
BLD GP AB SCN CELLS X3 SERPL QL: NORMAL
BUN SERPL-MCNC: 9 MG/DL (ref 6–20)
CALCIUM SERPL-MCNC: 8.6 MG/DL (ref 8.7–10.5)
CHLORIDE SERPL-SCNC: 107 MMOL/L (ref 95–110)
CLASS I ANTIBODY COMMENTS - LUMINEX: NORMAL
CLASS II ANTIBODY COMMENTS - LUMINEX: NORMAL
CO2 SERPL-SCNC: 25 MMOL/L (ref 23–29)
CREAT SERPL-MCNC: 1.6 MG/DL (ref 0.5–1.4)
DIFFERENTIAL METHOD: NORMAL
DSA1 TESTING DATE: NORMAL
DSA12 TESTING DATE: NORMAL
DSA2 TESTING DATE: NORMAL
EOSINOPHIL # BLD AUTO: 0.1 K/UL (ref 0–0.5)
EOSINOPHIL NFR BLD: 1.3 % (ref 0–8)
ERYTHROCYTE [DISTWIDTH] IN BLOOD BY AUTOMATED COUNT: 14.2 % (ref 11.5–14.5)
EST. GFR  (AFRICAN AMERICAN): 57.6 ML/MIN/1.73 M^2
EST. GFR  (NON AFRICAN AMERICAN): 49.8 ML/MIN/1.73 M^2
GLUCOSE SERPL-MCNC: 96 MG/DL (ref 70–110)
HCT VFR BLD AUTO: 43.3 % (ref 40–54)
HGB BLD-MCNC: 14 G/DL (ref 14–18)
IMM GRANULOCYTES # BLD AUTO: 0.01 K/UL (ref 0–0.04)
IMM GRANULOCYTES NFR BLD AUTO: 0.3 % (ref 0–0.5)
LYMPHOCYTES # BLD AUTO: 1.3 K/UL (ref 1–4.8)
LYMPHOCYTES NFR BLD: 33.8 % (ref 18–48)
MCH RBC QN AUTO: 30.1 PG (ref 27–31)
MCHC RBC AUTO-ENTMCNC: 32.3 G/DL (ref 32–36)
MCV RBC AUTO: 93 FL (ref 82–98)
MONOCYTES # BLD AUTO: 0.4 K/UL (ref 0.3–1)
MONOCYTES NFR BLD: 10.2 % (ref 4–15)
NEUTROPHILS # BLD AUTO: 2.1 K/UL (ref 1.8–7.7)
NEUTROPHILS NFR BLD: 53.6 % (ref 38–73)
NRBC BLD-RTO: 0 /100 WBC
PLATELET # BLD AUTO: 258 K/UL (ref 150–350)
PMV BLD AUTO: 10.3 FL (ref 9.2–12.9)
POTASSIUM SERPL-SCNC: 3.3 MMOL/L (ref 3.5–5.1)
RBC # BLD AUTO: 4.65 M/UL (ref 4.6–6.2)
SERUM COLLECTION DT - LUMINEX CLASS I: NORMAL
SERUM COLLECTION DT - LUMINEX CLASS II: NORMAL
SODIUM SERPL-SCNC: 142 MMOL/L (ref 136–145)
WBC # BLD AUTO: 3.91 K/UL (ref 3.9–12.7)

## 2020-08-10 PROCEDURE — 25500020 PHARM REV CODE 255: Performed by: INTERNAL MEDICINE

## 2020-08-10 PROCEDURE — 93460 R&L HRT ART/VENTRICLE ANGIO: CPT | Performed by: INTERNAL MEDICINE

## 2020-08-10 PROCEDURE — C1887 CATHETER, GUIDING: HCPCS | Performed by: INTERNAL MEDICINE

## 2020-08-10 PROCEDURE — 88307 TISSUE EXAM BY PATHOLOGIST: CPT | Mod: 26,,, | Performed by: PATHOLOGY

## 2020-08-10 PROCEDURE — 80048 BASIC METABOLIC PNL TOTAL CA: CPT

## 2020-08-10 PROCEDURE — C1760 CLOSURE DEV, VASC: HCPCS | Performed by: INTERNAL MEDICINE

## 2020-08-10 PROCEDURE — C1751 CATH, INF, PER/CENT/MIDLINE: HCPCS | Performed by: INTERNAL MEDICINE

## 2020-08-10 PROCEDURE — 93005 ELECTROCARDIOGRAM TRACING: CPT | Mod: 59

## 2020-08-10 PROCEDURE — 88342 IMHCHEM/IMCYTCHM 1ST ANTB: CPT | Performed by: PATHOLOGY

## 2020-08-10 PROCEDURE — 93460 R&L HRT ART/VENTRICLE ANGIO: CPT | Mod: 26,,, | Performed by: INTERNAL MEDICINE

## 2020-08-10 PROCEDURE — 93505 ENDOMYOCARDIAL BIOPSY: CPT | Performed by: INTERNAL MEDICINE

## 2020-08-10 PROCEDURE — 25000003 PHARM REV CODE 250: Performed by: INTERNAL MEDICINE

## 2020-08-10 PROCEDURE — 99152 MOD SED SAME PHYS/QHP 5/>YRS: CPT | Mod: ,,, | Performed by: INTERNAL MEDICINE

## 2020-08-10 PROCEDURE — 85025 COMPLETE CBC W/AUTO DIFF WBC: CPT

## 2020-08-10 PROCEDURE — 99152 MOD SED SAME PHYS/QHP 5/>YRS: CPT | Performed by: INTERNAL MEDICINE

## 2020-08-10 PROCEDURE — 85347 COAGULATION TIME ACTIVATED: CPT | Performed by: INTERNAL MEDICINE

## 2020-08-10 PROCEDURE — 93460 PR CATH PLACE/CORON ANGIO, IMG SUPER/INTERP,R&L HRT CATH, L HRT VENTRIC: ICD-10-PCS | Mod: 26,,, | Performed by: INTERNAL MEDICINE

## 2020-08-10 PROCEDURE — 92978 PR IVUS, CORONARY, 1ST VESSEL: ICD-10-PCS | Mod: 26,LD,, | Performed by: INTERNAL MEDICINE

## 2020-08-10 PROCEDURE — 88342 IMHCHEM/IMCYTCHM 1ST ANTB: CPT | Mod: 26,,, | Performed by: PATHOLOGY

## 2020-08-10 PROCEDURE — 93505 PR BIOPSY OF HEART LINING: ICD-10-PCS | Mod: 26,51,, | Performed by: INTERNAL MEDICINE

## 2020-08-10 PROCEDURE — 99153 MOD SED SAME PHYS/QHP EA: CPT | Performed by: INTERNAL MEDICINE

## 2020-08-10 PROCEDURE — 88346 IMFLUOR 1ST 1ANTB STAIN PX: CPT | Performed by: PATHOLOGY

## 2020-08-10 PROCEDURE — 93505 ENDOMYOCARDIAL BIOPSY: CPT | Mod: 26,51,, | Performed by: INTERNAL MEDICINE

## 2020-08-10 PROCEDURE — C1894 INTRO/SHEATH, NON-LASER: HCPCS | Performed by: INTERNAL MEDICINE

## 2020-08-10 PROCEDURE — 92978 ENDOLUMINL IVUS OCT C 1ST: CPT | Mod: 26,LD,, | Performed by: INTERNAL MEDICINE

## 2020-08-10 PROCEDURE — 27201423 OPTIME MED/SURG SUP & DEVICES STERILE SUPPLY: Performed by: INTERNAL MEDICINE

## 2020-08-10 PROCEDURE — 99152 PR MOD CONSCIOUS SEDATION, SAME PHYS, 5+ YRS, FIRST 15 MIN: ICD-10-PCS | Mod: ,,, | Performed by: INTERNAL MEDICINE

## 2020-08-10 PROCEDURE — C1769 GUIDE WIRE: HCPCS | Performed by: INTERNAL MEDICINE

## 2020-08-10 PROCEDURE — 93010 EKG 12-LEAD: ICD-10-PCS | Mod: ,,, | Performed by: INTERNAL MEDICINE

## 2020-08-10 PROCEDURE — 92978 ENDOLUMINL IVUS OCT C 1ST: CPT | Mod: LD | Performed by: INTERNAL MEDICINE

## 2020-08-10 PROCEDURE — 88307 PR  SURG PATH,LEVEL V: ICD-10-PCS | Mod: 26,,, | Performed by: PATHOLOGY

## 2020-08-10 PROCEDURE — 86850 RBC ANTIBODY SCREEN: CPT

## 2020-08-10 PROCEDURE — 63600175 PHARM REV CODE 636 W HCPCS: Performed by: INTERNAL MEDICINE

## 2020-08-10 PROCEDURE — 88346 IMFLUOR 1ST 1ANTB STAIN PX: CPT | Mod: 26,,, | Performed by: PATHOLOGY

## 2020-08-10 PROCEDURE — C1753 CATH, INTRAVAS ULTRASOUND: HCPCS | Performed by: INTERNAL MEDICINE

## 2020-08-10 PROCEDURE — 93010 ELECTROCARDIOGRAM REPORT: CPT | Mod: ,,, | Performed by: INTERNAL MEDICINE

## 2020-08-10 PROCEDURE — 88307 TISSUE EXAM BY PATHOLOGIST: CPT | Performed by: PATHOLOGY

## 2020-08-10 PROCEDURE — 88346 PR IMMUNOFLUORESCENT ANTB, 1ST STAIN: ICD-10-PCS | Mod: 26,,, | Performed by: PATHOLOGY

## 2020-08-10 PROCEDURE — 88342 CHG IMMUNOCYTOCHEMISTRY: ICD-10-PCS | Mod: 26,,, | Performed by: PATHOLOGY

## 2020-08-10 DEVICE — ANGIO-SEAL VIP VASCULAR CLOSURE DEVICE
Type: IMPLANTABLE DEVICE | Site: GROIN | Status: FUNCTIONAL
Brand: ANGIO-SEAL

## 2020-08-10 RX ORDER — ACETAMINOPHEN 325 MG/1
650 TABLET ORAL EVERY 4 HOURS PRN
Status: DISCONTINUED | OUTPATIENT
Start: 2020-08-10 | End: 2020-08-10 | Stop reason: HOSPADM

## 2020-08-10 RX ORDER — HEPARIN SODIUM 1000 [USP'U]/ML
INJECTION, SOLUTION INTRAVENOUS; SUBCUTANEOUS
Status: DISCONTINUED | OUTPATIENT
Start: 2020-08-10 | End: 2020-08-10 | Stop reason: HOSPADM

## 2020-08-10 RX ORDER — DIPHENHYDRAMINE HCL 50 MG
50 CAPSULE ORAL ONCE
Status: COMPLETED | OUTPATIENT
Start: 2020-08-10 | End: 2020-08-10

## 2020-08-10 RX ORDER — SODIUM CHLORIDE 9 MG/ML
3 INJECTION, SOLUTION INTRAVENOUS CONTINUOUS
Status: ACTIVE | OUTPATIENT
Start: 2020-08-10 | End: 2020-08-10

## 2020-08-10 RX ORDER — NITROGLYCERIN 5 MG/ML
INJECTION, SOLUTION INTRAVENOUS
Status: DISCONTINUED | OUTPATIENT
Start: 2020-08-10 | End: 2020-08-10 | Stop reason: HOSPADM

## 2020-08-10 RX ORDER — SODIUM CHLORIDE 9 MG/ML
INJECTION, SOLUTION INTRAVENOUS CONTINUOUS
Status: ACTIVE | OUTPATIENT
Start: 2020-08-10 | End: 2020-08-10

## 2020-08-10 RX ORDER — CEFAZOLIN SODIUM 1 G/3ML
INJECTION, POWDER, FOR SOLUTION INTRAMUSCULAR; INTRAVENOUS
Status: DISCONTINUED | OUTPATIENT
Start: 2020-08-10 | End: 2020-08-10 | Stop reason: HOSPADM

## 2020-08-10 RX ORDER — LIDOCAINE HYDROCHLORIDE 20 MG/ML
INJECTION, SOLUTION EPIDURAL; INFILTRATION; INTRACAUDAL; PERINEURAL
Status: DISCONTINUED | OUTPATIENT
Start: 2020-08-10 | End: 2020-08-10 | Stop reason: HOSPADM

## 2020-08-10 RX ORDER — MIDAZOLAM HYDROCHLORIDE 1 MG/ML
INJECTION, SOLUTION INTRAMUSCULAR; INTRAVENOUS
Status: DISCONTINUED | OUTPATIENT
Start: 2020-08-10 | End: 2020-08-10 | Stop reason: HOSPADM

## 2020-08-10 RX ORDER — HEPARIN SODIUM 200 [USP'U]/100ML
INJECTION, SOLUTION INTRAVENOUS
Status: DISCONTINUED | OUTPATIENT
Start: 2020-08-10 | End: 2020-08-10 | Stop reason: HOSPADM

## 2020-08-10 RX ORDER — FENTANYL CITRATE 50 UG/ML
INJECTION, SOLUTION INTRAMUSCULAR; INTRAVENOUS
Status: DISCONTINUED | OUTPATIENT
Start: 2020-08-10 | End: 2020-08-10 | Stop reason: HOSPADM

## 2020-08-10 RX ADMIN — DIPHENHYDRAMINE HYDROCHLORIDE 50 MG: 50 CAPSULE ORAL at 09:08

## 2020-08-10 RX ADMIN — SODIUM CHLORIDE 3 ML/KG/HR: 0.9 INJECTION, SOLUTION INTRAVENOUS at 09:08

## 2020-08-10 NOTE — BRIEF OP NOTE
Post Cath Note  Referring Physician: Michael Fernandez MD  Procedure: CATHETERIZATION, HEART, BOTH LEFT AND RIGHT (N/A), BIOPSY, CARDIAC (N/A), ANGIOGRAM, CORONARY ARTERY (N/A), IVUS, Coronary       Access: R CFA, R IJ    Findings: EMB bx, RHC, no cad, IVUS to LAD    LVEDP 13 mmHg,   See full report for further details    Intervention:   None.  Closure device: Perclosure    Post Cath Exam:   BP (!) 149/102 (BP Location: Left arm, Patient Position: Lying)   Pulse 101   Temp 97.2 °F (36.2 °C)   Resp 17   Ht 6' (1.829 m)   Wt 102.1 kg (225 lb)   SpO2 98%   BMI 30.52 kg/m²   No unusual pain, hematoma, thrill or bruit at vascular access site.  Distal pulse present without signs of ischemia.    Recommendations:   - Routine post-cath care  - IVF at 150 cc/hr for 4 hrs  - Continue medical management, Risk factor reduction, Follow-up with outpatient cardiologist    Signed:  Ernesto Smith MD  Cardiology Fellow  8/10/2020 1:10 PM

## 2020-08-10 NOTE — HPI
Mr. Leger is a 46-yo male s/p OHTx 10/29/12 (for EtOH CM) with post-op severe TR, chronic LV dysfunction, Factor V Leiden with h/o DVT (last 2012), HTN, CKD (baseline Cr ~1.5), DM ( diet controlled), dyslipidemia and PTLD, completed 6 cycles of R-CHOP/CEOP in 2013. Overall he feels well, he denies chest pain, dyspnea on exertion, LE edema, syncope, palpitations.  On tacrolimus and prednisone only for immunosuppression therapy.      He is no longer consuming EtOH, and quit smoking in May 2011 (20 years, 1 ppd prior to that).      [facility] 07/25/16:  Normal coronary arteries, diastolic dysfunction

## 2020-08-10 NOTE — NURSING
Report received from BERNIE Palacio.  Received via stretcher from Cath Lab holding area.  Pt accompanied by Bernie Palacio.  Awake alert and oriented.  C/o pain to right lateral neck of a 3 on a scale of 0-10.  Gauze and tegaderm to right neck clean dry and intact.  No bleeding or hematoma noted.  Gauze and tegaderm to right groin clean dry and intact.  Right groin soft.  No bleeding or hematoma noted.  VSS.  Pt given sandwich and drinks.  Oriented to room and call bell provided.  Will continue to monitor.

## 2020-08-10 NOTE — PLAN OF CARE
Ambulated on unit by himself.  Pt states that he will be staying at the [facility] this evening.  Verbalized understanding of procedure.  Oriented to room and call bell provided.  Denies pain or SOB.  Will continue to monitor.

## 2020-08-10 NOTE — ASSESSMENT & PLAN NOTE
- Presentation for a comprehensive post transplant evaluation every year that may include LHC/RHC  - Access: Right Radial and Right groin as back up     Renal Risk Score/Predicts risk of contrast-induced nephropathy (ESTEFANY) after PCI:    Prior Contrast Allergy: No    Pre Cath Routine Benadryl 50 mg and Routine IVF NaCl PreCath      Sedation   Type of sedation: RN IV sedation    Mallampati score: 2   ASA score: 2    -The risks, benefits & alternatives of the procedure were explained to the patient.    -The risks of coronary angiography include but are not limited to: Bleeding, infection, heart rhythm abnormalities, allergic reactions, kidney injury, stroke and death.    -Should stenting be indicated, the patient has agreed to dual anti-platelet therapy for 1-consecutive year with a drug-eluting stent and a minimum of 1-month with the use of a bare metal stent.    -The risks of moderate sedation include hypotension, respiratory depression, arrhythmias, bronchospasm, & death.    -Informed consent was obtained & the patient is agreeable to proceed with the procedure.  -This patient will be discussed in the morning discussion with interventional cardiology staff.

## 2020-08-10 NOTE — Clinical Note
90 ml injected throughout the case. 110 mL total wasted during the case. 200 mL total used in the case.

## 2020-08-10 NOTE — H&P
[facility] - Short Stay Cardiac Unit  Interventional Cardiology  H&P    Patient Name: Alfonso Leger  MRN: [id number]  Admission Date: 8/10/2020  Code Status: Prior   Attending Provider: Michael Fernandez MD   Primary Care Physician: Mariano Hale MD  Principal Problem:Status post heart transplantation    Patient information was obtained from patient and past medical records.     Subjective:     Chief Complaint:  OHTx surveillance      HPI:  Mr. Leger is a 46-yo male s/p OHTx 10/29/12 (for EtOH CM) with post-op severe TR, chronic LV dysfunction, Factor V Leiden with h/o DVT (last 2012), HTN, CKD (baseline Cr ~1.5), DM ( diet controlled), dyslipidemia and PTLD, completed 6 cycles of R-CHOP/CEOP in 2013. Overall he feels well, he denies chest pain, dyspnea on exertion, LE edema, syncope, palpitations.  On tacrolimus and prednisone only for immunosuppression therapy.      He is no longer consuming EtOH, and quit smoking in May 2011 (20 years, 1 ppd prior to that).      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     magnesium oxide (MAG-OX) 400 mg (241.3 mg magnesium) tablet Take 1 tablet (400 mg total) by mouth once daily.    potassium chloride (KLOR-CON) 10 MEQ TbSR Take 1 tablet (10 mEq total) by mouth once daily.    tacrolimus (PROGRAF) 1 MG Cap TAKE 1 MG BY MOUTH IN THE MORNING AND 2 MG BY MOUTH IN THE EVENING 11/3/17    blood sugar diagnostic Strp 1 strip by Misc.(Non-Drug; Combo Route) route 3 (three) times daily.    enoxaparin (LOVENOX) 100 mg/mL Syrg INJECT 1 ML (100 MG TOTAL) INTO THE SKIN EVERY 12 (TWELVE) HOURS. (60 SYRINGES)    ergocalciferol (ERGOCALCIFEROL) 50,000 unit Cap Take 1 capsule (50,000 Units total) by mouth every 7 days.    olmesartan (BENICAR) 5 MG Tab Take 1 tablet (5 mg total) by mouth every evening.    potassium, sodium phosphates (PHOS-NAK) 280-160-250 mg PwPk Take 1 packet by mouth 3 (three) times daily.    pravastatin (PRAVACHOL) 40 MG tablet Take 1 tablet (40 mg total) by mouth once daily.     Family History     Problem Relation (Age of Onset)    Cancer Paternal Grandfather    Diabetes Maternal Uncle        Tobacco Use    Smoking status: Former Smoker     Quit date: 2011     Years since quittin.2    Smokeless tobacco: Former User   Substance and Sexual Activity    Alcohol use: No    Drug use: No    Sexual activity: Yes     Partners: Female     Birth control/protection: None     Review of Systems   Constitution: Negative for chills.   HENT: Negative for congestion.    Eyes: Negative for blurred vision.   Cardiovascular: Negative for chest pain, leg swelling, near-syncope, palpitations and syncope.   Respiratory: Negative for cough and shortness of breath.    Endocrine: Negative for polyuria.   Skin: Negative for itching and rash.   Musculoskeletal: Negative for back pain.   Gastrointestinal: Negative for abdominal pain, constipation, diarrhea, nausea and vomiting.   Genitourinary: Negative for dysuria.   Neurological: Negative for dizziness, headaches and light-headedness.    Psychiatric/Behavioral: Negative for altered mental status.     Objective:     Vital Signs (Most Recent):  Temp: 97.2 °F (36.2 °C) (08/10/20 0815)  Pulse: 101 (08/10/20 0815)  Resp: 17 (08/10/20 0815)  BP: (!) 149/102 (08/10/20 0815)  SpO2: 98 % (08/10/20 0815) Vital Signs (24h Range):  Temp:  [97.2 °F (36.2 °C)] 97.2 °F (36.2 °C)  Pulse:  [101] 101  Resp:  [17] 17  SpO2:  [98 %] 98 %  BP: (135-149)/(101-102) 149/102     Weight: 102.1 kg (225 lb)  Body mass index is 30.52 kg/m².    SpO2: 98 %  O2 Device (Oxygen Therapy): room air    No intake or output data in the 24 hours ending 08/10/20 0910    Lines/Drains/Airways     Central Venous Catheter Line                 Port A Cath Single Lumen 09/18/13 Left Chest 2517 days         Port A Cath Single Lumen 10/16/13 Left Chest 2490 days          Peripheral Intravenous Line                 Peripheral IV - Single Lumen 08/10/20 0849 18 G Left Antecubital less than 1 day         Peripheral IV - Single Lumen 08/10/20 0849 20 G Left Hand less than 1 day                Physical Exam   Constitutional: He is oriented to person, place, and time. He appears well-developed and well-nourished.   HENT:   Head: Normocephalic and atraumatic.   Eyes: Pupils are equal, round, and reactive to light. Conjunctivae are normal.   Neck: Normal range of motion. Neck supple.   Cardiovascular: Normal rate, regular rhythm, S1 normal, S2 normal and normal heart sounds. Exam reveals no gallop and no friction rub.   No murmur heard.  Pulses:       Radial pulses are 2+ on the right side and 2+ on the left side.   Pulmonary/Chest: Effort normal and breath sounds normal. No respiratory distress. He has no wheezes. He has no rales. He exhibits no tenderness.   Abdominal: Soft. Bowel sounds are normal. He exhibits no distension and no mass. There is no abdominal tenderness. There is no rebound and no guarding.   Musculoskeletal:         General: No edema.   Neurological: He is alert and oriented to  person, place, and time.   Skin: Skin is warm and dry.   Psychiatric: He has a normal mood and affect.       Significant Labs: BMP: No results for input(s): GLU, NA, K, CL, CO2, BUN, CREATININE, CALCIUM, MG in the last 48 hours., CMP No results for input(s): NA, K, CL, CO2, GLU, BUN, CREATININE, CALCIUM, PROT, ALBUMIN, BILITOT, ALKPHOS, AST, ALT, ANIONGAP, ESTGFRAFRICA, EGFRNONAA in the last 48 hours., CBC No results for input(s): WBC, HGB, HCT, PLT in the last 48 hours., INR No results for input(s): INR, PROTIME in the last 48 hours., Lipid Panel No results for input(s): CHOL, HDL, LDLCALC, TRIG, CHOLHDL in the last 48 hours., Troponin No results for input(s): TROPONINI in the last 48 hours. and All pertinent lab results from the last 24 hours have been reviewed.    Significant Imaging: CT scan: CT ABDOMEN PELVIS WITH CONTRAST: No results found for this visit on 08/10/20. and CT ABDOMEN PELVIS WITHOUT CONTRAST: No results found for this visit on 08/10/20., Echocardiogram:   2D echo with color flow doppler:   Results for orders placed or performed in visit on 08/21/18   2D echo with color flow doppler   Result Value Ref Range    QEF 35 (A) 55 - 65    Diastolic Dysfunction Yes (A)     Est. PA Systolic Pressure 48.18 (A)     Tricuspid Valve Regurgitation SEVERE (A)     Narrative    Date of Procedure: 08/21/2018        TEST DESCRIPTION   Technical Quality: This is a technically challenging study. There is poor endocardial definition.     Aorta: The aortic root is normal in size, measuring 2.8 cm at sinotubular junction and 3.4 cm at Sinuses of Valsalva. The proximal ascending aorta is normal in size, measuring 3.1 cm across.     Left Atrium: The left atrium is normal in size.     Left Ventricle: The left ventricle is normal in size, with an end-diastolic diameter of 4.6 cm, and an end-systolic diameter of 3.7 cm. LV wall thickness is normal, with the septum measuring 1.1 cm and the posterior wall measuring 1.0 cm  across. Relative   wall thickness was normal at 0.43, and the LV mass index was 89.2 g/m2 consistent with normal left ventricular mass. There are no regional wall motion abnormalities. Left ventricular systolic function appears moderately depressed. Visually estimated   ejection fraction is 35-40%. The LV Doppler derived stroke volume equals 44.0 ccs.     Diastolic indices: E wave velocity 0.6 m/s, E/A ratio 2.3,  msec., E/e' ratio(avg) 5. There is diastolic dysfunction secondary to restrictive abnormality.     Right Atrium: The right atrium is normal in size.     Right Ventricle: The right ventricle is mildly to moderately enlarged measuring 4.3 cm at the base in the apical right ventricle-focused view. Global right ventricular systolic function appears normal. There is abnormal septal motion consistent with   prior cardiac surgery. The estimated PA systolic pressure is 48 mmHg.     Aortic Valve:  The aortic valve is normal in structure.     Mitral Valve:  The mitral valve is normal in structure.     Tricuspid Valve:  The tricuspid valve is normal in structure. There is severe tricuspid regurgitation.     Pulmonary Valve:  The pulmonic valve is normal in structure.     IVC: IVC is enlarged and collapses < 50% with a sniff, suggesting high right atrial pressure of 15 mmHg.     Intracavitary: There is no evidence of pericardial effusion, intracavity mass, thrombi, or vegetation.         CONCLUSIONS     1 - Moderately depressed left ventricular systolic function (EF 35-40%).     2 - Restrictive LV filling pattern, indicating markedly elevated LAP (grade 3 diastolic dysfunction).     3 - Right ventricular enlargement with normal systolic function.     4 - Severe tricuspid regurgitation.     5 - Pulmonary hypertension. The estimated PA systolic pressure is 48 mmHg.             This document has been electronically    SIGNED BY: Schuyler Valenzuela MD On: 08/21/2018 12:40    and Transthoracic echo (TTE) complete (Cupid  Only): No results found for this or any previous visit. and X-Ray: CXR: X-Ray Chest 1 View (CXR): No results found for this visit on 08/10/20. and X-Ray Chest PA and Lateral (CXR): No results found for this visit on 08/10/20. and KUB: X-Ray Abdomen AP 1 View (KUB): No results found for this visit on 08/10/20.    Assessment and Plan:     * Status post heart transplantation  - Presentation for a comprehensive post transplant evaluation every year that may include LHC/RHC  - Access: Right Radial and Right groin as back up     Renal Risk Score/Predicts risk of contrast-induced nephropathy (ESTEFANY) after PCI:    Prior Contrast Allergy: No    Pre Cath Routine Benadryl 50 mg and Routine IVF NaCl PreCath      Sedation   Type of sedation: RN IV sedation    Mallampati score: 2   ASA score: 2    -The risks, benefits & alternatives of the procedure were explained to the patient.    -The risks of coronary angiography include but are not limited to: Bleeding, infection, heart rhythm abnormalities, allergic reactions, kidney injury, stroke and death.    -Should stenting be indicated, the patient has agreed to dual anti-platelet therapy for 1-consecutive year with a drug-eluting stent and a minimum of 1-month with the use of a bare metal stent.    -The risks of moderate sedation include hypotension, respiratory depression, arrhythmias, bronchospasm, & death.    -Informed consent was obtained & the patient is agreeable to proceed with the procedure.  -This patient will be discussed in the morning discussion with interventional cardiology staff.        VTE Risk Mitigation (From admission, onward)    None          Ilir Soler MD  Interventional Cardiology   [facility] Cardiac [facility]

## 2020-08-10 NOTE — Clinical Note
The PA catheter is repositioned to the main pulmonary artery. Hemodynamics performed. Cardiac output obtained at 4 L/min. O2 saturation measured at 64%. CO THERMAL DILUTION RECORED AS: 4.30L/Min, 3.73L/Min, 4.25L/Min    AND CATHETER IS REMOVED

## 2020-08-10 NOTE — SUBJECTIVE & OBJECTIVE
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    magnesium oxide (MAG-OX) 400 mg (241.3 mg magnesium) tablet Take 1 tablet (400 mg total) by mouth once daily.    potassium chloride (KLOR-CON) 10 MEQ TbSR Take 1 tablet (10 mEq total) by mouth once daily.    tacrolimus (PROGRAF) 1 MG Cap TAKE 1 MG BY MOUTH IN THE MORNING AND 2 MG BY MOUTH IN THE EVENING 11/3/17    blood sugar diagnostic Strp 1 strip by Misc.(Non-Drug; Combo Route) route 3 (three) times daily.    enoxaparin (LOVENOX) 100 mg/mL Syrg INJECT 1 ML (100 MG TOTAL) INTO THE SKIN EVERY 12 (TWELVE) HOURS. (60 SYRINGES)    ergocalciferol (ERGOCALCIFEROL) 50,000 unit Cap Take 1 capsule (50,000 Units total) by mouth every 7 days.    olmesartan (BENICAR) 5 MG Tab Take 1 tablet (5 mg total) by mouth every evening.    potassium, sodium phosphates (PHOS-NAK) 280-160-250 mg PwPk Take 1 packet by mouth 3 (three) times daily.    pravastatin (PRAVACHOL) 40 MG tablet Take 1 tablet (40 mg total) by mouth once daily.     Family History     Problem Relation (Age of Onset)    Cancer Paternal Grandfather    Diabetes Maternal Uncle        Tobacco Use     Smoking status: Former Smoker     Quit date: 2011     Years since quittin.2    Smokeless tobacco: Former User   Substance and Sexual Activity    Alcohol use: No    Drug use: No    Sexual activity: Yes     Partners: Female     Birth control/protection: None     Review of Systems   Constitution: Negative for chills.   HENT: Negative for congestion.    Eyes: Negative for blurred vision.   Cardiovascular: Negative for chest pain, leg swelling, near-syncope, palpitations and syncope.   Respiratory: Negative for cough and shortness of breath.    Endocrine: Negative for polyuria.   Skin: Negative for itching and rash.   Musculoskeletal: Negative for back pain.   Gastrointestinal: Negative for abdominal pain, constipation, diarrhea, nausea and vomiting.   Genitourinary: Negative for dysuria.   Neurological: Negative for dizziness, headaches and light-headedness.   Psychiatric/Behavioral: Negative for altered mental status.     Objective:     Vital Signs (Most Recent):  Temp: 97.2 °F (36.2 °C) (08/10/20 0815)  Pulse: 101 (08/10/20 0815)  Resp: 17 (08/10/20 0815)  BP: (!) 149/102 (08/10/20 0815)  SpO2: 98 % (08/10/20 0815) Vital Signs (24h Range):  Temp:  [97.2 °F (36.2 °C)] 97.2 °F (36.2 °C)  Pulse:  [101] 101  Resp:  [17] 17  SpO2:  [98 %] 98 %  BP: (135-149)/(101-102) 149/102     Weight: 102.1 kg (225 lb)  Body mass index is 30.52 kg/m².    SpO2: 98 %  O2 Device (Oxygen Therapy): room air    No intake or output data in the 24 hours ending 08/10/20 0910    Lines/Drains/Airways     Central Venous Catheter Line                 Port A Cath Single Lumen 13 Left Chest 2517 days         Port A Cath Single Lumen 10/16/13 Left Chest 2490 days          Peripheral Intravenous Line                 Peripheral IV - Single Lumen 08/10/20 0849 18 G Left Antecubital less than 1 day         Peripheral IV - Single Lumen 08/10/20 0849 20 G Left Hand less than 1 day                Physical Exam   Constitutional: He is  oriented to person, place, and time. He appears well-developed and well-nourished.   HENT:   Head: Normocephalic and atraumatic.   Eyes: Pupils are equal, round, and reactive to light. Conjunctivae are normal.   Neck: Normal range of motion. Neck supple.   Cardiovascular: Normal rate, regular rhythm, S1 normal, S2 normal and normal heart sounds. Exam reveals no gallop and no friction rub.   No murmur heard.  Pulses:       Radial pulses are 2+ on the right side and 2+ on the left side.   Pulmonary/Chest: Effort normal and breath sounds normal. No respiratory distress. He has no wheezes. He has no rales. He exhibits no tenderness.   Abdominal: Soft. Bowel sounds are normal. He exhibits no distension and no mass. There is no abdominal tenderness. There is no rebound and no guarding.   Musculoskeletal:         General: No edema.   Neurological: He is alert and oriented to person, place, and time.   Skin: Skin is warm and dry.   Psychiatric: He has a normal mood and affect.       Significant Labs: BMP: No results for input(s): GLU, NA, K, CL, CO2, BUN, CREATININE, CALCIUM, MG in the last 48 hours., CMP No results for input(s): NA, K, CL, CO2, GLU, BUN, CREATININE, CALCIUM, PROT, ALBUMIN, BILITOT, ALKPHOS, AST, ALT, ANIONGAP, ESTGFRAFRICA, EGFRNONAA in the last 48 hours., CBC No results for input(s): WBC, HGB, HCT, PLT in the last 48 hours., INR No results for input(s): INR, PROTIME in the last 48 hours., Lipid Panel No results for input(s): CHOL, HDL, LDLCALC, TRIG, CHOLHDL in the last 48 hours., Troponin No results for input(s): TROPONINI in the last 48 hours. and All pertinent lab results from the last 24 hours have been reviewed.    Significant Imaging: CT scan: CT ABDOMEN PELVIS WITH CONTRAST: No results found for this visit on 08/10/20. and CT ABDOMEN PELVIS WITHOUT CONTRAST: No results found for this visit on 08/10/20., Echocardiogram:   2D echo with color flow doppler:   Results for orders placed or performed in  visit on 08/21/18   2D echo with color flow doppler   Result Value Ref Range    QEF 35 (A) 55 - 65    Diastolic Dysfunction Yes (A)     Est. PA Systolic Pressure 48.18 (A)     Tricuspid Valve Regurgitation SEVERE (A)     Narrative    Date of Procedure: 08/21/2018        TEST DESCRIPTION   Technical Quality: This is a technically challenging study. There is poor endocardial definition.     Aorta: The aortic root is normal in size, measuring 2.8 cm at sinotubular junction and 3.4 cm at Sinuses of Valsalva. The proximal ascending aorta is normal in size, measuring 3.1 cm across.     Left Atrium: The left atrium is normal in size.     Left Ventricle: The left ventricle is normal in size, with an end-diastolic diameter of 4.6 cm, and an end-systolic diameter of 3.7 cm. LV wall thickness is normal, with the septum measuring 1.1 cm and the posterior wall measuring 1.0 cm across. Relative   wall thickness was normal at 0.43, and the LV mass index was 89.2 g/m2 consistent with normal left ventricular mass. There are no regional wall motion abnormalities. Left ventricular systolic function appears moderately depressed. Visually estimated   ejection fraction is 35-40%. The LV Doppler derived stroke volume equals 44.0 ccs.     Diastolic indices: E wave velocity 0.6 m/s, E/A ratio 2.3,  msec., E/e' ratio(avg) 5. There is diastolic dysfunction secondary to restrictive abnormality.     Right Atrium: The right atrium is normal in size.     Right Ventricle: The right ventricle is mildly to moderately enlarged measuring 4.3 cm at the base in the apical right ventricle-focused view. Global right ventricular systolic function appears normal. There is abnormal septal motion consistent with   prior cardiac surgery. The estimated PA systolic pressure is 48 mmHg.     Aortic Valve:  The aortic valve is normal in structure.     Mitral Valve:  The mitral valve is normal in structure.     Tricuspid Valve:  The tricuspid valve is normal  in structure. There is severe tricuspid regurgitation.     Pulmonary Valve:  The pulmonic valve is normal in structure.     IVC: IVC is enlarged and collapses < 50% with a sniff, suggesting high right atrial pressure of 15 mmHg.     Intracavitary: There is no evidence of pericardial effusion, intracavity mass, thrombi, or vegetation.         CONCLUSIONS     1 - Moderately depressed left ventricular systolic function (EF 35-40%).     2 - Restrictive LV filling pattern, indicating markedly elevated LAP (grade 3 diastolic dysfunction).     3 - Right ventricular enlargement with normal systolic function.     4 - Severe tricuspid regurgitation.     5 - Pulmonary hypertension. The estimated PA systolic pressure is 48 mmHg.             This document has been electronically    SIGNED BY: Schuyler Valenzuela MD On: 08/21/2018 12:40    and Transthoracic echo (TTE) complete (Cupid Only): No results found for this or any previous visit. and X-Ray: CXR: X-Ray Chest 1 View (CXR): No results found for this visit on 08/10/20. and X-Ray Chest PA and Lateral (CXR): No results found for this visit on 08/10/20. and KUB: X-Ray Abdomen AP 1 View (KUB): No results found for this visit on 08/10/20.

## 2020-08-10 NOTE — DISCHARGE SUMMARY
Discharge Summary  Interventional Cardiology      Admit Date: 8/10/2020    Discharge Date:  8/10/2020    Attending Physician: Michael Fernandez MD    Discharge Physician: Max Wood MD    Principal Diagnoses: Status post heart transplantation  Indication for Admission: planned RHC, LHC and EMB bx    Discharged Condition: Good    Hospital Course:   Patient admitted from home for planned LHC, RHC, EMB bx and IVUS of LAD. In the cath lab, attempts were made to gain radial access, however, proximal radial artery appeared occluded and wire unable to be passed. Therefore, femoral artery access was obtained as well as R IJ for RHC/EMBx. RHC was then performed as well as 5 bx taken from off RV. LHC was then performed which showed no stenosis and IVUS of LAD was also performed. Closure device was used for R CFA and manual pressure applied to R IJ venopuncture site in cath lab (sheath left 2/2 high act in cath lab.) Patient tolerated the procedure well and he was discharged home after 4 hours of IVF. He had no significant hematoma formation or any bleeding and left in stable condition.     Outpatient Plan:  [facility] cardiologist    Diet: cardiac    Activity: Ad monika, wound care instructions provided    Disposition: Home or Self Care     Discharge Medications:      Medication List      CONTINUE taking these medications    amLODIPine 10 MG tablet  Commonly known as: NORVASC  TAKE 1 TABLET (10 MG TOTAL) BY MOUTH ONCE DAILY.     blood sugar diagnostic Strp  1 strip by Misc.(Non-Drug; Combo Route) route 3 (three) times daily.     enoxaparin 100 mg/mL Syrg  Commonly known as: LOVENOX  INJECT 1 ML (100 MG TOTAL) INTO THE SKIN EVERY 12 (TWELVE) HOURS. (60 SYRINGES)     ergocalciferol 50,000 unit Cap  Commonly known as: ERGOCALCIFEROL  Take 1 capsule (50,000 Units total) by mouth every 7 days.     magnesium oxide 400 mg (241.3 mg magnesium) tablet  Commonly known as: MAG-OX  Take 1 tablet (400 mg total) by mouth once daily.      olmesartan 5 MG Tab  Commonly known as: BENICAR  Take 1 tablet (5 mg total) by mouth every evening.     potassium chloride 10 MEQ Tbsr  Commonly known as: KLOR-CON  Take 1 tablet (10 mEq total) by mouth once daily.     potassium, sodium phosphates 280-160-250 mg Pwpk  Commonly known as: PHOS-NAK  Take 1 packet by mouth 3 (three) times daily.     pravastatin 40 MG tablet  Commonly known as: PRAVACHOL  Take 1 tablet (40 mg total) by mouth once daily.     tacrolimus 1 MG Cap  Commonly known as: PROGRAF  TAKE 1 MG BY MOUTH IN THE MORNING AND 2 MG BY MOUTH IN THE EVENING 11/3/17          Follow Up:      Ernesto Smith MD  Cardiology Fellow  Pager: [phone]  8/10/2020 2:19 PM

## 2020-08-10 NOTE — Clinical Note
The sheath is inserted into the right internal jugular vein. AND REPOSITIONED TO THE RIGHT VENTRICLE

## 2020-08-10 NOTE — Clinical Note
Prepped: groin, right radial and right neck. Prepped with: ChloraPrep. The site was clipped. The patient was draped.

## 2020-08-10 NOTE — Clinical Note
The PA catheter is reinserted into the right atrium. Hemodynamics performed. AND CATHETER IS REMVOED

## 2020-08-10 NOTE — PROGRESS NOTES
Pt DC'd per MD order. Discharge instructions given including activity, wound care, S&S of infections, future appointments, and when to call MD. Medications reviewed including when to take next dose. Telemetry and PIV DC'd, catheter tip intact. Pt left unit via wheelchair with transport.

## 2020-08-11 LAB
C1Q1 TESTING DATE: NORMAL
C1Q2 TESTING DATE: NORMAL
CLASS I ANTIBODY COMMENTS - LUMINEX: NORMAL
CLASS II ANTIBODY COMMENTS - LUMINEX: NORMAL
FINAL PATHOLOGIC DIAGNOSIS: NORMAL
GROSS: NORMAL
HC1Q TESTING DATE: NORMAL
MICROSCOPIC EXAM: NORMAL
POC ACTIVATED CLOTTING TIME K: 158 SEC (ref 74–137)
POC ACTIVATED CLOTTING TIME K: 175 SEC (ref 74–137)
POC ACTIVATED CLOTTING TIME K: 197 SEC (ref 74–137)
SAMPLE: ABNORMAL
SERUM COLLECTION DT - LUMINEX CLASS I: NORMAL
SERUM COLLECTION DT - LUMINEX CLASS II: NORMAL

## 2020-08-12 ENCOUNTER — TELEPHONE (OUTPATIENT)
Dept: TRANSPLANT | Facility: CLINIC | Age: 50
End: 2020-08-12

## 2020-08-12 NOTE — TELEPHONE ENCOUNTER
Spoke with pt after the results of the Heart Biopsy negative, reviewed with Dr. Manzano, pt has a clinic visit next Tuesday.

## 2020-08-14 DIAGNOSIS — N18.30 STAGE 3 CHRONIC KIDNEY DISEASE: Primary | ICD-10-CM

## 2020-08-18 ENCOUNTER — OFFICE VISIT (OUTPATIENT)
Dept: TRANSPLANT | Facility: CLINIC | Age: 50
End: 2020-08-18
Payer: MEDICARE

## 2020-08-18 VITALS
HEIGHT: 72 IN | WEIGHT: 228.63 LBS | BODY MASS INDEX: 30.97 KG/M2 | HEART RATE: 100 BPM | DIASTOLIC BLOOD PRESSURE: 88 MMHG | SYSTOLIC BLOOD PRESSURE: 124 MMHG

## 2020-08-18 DIAGNOSIS — Z79.60 LONG-TERM USE OF IMMUNOSUPPRESSANT MEDICATION: ICD-10-CM

## 2020-08-18 DIAGNOSIS — Z94.1 S/P ORTHOTOPIC HEART TRANSPLANT: Primary | ICD-10-CM

## 2020-08-18 DIAGNOSIS — I10 ESSENTIAL HYPERTENSION: ICD-10-CM

## 2020-08-18 PROCEDURE — 99999 PR PBB SHADOW E&M-EST. PATIENT-LVL III: ICD-10-PCS | Mod: PBBFAC,,, | Performed by: INTERNAL MEDICINE

## 2020-08-18 PROCEDURE — 99214 OFFICE O/P EST MOD 30 MIN: CPT | Mod: S$PBB,,, | Performed by: INTERNAL MEDICINE

## 2020-08-18 PROCEDURE — 99999 PR PBB SHADOW E&M-EST. PATIENT-LVL III: CPT | Mod: PBBFAC,,, | Performed by: INTERNAL MEDICINE

## 2020-08-18 PROCEDURE — 99214 PR OFFICE/OUTPT VISIT, EST, LEVL IV, 30-39 MIN: ICD-10-PCS | Mod: S$PBB,,, | Performed by: INTERNAL MEDICINE

## 2020-08-18 PROCEDURE — 99213 OFFICE O/P EST LOW 20 MIN: CPT | Mod: PBBFAC | Performed by: INTERNAL MEDICINE

## 2020-08-18 NOTE — PROGRESS NOTES
Subjective:   Mr. Leger is a 50 y.o. year old Black or  male who received a donation after brain death heart transplant on 10/29/12.      CMV status:   Donor:-  Recipient:-    HPI   Mr. Leger is a very pleasant 48 year old  S/p OHT 10/29/12, with post-op severe TR, LVEF 35 - 40 % (most recent Echo showed EF=25% by my read), with Factor V leiden deficiency, with history of DVT on lovenox, HTN, CKD, HLP, PTLD, s/p R-CHOP/CEOP 12/2013 who comes for a follow-up visit. Clinically reports no symptoms. Very active. Because of his recent drop in LV function he underwent a coronary angiogram and RV biopsy to r/o CAV and rejection both of which were negative. RHC was performed as well and showed mildly increased RAP=12 mm of Hg and mildly decreased CI/CO. Immuno regimen includes; Tacolimus 1/2, Prednisone 10 mg and not on cellcept.       Echo with CFD done on 8/4/2020  · There were no arrhythmias during stress.  · Concentric left ventricular remodeling.  · Severely decreased left ventricular systolic function. The estimated ejection fraction is 15%.  · Grade I (mild) left ventricular diastolic dysfunction consistent with impaired relaxation.  · Severe global hypokinetic wall motion.  · Mild right ventricular enlargement.  · Moderately reduced right ventricular systolic function.  · Moderate to severe tricuspid regurgitation.  · Severe right atrial enlargement.  · Intermediate central venous pressure (8 mmHg).  · The estimated PA systolic pressure is 35 mmHg.  · The aortic root is mildly dilated.  · The ECG portion of this study is negative for myocardial ischemia.  · The stress echo portion of this study is positive for myocardial ischemia.     RHC performed on 8/10/2020  RA: 12 RV: 33/ 2/ 10 PA: 23 PWP: 15/ 13/ 12 .   Cardiac output was 5.05  by Jair. Cardiac index is 2.25 L/min/m2.   O2 Sat: PA 64%.   Thermodilution CO- 4.09, CI-1.83  Pulmonic Arterial O2 Saturation: 64 %    Coronary angiogram done on  8/10/2020  · Normal coronary arteries.  · IVUS of LAD did not show features of significant Cardiac allograft vasculopathy (CAV)  · Filling pressures normal. 5 successful RVBX, under fluoro guidance, perfomed. A sample was sent for immunofluorescence..  · US guided Right femoral artery and Rt IJ vein access (Rt radial artery occluded, noted in US)  · In order to adequately assess the patient and form an appropriate treatment plan, it is necessary to perform a comprehensive Right Heart catheterization. A thorough study of the patient's cardiac and hemodynamic functioning, in addition to an Endomyocardial biopsy, was performed. The information to be obtained from the biopsy alone was insufficient to care for this patient.  · Estimated blood loss: <10 mL      Review of Systems   Constitution: Negative. Negative for chills, decreased appetite, diaphoresis, fever, malaise/fatigue, night sweats, weight gain and weight loss.   Eyes: Negative.    Cardiovascular: Negative for chest pain, claudication, cyanosis, dyspnea on exertion, irregular heartbeat, leg swelling, near-syncope, orthopnea, palpitations, paroxysmal nocturnal dyspnea and syncope.   Respiratory: Negative for cough, hemoptysis and shortness of breath.    Endocrine: Negative.    Hematologic/Lymphatic: Negative.    Skin: Negative for color change, dry skin and nail changes.   Musculoskeletal: Negative.    Gastrointestinal: Negative.    Genitourinary: Negative.    Neurological: Negative for weakness.       Objective:   Blood pressure 124/88, pulse 100, height 6' (1.829 m), weight 103.7 kg (228 lb 9.9 oz).body mass index is 31.01 kg/m².    Physical Exam   Constitutional: He appears well-developed.   /88   Pulse 100   Ht 6' (1.829 m)   Wt 103.7 kg (228 lb 9.9 oz)   BMI 31.01 kg/m²      HENT:   Head: Normocephalic.   Neck: No JVD present. Carotid bruit is not present.   Cardiovascular: Regular rhythm and normal heart sounds. Tachycardia present.   No murmur  heard.  Pulmonary/Chest: Effort normal and breath sounds normal. No respiratory distress. He has no wheezes. He has no rales.   Abdominal: Soft. Bowel sounds are normal. He exhibits no distension. There is no abdominal tenderness. There is no rebound.   Musculoskeletal:         General: No edema.   Neurological: He is alert.   Skin: Skin is warm.   Vitals reviewed.      Lab Results   Component Value Date    WBC 3.91 08/10/2020    HGB 14.0 08/10/2020    HCT 43.3 08/10/2020    MCV 93 08/10/2020     08/10/2020    CO2 25 08/10/2020    CREATININE 1.6 (H) 08/10/2020    CALCIUM 8.6 (L) 08/10/2020    ALBUMIN 4.1 08/05/2020    AST 18 08/05/2020     (H) 08/20/2019    ALT 16 08/05/2020    .8 (H) 03/02/2020    ALLOMAP 28 05/27/2016        Lab Results   Component Value Date    INR 1.1 07/08/2016    INR 1.1 10/16/2013    INR 1.4 (H) 08/07/2013       BNP   Date Value Ref Range Status   08/20/2019 103 (H) 0 - 99 pg/mL Final     Comment:     Values of less than 100 pg/ml are consistent with non-CHF populations.   07/27/2018 92 0 - 99 pg/mL Final     Comment:     Values of less than 100 pg/ml are consistent with non-CHF populations.   06/19/2018 74 0 - 99 pg/mL Final     Comment:     Values of less than 100 pg/ml are consistent with non-CHF populations.       LD   Date Value Ref Range Status   01/03/2020 150 110 - 260 U/L Final     Comment:     Results are increased in hemolyzed samples.   07/23/2019 157 110 - 260 U/L Final     Comment:     Results are increased in hemolyzed samples.   01/25/2019 159 110 - 260 U/L Final     Comment:     Results are increased in hemolyzed samples.       Tacrolimus Lvl   Date Value Ref Range Status   08/04/2020 7.0 5.0 - 15.0 ng/mL Final     Comment:     Testing performed by Liquid Chromatography-Tandem  Mass Spectrometry (LC-MS/MS).  This test was developed and its performance characteristics  determined by [facility], Department of Pathology  and Laboratory Medicine  in a manner consistent with CLIA  requirements. It has not been cleared or approved by the [place]  Food and Drug Administration.  This test is used for clinical   purposes.  It should not be regarded as investigational or for  research.     08/04/2020 7.0 5.0 - 15.0 ng/mL Final     Comment:     Testing performed by Liquid Chromatography-Tandem  Mass Spectrometry (LC-MS/MS).  This test was developed and its performance characteristics  determined by [facility], Department of Pathology  and Laboratory Medicine in a manner consistent with CLIA  requirements. It has not been cleared or approved by the [place]  Food and Drug Administration.  This test is used for clinical   purposes.  It should not be regarded as investigational or for  research.       Assessment:     1. S/P orthotopic heart transplant    2. Long-term use of immunosuppressant medication    3. Essential hypertension        Plan:   Clinically doing well. Asymptomatic. Appears euvolemic on exam.  In my opinion his worsening LV dysfunction (in the absence of CAV and rejection) is due to severe RV enlargement with RV dysfunction. He does have severe TR (triciuspid valve appears structurally normal)  Although the etiology of RV enlargement and dysfunction is likely related to his chronic severe TR, I do recommend a VQ scan to r/o chronic PE (as he has a Hx of DVT) and also sleep study to r/o KLEBER.     Return instructions as set forth by post transplant schedule or as needed:    Clinic: Return for labs and/or biopsy weekly the first month, every two weeks during month 2 and then monthly for the first year at the provider or coordinator's discretion. During the second year, return to clinic every 3 months. Post transplant year 3-5 return every 6 months. There will be a comprehensive post transplant evaluation every year that may include LHC/RHC/biopsy, stress test, echo, CXR, and other health screening exams.    In addition to the clinical assessment, I have  ordered Allomap testing for this patient to assist in identification of moderate/severe acute cellular rejection (ACR) in a pt with stable Allograft function instead of endomyocardial biopsy.     Patient is reminded to call with any health changes as these can be early signs of transplant complications. Patient is advised to make sure any new medications or changes of old medications are discussed with a pharmacist or physician knowledgeable with transplant to avoid rejection/drug toxicity related to significant drug interactions.    UNOS Patient Status  Functional Status: 100% - Normal, no complaints, no evidence of disease  Physical Capacity: No Limitations  Working for Income: Unknown    Yudith Lazar MD

## 2020-08-19 LAB
C1Q1A TESTING DATE: NORMAL
C1Q2A TESTING DATE: NORMAL
CLASS I ANTIBODY COMMENTS - LUMINEX: NORMAL
CLASS II ANTIBODY COMMENTS - LUMINEX: NORMAL
HC1QA TESTING DATE: NORMAL
SERUM COLLECTION DT - LUMINEX CLASS I: NORMAL
SERUM COLLECTION DT - LUMINEX CLASS II: NORMAL

## 2020-09-08 ENCOUNTER — LAB VISIT (OUTPATIENT)
Dept: LAB | Facility: HOSPITAL | Age: 50
End: 2020-09-08
Attending: INTERNAL MEDICINE
Payer: MEDICARE

## 2020-09-08 ENCOUNTER — OFFICE VISIT (OUTPATIENT)
Dept: INTERNAL MEDICINE | Facility: CLINIC | Age: 50
End: 2020-09-08
Payer: MEDICARE

## 2020-09-08 VITALS
HEART RATE: 95 BPM | DIASTOLIC BLOOD PRESSURE: 72 MMHG | SYSTOLIC BLOOD PRESSURE: 126 MMHG | OXYGEN SATURATION: 99 % | WEIGHT: 224.88 LBS | BODY MASS INDEX: 30.46 KG/M2 | HEIGHT: 72 IN

## 2020-09-08 DIAGNOSIS — E78.5 HYPERLIPIDEMIA, UNSPECIFIED HYPERLIPIDEMIA TYPE: ICD-10-CM

## 2020-09-08 DIAGNOSIS — Z11.59 NEED FOR HEPATITIS C SCREENING TEST: ICD-10-CM

## 2020-09-08 DIAGNOSIS — D68.51 FACTOR V LEIDEN: Chronic | ICD-10-CM

## 2020-09-08 DIAGNOSIS — E66.9 OBESITY (BMI 30.0-34.9): ICD-10-CM

## 2020-09-08 DIAGNOSIS — I42.8 NON-ISCHEMIC CARDIOMYOPATHY: ICD-10-CM

## 2020-09-08 DIAGNOSIS — Z11.4 ENCOUNTER FOR SCREENING FOR HIV: ICD-10-CM

## 2020-09-08 DIAGNOSIS — E11.9 CONTROLLED TYPE 2 DIABETES MELLITUS WITHOUT COMPLICATION, WITHOUT LONG-TERM CURRENT USE OF INSULIN: ICD-10-CM

## 2020-09-08 DIAGNOSIS — Z86.718 HISTORY OF DVT (DEEP VEIN THROMBOSIS): ICD-10-CM

## 2020-09-08 DIAGNOSIS — E83.42 HYPOMAGNESEMIA: ICD-10-CM

## 2020-09-08 DIAGNOSIS — Z85.72 HISTORY OF NON-HODGKIN'S LYMPHOMA: ICD-10-CM

## 2020-09-08 DIAGNOSIS — M81.0 OSTEOPOROSIS, UNSPECIFIED OSTEOPOROSIS TYPE, UNSPECIFIED PATHOLOGICAL FRACTURE PRESENCE: ICD-10-CM

## 2020-09-08 DIAGNOSIS — D84.9 IMMUNOSUPPRESSION: ICD-10-CM

## 2020-09-08 DIAGNOSIS — C85.80 EBV MEDIATED PRIMARY LYMPHOMA OF LYMPH NODE: ICD-10-CM

## 2020-09-08 DIAGNOSIS — Z00.00 ENCOUNTER FOR PREVENTIVE HEALTH EXAMINATION: Primary | ICD-10-CM

## 2020-09-08 DIAGNOSIS — Z91.89 POTENTIAL FOR COGNITIVE IMPAIRMENT: ICD-10-CM

## 2020-09-08 DIAGNOSIS — I10 ESSENTIAL HYPERTENSION: ICD-10-CM

## 2020-09-08 DIAGNOSIS — J47.9 BRONCHIECTASIS WITHOUT COMPLICATION: ICD-10-CM

## 2020-09-08 DIAGNOSIS — B27.09 EBV MEDIATED PRIMARY LYMPHOMA OF LYMPH NODE: ICD-10-CM

## 2020-09-08 DIAGNOSIS — N18.30 CHRONIC KIDNEY DISEASE (CKD), STAGE III (MODERATE): ICD-10-CM

## 2020-09-08 DIAGNOSIS — Z94.1 STATUS POST HEART TRANSPLANTATION: ICD-10-CM

## 2020-09-08 DIAGNOSIS — N18.30 CKD (CHRONIC KIDNEY DISEASE) STAGE 3, GFR 30-59 ML/MIN: ICD-10-CM

## 2020-09-08 PROBLEM — E66.811 OBESITY (BMI 30.0-34.9): Status: ACTIVE | Noted: 2020-09-08

## 2020-09-08 LAB
25(OH)D3+25(OH)D2 SERPL-MCNC: 8 NG/ML (ref 30–96)
ALBUMIN SERPL BCP-MCNC: 3.7 G/DL (ref 3.5–5.2)
ANION GAP SERPL CALC-SCNC: 12 MMOL/L (ref 8–16)
BUN SERPL-MCNC: 5 MG/DL (ref 6–20)
CALCIUM SERPL-MCNC: 9.1 MG/DL (ref 8.7–10.5)
CHLORIDE SERPL-SCNC: 104 MMOL/L (ref 95–110)
CO2 SERPL-SCNC: 26 MMOL/L (ref 23–29)
CREAT SERPL-MCNC: 1.4 MG/DL (ref 0.5–1.4)
EST. GFR  (AFRICAN AMERICAN): >60 ML/MIN/1.73 M^2
EST. GFR  (NON AFRICAN AMERICAN): 59 ML/MIN/1.73 M^2
GLUCOSE SERPL-MCNC: 104 MG/DL (ref 70–110)
MAGNESIUM SERPL-MCNC: 1.6 MG/DL (ref 1.6–2.6)
PHOSPHATE SERPL-MCNC: 1.7 MG/DL (ref 2.7–4.5)
POTASSIUM SERPL-SCNC: 3.4 MMOL/L (ref 3.5–5.1)
PTH-INTACT SERPL-MCNC: 245.8 PG/ML (ref 9–77)
SODIUM SERPL-SCNC: 142 MMOL/L (ref 136–145)
URATE SERPL-MCNC: 8.3 MG/DL (ref 3.4–7)

## 2020-09-08 PROCEDURE — 36415 COLL VENOUS BLD VENIPUNCTURE: CPT

## 2020-09-08 PROCEDURE — 80069 RENAL FUNCTION PANEL: CPT

## 2020-09-08 PROCEDURE — 86703 HIV-1/HIV-2 1 RESULT ANTBDY: CPT

## 2020-09-08 PROCEDURE — 99214 OFFICE O/P EST MOD 30 MIN: CPT | Mod: PBBFAC | Performed by: NURSE PRACTITIONER

## 2020-09-08 PROCEDURE — 99999 PR PBB SHADOW E&M-EST. PATIENT-LVL IV: ICD-10-PCS | Mod: PBBFAC,,, | Performed by: NURSE PRACTITIONER

## 2020-09-08 PROCEDURE — 82306 VITAMIN D 25 HYDROXY: CPT

## 2020-09-08 PROCEDURE — 99999 PR PBB SHADOW E&M-EST. PATIENT-LVL IV: CPT | Mod: PBBFAC,,, | Performed by: NURSE PRACTITIONER

## 2020-09-08 PROCEDURE — 83970 ASSAY OF PARATHORMONE: CPT

## 2020-09-08 PROCEDURE — G0439 PR MEDICARE ANNUAL WELLNESS SUBSEQUENT VISIT: ICD-10-PCS | Mod: ,,, | Performed by: NURSE PRACTITIONER

## 2020-09-08 PROCEDURE — 83735 ASSAY OF MAGNESIUM: CPT

## 2020-09-08 PROCEDURE — 86803 HEPATITIS C AB TEST: CPT

## 2020-09-08 PROCEDURE — 84550 ASSAY OF BLOOD/URIC ACID: CPT

## 2020-09-08 PROCEDURE — G0439 PPPS, SUBSEQ VISIT: HCPCS | Mod: ,,, | Performed by: NURSE PRACTITIONER

## 2020-09-08 NOTE — PATIENT INSTRUCTIONS
Counseling and Referral of Other Preventative  (Italic type indicates deductible and co-insurance are waived)    Patient Name: Alfonso Leger  Today's Date: 9/8/2020    Health Maintenance       Date Due Completion Date    Hepatitis C Screening 1971 ---    HIV Screening 06/16/1986 ---    TETANUS VACCINE 06/16/1989 ---    Pneumococcal Vaccine (Highest Risk) (2 of 3 - PCV13) 08/04/2018 8/4/2017    Eye Exam 03/07/2020 3/7/2019    Override on 9/6/2018: Done    Override on 8/4/2017: Done    Influenza Vaccine (1) 10/14/2020 (Originally 8/1/2020) 1/16/2017    Hemoglobin A1c 02/05/2021 8/5/2020    Lipid Panel 08/04/2021 8/4/2020    Foot Exam 09/08/2021 9/8/2020 (Done)    Override on 9/8/2020: Done    Override on 7/28/2017: Done    Override on 10/11/2013: Done        Orders Placed This Encounter   Procedures    Hepatitis C Antibody    HIV 1/2 Ag/Ab (4th Gen)     The following information is provided to all patients.  This information is to help you find resources for any of the problems found today that may be affecting your health:                Living healthy guide: www.[facility].[place].gov      Understanding Diabetes: www.diabetes.org      Eating healthy: www.cdc.gov/healthyweight      CDC home safety checklist: www.cdc.gov/steadi/patient.html      Agency on Aging: www.goea.[place].[facility]      Alcoholics anonymous (AA): www.aa.org      Physical Activity: www.xiao.nih.gov/su1dslx      Tobacco use: www.quitwithusla.org

## 2020-09-08 NOTE — PROGRESS NOTES
Alfonso Leger presented for a  Medicare AWV and comprehensive Health Risk Assessment today. The following components were reviewed and updated:    · Medical history  · Family History  · Social history  · Allergies and Current Medications  · Health Risk Assessment  · Health Maintenance  · Care Team     ** See Completed Assessments for Annual Wellness Visit within the encounter summary.**         The following assessments were completed:  · Living Situation  · CAGE  · Depression Screening  · Timed Get Up and Go  · Whisper Test  · Cognitive Function Screening  · Nutrition Screening  · ADL Screening  · PAQ Screening        Vitals:    09/08/20 0903   BP: 126/72   BP Location: Right arm   Patient Position: Sitting   BP Method: Medium (Manual)   Pulse: 95   SpO2: 99%   Weight: 102 kg (224 lb 13.9 oz)   Height: 6' (1.829 m)     Body mass index is 30.5 kg/m².  Physical Exam  Vitals signs and nursing note reviewed.   Constitutional:       Appearance: He is well-developed.   HENT:      Head: Normocephalic.   Cardiovascular:      Rate and Rhythm: Normal rate and regular rhythm.      Pulses:           Dorsalis pedis pulses are 2+ on the right side and 2+ on the left side.      Heart sounds: Normal heart sounds. No murmur.   Pulmonary:      Effort: Pulmonary effort is normal. No respiratory distress.      Breath sounds: Normal breath sounds.   Abdominal:      Palpations: Abdomen is soft. There is no mass.      Tenderness: There is no abdominal tenderness.   Musculoskeletal: Normal range of motion.   Feet:      Right foot:      Protective Sensation: 10 sites tested. 10 sites sensed.      Skin integrity: No ulcer.      Left foot:      Protective Sensation: 10 sites tested. 10 sites sensed.      Skin integrity: No ulcer.   Skin:     General: Skin is warm and dry.   Neurological:      Mental Status: He is alert and oriented to person, place, and time.      Motor: No abnormal muscle tone.   Psychiatric:         Speech: Speech normal.          Behavior: Behavior normal.               Diagnoses and health risks identified today and associated recommendations/orders:    1. Encounter for preventive health examination  He will discuss vaccines with transplant team.   He declines flu vaccine.     MA to schedule  Optometry  PCP  Dermatology    2. Non-ischemic cardiomyopathy  Echo 8/2020  Stable. Continue current treatment plan as previously prescribed with your transplant team.     3. Status post heart transplantation  See #2    4. Essential hypertension  Stable and controlled. Continue current treatment plan as previously prescribed with your PCP.     5. Hyperlipidemia, unspecified hyperlipidemia type  Continue current treatment plan as previously prescribed with your transplant team.     6. Bronchiectasis without complication  Ct 10/2012  Advised to follow up with PCP for further evaluation and recommendations. He expressed understanding.      7. 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     8. History of non-Hodgkin's lymphoma  See #7    9. Factor V Leiden  See #7    10. History of DVT (deep vein thrombosis)  See #7    11. CKD (chronic kidney disease) stage 3, GFR 30-59 ml/min  Decreased from prior check. Continue current treatment plan as previously prescribed with your nephrologist.     12. Controlled type 2 diabetes mellitus without complication, without long-term current use of insulin  A1C 6.0  Stable and controlled. Continue current treatment plan as previously prescribed with your PCP.     13. Osteoporosis, unspecified osteoporosis type, unspecified pathological fracture presence  DEXA 5/2015  Advised to follow up with PCP for further evaluation and treatment. He expressed understanding.      14. Immunosuppression  See #3    15. Obesity (BMI 30.0-34.9)  Continue current treatment plan as previously prescribed with your PCP.        16. Need for hepatitis C  screening test  - Hepatitis C Antibody; Future    17. Encounter for screening for HIV  - HIV 1/2 Ag/Ab (4th Gen); Future    18.Potential for cognitive impairment  Abnormal cognitive function screening.   Completes ADLs independently.   Advised to follow up with PCP for further evaluation and recommendations. He expressed understanding.      Provided Alfonso with a 5-10 year written screening schedule and personal prevention plan. Recommendations were developed using the USPSTF age appropriate recommendations. Education, counseling, and referrals were provided as needed. After Visit Summary printed and given to patient which includes a list of additional screenings\tests needed.    Follow up in about 1 year (around 9/8/2021) for awv.    Janey Schaeffer NP

## 2020-09-08 NOTE — Clinical Note
Your patient was seen today for AWV. Abnormalities bolded. Please review.   Thank you,   HOMER Coles

## 2020-09-09 LAB
HCV AB SERPL QL IA: NEGATIVE
HIV 1+2 AB+HIV1 P24 AG SERPL QL IA: NEGATIVE

## 2020-09-14 ENCOUNTER — TELEPHONE (OUTPATIENT)
Dept: HEMATOLOGY/ONCOLOGY | Facility: CLINIC | Age: 50
End: 2020-09-14

## 2020-09-14 ENCOUNTER — OFFICE VISIT (OUTPATIENT)
Dept: NEPHROLOGY | Facility: CLINIC | Age: 50
End: 2020-09-14
Payer: MEDICARE

## 2020-09-14 ENCOUNTER — OFFICE VISIT (OUTPATIENT)
Dept: HEMATOLOGY/ONCOLOGY | Facility: CLINIC | Age: 50
End: 2020-09-14
Payer: MEDICARE

## 2020-09-14 VITALS
DIASTOLIC BLOOD PRESSURE: 100 MMHG | BODY MASS INDEX: 30.48 KG/M2 | OXYGEN SATURATION: 99 % | HEART RATE: 107 BPM | HEIGHT: 72 IN | WEIGHT: 225.06 LBS | SYSTOLIC BLOOD PRESSURE: 146 MMHG | TEMPERATURE: 99 F

## 2020-09-14 VITALS
HEART RATE: 100 BPM | WEIGHT: 225.06 LBS | DIASTOLIC BLOOD PRESSURE: 94 MMHG | SYSTOLIC BLOOD PRESSURE: 130 MMHG | BODY MASS INDEX: 30.48 KG/M2 | HEIGHT: 72 IN

## 2020-09-14 DIAGNOSIS — D68.51 FACTOR V LEIDEN: Chronic | ICD-10-CM

## 2020-09-14 DIAGNOSIS — I82.90 VENOUS THROMBOEMBOLISM: ICD-10-CM

## 2020-09-14 DIAGNOSIS — Z85.72 HISTORY OF NON-HODGKIN'S LYMPHOMA: Primary | ICD-10-CM

## 2020-09-14 DIAGNOSIS — N18.30 CHRONIC KIDNEY DISEASE (CKD), STAGE III (MODERATE): Primary | ICD-10-CM

## 2020-09-14 DIAGNOSIS — C85.80 EBV MEDIATED PRIMARY LYMPHOMA OF LYMPH NODE: ICD-10-CM

## 2020-09-14 DIAGNOSIS — N25.81 SECONDARY HYPERPARATHYROIDISM OF RENAL ORIGIN: ICD-10-CM

## 2020-09-14 DIAGNOSIS — B27.09 EBV MEDIATED PRIMARY LYMPHOMA OF LYMPH NODE: ICD-10-CM

## 2020-09-14 DIAGNOSIS — E87.6 HYPOKALEMIA: ICD-10-CM

## 2020-09-14 DIAGNOSIS — I10 HTN (HYPERTENSION), BENIGN: ICD-10-CM

## 2020-09-14 DIAGNOSIS — E83.39 HYPOPHOSPHATEMIA: ICD-10-CM

## 2020-09-14 DIAGNOSIS — Z79.01 CURRENT USE OF LONG TERM ANTICOAGULATION: ICD-10-CM

## 2020-09-14 PROCEDURE — 99213 OFFICE O/P EST LOW 20 MIN: CPT | Mod: PBBFAC | Performed by: INTERNAL MEDICINE

## 2020-09-14 PROCEDURE — 99999 PR PBB SHADOW E&M-EST. PATIENT-LVL IV: ICD-10-PCS | Mod: PBBFAC,,, | Performed by: NURSE PRACTITIONER

## 2020-09-14 PROCEDURE — 99999 PR PBB SHADOW E&M-EST. PATIENT-LVL III: ICD-10-PCS | Mod: PBBFAC,,, | Performed by: INTERNAL MEDICINE

## 2020-09-14 PROCEDURE — 99999 PR PBB SHADOW E&M-EST. PATIENT-LVL III: CPT | Mod: PBBFAC,,, | Performed by: INTERNAL MEDICINE

## 2020-09-14 PROCEDURE — 99214 OFFICE O/P EST MOD 30 MIN: CPT | Mod: S$PBB,,, | Performed by: NURSE PRACTITIONER

## 2020-09-14 PROCEDURE — 99214 PR OFFICE/OUTPT VISIT, EST, LEVL IV, 30-39 MIN: ICD-10-PCS | Mod: S$PBB,,, | Performed by: NURSE PRACTITIONER

## 2020-09-14 PROCEDURE — 99999 PR PBB SHADOW E&M-EST. PATIENT-LVL IV: CPT | Mod: PBBFAC,,, | Performed by: NURSE PRACTITIONER

## 2020-09-14 PROCEDURE — 99214 OFFICE O/P EST MOD 30 MIN: CPT | Mod: PBBFAC,27 | Performed by: NURSE PRACTITIONER

## 2020-09-14 PROCEDURE — 99215 PR OFFICE/OUTPT VISIT, EST, LEVL V, 40-54 MIN: ICD-10-PCS | Mod: S$PBB,,, | Performed by: INTERNAL MEDICINE

## 2020-09-14 PROCEDURE — 99215 OFFICE O/P EST HI 40 MIN: CPT | Mod: S$PBB,,, | Performed by: INTERNAL MEDICINE

## 2020-09-14 RX ORDER — VIT C/E/ZN/COPPR/LUTEIN/ZEAXAN 250MG-90MG
5000 CAPSULE ORAL DAILY
Qty: 90 CAPSULE | Refills: 3
Start: 2020-09-14 | End: 2021-01-06

## 2020-09-14 RX ORDER — LOSARTAN POTASSIUM 25 MG/1
25 TABLET ORAL DAILY
Qty: 30 TABLET | Refills: 9 | Status: SHIPPED | OUTPATIENT
Start: 2020-09-14 | End: 2021-01-06 | Stop reason: SDUPTHER

## 2020-09-14 RX ORDER — SODIUM,POTASSIUM PHOSPHATES 280-250MG
1 POWDER IN PACKET (EA) ORAL 3 TIMES DAILY
Qty: 90 PACKET | Refills: 11 | Status: SHIPPED | OUTPATIENT
Start: 2020-09-14 | End: 2021-01-06

## 2020-09-14 NOTE — PATIENT INSTRUCTIONS
Avoid NSAID pain medications such as advil, aleve, motrin, ibuprofen, naprosyn, meloxicam, diclofenac, mobic.     Start Losartan 25 mg daily     Start Vitamin D 3 5000 units once a day - over the counter     Start sodium phosphate 3 times a day

## 2020-09-14 NOTE — PROGRESS NOTES
Subjective:       Patient ID: Alfonso Leger is a 50 y.o.   male who presents for follow-up evaluation of CKD stage 2, Hypokalemia , HTN       Mariano Hale MD      HPI : Alfonso Leger is a pleasant 50 -year-old  man with history of hypertension, CKD stage 2/3, cardiomyopathy status post heart transplant in 2012, followed by Heart Transplant Team, history of B-cell lymphoma, factor 5 laden, seen in office today in f/u for above medical problems .  serum creatinine fluctuates between 1.5 and 2 mg/dL depending on his fluid status.  He has been on Prograf, 1 mg in a.m. and 2 mg in p.m. for immunosuppression.  Denies NSAID use.  No family history of kidney disease.  at the previous visit I started him on Benicar, according to the patient this medication was not covered by his insurance,    PAST MEDICAL HISTORY:   1.  Dilated cardiomyopathy status post orthotopic heart transplant in October 2012 on chronic immunosuppression  2.  Factor V Leiden mutation  3.  Bilateral lower extremity DVT in August 2013  4.  Right brachial vein DVT in August 2013  5.  Peptic ulcer disease  6.  H. pylori gastritis  7.  Dyslipidemia  8.  Hypertension  9.  Corticosteroid-induced diabetes     SURGICAL HISTORY:   1.  Orthotopic heart transplantation in October 2012  2.  IVC filter placement in August 2011  3.  AICD placement and removal in September 2012  4.  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 30 tablet 11    blood sugar diagnostic Strp 1 strip by Misc.(Non-Drug; Combo Route) route 3 (three) times daily. 100 strip 11    enoxaparin (LOVENOX) 100 mg/mL Syrg INJECT 1 ML (100 MG TOTAL) INTO THE SKIN EVERY 12 (TWELVE) HOURS. (60 SYRINGES) 60 Syringe 5    ergocalciferol (ERGOCALCIFEROL) 50,000 unit Cap Take 1 capsule (50,000 Units total) by mouth every 7 days. 12 capsule 3    magnesium oxide (MAG-OX) 400 mg (241.3 mg magnesium) tablet Take 1 tablet (400 mg total) by mouth once daily. 30 tablet 9    olmesartan (BENICAR) 5 MG Tab Take 1 tablet (5 mg total) by mouth every evening. 90 tablet 3    potassium chloride (KLOR-CON) 10 MEQ TbSR Take 1 tablet (10 mEq total) by mouth once daily. 30 tablet 9    potassium, sodium phosphates (PHOS-NAK) 280-160-250 mg PwPk Take 1 packet by mouth 3 (three) times daily. 90 packet 3    pravastatin (PRAVACHOL) 40 MG tablet Take 1 tablet (40 mg total) by mouth once daily. 90 tablet 3    tacrolimus (PROGRAF) 1 MG Cap TAKE 1 MG BY MOUTH IN THE MORNING AND 2 MG BY MOUTH IN THE EVENING 11/3/17 270 capsule 2     No current facility-administered medications on file prior to visit.        Review of Systems   Constitutional: Negative for activity change and appetite change.   HENT: Negative for congestion and facial swelling.    Eyes: Negative for pain, discharge and redness.   Respiratory: Negative for apnea, cough and chest tightness.    Cardiovascular: Negative for chest pain, palpitations and leg swelling.   Gastrointestinal: Negative for abdominal distention.   Genitourinary: Negative for difficulty urinating, dysuria and frequency.   Musculoskeletal:  Positive for arthralgias. Negative for neck pain and neck stiffness.   Skin: Negative for color change, rash and wound.   Neurological: Negative for dizziness, weakness and numbness.   Psychiatric/Behavioral: Negative for sleep disturbance.   All other systems reviewed and are negative.    :              Objective:         Vitals:    09/14/20 1310   BP: (!) 130/94   Pulse: 100       Weight 225 lb, stable      Physical Exam  :      Constitutional: He is oriented to person, place, and time. He appears well-developed and well-nourished. No distress.   HENT: Head: Normocephalic and atraumatic.   Eyes: Pupils are equal, round, and reactive to light.   Neck: Normal range of motion. Neck supple. No tracheal deviation present. No thyromegaly present.   Cardiovascular: Normal rate, regular rhythm and intact distal pulses. Exam reveals no gallop and no friction rub.   Murmur heard. SM in [place]   Pulmonary/Chest: Effort normal and breath sounds normal. He has no wheezes. He has no rales.   Abdominal: Soft. He exhibits no mass. There is no tenderness. There is no rebound and no guarding.   Musculoskeletal: Normal range of motion. He exhibits no edema.   Lymphadenopathy:   He has no cervical adenopathy.   Neurological: He is alert and oriented to person, place, and time.   Skin: Skin is warm. No rash noted. He is not diaphoretic. No erythema.   Nursing note and vitals reviewed.           Labs:    Lab Results   Component Value Date    CREATININE 1.4 09/08/2020    BUN 5 (L) 09/08/2020     09/08/2020    K 3.4 (L) 09/08/2020     09/08/2020    CO2 26 09/08/2020       Lab Results   Component Value Date    .8 (H) 09/08/2020    CALCIUM 9.1 09/08/2020    PHOS 1.7 (L) 09/08/2020       Lab Results   Component Value Date    ALBUMIN 3.7 09/08/2020       Lab Results   Component Value Date    URICACID 8.3 (H) 09/08/2020       Lab Results   Component Value Date    WBC 3.91 08/10/2020    HGB 14.0 08/10/2020    HCT 43.3  08/10/2020    MCV 93 08/10/2020     08/10/2020       Impression and Plan :  50 -year-old  man with history of hypertension, CKD stage 3, cardiomyopathy, status post heart transplant in 2012, congestive heart failure with LV ejection fraction about 15%, seen in office today in f/u  for following medical problems     1.  CKD stage 2 - recent serum creatinine is stable at 1.4 mg/dL, chronic kidney disease is multifactorial, he has been on Prograf for a long time, has history of hypertension, discussed adequate fluid intake, advised patient to avoid NSAID use, handout on the same was given today,     2.  Status post heart transplant 2012, currently on Prograf 1 mg in a.m. and 2 mg in p.m, followed by Cardiology,     3.  Hypertension - currently on amlodipine, start losartan 25 mg daily, check renal panel in 1 week    4.  Hypokalemia - continue potassium supplements, discussed adherence,     5.  Hypomagnesemia - cont  magnesium oxide 400 mg daily,      6.  HFrEF, LVEF 15 %,  discussed salt and fluid restriction,      7.  Hyperparathyroidism - primary, will monitor serum calcium levels closely, low vitamin-D levels noted, advised patient to start vitamin D3 5000 units daily     8.  Hypophosphatemia -  start Neutra-Phos 1 packet 3 times a day, check renal panel in a week,    9. H/o DVT, on Lovenox injections         return to clinic in about  4 months,  more than 40 min of face-to-face time was spent with the patient discussing labs and plan of care,         Jared Arellano MD

## 2020-09-14 NOTE — PROGRESS NOTES
Subjective:       Patient ID: Alfonso Leger is a 50 y.o. male.    Chief Complaint: Lymphoma    50-year-old  male who presents to the hematology oncology clinic today for follow up of diffuse large B-cell lymphoma (PTLD) and positive Factor V Leiden.       The patient's diagnosis was made after a small bowel resection and liver biopsy. He has completed 6 cycles of chemotherapy with R-CHOP/CEOP in Dec 2013.       The patient is currently taking his therapeutic Lovenox injections twice daily and has been tolerating this well without any significant problems. History of Factor V Leiden mutation and  Bilateral lower extremity DVT in August 2013.  He had heart transplant in October 2012 and is followed by heart transplant team in [place] - currently on prograf.     All of his interval clinical history available in [facility] has been reviewed.    Today's visit:  Patient presents for lab evaluation    Review of Systems   Constitutional: Negative for chills, diaphoresis, fatigue, fever and unexpected weight change.   HENT: Negative for congestion, hearing loss, nosebleeds, postnasal drip, rhinorrhea and trouble swallowing.    Eyes: Negative for discharge and visual disturbance.   Respiratory: Negative for cough, chest tightness and shortness of breath.    Cardiovascular: Negative for chest pain, palpitations and leg swelling.   Gastrointestinal: Positive for abdominal distention. Negative for abdominal pain, constipation, diarrhea, nausea and vomiting.   Endocrine: Negative for cold intolerance and heat intolerance.   Genitourinary: Negative for difficulty urinating, dysuria, frequency and hematuria.   Musculoskeletal: Positive for back pain. Negative for arthralgias, gait problem and myalgias.   Skin: Negative.    Neurological: Negative for dizziness, weakness, light-headedness and headaches.   Hematological: Negative for adenopathy. Does not bruise/bleed easily.   Psychiatric/Behavioral: The patient is  not nervous/anxious.        Medication List with Changes/Refills   New Medications    CHOLECALCIFEROL, VITAMIN D3, (VITAMIN D3) 25 MCG (1,000 UNIT) CAPSULE    Take 5 capsules (5,000 Units total) by mouth once daily.    LOSARTAN (COZAAR) 25 MG TABLET    Take 1 tablet (25 mg total) by mouth once daily.   Current Medications    AMLODIPINE (NORVASC) 10 MG TABLET    TAKE 1 TABLET (10 MG TOTAL) BY MOUTH ONCE DAILY.    BLOOD SUGAR DIAGNOSTIC STRP    1 strip by Misc.(Non-Drug; Combo Route) route 3 (three) times daily.    ENOXAPARIN (LOVENOX) 100 MG/ML SYRG    INJECT 1 ML (100 MG TOTAL) INTO THE SKIN EVERY 12 (TWELVE) HOURS. (60 SYRINGES)    MAGNESIUM OXIDE (MAG-OX) 400 MG (241.3 MG MAGNESIUM) TABLET    Take 1 tablet (400 mg total) by mouth once daily.    POTASSIUM CHLORIDE (KLOR-CON) 10 MEQ TBSR    Take 1 tablet (10 mEq total) by mouth once daily.    POTASSIUM, SODIUM PHOSPHATES (PHOS-NAK) 280-160-250 MG PWPK    Take 1 packet by mouth 3 (three) times daily.    PRAVASTATIN (PRAVACHOL) 40 MG TABLET    Take 1 tablet (40 mg total) by mouth once daily.    TACROLIMUS (PROGRAF) 1 MG CAP    TAKE 1 MG BY MOUTH IN THE MORNING AND 2 MG BY MOUTH IN THE EVENING 11/3/17     Objective:     Vitals:    09/14/20 1420   BP: (!) 146/100   Pulse: 107   Temp: 98.6 °F (37 °C)     Physical Exam  Vitals signs and nursing note reviewed.   Constitutional:       General: He is not in acute distress.     Appearance: He is well-developed. He is not diaphoretic.   HENT:      Head: Normocephalic and atraumatic.      Right Ear: Hearing and external ear normal.      Left Ear: Hearing and external ear normal.      Nose: Nose normal. No mucosal edema or rhinorrhea.      Mouth/Throat:      Pharynx: Uvula midline.   Eyes:      General:         Right eye: No discharge.         Left eye: No discharge.      Conjunctiva/sclera: Conjunctivae normal.      Right eye: No chemosis.     Left eye: No chemosis.     Pupils: Pupils are equal, round, and reactive to light.    Neck:      Musculoskeletal: Normal range of motion and neck supple.      Thyroid: No thyroid mass or thyromegaly.      Trachea: Trachea normal.   Cardiovascular:      Rate and Rhythm: Normal rate and regular rhythm.      Pulses:           Dorsalis pedis pulses are 2+ on the right side and 2+ on the left side.      Heart sounds: Normal heart sounds. No murmur.   Pulmonary:      Effort: Pulmonary effort is normal. No respiratory distress.      Breath sounds: Normal breath sounds. No decreased breath sounds or wheezing.   Abdominal:      General: Bowel sounds are normal. There is no distension.      Palpations: Abdomen is soft.      Tenderness: There is no abdominal tenderness.   Musculoskeletal: Normal range of motion.   Lymphadenopathy:      Cervical: No cervical adenopathy.      Upper Body:      Right upper body: No supraclavicular adenopathy.      Left upper body: No supraclavicular adenopathy.   Skin:     General: Skin is warm and dry.      Capillary Refill: Capillary refill takes less than 2 seconds.      Findings: No rash.   Neurological:      Mental Status: He is alert and oriented to person, place, and time.   Psychiatric:         Mood and Affect: Mood is not anxious.         Speech: Speech normal.         Behavior: Behavior normal.         Thought Content: Thought content normal.         Judgment: Judgment normal.         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    Non-Hodgkin's lymphoma:  --no recent labs performed, orders placed today and attached to a lab visit planned for  09/21/2020  --will review lab results when available and communicate results to patient  --patient denies weight loss, night sweats and fevers    Follow-up:  Pending lab results, if lab results appeared to be within normal limits, patient to return to clinic in 6 months with labs prior to visit.

## 2020-09-16 NOTE — PATIENT INSTRUCTIONS
COVID-19 and Cancer Patients    What is COVID-19?  COVID-19 is a new type of virus that can cause mild to severe infections in the lungs. Like other viruses, it can lead to serious infections for people with weakened immune systems. COVID-19 may cause more severe infections than other viruses. We do not have a vaccine to help control its spread, but experts are working to make a vaccine.    How does COVID-19 spread?  The virus can spread easily, just like the common cold or flu. It spreads when an infected person coughs or sneezes droplets that can get into the eyes, nose, or mouth of people nearby. Droplets also land on surfaces that people touch before touching their own eyes, nose, or mouth.    How can I protect myself?  These are some of the best ways to protect yourself and others from the virus:  - Wash your hands often with soap and water for at least 20 seconds.  - Use hand  with 60% or more alcohol until you can wash your hands with soap and water.  - Avoid touching your eyes, nose, and mouth without washing your hands first.  - Clean and disinfect surfaces often. Regular household wipes and sprays will kill the virus. Be sure to  clean places that people touch a lot, such as door handles, phones, keyboards, and light switches.  - Avoid handshakes, hugging, and standing or sitting close to people who are coughing or sneezing.  - Be as healthy as you can. Get plenty of sleep, eat healthy, exercise, and manage your stress.  If you are sick, follow these steps:  - Stay home.  - Cover your nose and mouth when you cough or sneeze. If you use a tissue, put it in the garbage right  away. If you do not have a tissue, cough or sneeze into your elbow crease.  - Call before going to your medical appointments. Let them know about recent travel or if you have had  contact with a person with COVID-19.          As of 3/12/2020  Should I wear a mask?  Experts don't believe that wearing a mask is helpful for the  general public, unless there is concern you have been exposed and may not have symptoms. Some people should use certain types of masks because of their own health or the type of work they do. Talk to your doctor or nurse to see if you would benefit from wearing a mask. If you arrive at the hospital with respiratory symptoms, please ask for a mask.    What if I care for or live with a cancer patient?  If you are caring for or living with someone with cancer, do your best to keep them from getting the virus.  Follow the steps to protect yourself listed on this sheet.  If you become sick yourself, call your doctor to see what more you should do to protect your loved one.    What about people visiting?   If you are sick or have been exposed to COVID-19, we ask that you not come to [facility].    If patients have symptoms, call the [facility] Covid-19 Line ([phone])    We ask that you find someone who isn't sick to join your loved one at their appointments.  To protect all patients, we may limit the number of people who can visit someone staying in the hospital.  Please check with your care team.    How will [facility] protect me from getting COVID-19?  Our hospital and clinics are taking steps to keep infected patients separate from those who may be at risk. At every appointment, your care team will ask questions about overall health and recent travel. We may ask some patients to wait in a separate room or to reschedule until they are feeling better if they have symptoms.  We are also taking extra steps to clean and disinfect surfaces throughout our hospital and clinics.     Will you still care for me if I get sick?  Yes. Your care is our top priority. Although we may change some ways we care for you, we will never put your care or health at risk.            CALL BEFORE YOU ACT   Dial 211 or Text keyword LACOVID to [phone] for general questions and information.   If patients have  symptoms, call the [facility] Covid-19 Line ([phone])               [facility] [place] ([email or web address])    [email or web address]

## 2020-09-17 ENCOUNTER — OFFICE VISIT (OUTPATIENT)
Dept: INTERNAL MEDICINE | Facility: CLINIC | Age: 50
End: 2020-09-17
Payer: MEDICARE

## 2020-09-17 VITALS
HEART RATE: 84 BPM | TEMPERATURE: 98 F | WEIGHT: 227.06 LBS | SYSTOLIC BLOOD PRESSURE: 130 MMHG | DIASTOLIC BLOOD PRESSURE: 82 MMHG | BODY MASS INDEX: 30.75 KG/M2 | HEIGHT: 72 IN | OXYGEN SATURATION: 96 %

## 2020-09-17 DIAGNOSIS — Z00.00 ANNUAL PHYSICAL EXAM: Primary | ICD-10-CM

## 2020-09-17 DIAGNOSIS — E11.9 CONTROLLED TYPE 2 DIABETES MELLITUS WITHOUT COMPLICATION, WITHOUT LONG-TERM CURRENT USE OF INSULIN: ICD-10-CM

## 2020-09-17 DIAGNOSIS — Z23 IMMUNIZATION DUE: ICD-10-CM

## 2020-09-17 DIAGNOSIS — Z94.1 STATUS POST HEART TRANSPLANTATION: ICD-10-CM

## 2020-09-17 PROCEDURE — 99396 PR PREVENTIVE VISIT,EST,40-64: ICD-10-PCS | Mod: S$PBB,,, | Performed by: FAMILY MEDICINE

## 2020-09-17 PROCEDURE — 99999 PR PBB SHADOW E&M-EST. PATIENT-LVL IV: ICD-10-PCS | Mod: PBBFAC,,, | Performed by: FAMILY MEDICINE

## 2020-09-17 PROCEDURE — 99214 OFFICE O/P EST MOD 30 MIN: CPT | Mod: PBBFAC,25 | Performed by: FAMILY MEDICINE

## 2020-09-17 PROCEDURE — 99396 PREV VISIT EST AGE 40-64: CPT | Mod: S$PBB,,, | Performed by: FAMILY MEDICINE

## 2020-09-17 PROCEDURE — 99999 PR PBB SHADOW E&M-EST. PATIENT-LVL IV: CPT | Mod: PBBFAC,,, | Performed by: FAMILY MEDICINE

## 2020-09-17 PROCEDURE — G0009 ADMIN PNEUMOCOCCAL VACCINE: HCPCS | Mod: PBBFAC

## 2020-09-17 NOTE — PROGRESS NOTES
Subjective:       Patient ID: Alfonso Leger is a 50 y.o. male.    Chief Complaint: Annual Exam    Pt is a 50 year heart transplant. Se[place] transplant team in [place]. Pt is due for pneumo 13. Needs PSA. BP is well controlled as is cholesterol    Review of Systems   Constitutional: Negative.    Respiratory: Negative.    Cardiovascular: Negative.    Musculoskeletal: Negative.    Neurological: Negative.    Psychiatric/Behavioral: Negative.          Objective:      Physical Exam  Constitutional:       Appearance: Normal appearance.   Neck:      Musculoskeletal: Normal range of motion and neck supple.   Cardiovascular:      Rate and Rhythm: Normal rate and regular rhythm.      Pulses: Normal pulses.      Heart sounds: Normal heart sounds.   Pulmonary:      Effort: Pulmonary effort is normal.      Breath sounds: Normal breath sounds.   Skin:     General: Skin is warm and dry.   Neurological:      General: No focal deficit present.      Mental Status: He is alert and oriented to person, place, and time.   Psychiatric:         Mood and Affect: Mood normal.         Behavior: Behavior normal.         Assessment:       1. Annual physical exam    2. Immunization due        Plan:       Annual physical exam  Comments:  Pt is up-todate on wellness  Orders:  -     PSA, Screening; Future; Expected date: 09/21/2020    Immunization due  Comments:  Will do pneumo 13  Orders:  -     (In Office Administered) Pneumococcal Conjugate Vaccine (13 Valent) (IM)

## 2020-09-21 ENCOUNTER — PATIENT MESSAGE (OUTPATIENT)
Dept: NEPHROLOGY | Facility: HOSPITAL | Age: 50
End: 2020-09-21

## 2020-09-21 ENCOUNTER — LAB VISIT (OUTPATIENT)
Dept: LAB | Facility: HOSPITAL | Age: 50
End: 2020-09-21
Attending: INTERNAL MEDICINE
Payer: MEDICARE

## 2020-09-21 DIAGNOSIS — Z00.00 ANNUAL PHYSICAL EXAM: ICD-10-CM

## 2020-09-21 DIAGNOSIS — E83.39 HYPOPHOSPHATEMIA: ICD-10-CM

## 2020-09-21 DIAGNOSIS — N18.30 CHRONIC KIDNEY DISEASE (CKD), STAGE III (MODERATE): ICD-10-CM

## 2020-09-21 DIAGNOSIS — E87.6 HYPOKALEMIA: ICD-10-CM

## 2020-09-21 DIAGNOSIS — Z85.72 HISTORY OF NON-HODGKIN'S LYMPHOMA: ICD-10-CM

## 2020-09-21 LAB
ALBUMIN SERPL BCP-MCNC: 3.6 G/DL (ref 3.5–5.2)
ALBUMIN SERPL BCP-MCNC: 3.7 G/DL (ref 3.5–5.2)
ALP SERPL-CCNC: 96 U/L (ref 55–135)
ALT SERPL W/O P-5'-P-CCNC: 10 U/L (ref 10–44)
ANION GAP SERPL CALC-SCNC: 12 MMOL/L (ref 8–16)
ANION GAP SERPL CALC-SCNC: 13 MMOL/L (ref 8–16)
AST SERPL-CCNC: 14 U/L (ref 10–40)
BASOPHILS # BLD AUTO: 0.03 K/UL (ref 0–0.2)
BASOPHILS NFR BLD: 0.7 % (ref 0–1.9)
BILIRUB SERPL-MCNC: 1 MG/DL (ref 0.1–1)
BUN SERPL-MCNC: 4 MG/DL (ref 6–20)
BUN SERPL-MCNC: 4 MG/DL (ref 6–20)
CALCIUM SERPL-MCNC: 8.8 MG/DL (ref 8.7–10.5)
CALCIUM SERPL-MCNC: 8.9 MG/DL (ref 8.7–10.5)
CHLORIDE SERPL-SCNC: 102 MMOL/L (ref 95–110)
CHLORIDE SERPL-SCNC: 103 MMOL/L (ref 95–110)
CO2 SERPL-SCNC: 24 MMOL/L (ref 23–29)
CO2 SERPL-SCNC: 25 MMOL/L (ref 23–29)
COMPLEXED PSA SERPL-MCNC: 1.5 NG/ML (ref 0–4)
CREAT SERPL-MCNC: 1.4 MG/DL (ref 0.5–1.4)
CREAT SERPL-MCNC: 1.4 MG/DL (ref 0.5–1.4)
DIFFERENTIAL METHOD: ABNORMAL
EOSINOPHIL # BLD AUTO: 0.1 K/UL (ref 0–0.5)
EOSINOPHIL NFR BLD: 2.7 % (ref 0–8)
ERYTHROCYTE [DISTWIDTH] IN BLOOD BY AUTOMATED COUNT: 13.5 % (ref 11.5–14.5)
EST. GFR  (AFRICAN AMERICAN): >60 ML/MIN/1.73 M^2
EST. GFR  (AFRICAN AMERICAN): >60 ML/MIN/1.73 M^2
EST. GFR  (NON AFRICAN AMERICAN): 59 ML/MIN/1.73 M^2
EST. GFR  (NON AFRICAN AMERICAN): 59 ML/MIN/1.73 M^2
GLUCOSE SERPL-MCNC: 143 MG/DL (ref 70–110)
GLUCOSE SERPL-MCNC: 144 MG/DL (ref 70–110)
HCT VFR BLD AUTO: 37.7 % (ref 40–54)
HGB BLD-MCNC: 12.3 G/DL (ref 14–18)
IMM GRANULOCYTES # BLD AUTO: 0.01 K/UL (ref 0–0.04)
IMM GRANULOCYTES NFR BLD AUTO: 0.2 % (ref 0–0.5)
LDH SERPL L TO P-CCNC: 214 U/L (ref 110–260)
LYMPHOCYTES # BLD AUTO: 1.1 K/UL (ref 1–4.8)
LYMPHOCYTES NFR BLD: 27.4 % (ref 18–48)
MCH RBC QN AUTO: 29.9 PG (ref 27–31)
MCHC RBC AUTO-ENTMCNC: 32.6 G/DL (ref 32–36)
MCV RBC AUTO: 92 FL (ref 82–98)
MONOCYTES # BLD AUTO: 0.3 K/UL (ref 0.3–1)
MONOCYTES NFR BLD: 7.8 % (ref 4–15)
NEUTROPHILS # BLD AUTO: 2.5 K/UL (ref 1.8–7.7)
NEUTROPHILS NFR BLD: 61.2 % (ref 38–73)
NRBC BLD-RTO: 0 /100 WBC
PHOSPHATE SERPL-MCNC: 2.1 MG/DL (ref 2.7–4.5)
PLATELET # BLD AUTO: 263 K/UL (ref 150–350)
PMV BLD AUTO: 8.6 FL (ref 9.2–12.9)
POTASSIUM SERPL-SCNC: 3.4 MMOL/L (ref 3.5–5.1)
POTASSIUM SERPL-SCNC: 3.6 MMOL/L (ref 3.5–5.1)
PROT SERPL-MCNC: 7.9 G/DL (ref 6–8.4)
RBC # BLD AUTO: 4.12 M/UL (ref 4.6–6.2)
SODIUM SERPL-SCNC: 139 MMOL/L (ref 136–145)
SODIUM SERPL-SCNC: 140 MMOL/L (ref 136–145)
WBC # BLD AUTO: 4.12 K/UL (ref 3.9–12.7)

## 2020-09-21 PROCEDURE — 80053 COMPREHEN METABOLIC PANEL: CPT

## 2020-09-21 PROCEDURE — 36415 COLL VENOUS BLD VENIPUNCTURE: CPT

## 2020-09-21 PROCEDURE — 84153 ASSAY OF PSA TOTAL: CPT | Mod: GA

## 2020-09-21 PROCEDURE — 80069 RENAL FUNCTION PANEL: CPT

## 2020-09-21 PROCEDURE — 83615 LACTATE (LD) (LDH) ENZYME: CPT

## 2020-09-21 PROCEDURE — 85025 COMPLETE CBC W/AUTO DIFF WBC: CPT

## 2020-09-21 NOTE — TELEPHONE ENCOUNTER
Lab Results   Component Value Date    CREATININE 1.4 09/21/2020    BUN 4 (L) 09/21/2020     09/21/2020    K 3.6 09/21/2020     09/21/2020    CO2 24 09/21/2020     Lab Results   Component Value Date    .8 (H) 09/08/2020    CALCIUM 8.8 09/21/2020    PHOS 2.1 (L) 09/21/2020       Phosphorus improved ,     Dr Arellano

## 2020-09-29 ENCOUNTER — PATIENT OUTREACH (OUTPATIENT)
Dept: ADMINISTRATIVE | Facility: OTHER | Age: 50
End: 2020-09-29

## 2020-09-29 ENCOUNTER — PATIENT MESSAGE (OUTPATIENT)
Dept: OTHER | Facility: OTHER | Age: 50
End: 2020-09-29

## 2020-09-29 ENCOUNTER — OFFICE VISIT (OUTPATIENT)
Dept: OPHTHALMOLOGY | Facility: CLINIC | Age: 50
End: 2020-09-29
Payer: MEDICARE

## 2020-09-29 DIAGNOSIS — E11.9 TYPE 2 DIABETES MELLITUS WITHOUT RETINOPATHY: Primary | ICD-10-CM

## 2020-09-29 DIAGNOSIS — H52.4 MYOPIA WITH ASTIGMATISM AND PRESBYOPIA, BILATERAL: ICD-10-CM

## 2020-09-29 DIAGNOSIS — H52.203 MYOPIA WITH ASTIGMATISM AND PRESBYOPIA, BILATERAL: ICD-10-CM

## 2020-09-29 DIAGNOSIS — H52.13 MYOPIA WITH ASTIGMATISM AND PRESBYOPIA, BILATERAL: ICD-10-CM

## 2020-09-29 DIAGNOSIS — H40.013 OPEN ANGLE WITH BORDERLINE FINDINGS OF BOTH EYES: ICD-10-CM

## 2020-09-29 PROCEDURE — 92014 PR EYE EXAM, EST PATIENT,COMPREHESV: ICD-10-PCS | Mod: S$PBB,,, | Performed by: OPTOMETRIST

## 2020-09-29 PROCEDURE — 92133 POSTERIOR SEGMENT OCT OPTIC NERVE(OCULAR COHERENCE TOMOGRAPHY) - OU - BOTH EYES: ICD-10-PCS | Mod: 26,S$PBB,, | Performed by: OPTOMETRIST

## 2020-09-29 PROCEDURE — 92015 PR REFRACTION: ICD-10-PCS | Mod: ,,, | Performed by: OPTOMETRIST

## 2020-09-29 PROCEDURE — 99999 PR PBB SHADOW E&M-EST. PATIENT-LVL II: ICD-10-PCS | Mod: PBBFAC,,, | Performed by: OPTOMETRIST

## 2020-09-29 PROCEDURE — 92014 COMPRE OPH EXAM EST PT 1/>: CPT | Mod: S$PBB,,, | Performed by: OPTOMETRIST

## 2020-09-29 PROCEDURE — 92133 CPTRZD OPH DX IMG PST SGM ON: CPT | Mod: PBBFAC | Performed by: OPTOMETRIST

## 2020-09-29 PROCEDURE — 99999 PR PBB SHADOW E&M-EST. PATIENT-LVL II: CPT | Mod: PBBFAC,,, | Performed by: OPTOMETRIST

## 2020-09-29 PROCEDURE — 99212 OFFICE O/P EST SF 10 MIN: CPT | Mod: PBBFAC,25 | Performed by: OPTOMETRIST

## 2020-09-29 PROCEDURE — 92015 DETERMINE REFRACTIVE STATE: CPT | Mod: ,,, | Performed by: OPTOMETRIST

## 2020-09-29 NOTE — PROGRESS NOTES
HPI     Diabetic Eye Exam     Comments: Yearly              Comments     Patient last visit with DNL on 03/07/2019.  Was told to rtc 6 months for dilation exam, 24-2VF, gOCT.  Decline Dilation Today      Medication eye drops if any: None  Last HVF: 09/06/2018  Last gOCT: 09/29/2020  Last SDP: 08/04/2017    Diabetic eye exam  Diagnosed with diabetes in 2013  Recent vision fluctuations No  Lab Results       Component                Value               Date                       HGBA1C                   6.0 (H)             08/05/2020              HPI    Any vision changes since last exam: No  Eye pain: No  Other ocular symptoms: No    Do you wear currently wear glasses or contacts? Glasses    Interested in contacts today? No    Do you plan on getting new glasses today? Yes, if needed              Last edited by Germania Medina on 9/29/2020  9:31 AM. (History)            Assessment /Plan     For exam results, see Encounter Report.    Type 2 diabetes mellitus without retinopathy  No diabetic retinopathy in either eye  Continue close care with PCP   Monitor 12 months    Open angle with borderline findings of both eyes  -     Posterior Segment OCT Optic Nerve- Both eyes  IOP stable today OU  No changes on gOCT OD, OS compared to baseline  Monitor 12 months    Myopia with astigmatism and presbyopia, bilateral  Eyeglass Final Rx     Eyeglass Final Rx       Sphere Cylinder Axis    Right -2.75 +1.00 090    Left -2.25 +0.75 090    Expiration Date: 9/30/2021                  RTC 1 yr for dilated eye exam and gOCT or PRN if any problems.   Discussed above and answered questions.

## 2020-09-29 NOTE — PROGRESS NOTES
Health Maintenance Due   Topic Date Due    TETANUS VACCINE  06/16/1988     Updates were requested from care everywhere.  Chart was reviewed for overdue Proactive [facility] Encounters (TR) topics (CRS, Breast Cancer Screening, Eye exam)  Health Maintenance has been updated.  LINKS immunization registry triggered.  Immunizations were reconciled.

## 2020-12-22 ENCOUNTER — LAB VISIT (OUTPATIENT)
Dept: LAB | Facility: HOSPITAL | Age: 50
End: 2020-12-22
Attending: FAMILY MEDICINE
Payer: MEDICARE

## 2020-12-22 DIAGNOSIS — Z94.1 STATUS POST HEART TRANSPLANT: ICD-10-CM

## 2020-12-22 DIAGNOSIS — T86.20 COMPLICATION OF HEART TRANSPLANT, UNSPECIFIED COMPLICATION: ICD-10-CM

## 2020-12-22 DIAGNOSIS — E78.2 MIXED HYPERLIPIDEMIA: ICD-10-CM

## 2020-12-22 DIAGNOSIS — Z79.899 ENCOUNTER FOR LONG-TERM (CURRENT) USE OF MEDICATIONS: ICD-10-CM

## 2020-12-22 LAB
ALBUMIN SERPL BCP-MCNC: 3.8 G/DL (ref 3.5–5.2)
ALP SERPL-CCNC: 88 U/L (ref 55–135)
ALT SERPL W/O P-5'-P-CCNC: 12 U/L (ref 10–44)
ANION GAP SERPL CALC-SCNC: 8 MMOL/L (ref 8–16)
AST SERPL-CCNC: 12 U/L (ref 10–40)
BASOPHILS # BLD AUTO: 0.02 K/UL (ref 0–0.2)
BASOPHILS NFR BLD: 0.5 % (ref 0–1.9)
BILIRUB SERPL-MCNC: 1.1 MG/DL (ref 0.1–1)
BUN SERPL-MCNC: 11 MG/DL (ref 6–20)
CALCIUM SERPL-MCNC: 9.2 MG/DL (ref 8.7–10.5)
CHLORIDE SERPL-SCNC: 104 MMOL/L (ref 95–110)
CO2 SERPL-SCNC: 27 MMOL/L (ref 23–29)
CREAT SERPL-MCNC: 1.5 MG/DL (ref 0.5–1.4)
DIFFERENTIAL METHOD: ABNORMAL
EOSINOPHIL # BLD AUTO: 0.1 K/UL (ref 0–0.5)
EOSINOPHIL NFR BLD: 1.6 % (ref 0–8)
ERYTHROCYTE [DISTWIDTH] IN BLOOD BY AUTOMATED COUNT: 13.3 % (ref 11.5–14.5)
EST. GFR  (AFRICAN AMERICAN): >60 ML/MIN/1.73 M^2
EST. GFR  (NON AFRICAN AMERICAN): 53.9 ML/MIN/1.73 M^2
GLUCOSE SERPL-MCNC: 81 MG/DL (ref 70–110)
HCT VFR BLD AUTO: 44.4 % (ref 40–54)
HGB BLD-MCNC: 14.1 G/DL (ref 14–18)
IMM GRANULOCYTES # BLD AUTO: 0.01 K/UL (ref 0–0.04)
IMM GRANULOCYTES NFR BLD AUTO: 0.3 % (ref 0–0.5)
LYMPHOCYTES # BLD AUTO: 1.1 K/UL (ref 1–4.8)
LYMPHOCYTES NFR BLD: 30.5 % (ref 18–48)
MCH RBC QN AUTO: 30.2 PG (ref 27–31)
MCHC RBC AUTO-ENTMCNC: 31.8 G/DL (ref 32–36)
MCV RBC AUTO: 95 FL (ref 82–98)
MONOCYTES # BLD AUTO: 0.3 K/UL (ref 0.3–1)
MONOCYTES NFR BLD: 8.9 % (ref 4–15)
NEUTROPHILS # BLD AUTO: 2.2 K/UL (ref 1.8–7.7)
NEUTROPHILS NFR BLD: 58.2 % (ref 38–73)
NRBC BLD-RTO: 0 /100 WBC
PLATELET # BLD AUTO: 234 K/UL (ref 150–350)
PMV BLD AUTO: 9.4 FL (ref 9.2–12.9)
POTASSIUM SERPL-SCNC: 4.1 MMOL/L (ref 3.5–5.1)
PROT SERPL-MCNC: 8.1 G/DL (ref 6–8.4)
RBC # BLD AUTO: 4.67 M/UL (ref 4.6–6.2)
SODIUM SERPL-SCNC: 139 MMOL/L (ref 136–145)
WBC # BLD AUTO: 3.7 K/UL (ref 3.9–12.7)

## 2020-12-22 PROCEDURE — 85025 COMPLETE CBC W/AUTO DIFF WBC: CPT

## 2020-12-22 PROCEDURE — 36415 COLL VENOUS BLD VENIPUNCTURE: CPT

## 2020-12-22 PROCEDURE — 80197 ASSAY OF TACROLIMUS: CPT

## 2020-12-22 PROCEDURE — 80053 COMPREHEN METABOLIC PANEL: CPT

## 2020-12-23 LAB — TACROLIMUS BLD-MCNC: 4.5 NG/ML (ref 5–15)

## 2020-12-24 ENCOUNTER — TELEPHONE (OUTPATIENT)
Dept: TRANSPLANT | Facility: CLINIC | Age: 50
End: 2020-12-24

## 2020-12-28 NOTE — TELEPHONE ENCOUNTER
Spoke with pt and discussed repeating tacrolimus level next week on Monday 1/4, reviewed the 12 hour trough and timing of medications.     Pt verbalized understanding and repeated them to me.

## 2020-12-31 DIAGNOSIS — Z94.1 STATUS POST HEART TRANSPLANTATION: ICD-10-CM

## 2020-12-31 RX ORDER — TACROLIMUS 1 MG/1
CAPSULE ORAL
Qty: 270 CAPSULE | Refills: 2 | Status: SHIPPED | OUTPATIENT
Start: 2020-12-31 | End: 2021-11-26

## 2021-01-04 ENCOUNTER — LAB VISIT (OUTPATIENT)
Dept: LAB | Facility: HOSPITAL | Age: 51
End: 2021-01-04
Attending: INTERNAL MEDICINE
Payer: MEDICARE

## 2021-01-04 DIAGNOSIS — N18.30 CHRONIC KIDNEY DISEASE (CKD), STAGE III (MODERATE): ICD-10-CM

## 2021-01-04 DIAGNOSIS — N25.81 SECONDARY HYPERPARATHYROIDISM OF RENAL ORIGIN: ICD-10-CM

## 2021-01-04 DIAGNOSIS — Z79.899 ENCOUNTER FOR LONG-TERM (CURRENT) USE OF MEDICATIONS: ICD-10-CM

## 2021-01-04 DIAGNOSIS — E78.2 MIXED HYPERLIPIDEMIA: ICD-10-CM

## 2021-01-04 DIAGNOSIS — Z94.1 STATUS POST HEART TRANSPLANT: ICD-10-CM

## 2021-01-04 DIAGNOSIS — T86.20 COMPLICATION OF HEART TRANSPLANT, UNSPECIFIED COMPLICATION: ICD-10-CM

## 2021-01-04 LAB
25(OH)D3+25(OH)D2 SERPL-MCNC: 5 NG/ML (ref 30–96)
ALBUMIN SERPL BCP-MCNC: 3.9 G/DL (ref 3.5–5.2)
ANION GAP SERPL CALC-SCNC: 10 MMOL/L (ref 8–16)
BASOPHILS # BLD AUTO: 0.03 K/UL (ref 0–0.2)
BASOPHILS NFR BLD: 0.7 % (ref 0–1.9)
BUN SERPL-MCNC: 15 MG/DL (ref 6–20)
CALCIUM SERPL-MCNC: 9.4 MG/DL (ref 8.7–10.5)
CHLORIDE SERPL-SCNC: 105 MMOL/L (ref 95–110)
CO2 SERPL-SCNC: 25 MMOL/L (ref 23–29)
CREAT SERPL-MCNC: 1.5 MG/DL (ref 0.5–1.4)
DIFFERENTIAL METHOD: NORMAL
EOSINOPHIL # BLD AUTO: 0.1 K/UL (ref 0–0.5)
EOSINOPHIL NFR BLD: 2 % (ref 0–8)
ERYTHROCYTE [DISTWIDTH] IN BLOOD BY AUTOMATED COUNT: 13 % (ref 11.5–14.5)
EST. GFR  (AFRICAN AMERICAN): >60 ML/MIN/1.73 M^2
EST. GFR  (NON AFRICAN AMERICAN): 54 ML/MIN/1.73 M^2
GLUCOSE SERPL-MCNC: 141 MG/DL (ref 70–110)
HCT VFR BLD AUTO: 46.5 % (ref 40–54)
HGB BLD-MCNC: 15.2 G/DL (ref 14–18)
IMM GRANULOCYTES # BLD AUTO: 0.01 K/UL (ref 0–0.04)
IMM GRANULOCYTES NFR BLD AUTO: 0.2 % (ref 0–0.5)
LYMPHOCYTES # BLD AUTO: 1.3 K/UL (ref 1–4.8)
LYMPHOCYTES NFR BLD: 32.5 % (ref 18–48)
MAGNESIUM SERPL-MCNC: 1.4 MG/DL (ref 1.6–2.6)
MCH RBC QN AUTO: 29.5 PG (ref 27–31)
MCHC RBC AUTO-ENTMCNC: 32.7 G/DL (ref 32–36)
MCV RBC AUTO: 90 FL (ref 82–98)
MONOCYTES # BLD AUTO: 0.4 K/UL (ref 0.3–1)
MONOCYTES NFR BLD: 9.4 % (ref 4–15)
NEUTROPHILS # BLD AUTO: 2.2 K/UL (ref 1.8–7.7)
NEUTROPHILS NFR BLD: 55.2 % (ref 38–73)
NRBC BLD-RTO: 0 /100 WBC
PHOSPHATE SERPL-MCNC: 1.9 MG/DL (ref 2.7–4.5)
PLATELET # BLD AUTO: 278 K/UL (ref 150–350)
PMV BLD AUTO: 9.5 FL (ref 9.2–12.9)
POTASSIUM SERPL-SCNC: 3.7 MMOL/L (ref 3.5–5.1)
PTH-INTACT SERPL-MCNC: 264.4 PG/ML (ref 9–77)
RBC # BLD AUTO: 5.15 M/UL (ref 4.6–6.2)
SODIUM SERPL-SCNC: 140 MMOL/L (ref 136–145)
WBC # BLD AUTO: 4.06 K/UL (ref 3.9–12.7)

## 2021-01-04 PROCEDURE — 80069 RENAL FUNCTION PANEL: CPT

## 2021-01-04 PROCEDURE — 36415 COLL VENOUS BLD VENIPUNCTURE: CPT

## 2021-01-04 PROCEDURE — 85025 COMPLETE CBC W/AUTO DIFF WBC: CPT

## 2021-01-04 PROCEDURE — 80197 ASSAY OF TACROLIMUS: CPT

## 2021-01-04 PROCEDURE — 83735 ASSAY OF MAGNESIUM: CPT

## 2021-01-04 PROCEDURE — 83970 ASSAY OF PARATHORMONE: CPT

## 2021-01-04 PROCEDURE — 82306 VITAMIN D 25 HYDROXY: CPT

## 2021-01-05 ENCOUNTER — PATIENT OUTREACH (OUTPATIENT)
Dept: ADMINISTRATIVE | Facility: OTHER | Age: 51
End: 2021-01-05

## 2021-01-05 ENCOUNTER — TELEPHONE (OUTPATIENT)
Dept: TRANSPLANT | Facility: CLINIC | Age: 51
End: 2021-01-05

## 2021-01-05 LAB — TACROLIMUS BLD-MCNC: 8.4 NG/ML (ref 5–15)

## 2021-01-06 ENCOUNTER — OFFICE VISIT (OUTPATIENT)
Dept: NEPHROLOGY | Facility: CLINIC | Age: 51
End: 2021-01-06
Payer: MEDICARE

## 2021-01-06 VITALS
HEART RATE: 98 BPM | HEIGHT: 72 IN | SYSTOLIC BLOOD PRESSURE: 142 MMHG | DIASTOLIC BLOOD PRESSURE: 94 MMHG | WEIGHT: 225.75 LBS | BODY MASS INDEX: 30.58 KG/M2

## 2021-01-06 DIAGNOSIS — E83.42 HYPOMAGNESEMIA: ICD-10-CM

## 2021-01-06 DIAGNOSIS — N18.31 STAGE 3A CHRONIC KIDNEY DISEASE: Primary | ICD-10-CM

## 2021-01-06 DIAGNOSIS — I10 HTN (HYPERTENSION), BENIGN: ICD-10-CM

## 2021-01-06 DIAGNOSIS — E55.9 VITAMIN D DEFICIENCY: ICD-10-CM

## 2021-01-06 PROCEDURE — 99999 PR PBB SHADOW E&M-EST. PATIENT-LVL III: ICD-10-PCS | Mod: PBBFAC,,, | Performed by: INTERNAL MEDICINE

## 2021-01-06 PROCEDURE — 99213 OFFICE O/P EST LOW 20 MIN: CPT | Mod: PBBFAC | Performed by: INTERNAL MEDICINE

## 2021-01-06 PROCEDURE — 99214 PR OFFICE/OUTPT VISIT, EST, LEVL IV, 30-39 MIN: ICD-10-PCS | Mod: S$PBB,,, | Performed by: INTERNAL MEDICINE

## 2021-01-06 PROCEDURE — 99214 OFFICE O/P EST MOD 30 MIN: CPT | Mod: S$PBB,,, | Performed by: INTERNAL MEDICINE

## 2021-01-06 PROCEDURE — 99999 PR PBB SHADOW E&M-EST. PATIENT-LVL III: CPT | Mod: PBBFAC,,, | Performed by: INTERNAL MEDICINE

## 2021-01-06 RX ORDER — VIT C/E/ZN/COPPR/LUTEIN/ZEAXAN 250MG-90MG
2000 CAPSULE ORAL DAILY
Qty: 90 CAPSULE | Refills: 3
Start: 2021-01-06 | End: 2021-12-17

## 2021-01-06 RX ORDER — LOSARTAN POTASSIUM 25 MG/1
25 TABLET ORAL DAILY
Qty: 90 TABLET | Refills: 3 | Status: SHIPPED | OUTPATIENT
Start: 2021-01-06 | End: 2022-01-06

## 2021-01-06 RX ORDER — LANOLIN ALCOHOL/MO/W.PET/CERES
400 CREAM (GRAM) TOPICAL DAILY
Qty: 90 TABLET | Refills: 3 | Status: SHIPPED | OUTPATIENT
Start: 2021-01-06 | End: 2022-01-15 | Stop reason: SDUPTHER

## 2021-01-08 ENCOUNTER — PATIENT MESSAGE (OUTPATIENT)
Dept: TRANSPLANT | Facility: CLINIC | Age: 51
End: 2021-01-08

## 2021-05-27 ENCOUNTER — PATIENT MESSAGE (OUTPATIENT)
Dept: TRANSPLANT | Facility: CLINIC | Age: 51
End: 2021-05-27

## 2021-10-18 ENCOUNTER — TELEPHONE (OUTPATIENT)
Dept: TRANSPLANT | Facility: CLINIC | Age: 51
End: 2021-10-18

## 2021-10-18 ENCOUNTER — PATIENT MESSAGE (OUTPATIENT)
Dept: TRANSPLANT | Facility: CLINIC | Age: 51
End: 2021-10-18
Payer: MEDICARE

## 2021-10-18 DIAGNOSIS — T86.20 COMPLICATION OF HEART TRANSPLANT, UNSPECIFIED COMPLICATION: ICD-10-CM

## 2021-10-18 DIAGNOSIS — Z79.52 LONG TERM CURRENT USE OF SYSTEMIC STEROIDS: ICD-10-CM

## 2021-10-18 DIAGNOSIS — E78.2 MIXED HYPERLIPIDEMIA: ICD-10-CM

## 2021-10-18 DIAGNOSIS — N28.9 RENAL INSUFFICIENCY: ICD-10-CM

## 2021-10-18 DIAGNOSIS — Z94.1 STATUS POST HEART TRANSPLANT: ICD-10-CM

## 2021-10-18 DIAGNOSIS — Z79.899 ENCOUNTER FOR LONG-TERM (CURRENT) USE OF MEDICATIONS: ICD-10-CM

## 2021-10-19 ENCOUNTER — TELEPHONE (OUTPATIENT)
Dept: TRANSPLANT | Facility: CLINIC | Age: 51
End: 2021-10-19

## 2021-10-19 DIAGNOSIS — Z79.60 LONG-TERM USE OF IMMUNOSUPPRESSANT MEDICATION: ICD-10-CM

## 2021-10-19 DIAGNOSIS — Z94.1 STATUS POST HEART TRANSPLANT: Primary | ICD-10-CM

## 2021-11-11 ENCOUNTER — HOSPITAL ENCOUNTER (OUTPATIENT)
Dept: CARDIOLOGY | Facility: HOSPITAL | Age: 51
Discharge: HOME | End: 2021-11-11
Attending: INTERNAL MEDICINE
Payer: MEDICARE

## 2021-11-11 ENCOUNTER — HOSPITAL ENCOUNTER (OUTPATIENT)
Dept: RADIOLOGY | Facility: HOSPITAL | Age: 51
Discharge: HOME | End: 2021-11-11
Attending: INTERNAL MEDICINE
Payer: MEDICARE

## 2021-11-11 ENCOUNTER — HOSPITAL ENCOUNTER (EMERGENCY)
Facility: HOSPITAL | Age: 51
Discharge: HOME | End: 2021-11-11
Attending: EMERGENCY MEDICINE
Payer: MEDICARE

## 2021-11-11 VITALS
SYSTOLIC BLOOD PRESSURE: 163 MMHG | RESPIRATION RATE: 21 BRPM | OXYGEN SATURATION: 100 % | TEMPERATURE: 98 F | DIASTOLIC BLOOD PRESSURE: 108 MMHG | BODY MASS INDEX: 30.38 KG/M2 | WEIGHT: 224 LBS | HEART RATE: 86 BPM

## 2021-11-11 VITALS
BODY MASS INDEX: 30.48 KG/M2 | HEIGHT: 72 IN | SYSTOLIC BLOOD PRESSURE: 209 MMHG | WEIGHT: 225 LBS | DIASTOLIC BLOOD PRESSURE: 139 MMHG

## 2021-11-11 DIAGNOSIS — Z79.60 LONG-TERM USE OF IMMUNOSUPPRESSANT MEDICATION: ICD-10-CM

## 2021-11-11 DIAGNOSIS — Z79.899 ENCOUNTER FOR LONG-TERM (CURRENT) USE OF MEDICATIONS: ICD-10-CM

## 2021-11-11 DIAGNOSIS — I50.9 CHF (CONGESTIVE HEART FAILURE): ICD-10-CM

## 2021-11-11 DIAGNOSIS — I16.0 ASYMPTOMATIC HYPERTENSIVE URGENCY: Primary | ICD-10-CM

## 2021-11-11 DIAGNOSIS — Z94.1 STATUS POST HEART TRANSPLANT: ICD-10-CM

## 2021-11-11 DIAGNOSIS — Z79.52 LONG TERM CURRENT USE OF SYSTEMIC STEROIDS: ICD-10-CM

## 2021-11-11 DIAGNOSIS — T86.20 COMPLICATION OF HEART TRANSPLANT, UNSPECIFIED COMPLICATION: ICD-10-CM

## 2021-11-11 DIAGNOSIS — E78.2 MIXED HYPERLIPIDEMIA: ICD-10-CM

## 2021-11-11 DIAGNOSIS — N28.9 RENAL INSUFFICIENCY: ICD-10-CM

## 2021-11-11 DIAGNOSIS — I10 HYPERTENSION: ICD-10-CM

## 2021-11-11 DIAGNOSIS — Z94.1 HEART TRANSPLANTED: ICD-10-CM

## 2021-11-11 LAB
ALBUMIN SERPL BCP-MCNC: 4 G/DL (ref 3.5–5.2)
ALP SERPL-CCNC: 69 U/L (ref 55–135)
ALT SERPL W/O P-5'-P-CCNC: 11 U/L (ref 10–44)
ANION GAP SERPL CALC-SCNC: 10 MMOL/L (ref 8–16)
AST SERPL-CCNC: 20 U/L (ref 10–40)
BASOPHILS # BLD AUTO: 0.03 K/UL (ref 0–0.2)
BASOPHILS NFR BLD: 0.7 % (ref 0–1.9)
BILIRUB SERPL-MCNC: 0.6 MG/DL (ref 0.1–1)
BNP SERPL-MCNC: 713 PG/ML (ref 0–99)
BUN SERPL-MCNC: 12 MG/DL (ref 6–20)
CALCIUM SERPL-MCNC: 9 MG/DL (ref 8.7–10.5)
CHLORIDE SERPL-SCNC: 107 MMOL/L (ref 95–110)
CO2 SERPL-SCNC: 23 MMOL/L (ref 23–29)
CREAT SERPL-MCNC: 1.5 MG/DL (ref 0.5–1.4)
DIFFERENTIAL METHOD: ABNORMAL
EOSINOPHIL # BLD AUTO: 0.1 K/UL (ref 0–0.5)
EOSINOPHIL NFR BLD: 1.2 % (ref 0–8)
ERYTHROCYTE [DISTWIDTH] IN BLOOD BY AUTOMATED COUNT: 13 % (ref 11.5–14.5)
EST. GFR  (AFRICAN AMERICAN): >60 ML/MIN/1.73 M^2
EST. GFR  (NON AFRICAN AMERICAN): 54 ML/MIN/1.73 M^2
GLUCOSE SERPL-MCNC: 81 MG/DL (ref 70–110)
HCT VFR BLD AUTO: 40.8 % (ref 40–54)
HGB BLD-MCNC: 13.2 G/DL (ref 14–18)
IMM GRANULOCYTES # BLD AUTO: 0.01 K/UL (ref 0–0.04)
IMM GRANULOCYTES NFR BLD AUTO: 0.2 % (ref 0–0.5)
LYMPHOCYTES # BLD AUTO: 1.4 K/UL (ref 1–4.8)
LYMPHOCYTES NFR BLD: 32 % (ref 18–48)
MCH RBC QN AUTO: 29.6 PG (ref 27–31)
MCHC RBC AUTO-ENTMCNC: 32.4 G/DL (ref 32–36)
MCV RBC AUTO: 92 FL (ref 82–98)
MONOCYTES # BLD AUTO: 0.4 K/UL (ref 0.3–1)
MONOCYTES NFR BLD: 8.3 % (ref 4–15)
NEUTROPHILS # BLD AUTO: 2.5 K/UL (ref 1.8–7.7)
NEUTROPHILS NFR BLD: 57.6 % (ref 38–73)
NRBC BLD-RTO: 0 /100 WBC
PLATELET # BLD AUTO: 219 K/UL (ref 150–450)
PMV BLD AUTO: 9.7 FL (ref 9.2–12.9)
POTASSIUM SERPL-SCNC: 4.9 MMOL/L (ref 3.5–5.1)
PROT SERPL-MCNC: 8.3 G/DL (ref 6–8.4)
RBC # BLD AUTO: 4.46 M/UL (ref 4.6–6.2)
SODIUM SERPL-SCNC: 140 MMOL/L (ref 136–145)
TROPONIN I SERPL DL<=0.01 NG/ML-MCNC: 0.02 NG/ML (ref 0–0.03)
WBC # BLD AUTO: 4.34 K/UL (ref 3.9–12.7)

## 2021-11-11 PROCEDURE — 93306 ECHO (CUPID ONLY): ICD-10-PCS | Mod: 26,,, | Performed by: INTERNAL MEDICINE

## 2021-11-11 PROCEDURE — 93010 EKG 12-LEAD: ICD-10-PCS | Mod: ,,, | Performed by: INTERNAL MEDICINE

## 2021-11-11 PROCEDURE — 84484 ASSAY OF TROPONIN QUANT: CPT | Performed by: REGISTERED NURSE

## 2021-11-11 PROCEDURE — 71046 XR CHEST PA AND LATERAL: ICD-10-PCS | Mod: 26,,, | Performed by: RADIOLOGY

## 2021-11-11 PROCEDURE — 96374 THER/PROPH/DIAG INJ IV PUSH: CPT

## 2021-11-11 PROCEDURE — 93010 ELECTROCARDIOGRAM REPORT: CPT | Mod: ,,, | Performed by: INTERNAL MEDICINE

## 2021-11-11 PROCEDURE — 85025 COMPLETE CBC W/AUTO DIFF WBC: CPT | Performed by: REGISTERED NURSE

## 2021-11-11 PROCEDURE — 96375 TX/PRO/DX INJ NEW DRUG ADDON: CPT | Mod: 59

## 2021-11-11 PROCEDURE — 63600175 PHARM REV CODE 636 W HCPCS: Performed by: EMERGENCY MEDICINE

## 2021-11-11 PROCEDURE — 71046 X-RAY EXAM CHEST 2 VIEWS: CPT | Mod: 26,,, | Performed by: RADIOLOGY

## 2021-11-11 PROCEDURE — 99291 CRITICAL CARE FIRST HOUR: CPT | Mod: 25

## 2021-11-11 PROCEDURE — 93005 ELECTROCARDIOGRAM TRACING: CPT

## 2021-11-11 PROCEDURE — 80053 COMPREHEN METABOLIC PANEL: CPT | Performed by: REGISTERED NURSE

## 2021-11-11 PROCEDURE — 71046 X-RAY EXAM CHEST 2 VIEWS: CPT | Mod: TC

## 2021-11-11 PROCEDURE — 25000003 PHARM REV CODE 250: Performed by: EMERGENCY MEDICINE

## 2021-11-11 PROCEDURE — 93306 TTE W/DOPPLER COMPLETE: CPT

## 2021-11-11 PROCEDURE — 83880 ASSAY OF NATRIURETIC PEPTIDE: CPT | Performed by: REGISTERED NURSE

## 2021-11-11 PROCEDURE — 93306 TTE W/DOPPLER COMPLETE: CPT | Mod: 26,,, | Performed by: INTERNAL MEDICINE

## 2021-11-11 RX ORDER — HYDRALAZINE HYDROCHLORIDE 20 MG/ML
10 INJECTION INTRAMUSCULAR; INTRAVENOUS
Status: COMPLETED | OUTPATIENT
Start: 2021-11-11 | End: 2021-11-11

## 2021-11-11 RX ORDER — CLONIDINE HYDROCHLORIDE 0.1 MG/1
0.1 TABLET ORAL
Status: COMPLETED | OUTPATIENT
Start: 2021-11-11 | End: 2021-11-11

## 2021-11-11 RX ORDER — LABETALOL HYDROCHLORIDE 5 MG/ML
20 INJECTION, SOLUTION INTRAVENOUS
Status: COMPLETED | OUTPATIENT
Start: 2021-11-11 | End: 2021-11-11

## 2021-11-11 RX ORDER — AMLODIPINE BESYLATE 5 MG/1
10 TABLET ORAL ONCE
Status: COMPLETED | OUTPATIENT
Start: 2021-11-11 | End: 2021-11-11

## 2021-11-11 RX ADMIN — LABETALOL HYDROCHLORIDE 20 MG: 5 INJECTION, SOLUTION INTRAVENOUS at 03:11

## 2021-11-11 RX ADMIN — HYDRALAZINE HYDROCHLORIDE 10 MG: 20 INJECTION INTRAMUSCULAR; INTRAVENOUS at 05:11

## 2021-11-11 RX ADMIN — AMLODIPINE BESYLATE 10 MG: 5 TABLET ORAL at 04:11

## 2021-11-11 RX ADMIN — CLONIDINE HYDROCHLORIDE 0.1 MG: 0.1 TABLET ORAL at 04:11

## 2021-11-12 ENCOUNTER — PATIENT MESSAGE (OUTPATIENT)
Dept: TRANSPLANT | Facility: CLINIC | Age: 51
End: 2021-11-12
Payer: MEDICARE

## 2021-11-12 ENCOUNTER — TELEPHONE (OUTPATIENT)
Dept: TRANSPLANT | Facility: CLINIC | Age: 51
End: 2021-11-12
Payer: MEDICARE

## 2021-11-12 LAB
AORTIC ROOT ANNULUS: 3.83 CM
ASCENDING AORTA: 3.42 CM
AV INDEX (PROSTH): 0.48
AV MEAN GRADIENT: 6 MMHG
AV PEAK GRADIENT: 10 MMHG
AV REGURGITATION PRESSURE HALF TIME: 845.81 MS
AV VALVE AREA: 2.33 CM2
AV VELOCITY RATIO: 0.39
BSA FOR ECHO PROCEDURE: 2.28 M2
CV ECHO LV RWT: 0.4 CM
DOP CALC AO PEAK VEL: 1.58 M/S
DOP CALC AO VTI: 26.5 CM
DOP CALC LVOT AREA: 4.9 CM2
DOP CALC LVOT DIAMETER: 2.49 CM
DOP CALC LVOT PEAK VEL: 0.61 M/S
DOP CALC LVOT STROKE VOLUME: 61.81 CM3
DOP CALC RVOT PEAK VEL: 0.35 M/S
DOP CALC RVOT VTI: 8.5 CM
DOP CALCLVOT PEAK VEL VTI: 12.7 CM
E/E' RATIO: 6.42 M/S
ECHO EF ESTIMATED: 19 %
ECHO LV POSTERIOR WALL: 1.14 CM (ref 0.6–1.1)
EJECTION FRACTION: 40 %
FRACTIONAL SHORTENING: 9 % (ref 28–44)
INTERVENTRICULAR SEPTUM: 0.94 CM (ref 0.6–1.1)
IVC DIAMETER: 3.52 CM
IVRT: 99.9 MSEC
LEFT INTERNAL DIMENSION IN SYSTOLE: 5.14 CM (ref 2.1–4)
LEFT VENTRICLE DIASTOLIC VOLUME INDEX: 69.95 ML/M2
LEFT VENTRICLE DIASTOLIC VOLUME: 156.68 ML
LEFT VENTRICLE MASS INDEX: 105 G/M2
LEFT VENTRICLE SYSTOLIC VOLUME INDEX: 56.4 ML/M2
LEFT VENTRICLE SYSTOLIC VOLUME: 126.32 ML
LEFT VENTRICULAR INTERNAL DIMENSION IN DIASTOLE: 5.65 CM (ref 3.5–6)
LEFT VENTRICULAR MASS: 234.83 G
LV LATERAL E/E' RATIO: 5.92 M/S
LV SEPTAL E/E' RATIO: 7 M/S
LVOT MG: 0.83 MMHG
LVOT MV: 0.42 CM/S
MV PEAK E VEL: 0.77 M/S
PISA AR MAX VEL: 4.27 M/S
PISA TR MAX VEL: 2.33 M/S
PV MEAN GRADIENT: 0 MMHG
PV PEAK VELOCITY: 0.42 CM/S
RA PRESSURE: 15 MMHG
RIGHT VENTRICULAR END-DIASTOLIC DIMENSION: 4.78 CM
SINUS: 3.91 CM
STJ: 3.9 CM
TDI LATERAL: 0.13 M/S
TDI SEPTAL: 0.11 M/S
TDI: 0.12 M/S
TR MAX PG: 22 MMHG
TRICUSPID ANNULAR PLANE SYSTOLIC EXCURSION: 1.29 CM
TV REST PULMONARY ARTERY PRESSURE: 37 MMHG

## 2021-11-30 ENCOUNTER — TELEPHONE (OUTPATIENT)
Dept: TRANSPLANT | Facility: CLINIC | Age: 51
End: 2021-11-30
Payer: MEDICARE

## 2021-12-03 ENCOUNTER — OFFICE VISIT (OUTPATIENT)
Dept: OPHTHALMOLOGY | Facility: CLINIC | Age: 51
End: 2021-12-03
Payer: MEDICARE

## 2021-12-03 DIAGNOSIS — H52.203 MYOPIA WITH ASTIGMATISM AND PRESBYOPIA, BILATERAL: ICD-10-CM

## 2021-12-03 DIAGNOSIS — H52.13 MYOPIA WITH ASTIGMATISM AND PRESBYOPIA, BILATERAL: ICD-10-CM

## 2021-12-03 DIAGNOSIS — H52.4 MYOPIA WITH ASTIGMATISM AND PRESBYOPIA, BILATERAL: ICD-10-CM

## 2021-12-03 DIAGNOSIS — H40.013 OPEN ANGLE WITH BORDERLINE FINDINGS OF BOTH EYES: ICD-10-CM

## 2021-12-03 DIAGNOSIS — E11.9 TYPE 2 DIABETES MELLITUS WITHOUT RETINOPATHY: Primary | ICD-10-CM

## 2021-12-03 PROCEDURE — 99999 PR PBB SHADOW E&M-EST. PATIENT-LVL II: ICD-10-PCS | Mod: PBBFAC,,, | Performed by: OPTOMETRIST

## 2021-12-03 PROCEDURE — 92014 PR EYE EXAM, EST PATIENT,COMPREHESV: ICD-10-PCS | Mod: S$PBB,,, | Performed by: OPTOMETRIST

## 2021-12-03 PROCEDURE — 92133 CPTRZD OPH DX IMG PST SGM ON: CPT | Mod: PBBFAC | Performed by: OPTOMETRIST

## 2021-12-03 PROCEDURE — 92015 DETERMINE REFRACTIVE STATE: CPT | Mod: ,,, | Performed by: OPTOMETRIST

## 2021-12-03 PROCEDURE — 92015 PR REFRACTION: ICD-10-PCS | Mod: ,,, | Performed by: OPTOMETRIST

## 2021-12-03 PROCEDURE — 92014 COMPRE OPH EXAM EST PT 1/>: CPT | Mod: S$PBB,,, | Performed by: OPTOMETRIST

## 2021-12-03 PROCEDURE — 99212 OFFICE O/P EST SF 10 MIN: CPT | Mod: PBBFAC | Performed by: OPTOMETRIST

## 2021-12-03 PROCEDURE — 92133 POSTERIOR SEGMENT OCT OPTIC NERVE(OCULAR COHERENCE TOMOGRAPHY) - OU - BOTH EYES: ICD-10-PCS | Mod: 26,S$PBB,, | Performed by: OPTOMETRIST

## 2021-12-03 PROCEDURE — 99999 PR PBB SHADOW E&M-EST. PATIENT-LVL II: CPT | Mod: PBBFAC,,, | Performed by: OPTOMETRIST

## 2021-12-17 ENCOUNTER — LAB VISIT (OUTPATIENT)
Dept: LAB | Facility: HOSPITAL | Age: 51
End: 2021-12-17
Payer: MEDICARE

## 2021-12-17 ENCOUNTER — OFFICE VISIT (OUTPATIENT)
Dept: TRANSPLANT | Facility: CLINIC | Age: 51
End: 2021-12-17
Attending: INTERNAL MEDICINE
Payer: MEDICARE

## 2021-12-17 DIAGNOSIS — Z94.1 STATUS POST HEART TRANSPLANT: ICD-10-CM

## 2021-12-17 DIAGNOSIS — N28.9 RENAL INSUFFICIENCY: ICD-10-CM

## 2021-12-17 DIAGNOSIS — Z94.1 STATUS POST HEART TRANSPLANTATION: Primary | ICD-10-CM

## 2021-12-17 DIAGNOSIS — D84.9 IMMUNOSUPPRESSION: ICD-10-CM

## 2021-12-17 DIAGNOSIS — Z92.25 PERSONAL HISTORY OF IMMUNOSUPPRESSIVE THERAPY: ICD-10-CM

## 2021-12-17 DIAGNOSIS — I10 HTN (HYPERTENSION), BENIGN: ICD-10-CM

## 2021-12-17 DIAGNOSIS — Z79.52 LONG TERM CURRENT USE OF SYSTEMIC STEROIDS: ICD-10-CM

## 2021-12-17 DIAGNOSIS — N18.32 STAGE 3B CHRONIC KIDNEY DISEASE: ICD-10-CM

## 2021-12-17 DIAGNOSIS — Z79.899 ENCOUNTER FOR LONG-TERM (CURRENT) USE OF MEDICATIONS: ICD-10-CM

## 2021-12-17 DIAGNOSIS — E78.2 MIXED HYPERLIPIDEMIA: ICD-10-CM

## 2021-12-17 DIAGNOSIS — T86.20 COMPLICATION OF HEART TRANSPLANT, UNSPECIFIED COMPLICATION: ICD-10-CM

## 2021-12-17 DIAGNOSIS — E83.42 HYPOMAGNESEMIA: ICD-10-CM

## 2021-12-17 DIAGNOSIS — I16.0 ASYMPTOMATIC HYPERTENSIVE URGENCY: ICD-10-CM

## 2021-12-17 LAB
ALBUMIN SERPL BCP-MCNC: 4.2 G/DL (ref 3.5–5.2)
ALP SERPL-CCNC: 92 U/L (ref 55–135)
ALT SERPL W/O P-5'-P-CCNC: 15 U/L (ref 10–44)
ANION GAP SERPL CALC-SCNC: 8 MMOL/L (ref 8–16)
AST SERPL-CCNC: 15 U/L (ref 10–40)
BASOPHILS # BLD AUTO: 0.03 K/UL (ref 0–0.2)
BASOPHILS NFR BLD: 0.6 % (ref 0–1.9)
BILIRUB SERPL-MCNC: 0.7 MG/DL (ref 0.1–1)
BUN SERPL-MCNC: 17 MG/DL (ref 6–20)
CALCIUM SERPL-MCNC: 10 MG/DL (ref 8.7–10.5)
CHLORIDE SERPL-SCNC: 103 MMOL/L (ref 95–110)
CO2 SERPL-SCNC: 30 MMOL/L (ref 23–29)
CREAT SERPL-MCNC: 1.9 MG/DL (ref 0.5–1.4)
DIFFERENTIAL METHOD: NORMAL
EOSINOPHIL # BLD AUTO: 0.1 K/UL (ref 0–0.5)
EOSINOPHIL NFR BLD: 1.2 % (ref 0–8)
ERYTHROCYTE [DISTWIDTH] IN BLOOD BY AUTOMATED COUNT: 12.4 % (ref 11.5–14.5)
EST. GFR  (AFRICAN AMERICAN): 46.5 ML/MIN/1.73 M^2
EST. GFR  (NON AFRICAN AMERICAN): 40.2 ML/MIN/1.73 M^2
GLUCOSE SERPL-MCNC: 110 MG/DL (ref 70–110)
HCT VFR BLD AUTO: 43.4 % (ref 40–54)
HGB BLD-MCNC: 14.1 G/DL (ref 14–18)
IMM GRANULOCYTES # BLD AUTO: 0.01 K/UL (ref 0–0.04)
IMM GRANULOCYTES NFR BLD AUTO: 0.2 % (ref 0–0.5)
LYMPHOCYTES # BLD AUTO: 1.4 K/UL (ref 1–4.8)
LYMPHOCYTES NFR BLD: 28.8 % (ref 18–48)
MAGNESIUM SERPL-MCNC: 1.6 MG/DL (ref 1.6–2.6)
MCH RBC QN AUTO: 29.1 PG (ref 27–31)
MCHC RBC AUTO-ENTMCNC: 32.5 G/DL (ref 32–36)
MCV RBC AUTO: 90 FL (ref 82–98)
MONOCYTES # BLD AUTO: 0.4 K/UL (ref 0.3–1)
MONOCYTES NFR BLD: 8.3 % (ref 4–15)
NEUTROPHILS # BLD AUTO: 3 K/UL (ref 1.8–7.7)
NEUTROPHILS NFR BLD: 60.9 % (ref 38–73)
NRBC BLD-RTO: 0 /100 WBC
PLATELET # BLD AUTO: 264 K/UL (ref 150–450)
PMV BLD AUTO: 9.6 FL (ref 9.2–12.9)
POTASSIUM SERPL-SCNC: 3.8 MMOL/L (ref 3.5–5.1)
PROT SERPL-MCNC: 8.8 G/DL (ref 6–8.4)
RBC # BLD AUTO: 4.84 M/UL (ref 4.6–6.2)
SODIUM SERPL-SCNC: 141 MMOL/L (ref 136–145)
TACROLIMUS BLD-MCNC: 6.8 NG/ML (ref 5–15)
WBC # BLD AUTO: 4.93 K/UL (ref 3.9–12.7)

## 2021-12-17 PROCEDURE — 36415 COLL VENOUS BLD VENIPUNCTURE: CPT | Performed by: INTERNAL MEDICINE

## 2021-12-17 PROCEDURE — 99213 OFFICE O/P EST LOW 20 MIN: CPT | Mod: PBBFAC | Performed by: INTERNAL MEDICINE

## 2021-12-17 PROCEDURE — 80053 COMPREHEN METABOLIC PANEL: CPT | Performed by: INTERNAL MEDICINE

## 2021-12-17 PROCEDURE — 83735 ASSAY OF MAGNESIUM: CPT | Performed by: INTERNAL MEDICINE

## 2021-12-17 PROCEDURE — 99999 PR PBB SHADOW E&M-EST. PATIENT-LVL III: ICD-10-PCS | Mod: PBBFAC,,, | Performed by: INTERNAL MEDICINE

## 2021-12-17 PROCEDURE — 99999 PR PBB SHADOW E&M-EST. PATIENT-LVL III: CPT | Mod: PBBFAC,,, | Performed by: INTERNAL MEDICINE

## 2021-12-17 PROCEDURE — 99215 OFFICE O/P EST HI 40 MIN: CPT | Mod: S$PBB,,, | Performed by: INTERNAL MEDICINE

## 2021-12-17 PROCEDURE — 80197 ASSAY OF TACROLIMUS: CPT | Performed by: INTERNAL MEDICINE

## 2021-12-17 PROCEDURE — 99215 PR OFFICE/OUTPT VISIT, EST, LEVL V, 40-54 MIN: ICD-10-PCS | Mod: S$PBB,,, | Performed by: INTERNAL MEDICINE

## 2021-12-17 PROCEDURE — 85025 COMPLETE CBC W/AUTO DIFF WBC: CPT | Performed by: INTERNAL MEDICINE

## 2021-12-17 RX ORDER — CLONIDINE HYDROCHLORIDE 0.1 MG/1
0.2 TABLET ORAL 2 TIMES DAILY
Qty: 120 TABLET | Refills: 0 | Status: SHIPPED | OUTPATIENT
Start: 2021-12-17 | End: 2022-12-01 | Stop reason: SDUPTHER

## 2022-01-15 VITALS
HEIGHT: 72 IN | HEART RATE: 107 BPM | BODY MASS INDEX: 29.8 KG/M2 | DIASTOLIC BLOOD PRESSURE: 118 MMHG | SYSTOLIC BLOOD PRESSURE: 168 MMHG | WEIGHT: 220 LBS

## 2022-01-15 PROBLEM — Z92.25 PERSONAL HISTORY OF IMMUNOSUPPRESSIVE THERAPY: Status: ACTIVE | Noted: 2022-01-15

## 2022-01-15 RX ORDER — LANOLIN ALCOHOL/MO/W.PET/CERES
400 CREAM (GRAM) TOPICAL DAILY
Qty: 90 TABLET | Refills: 3 | Status: SHIPPED | OUTPATIENT
Start: 2022-01-15

## 2022-01-15 RX ORDER — PRAVASTATIN SODIUM 40 MG/1
40 TABLET ORAL NIGHTLY
Qty: 90 TABLET | Refills: 3 | Status: SHIPPED | OUTPATIENT
Start: 2022-01-15

## 2022-01-16 NOTE — PROGRESS NOTES
Subjective:   Mr. Leger is a 51 y.o. year old Black or  male who received a donation after brain death heart transplant on 10/29/12.      CMV status:   Donor: -   Recipient: -    HPI  50 yo BM s/p OHT 10/29/12, with post-op severe TR, moderate to moderately reduced LVEF.  Post-op developed PTLD, s/p R-CHOP/CEOP 12/2013 who comes for a follow-up visit.  The reduced LVEF developed without rejection or CAV.  He has HBP, CKD with significantly uncontrolled BP today and he reports max /111 at home.  He is out of losartan, not taking pravastatin or Mg.    Immuno regimen: Tacolimus 1/2, Prednisone      Review of Systems   Constitutional: Positive for weight gain. Negative for chills, fever, malaise/fatigue, night sweats and weight loss.   Cardiovascular: Negative for chest pain, dyspnea on exertion, irregular heartbeat, leg swelling, near-syncope, orthopnea, palpitations, paroxysmal nocturnal dyspnea and syncope.   Respiratory: Negative for cough, sputum production and wheezing.    Hematologic/Lymphatic: Does not bruise/bleed easily.   Skin:        He has not been seeing Derm   Musculoskeletal: Negative for arthritis, joint pain and stiffness.   Gastrointestinal: Negative for hematochezia and melena.        He is due for colonoscopy   Genitourinary: Negative for hematuria.   Neurological: Negative for brief paralysis, dizziness, focal weakness, headaches, light-headedness, seizures and weakness.       Objective:   Blood pressure (!) 168/118, pulse 107, height 6' (1.829 m), weight 99.8 kg (220 lb 0.3 oz).body mass index is 29.84 kg/m².  By me with manual cuff /120 on left and 168/118 on right  Physical Exam  Constitutional:       General: He is not in acute distress.     Appearance: He is well-developed. He is not ill-appearing, toxic-appearing or diaphoretic.      Comments: BP (!) 165/105 (BP Location: Right arm and 163/111 Patient Position: Sitting, BP Method: Medium (Automatic))   Pulse 107    Ht 6' (1.829 m)   Wt 99.8 kg (220 lb 0.3 oz)   BMI 29.84 kg/m²   WD, WN BM in NAD  By me with manual cuff /120 on left and 168/118 on right   HENT:      Head: Normocephalic and atraumatic.      Mouth/Throat:      Mouth: Mucous membranes are moist.      Pharynx: No oropharyngeal exudate or posterior oropharyngeal erythema.   Eyes:      General: No scleral icterus.        Right eye: No discharge.         Left eye: No discharge.      Conjunctiva/sclera: Conjunctivae normal.      Comments: Fundi with marked arteriolar narrowing and AV nicking.  No hemorrhages or papilledema   Neck:      Thyroid: No thyromegaly.      Vascular: No JVD.      Trachea: No tracheal deviation.   Cardiovascular:      Rate and Rhythm: Normal rate and regular rhythm.      Heart sounds: Normal heart sounds. No murmur heard.  No gallop.    Pulmonary:      Effort: Pulmonary effort is normal.      Breath sounds: Normal breath sounds.   Abdominal:      General: Bowel sounds are normal. There is no distension.      Palpations: Abdomen is soft. There is no mass.      Tenderness: There is no abdominal tenderness. There is no guarding or rebound.   Musculoskeletal:         General: No tenderness.      Right lower leg: No edema.      Left lower leg: No edema.   Skin:     General: Skin is warm and dry.   Neurological:      General: No focal deficit present.      Mental Status: He is alert and oriented to person, place, and time. Mental status is at baseline.   Psychiatric:         Mood and Affect: Mood normal.         Behavior: Behavior normal.         Thought Content: Thought content normal.         Judgment: Judgment normal.         Lab Results   Component Value Date     (H) 11/11/2021     12/17/2021    K 3.8 12/17/2021    MG 1.6 12/17/2021     12/17/2021    CO2 30 (H) 12/17/2021    PHOS 1.9 (L) 01/04/2021    BUN 17 12/17/2021    CREATININE 1.9 (H) 12/17/2021     12/17/2021    HGBA1C 6.0 (H) 08/05/2020    AST 15  12/17/2021    ALT 15 12/17/2021    ALBUMIN 4.2 12/17/2021    PROT 8.8 (H) 12/17/2021    BILITOT 0.7 12/17/2021    WBC 4.93 12/17/2021    HGB 14.1 12/17/2021    HCT 43.4 12/17/2021     12/17/2021    INR 1.1 07/08/2016     09/21/2020    TSH 1.271 08/05/2020    V4HYZPG 8.3 08/05/2020    CHOL 115 (L) 11/11/2021    HDL 44 11/11/2021    LDLCALC 56.2 (L) 11/11/2021    TRIG 74 11/11/2021    TACROLIMUS 6.8 12/17/2021    ALLOMAP 28 05/27/2016     Assessment:     1. Status post heart transplantation    2. Asymptomatic hypertensive urgency    3. Hypomagnesemia    4. HTN (hypertension), benign    5. Stage 3b chronic kidney disease    6. Immunosuppression    7. Personal history of immunosuppressive therapy        Plan:   Tacro level does not account for elevation of BP  Some elevation may be stress of being here BUT clearly not the sole cause as home readings also elevated.  Recent optometry check does not describe retina changes  Increase clonidine to 0.2 mg BID and track BP as may need to increase further but prefer to wait 2 weeks for full effect--f/u in clinic--I warned him of rebound effect so not to run out or stop  Resume pravastatin and Mg  Will hold off on resuming losartan (not taking) with bump in Cr though I suspect this is due to uncontrolled BP and not ARB.     Return instructions as set forth by post transplant schedule or as needed:    Clinic: Return for labs and/or biopsy weekly the first month, every two weeks during month 2 and then monthly for the first year at the provider or coordinator's discretion. During the second year, return to clinic every 3 months. Post transplant year 3-5 return every 6 months. There will be a comprehensive post transplant evaluation every year that may include LHC/RHC/biopsy, stress test, echo, CXR, and other health screening exams.    In addition to the clinical assessment, I have ordered Allomap testing for this patient to assist in identification of moderate/severe  acute cellular rejection (ACR) in a pt with stable Allograft function instead of endomyocardial biopsy.     Patient is reminded to call with any health changes as these can be early signs of transplant complications. Patient is advised to make sure any new medications or changes of old medications are discussed with a pharmacist or physician knowledgeable with transplant to avoid rejection/drug toxicity related to significant drug interactions.    Patient advised that it is recommended that all transplanted patients, and their close contacts and household members receive Covid vaccination.    UNOS Patient Status  Functional Status: 100% - Normal, no complaints, no evidence of disease  Physical Capacity: No Limitations  Working for Income: Unknown    Sarbjit Fowler Jr, MD

## 2022-03-07 ENCOUNTER — PES CALL (OUTPATIENT)
Dept: ADMINISTRATIVE | Facility: CLINIC | Age: 52
End: 2022-03-07
Payer: MEDICARE

## 2022-04-06 DIAGNOSIS — D84.9 IMMUNOSUPPRESSION: ICD-10-CM

## 2022-04-06 DIAGNOSIS — I82.90 VENOUS THROMBOEMBOLISM: ICD-10-CM

## 2022-04-06 DIAGNOSIS — N18.30 STAGE 3 CHRONIC KIDNEY DISEASE: ICD-10-CM

## 2022-04-06 DIAGNOSIS — I10 ESSENTIAL HYPERTENSION: ICD-10-CM

## 2022-04-06 DIAGNOSIS — I07.1 TRICUSPID VALVE INSUFFICIENCY, UNSPECIFIED ETIOLOGY: ICD-10-CM

## 2022-04-06 DIAGNOSIS — D68.51 FACTOR V LEIDEN: Chronic | ICD-10-CM

## 2022-04-06 DIAGNOSIS — B27.09 EBV MEDIATED PRIMARY LYMPHOMA OF LYMPH NODE: ICD-10-CM

## 2022-04-06 DIAGNOSIS — Z94.1 STATUS POST HEART TRANSPLANTATION: ICD-10-CM

## 2022-04-06 DIAGNOSIS — C85.80 EBV MEDIATED PRIMARY LYMPHOMA OF LYMPH NODE: ICD-10-CM

## 2022-04-06 DIAGNOSIS — C83.30 DIFFUSE LARGE B-CELL LYMPHOMA, UNSPECIFIED BODY REGION: ICD-10-CM

## 2022-04-06 RX ORDER — AMLODIPINE BESYLATE 10 MG/1
10 TABLET ORAL DAILY
Qty: 90 TABLET | Refills: 3 | Status: SHIPPED | OUTPATIENT
Start: 2022-04-06 | End: 2023-05-12

## 2022-04-06 NOTE — TELEPHONE ENCOUNTER
Spoke to pt who called Staci Reno's phone for refill on his Amlodipine. He has been out of the medicine. I called it to [facility] and spoke to pharmacist to order 90 day with 3 refills per Dr. Fowler.

## 2022-06-15 ENCOUNTER — PATIENT MESSAGE (OUTPATIENT)
Dept: TRANSPLANT | Facility: CLINIC | Age: 52
End: 2022-06-15
Payer: MEDICARE

## 2022-06-24 ENCOUNTER — PES CALL (OUTPATIENT)
Dept: ADMINISTRATIVE | Facility: CLINIC | Age: 52
End: 2022-06-24
Payer: MEDICARE

## 2022-08-23 ENCOUNTER — PATIENT MESSAGE (OUTPATIENT)
Dept: ADMINISTRATIVE | Facility: HOSPITAL | Age: 52
End: 2022-08-23
Payer: MEDICARE

## 2022-08-23 ENCOUNTER — PATIENT OUTREACH (OUTPATIENT)
Dept: ADMINISTRATIVE | Facility: HOSPITAL | Age: 52
End: 2022-08-23
Payer: MEDICARE

## 2022-10-31 ENCOUNTER — TELEPHONE (OUTPATIENT)
Dept: TRANSPLANT | Facility: CLINIC | Age: 52
End: 2022-10-31
Payer: MEDICARE

## 2022-10-31 DIAGNOSIS — R06.02 SHORTNESS OF BREATH: ICD-10-CM

## 2022-10-31 DIAGNOSIS — Z79.52 LONG TERM CURRENT USE OF SYSTEMIC STEROIDS: ICD-10-CM

## 2022-10-31 DIAGNOSIS — Z79.899 ENCOUNTER FOR LONG-TERM (CURRENT) USE OF MEDICATIONS: ICD-10-CM

## 2022-10-31 DIAGNOSIS — T86.20 COMPLICATION OF HEART TRANSPLANT, UNSPECIFIED COMPLICATION: Primary | ICD-10-CM

## 2022-10-31 DIAGNOSIS — E78.2 MIXED HYPERLIPIDEMIA: ICD-10-CM

## 2022-10-31 DIAGNOSIS — Z94.1 STATUS POST HEART TRANSPLANT: ICD-10-CM

## 2022-10-31 DIAGNOSIS — Z12.5 SCREENING FOR PROSTATE CANCER: ICD-10-CM

## 2022-11-17 ENCOUNTER — TELEPHONE (OUTPATIENT)
Dept: TRANSPLANT | Facility: CLINIC | Age: 52
End: 2022-11-17
Payer: MEDICARE

## 2022-11-17 NOTE — TELEPHONE ENCOUNTER
Patient called for refills, his Pharmacy information is outdated, [facility] is closed and not accepting [facility]ribe prescription and I spoke with [facility] who was listed as well, they have not filled since 2019.    I left 2 voice messages for pt to please call back with pharmacy he needs sent and what medications need to be refilled.

## 2022-12-01 DIAGNOSIS — I16.0 ASYMPTOMATIC HYPERTENSIVE URGENCY: ICD-10-CM

## 2022-12-02 RX ORDER — CLONIDINE HYDROCHLORIDE 0.1 MG/1
0.2 TABLET ORAL 2 TIMES DAILY
Qty: 120 TABLET | Refills: 11 | Status: SHIPPED | OUTPATIENT
Start: 2022-12-02 | End: 2023-11-10 | Stop reason: SDUPTHER

## 2022-12-06 ENCOUNTER — TELEPHONE (OUTPATIENT)
Dept: TRANSPLANT | Facility: CLINIC | Age: 52
End: 2022-12-06
Payer: MEDICARE

## 2022-12-20 ENCOUNTER — HOSPITAL ENCOUNTER (OUTPATIENT)
Dept: RADIOLOGY | Facility: HOSPITAL | Age: 52
Discharge: HOME | End: 2022-12-20
Attending: INTERNAL MEDICINE
Payer: MEDICARE

## 2022-12-20 ENCOUNTER — OFFICE VISIT (OUTPATIENT)
Dept: TRANSPLANT | Facility: CLINIC | Age: 52
End: 2022-12-20
Payer: MEDICARE

## 2022-12-20 ENCOUNTER — HOSPITAL ENCOUNTER (OUTPATIENT)
Dept: CARDIOLOGY | Facility: HOSPITAL | Age: 52
Discharge: HOME | End: 2022-12-20
Attending: INTERNAL MEDICINE
Payer: MEDICARE

## 2022-12-20 VITALS
WEIGHT: 226 LBS | BODY MASS INDEX: 30.61 KG/M2 | HEART RATE: 100 BPM | SYSTOLIC BLOOD PRESSURE: 154 MMHG | WEIGHT: 226 LBS | DIASTOLIC BLOOD PRESSURE: 110 MMHG | HEIGHT: 72 IN | SYSTOLIC BLOOD PRESSURE: 154 MMHG | HEART RATE: 82 BPM | DIASTOLIC BLOOD PRESSURE: 110 MMHG | HEIGHT: 72 IN | BODY MASS INDEX: 30.61 KG/M2

## 2022-12-20 DIAGNOSIS — E11.9 CONTROLLED TYPE 2 DIABETES MELLITUS WITHOUT COMPLICATION, WITHOUT LONG-TERM CURRENT USE OF INSULIN: ICD-10-CM

## 2022-12-20 DIAGNOSIS — E78.2 MIXED HYPERLIPIDEMIA: ICD-10-CM

## 2022-12-20 DIAGNOSIS — E11.9 TYPE 2 DIABETES MELLITUS WITHOUT RETINOPATHY: ICD-10-CM

## 2022-12-20 DIAGNOSIS — Z79.52 LONG TERM CURRENT USE OF SYSTEMIC STEROIDS: ICD-10-CM

## 2022-12-20 DIAGNOSIS — Z94.1 STATUS POST HEART TRANSPLANT: ICD-10-CM

## 2022-12-20 DIAGNOSIS — Z79.60 LONG-TERM USE OF IMMUNOSUPPRESSANT MEDICATION: ICD-10-CM

## 2022-12-20 DIAGNOSIS — Z94.1 S/P ORTHOTOPIC HEART TRANSPLANT: Primary | ICD-10-CM

## 2022-12-20 DIAGNOSIS — Z79.899 ENCOUNTER FOR LONG-TERM (CURRENT) USE OF MEDICATIONS: ICD-10-CM

## 2022-12-20 DIAGNOSIS — D84.821 IMMUNODEFICIENCY DUE TO TREATMENT WITH IMMUNOSUPPRESSIVE MEDICATION: ICD-10-CM

## 2022-12-20 DIAGNOSIS — Z79.899 IMMUNODEFICIENCY DUE TO TREATMENT WITH IMMUNOSUPPRESSIVE MEDICATION: ICD-10-CM

## 2022-12-20 DIAGNOSIS — T86.20 COMPLICATION OF HEART TRANSPLANT, UNSPECIFIED COMPLICATION: ICD-10-CM

## 2022-12-20 DIAGNOSIS — I10 PRIMARY HYPERTENSION: ICD-10-CM

## 2022-12-20 DIAGNOSIS — R06.02 SHORTNESS OF BREATH: ICD-10-CM

## 2022-12-20 LAB
AORTIC ROOT ANNULUS: 3.92 CM
ASCENDING AORTA: 2.98 CM
AV INDEX (PROSTH): 0.62
AV MEAN GRADIENT: 2 MMHG
AV PEAK GRADIENT: 3 MMHG
AV VALVE AREA: 2.88 CM2
AV VELOCITY RATIO: 0.63
BSA FOR ECHO PROCEDURE: 2.28 M2
CV ECHO LV RWT: 0.4 CM
DOP CALC AO PEAK VEL: 0.87 M/S
DOP CALC AO VTI: 15.9 CM
DOP CALC LVOT AREA: 4.7 CM2
DOP CALC LVOT DIAMETER: 2.44 CM
DOP CALC LVOT PEAK VEL: 0.55 M/S
DOP CALC LVOT STROKE VOLUME: 45.8 CM3
DOP CALCLVOT PEAK VEL VTI: 9.8 CM
E WAVE DECELERATION TIME: 140 MSEC
E/A RATIO: 1.64
E/E' RATIO: 6.21 M/S
ECHO LV POSTERIOR WALL: 0.97 CM (ref 0.6–1.1)
EJECTION FRACTION: 35 %
FRACTIONAL SHORTENING: 15 % (ref 28–44)
INTERVENTRICULAR SEPTUM: 1.2 CM (ref 0.6–1.1)
IVRT: 102.76 MSEC
LEFT INTERNAL DIMENSION IN SYSTOLE: 4.08 CM (ref 2.1–4)
LEFT VENTRICLE DIASTOLIC VOLUME INDEX: 48.15 ML/M2
LEFT VENTRICLE DIASTOLIC VOLUME: 107.86 ML
LEFT VENTRICLE MASS INDEX: 85 G/M2
LEFT VENTRICLE SYSTOLIC VOLUME INDEX: 32.8 ML/M2
LEFT VENTRICLE SYSTOLIC VOLUME: 73.37 ML
LEFT VENTRICULAR INTERNAL DIMENSION IN DIASTOLE: 4.81 CM (ref 3.5–6)
LEFT VENTRICULAR MASS: 190.95 G
LV LATERAL E/E' RATIO: 5.36 M/S
LV SEPTAL E/E' RATIO: 7.38 M/S
LVOT MG: 0.79 MMHG
LVOT MV: 0.43 CM/S
MV PEAK A VEL: 0.36 M/S
MV PEAK E VEL: 0.59 M/S
MV STENOSIS PRESSURE HALF TIME: 40.6 MS
MV VALVE AREA P 1/2 METHOD: 5.42 CM2
PISA TR MAX VEL: 2 M/S
PV PEAK VELOCITY: 0.61 CM/S
RA PRESSURE: 8 MMHG
SINUS: 3.19 CM
STJ: 2.89 CM
TDI LATERAL: 0.11 M/S
TDI SEPTAL: 0.08 M/S
TDI: 0.1 M/S
TR MAX PG: 16 MMHG
TRICUSPID ANNULAR PLANE SYSTOLIC EXCURSION: 1.16 CM
TV REST PULMONARY ARTERY PRESSURE: 24 MMHG

## 2022-12-20 PROCEDURE — 99999 PR PBB SHADOW E&M-EST. PATIENT-LVL II: ICD-10-PCS | Mod: PBBFAC,,, | Performed by: INTERNAL MEDICINE

## 2022-12-20 PROCEDURE — 71046 XR CHEST PA AND LATERAL: ICD-10-PCS | Mod: 26,,, | Performed by: STUDENT IN AN ORGANIZED HEALTH CARE EDUCATION/TRAINING PROGRAM

## 2022-12-20 PROCEDURE — 99999 PR PBB SHADOW E&M-EST. PATIENT-LVL II: CPT | Mod: PBBFAC,,, | Performed by: INTERNAL MEDICINE

## 2022-12-20 PROCEDURE — 93306 TTE W/DOPPLER COMPLETE: CPT | Mod: 26,,, | Performed by: INTERNAL MEDICINE

## 2022-12-20 PROCEDURE — 99212 OFFICE O/P EST SF 10 MIN: CPT | Mod: PBBFAC,25 | Performed by: INTERNAL MEDICINE

## 2022-12-20 PROCEDURE — 71046 X-RAY EXAM CHEST 2 VIEWS: CPT | Mod: 26,,, | Performed by: STUDENT IN AN ORGANIZED HEALTH CARE EDUCATION/TRAINING PROGRAM

## 2022-12-20 PROCEDURE — 93306 ECHO (CUPID ONLY): ICD-10-PCS | Mod: 26,,, | Performed by: INTERNAL MEDICINE

## 2022-12-20 PROCEDURE — 99215 OFFICE O/P EST HI 40 MIN: CPT | Mod: S$PBB,,, | Performed by: INTERNAL MEDICINE

## 2022-12-20 PROCEDURE — 99215 PR OFFICE/OUTPT VISIT, EST, LEVL V, 40-54 MIN: ICD-10-PCS | Mod: S$PBB,,, | Performed by: INTERNAL MEDICINE

## 2022-12-20 PROCEDURE — 71046 X-RAY EXAM CHEST 2 VIEWS: CPT | Mod: TC

## 2022-12-20 PROCEDURE — 93306 TTE W/DOPPLER COMPLETE: CPT

## 2022-12-20 RX ORDER — TACROLIMUS 1 MG/1
CAPSULE ORAL
Qty: 270 CAPSULE | Refills: 2 | Status: SHIPPED | OUTPATIENT
Start: 2022-12-20 | End: 2023-11-10 | Stop reason: SDUPTHER

## 2022-12-20 NOTE — PROGRESS NOTES
Subjective:   Mr. Leger is a 52 y.o. year old Black or  male who received a donation after brain death heart transplant on 10/29/12.      CMV status:   Donor: -  Recipient:-    HPI  Very pleasant 51 yo black male s/p OHT 10/29/12, with post-op severe TR, moderate to moderately reduced LVEF.  Post-op developed PTLD, s/p R-CHOP/CEOP 12/2013 who comes for a follow-up visit.  The reduced LVEF developed without rejection or CAV.  He has HBP, CKD with significantly uncontrolled BP today but reports normal BPs at home.   Echo done today showed EF=40% (unchanged from prior). Immuno regimen: Tacolimus 1/2.     Review of Systems   Constitutional: Negative. Negative for chills, decreased appetite, diaphoresis, fever, malaise/fatigue, night sweats, weight gain and weight loss.   Eyes: Negative.    Cardiovascular:  Negative for chest pain, claudication, cyanosis, dyspnea on exertion, irregular heartbeat, leg swelling, near-syncope, orthopnea, palpitations, paroxysmal nocturnal dyspnea and syncope.   Respiratory:  Negative for cough, hemoptysis and shortness of breath.    Endocrine: Negative.    Hematologic/Lymphatic: Negative.    Skin:  Negative for color change, dry skin and nail changes.   Musculoskeletal: Negative.    Gastrointestinal: Negative.    Genitourinary: Negative.    Neurological:  Negative for weakness.     Objective:   Blood pressure (!) 154/110, pulse 100, height 6' (1.829 m), weight 102.5 kg (225 lb 15.5 oz).body mass index is 30.65 kg/m².    Physical Exam  Vitals reviewed.   Constitutional:       Appearance: He is well-developed.      Comments: BP (!) 154/110   Pulse 100   Ht 6' (1.829 m)   Wt 102.5 kg (225 lb 15.5 oz)   BMI 30.65 kg/m²      HENT:      Head: Normocephalic.   Neck:      Vascular: No carotid bruit or JVD.   Cardiovascular:      Rate and Rhythm: Regular rhythm.      Pulses: Normal pulses.      Heart sounds: Normal heart sounds. No murmur heard.  Pulmonary:      Effort:  Pulmonary effort is normal.      Breath sounds: Normal breath sounds.   Abdominal:      General: Bowel sounds are normal.      Palpations: Abdomen is soft.   Skin:     General: Skin is warm.   Neurological:      Mental Status: He is alert.       Lab Results   Component Value Date    WBC 4.93 12/17/2021    HGB 14.1 12/17/2021    HCT 43.4 12/17/2021    MCV 90 12/17/2021     12/17/2021    CO2 30 (H) 12/17/2021    CREATININE 1.9 (H) 12/17/2021    CALCIUM 10.0 12/17/2021    ALBUMIN 4.2 12/17/2021    AST 15 12/17/2021     (H) 11/11/2021    ALT 15 12/17/2021    .4 (H) 01/04/2021    ALLOMAP 28 05/27/2016       Lab Results   Component Value Date    INR 1.1 07/08/2016    INR 1.1 10/16/2013    INR 1.4 (H) 08/07/2013       BNP   Date Value Ref Range Status   11/11/2021 713 (H) 0 - 99 pg/mL Final     Comment:     Values of less than 100 pg/ml are consistent with non-CHF populations.   11/11/2021 486 (H) 0 - 99 pg/mL Final     Comment:     Values of less than 100 pg/ml are consistent with non-CHF populations.   08/20/2019 103 (H) 0 - 99 pg/mL Final     Comment:     Values of less than 100 pg/ml are consistent with non-CHF populations.       LD   Date Value Ref Range Status   09/21/2020 214 110 - 260 U/L Final     Comment:     Results are increased in hemolyzed samples.   01/03/2020 150 110 - 260 U/L Final     Comment:     Results are increased in hemolyzed samples.   07/23/2019 157 110 - 260 U/L Final     Comment:     Results are increased in hemolyzed samples.       Tacrolimus Lvl   Date Value Ref Range Status   12/17/2021 6.8 5.0 - 15.0 ng/mL Final     Comment:     Testing performed by a chemiluminescent microparticle   immunoassay on the [employer] System.         Assessment:     1. S/P orthotopic heart transplant    2. Immunodeficiency due to treatment with immunosuppressive medication    3. Primary hypertension    4. Mixed hyperlipidemia    5. Controlled type 2 diabetes mellitus without  complication, without long-term current use of insulin    6. Type 2 diabetes mellitus without retinopathy        Plan:   Doing well from a cardiac standpoint  Echo reviewed today. EF remains unchanged.   BP remains elevated at clinic visits but patient reports normal BP at home (white coat HTN?)  Needs to see Dermatology fr routine skin cancer screening  Needs a screening colonoscopy.  Patient declines flu vaccine and refuses to get the COVID vaccine    Return instructions as set forth by post transplant schedule or as needed:    Clinic: Return for labs and/or biopsy weekly the first month, every two weeks during month 2 and then monthly for the first year at the provider or coordinator's discretion. During the second year, return to clinic every 3 months. Post transplant year 3-5 return every 6 months. There will be a comprehensive post transplant evaluation every year that may include LHC/RHC/biopsy, stress test, echo, CXR, and other health screening exams.    In addition to the clinical assessment, I have ordered Allomap testing for this patient to assist in identification of moderate/severe acute cellular rejection (ACR) in a pt with stable Allograft function instead of endomyocardial biopsy.     Patient is reminded to call with any health changes as these can be early signs of transplant complications. Patient is advised to make sure any new medications or changes of old medications are discussed with a pharmacist or physician knowledgeable with transplant to avoid rejection/drug toxicity related to significant drug interactions.    Patient advised that it is recommended that all transplanted patients, and their close contacts and household members receive Covid vaccination.    UNOS Patient Status  Functional Status: 100% - Normal, no complaints, no evidence of disease  Physical Capacity: No Limitations  Working for Income: Unknown    Yudith Lazar MD

## 2023-05-10 NOTE — TELEPHONE ENCOUNTER
Spoke with pt regarding annual visit, pt will be getting fasting labs on Friday and seeing Dr. Hale. Reviewed with pt that we will need to schedule a DSE, still be seen in clinic here with the transplant team.   
good, to achieve stated therapy goals

## 2023-05-12 DIAGNOSIS — C85.80 EBV MEDIATED PRIMARY LYMPHOMA OF LYMPH NODE: ICD-10-CM

## 2023-05-12 DIAGNOSIS — B27.09 EBV MEDIATED PRIMARY LYMPHOMA OF LYMPH NODE: ICD-10-CM

## 2023-05-12 DIAGNOSIS — I82.90 VENOUS THROMBOEMBOLISM: ICD-10-CM

## 2023-05-12 DIAGNOSIS — N18.30 STAGE 3 CHRONIC KIDNEY DISEASE: ICD-10-CM

## 2023-05-12 DIAGNOSIS — I07.1 TRICUSPID VALVE INSUFFICIENCY, UNSPECIFIED ETIOLOGY: ICD-10-CM

## 2023-05-12 DIAGNOSIS — Z94.1 STATUS POST HEART TRANSPLANTATION: ICD-10-CM

## 2023-05-12 DIAGNOSIS — I10 ESSENTIAL HYPERTENSION: ICD-10-CM

## 2023-05-12 DIAGNOSIS — D84.9 IMMUNOSUPPRESSION: ICD-10-CM

## 2023-05-12 DIAGNOSIS — C83.30 DIFFUSE LARGE B-CELL LYMPHOMA, UNSPECIFIED BODY REGION: ICD-10-CM

## 2023-05-12 DIAGNOSIS — D68.51 FACTOR V LEIDEN: Chronic | ICD-10-CM

## 2023-05-12 RX ORDER — AMLODIPINE BESYLATE 10 MG/1
TABLET ORAL
Qty: 90 TABLET | Refills: 2 | Status: SHIPPED | OUTPATIENT
Start: 2023-05-12

## 2023-09-15 ENCOUNTER — TELEPHONE (OUTPATIENT)
Dept: TRANSPLANT | Facility: CLINIC | Age: 53
End: 2023-09-15
Payer: MEDICARE

## 2023-09-15 DIAGNOSIS — R06.02 SHORTNESS OF BREATH: ICD-10-CM

## 2023-09-15 DIAGNOSIS — Z12.5 PROSTATE CANCER SCREENING: Primary | ICD-10-CM

## 2023-09-15 DIAGNOSIS — Z79.899 ENCOUNTER FOR LONG-TERM (CURRENT) USE OF MEDICATIONS: ICD-10-CM

## 2023-09-15 DIAGNOSIS — Z94.1 STATUS POST HEART TRANSPLANT: ICD-10-CM

## 2023-09-15 DIAGNOSIS — T86.20 COMPLICATION OF HEART TRANSPLANT, UNSPECIFIED COMPLICATION: ICD-10-CM

## 2023-09-15 DIAGNOSIS — E78.2 MIXED HYPERLIPIDEMIA: ICD-10-CM

## 2023-09-15 NOTE — TELEPHONE ENCOUNTER
Orders placed and message to schedulers for scheduling pt for his 11 year annual.   Letter regarding what is needed including Dermatology and Colonoscopy needed.  [facility]

## 2023-09-15 NOTE — LETTER
September 15, 2023    Alfonso Leger  [address]  [address]  [place] [address]  MRN: [id number]          Dear Alfonso Leger:    Congratulations on coming up to 11 years post heart transplant     We recommend you complete the following on an annual basis.  Our patients need to have a primary care physician near home. Please see dermatology for annual skin screening.    For your annual visit, you will need the following tests completed:    Annual visit with the transplant cardiologist  Stress Echo test  Chest x-ray within the last year  Fasting labs: CBC, CMP, lipid panel, magnesium, medication levels, PSA screen for males  See Dermatology annually for a full body skin exam  See your Dentist twice a year  Get a flu shot annually  See your PCP for recommendations on colonoscopy and other preventative health measures    Please let me know if you have any further questions.  My direct number is [phone].    Sincerely,    Staci Reno RN, BSN  Heart Transplant Coordinator  [address] [place] [address] [address] [place] [address]  [phone]

## 2023-10-02 ENCOUNTER — TELEPHONE (OUTPATIENT)
Dept: CARDIOLOGY | Facility: CLINIC | Age: 53
End: 2023-10-02
Payer: MEDICARE

## 2023-10-02 NOTE — TELEPHONE ENCOUNTER
----- Message from Brooke Elam LPN sent at 12/20/2022 10:46 AM CST -----  Schedule 1 yr f/u with Dr mann

## 2023-11-03 ENCOUNTER — LAB VISIT (OUTPATIENT)
Dept: LAB | Facility: HOSPITAL | Age: 53
End: 2023-11-03
Attending: INTERNAL MEDICINE
Payer: MEDICARE

## 2023-11-03 DIAGNOSIS — E78.2 MIXED HYPERLIPIDEMIA: ICD-10-CM

## 2023-11-03 DIAGNOSIS — R06.02 SHORTNESS OF BREATH: ICD-10-CM

## 2023-11-03 DIAGNOSIS — Z79.899 ENCOUNTER FOR LONG-TERM (CURRENT) USE OF MEDICATIONS: ICD-10-CM

## 2023-11-03 DIAGNOSIS — Z94.1 STATUS POST HEART TRANSPLANT: ICD-10-CM

## 2023-11-03 DIAGNOSIS — T86.20 COMPLICATION OF HEART TRANSPLANT, UNSPECIFIED COMPLICATION: ICD-10-CM

## 2023-11-03 LAB
ALBUMIN SERPL BCP-MCNC: 4.3 G/DL (ref 3.5–5.2)
ALP SERPL-CCNC: 85 U/L (ref 55–135)
ALT SERPL W/O P-5'-P-CCNC: 10 U/L (ref 10–44)
ANION GAP SERPL CALC-SCNC: 17 MMOL/L (ref 8–16)
AST SERPL-CCNC: 16 U/L (ref 10–40)
BASOPHILS # BLD AUTO: 0.04 K/UL (ref 0–0.2)
BASOPHILS NFR BLD: 1 % (ref 0–1.9)
BILIRUB SERPL-MCNC: 2.7 MG/DL (ref 0.1–1)
BNP SERPL-MCNC: 73 PG/ML (ref 0–99)
BUN SERPL-MCNC: 19 MG/DL (ref 6–20)
CALCIUM SERPL-MCNC: 9.8 MG/DL (ref 8.7–10.5)
CHLORIDE SERPL-SCNC: 102 MMOL/L (ref 95–110)
CHOLEST SERPL-MCNC: 177 MG/DL (ref 120–199)
CHOLEST/HDLC SERPL: 4.5 {RATIO} (ref 2–5)
CO2 SERPL-SCNC: 23 MMOL/L (ref 23–29)
CREAT SERPL-MCNC: 2.3 MG/DL (ref 0.5–1.4)
DIFFERENTIAL METHOD: NORMAL
EOSINOPHIL # BLD AUTO: 0.1 K/UL (ref 0–0.5)
EOSINOPHIL NFR BLD: 1.4 % (ref 0–8)
ERYTHROCYTE [DISTWIDTH] IN BLOOD BY AUTOMATED COUNT: 13 % (ref 11.5–14.5)
EST. GFR  (NO RACE VARIABLE): 33.3 ML/MIN/1.73 M^2
ESTIMATED AVG GLUCOSE: 123 MG/DL (ref 68–131)
GLUCOSE SERPL-MCNC: 62 MG/DL (ref 70–110)
HBA1C MFR BLD: 5.9 % (ref 4–5.6)
HCT VFR BLD AUTO: 49.8 % (ref 40–54)
HDLC SERPL-MCNC: 39 MG/DL (ref 40–75)
HDLC SERPL: 22 % (ref 20–50)
HGB BLD-MCNC: 16.1 G/DL (ref 14–18)
IMM GRANULOCYTES # BLD AUTO: 0.01 K/UL (ref 0–0.04)
IMM GRANULOCYTES NFR BLD AUTO: 0.2 % (ref 0–0.5)
LDLC SERPL CALC-MCNC: 113.8 MG/DL (ref 63–159)
LYMPHOCYTES # BLD AUTO: 1.3 K/UL (ref 1–4.8)
LYMPHOCYTES NFR BLD: 30.7 % (ref 18–48)
MAGNESIUM SERPL-MCNC: 1.4 MG/DL (ref 1.6–2.6)
MCH RBC QN AUTO: 30.8 PG (ref 27–31)
MCHC RBC AUTO-ENTMCNC: 32.3 G/DL (ref 32–36)
MCV RBC AUTO: 95 FL (ref 82–98)
MONOCYTES # BLD AUTO: 0.5 K/UL (ref 0.3–1)
MONOCYTES NFR BLD: 11.3 % (ref 4–15)
NEUTROPHILS # BLD AUTO: 2.3 K/UL (ref 1.8–7.7)
NEUTROPHILS NFR BLD: 55.4 % (ref 38–73)
NONHDLC SERPL-MCNC: 138 MG/DL
NRBC BLD-RTO: 0 /100 WBC
PLATELET # BLD AUTO: 228 K/UL (ref 150–450)
PMV BLD AUTO: 10.3 FL (ref 9.2–12.9)
POTASSIUM SERPL-SCNC: 3.8 MMOL/L (ref 3.5–5.1)
PROT SERPL-MCNC: 8.8 G/DL (ref 6–8.4)
RBC # BLD AUTO: 5.23 M/UL (ref 4.6–6.2)
SODIUM SERPL-SCNC: 142 MMOL/L (ref 136–145)
T4 SERPL-MCNC: 8.4 UG/DL (ref 4.5–11.5)
TRIGL SERPL-MCNC: 121 MG/DL (ref 30–150)
TSH SERPL DL<=0.005 MIU/L-ACNC: 1.33 UIU/ML (ref 0.4–4)
WBC # BLD AUTO: 4.17 K/UL (ref 3.9–12.7)

## 2023-11-03 PROCEDURE — 80061 LIPID PANEL: CPT | Performed by: INTERNAL MEDICINE

## 2023-11-03 PROCEDURE — 83036 HEMOGLOBIN GLYCOSYLATED A1C: CPT | Performed by: INTERNAL MEDICINE

## 2023-11-03 PROCEDURE — 85025 COMPLETE CBC W/AUTO DIFF WBC: CPT | Performed by: INTERNAL MEDICINE

## 2023-11-03 PROCEDURE — 83880 ASSAY OF NATRIURETIC PEPTIDE: CPT | Performed by: INTERNAL MEDICINE

## 2023-11-03 PROCEDURE — 86832 HLA CLASS I HIGH DEFIN QUAL: CPT | Performed by: INTERNAL MEDICINE

## 2023-11-03 PROCEDURE — 84443 ASSAY THYROID STIM HORMONE: CPT | Performed by: INTERNAL MEDICINE

## 2023-11-03 PROCEDURE — 83735 ASSAY OF MAGNESIUM: CPT | Performed by: INTERNAL MEDICINE

## 2023-11-03 PROCEDURE — 84436 ASSAY OF TOTAL THYROXINE: CPT | Performed by: INTERNAL MEDICINE

## 2023-11-03 PROCEDURE — 80053 COMPREHEN METABOLIC PANEL: CPT | Performed by: INTERNAL MEDICINE

## 2023-11-03 PROCEDURE — 86833 HLA CLASS II HIGH DEFIN QUAL: CPT | Performed by: INTERNAL MEDICINE

## 2023-11-03 PROCEDURE — 36415 COLL VENOUS BLD VENIPUNCTURE: CPT | Performed by: INTERNAL MEDICINE

## 2023-11-03 PROCEDURE — 80197 ASSAY OF TACROLIMUS: CPT | Performed by: INTERNAL MEDICINE

## 2023-11-03 PROCEDURE — 86977 RBC SERUM PRETX INCUBJ/INHIB: CPT | Performed by: INTERNAL MEDICINE

## 2023-11-04 LAB — TACROLIMUS BLD-MCNC: 12.9 NG/ML (ref 5–15)

## 2023-11-09 ENCOUNTER — TELEPHONE (OUTPATIENT)
Dept: ADMINISTRATIVE | Facility: HOSPITAL | Age: 53
End: 2023-11-09
Payer: MEDICARE

## 2023-11-09 ENCOUNTER — TELEPHONE (OUTPATIENT)
Dept: TRANSPLANT | Facility: CLINIC | Age: 53
End: 2023-11-09
Payer: MEDICARE

## 2023-11-09 NOTE — TELEPHONE ENCOUNTER
Spoke with pt about labs and will need to repeat labs. Pt coming to clinic after stress echo Tomorrow.

## 2023-11-10 ENCOUNTER — OFFICE VISIT (OUTPATIENT)
Dept: DERMATOLOGY | Facility: CLINIC | Age: 53
End: 2023-11-10
Payer: MEDICARE

## 2023-11-10 ENCOUNTER — OFFICE VISIT (OUTPATIENT)
Dept: TRANSPLANT | Facility: CLINIC | Age: 53
End: 2023-11-10
Payer: MEDICARE

## 2023-11-10 ENCOUNTER — TELEPHONE (OUTPATIENT)
Dept: TRANSPLANT | Facility: CLINIC | Age: 53
End: 2023-11-10

## 2023-11-10 ENCOUNTER — HOSPITAL ENCOUNTER (OUTPATIENT)
Dept: CARDIOLOGY | Facility: HOSPITAL | Age: 53
Discharge: HOME | End: 2023-11-10
Attending: INTERNAL MEDICINE
Payer: MEDICARE

## 2023-11-10 ENCOUNTER — HOSPITAL ENCOUNTER (OUTPATIENT)
Dept: RADIOLOGY | Facility: HOSPITAL | Age: 53
Discharge: HOME | End: 2023-11-10
Attending: INTERNAL MEDICINE
Payer: MEDICARE

## 2023-11-10 VITALS
WEIGHT: 226 LBS | HEIGHT: 72 IN | DIASTOLIC BLOOD PRESSURE: 132 MMHG | OXYGEN SATURATION: 100 % | SYSTOLIC BLOOD PRESSURE: 180 MMHG | SYSTOLIC BLOOD PRESSURE: 195 MMHG | BODY MASS INDEX: 30.61 KG/M2 | WEIGHT: 226.88 LBS | HEART RATE: 81 BPM | HEIGHT: 72 IN | HEART RATE: 80 BPM | DIASTOLIC BLOOD PRESSURE: 132 MMHG | BODY MASS INDEX: 30.73 KG/M2

## 2023-11-10 DIAGNOSIS — Z92.25 PERSONAL HISTORY OF IMMUNOSUPPRESSIVE THERAPY: ICD-10-CM

## 2023-11-10 DIAGNOSIS — R06.02 SHORTNESS OF BREATH: ICD-10-CM

## 2023-11-10 DIAGNOSIS — I10 HYPERTENSION, UNSPECIFIED TYPE: Primary | ICD-10-CM

## 2023-11-10 DIAGNOSIS — Z92.25 HISTORY OF IMMUNOSUPPRESSION: ICD-10-CM

## 2023-11-10 DIAGNOSIS — Z94.1 S/P ORTHOTOPIC HEART TRANSPLANT: ICD-10-CM

## 2023-11-10 DIAGNOSIS — D22.9 MULTIPLE BENIGN NEVI: Primary | ICD-10-CM

## 2023-11-10 DIAGNOSIS — I07.1 TRICUSPID VALVE INSUFFICIENCY, UNSPECIFIED ETIOLOGY: ICD-10-CM

## 2023-11-10 DIAGNOSIS — T86.20 COMPLICATION OF HEART TRANSPLANT, UNSPECIFIED COMPLICATION: ICD-10-CM

## 2023-11-10 DIAGNOSIS — Z94.1 STATUS POST HEART TRANSPLANTATION: Primary | ICD-10-CM

## 2023-11-10 DIAGNOSIS — I10 ESSENTIAL HYPERTENSION: ICD-10-CM

## 2023-11-10 DIAGNOSIS — E78.2 MIXED HYPERLIPIDEMIA: ICD-10-CM

## 2023-11-10 DIAGNOSIS — Z94.1 STATUS POST HEART TRANSPLANT: ICD-10-CM

## 2023-11-10 DIAGNOSIS — D84.821 IMMUNODEFICIENCY DUE TO TREATMENT WITH IMMUNOSUPPRESSIVE MEDICATION: ICD-10-CM

## 2023-11-10 DIAGNOSIS — Z12.83 SCREENING EXAM FOR SKIN CANCER: ICD-10-CM

## 2023-11-10 DIAGNOSIS — I16.0 ASYMPTOMATIC HYPERTENSIVE URGENCY: ICD-10-CM

## 2023-11-10 DIAGNOSIS — E78.5 HYPERLIPIDEMIA, UNSPECIFIED HYPERLIPIDEMIA TYPE: ICD-10-CM

## 2023-11-10 DIAGNOSIS — Z79.899 ENCOUNTER FOR LONG-TERM (CURRENT) USE OF MEDICATIONS: ICD-10-CM

## 2023-11-10 DIAGNOSIS — N18.32 STAGE 3B CHRONIC KIDNEY DISEASE: ICD-10-CM

## 2023-11-10 DIAGNOSIS — Z79.899 IMMUNODEFICIENCY DUE TO TREATMENT WITH IMMUNOSUPPRESSIVE MEDICATION: ICD-10-CM

## 2023-11-10 LAB
ASCENDING AORTA: 3.96 CM
AV INDEX (PROSTH): 0.65
AV MEAN GRADIENT: 1 MMHG
AV PEAK GRADIENT: 2 MMHG
AV VALVE AREA BY VELOCITY RATIO: 3.66 CM²
AV VALVE AREA: 3.34 CM²
AV VELOCITY RATIO: 0.72
BSA FOR ECHO PROCEDURE: 2.28 M2
CV ECHO LV RWT: 0.41 CM
DOP CALC AO PEAK VEL: 0.67 M/S
DOP CALC AO VTI: 11.93 CM
DOP CALC LVOT AREA: 5.1 CM2
DOP CALC LVOT DIAMETER: 2.55 CM
DOP CALC LVOT PEAK VEL: 0.48 M/S
DOP CALC LVOT STROKE VOLUME: 39.87 CM3
DOP CALCLVOT PEAK VEL VTI: 7.81 CM
E WAVE DECELERATION TIME: 159.65 MSEC
E/A RATIO: 4.38
E/E' RATIO: 5.43 M/S
ECHO LV POSTERIOR WALL: 1.05 CM (ref 0.6–1.1)
EJECTION FRACTION: 40 %
FRACTIONAL SHORTENING: 23 % (ref 28–44)
INTERVENTRICULAR SEPTUM: 1.13 CM (ref 0.6–1.1)
IVRT: 108.47 MSEC
LEFT INTERNAL DIMENSION IN SYSTOLE: 3.9 CM (ref 2.1–4)
LEFT VENTRICLE DIASTOLIC VOLUME INDEX: 54.92 ML/M2
LEFT VENTRICLE DIASTOLIC VOLUME: 123.01 ML
LEFT VENTRICLE MASS INDEX: 94 G/M2
LEFT VENTRICLE SYSTOLIC VOLUME INDEX: 29.5 ML/M2
LEFT VENTRICLE SYSTOLIC VOLUME: 65.97 ML
LEFT VENTRICULAR INTERNAL DIMENSION IN DIASTOLE: 5.09 CM (ref 3.5–6)
LEFT VENTRICULAR MASS: 210.57 G
LV LATERAL E/E' RATIO: 4.75 M/S
LV SEPTAL E/E' RATIO: 6.33 M/S
MV PEAK A VEL: 0.13 M/S
MV PEAK E VEL: 0.57 M/S
MV STENOSIS PRESSURE HALF TIME: 46.3 MS
MV VALVE AREA P 1/2 METHOD: 4.75 CM2
PISA TR MAX VEL: 2.17 M/S
RA PRESSURE ESTIMATED: 15 MMHG
RIGHT VENTRICULAR END-DIASTOLIC DIMENSION: 5.82 CM
RV TB RVSP: 17 MMHG
SINUS: 4.27 CM
STJ: 3.14 CM
TDI LATERAL: 0.12 M/S
TDI SEPTAL: 0.09 M/S
TDI: 0.11 M/S
TR MAX PG: 19 MMHG
TRICUSPID ANNULAR PLANE SYSTOLIC EXCURSION: 2.45 CM
TV REST PULMONARY ARTERY PRESSURE: 34 MMHG
Z-SCORE OF LEFT VENTRICULAR DIMENSION IN END DIASTOLE: -4.54
Z-SCORE OF LEFT VENTRICULAR DIMENSION IN END SYSTOLE: -1.73

## 2023-11-10 PROCEDURE — 99203 PR OFFICE/OUTPT VISIT, NEW, LEVL III, 30-44 MIN: ICD-10-PCS | Mod: S$PBB,,, | Performed by: STUDENT IN AN ORGANIZED HEALTH CARE EDUCATION/TRAINING PROGRAM

## 2023-11-10 PROCEDURE — 71046 XR CHEST PA AND LATERAL: ICD-10-PCS | Mod: 26,,, | Performed by: RADIOLOGY

## 2023-11-10 PROCEDURE — 99215 OFFICE O/P EST HI 40 MIN: CPT | Mod: S$PBB,,, | Performed by: INTERNAL MEDICINE

## 2023-11-10 PROCEDURE — 99212 OFFICE O/P EST SF 10 MIN: CPT | Mod: PBBFAC,25 | Performed by: STUDENT IN AN ORGANIZED HEALTH CARE EDUCATION/TRAINING PROGRAM

## 2023-11-10 PROCEDURE — 99999 PR PBB SHADOW E&M-EST. PATIENT-LVL III: ICD-10-PCS | Mod: PBBFAC,,, | Performed by: INTERNAL MEDICINE

## 2023-11-10 PROCEDURE — 93306 ECHO (CUPID ONLY): ICD-10-PCS | Mod: 26,,, | Performed by: INTERNAL MEDICINE

## 2023-11-10 PROCEDURE — 99999 PR PBB SHADOW E&M-EST. PATIENT-LVL II: ICD-10-PCS | Mod: PBBFAC,,, | Performed by: STUDENT IN AN ORGANIZED HEALTH CARE EDUCATION/TRAINING PROGRAM

## 2023-11-10 PROCEDURE — 99215 PR OFFICE/OUTPT VISIT, EST, LEVL V, 40-54 MIN: ICD-10-PCS | Mod: S$PBB,,, | Performed by: INTERNAL MEDICINE

## 2023-11-10 PROCEDURE — 71046 X-RAY EXAM CHEST 2 VIEWS: CPT | Mod: 26,,, | Performed by: RADIOLOGY

## 2023-11-10 PROCEDURE — 99999 PR PBB SHADOW E&M-EST. PATIENT-LVL II: CPT | Mod: PBBFAC,,, | Performed by: STUDENT IN AN ORGANIZED HEALTH CARE EDUCATION/TRAINING PROGRAM

## 2023-11-10 PROCEDURE — 99213 OFFICE O/P EST LOW 20 MIN: CPT | Mod: PBBFAC,25,27 | Performed by: INTERNAL MEDICINE

## 2023-11-10 PROCEDURE — 99999 PR PBB SHADOW E&M-EST. PATIENT-LVL III: CPT | Mod: PBBFAC,,, | Performed by: INTERNAL MEDICINE

## 2023-11-10 PROCEDURE — 99203 OFFICE O/P NEW LOW 30 MIN: CPT | Mod: S$PBB,,, | Performed by: STUDENT IN AN ORGANIZED HEALTH CARE EDUCATION/TRAINING PROGRAM

## 2023-11-10 PROCEDURE — 93306 TTE W/DOPPLER COMPLETE: CPT | Mod: 26,,, | Performed by: INTERNAL MEDICINE

## 2023-11-10 PROCEDURE — 71046 X-RAY EXAM CHEST 2 VIEWS: CPT | Mod: TC,FY

## 2023-11-10 PROCEDURE — 93306 TTE W/DOPPLER COMPLETE: CPT

## 2023-11-10 RX ORDER — TACROLIMUS 1 MG/1
1 CAPSULE ORAL EVERY 12 HOURS
Qty: 60 CAPSULE | Refills: 11 | Status: SHIPPED | OUTPATIENT
Start: 2023-11-10 | End: 2023-11-10 | Stop reason: SDUPTHER

## 2023-11-10 RX ORDER — HYDRALAZINE HYDROCHLORIDE 25 MG/1
25 TABLET, FILM COATED ORAL 3 TIMES DAILY
Qty: 90 TABLET | Refills: 11 | Status: SHIPPED | OUTPATIENT
Start: 2023-11-10 | End: 2024-11-09

## 2023-11-10 RX ORDER — CLONIDINE HYDROCHLORIDE 0.1 MG/1
0.3 TABLET ORAL 2 TIMES DAILY
Qty: 180 TABLET | Refills: 11 | Status: SHIPPED | OUTPATIENT
Start: 2023-11-10 | End: 2024-11-09

## 2023-11-10 RX ORDER — TACROLIMUS 1 MG/1
1 CAPSULE ORAL EVERY 12 HOURS
Qty: 60 CAPSULE | Refills: 11 | Status: SHIPPED | OUTPATIENT
Start: 2023-11-10 | End: 2024-11-09

## 2023-11-10 NOTE — PROGRESS NOTES
Pt here for ex2d today. Bp 180/130 auto and manual cuff. Pt denies sx. Dr clement assessed and spoke with dr peña and dr elizondo. Ccfd done EKG saved.  Stress portion cx. Message sent to transplant as he has a md visit after this appt.

## 2023-11-10 NOTE — PROGRESS NOTES
Subjective:   Mr. Leger is a 53 y.o. year old Black or  male who received a donation after brain death heart transplant on 10/29/12.      CMV status:   Donor: -  Recipient: -    52 YO M s/p OHT 10/29/12, with post-op severe TR, mild to moderately reduced LVEF.  Post-op developed PTLD, s/p R-CHOP/CEOP 12/2013.  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      Seen in clinic last 1 yr prior and comes in today for follow up.    Since his last clinic visit, he says that he continues to do well.  Has no major cardiovascular complaints.  He is not very physically active, but stays active by playing video games and taking care of basic work around the house.  Denies any cardiac symptoms such as chest discomfort, shortness of breath, palpitations, presyncope, syncope, leg swelling, PND, or orthopnea.  Blood pressure continues to be elevated in clinic today.  When asked, he says that he does not check his blood pressure at home.  Does not check his blood pressure in between his visits, but says that typically it is elevated when he comes into clinic.  He says that he did take his medications this morning as prescribed.    Rituxan: 724 mg IVPB on 8/17/2013.   Doxorubicin: 96 mg IVPB on 8/18/2013   Cytoxan: 1,447.5 mg IVPB on 8/18/2013.   Vincristine: 2 mg IVPB on 8/18/2013.   Filgrastim 480 mcg. SQ on 8/19/2013.  Tacrolimus 1mg am and 2mg pm. Goal level = 5 - 8   MMF remains on hold  Valcyte has been discontinued due to neutropenia  Post Heart Transplant   High Risk Donor: No  CMV status: donor negative/recipient negative  Rejection status:   Positive Cross Match? : Negative  Induction:   Standard steriods  Rejections:  No   Last Biopsy : 11/12/13  HLA/ DSA:    negative      C1Q: Class I CW1 (1049) Class II NONE     Last Echo:   12/20/22 LVEF 35 % [facility]ric   11/12/21  LVEF 40%   DSE 8.4.20 DSE Severe Right Atrial enlargement LVEF 15%, Global Hypokinetic Positive  8/8/20/19 DSE :CONCLUSIONS    Concentric hypertrophy, Low normal to mildly depressed left ventricular systolic function (EF 50-55%).    Pulmonary hypertension. The estimated PA systolic pressure is 41 mmHg.    Severe tricuspid regurgitation.    No evidence of stress induced myocardial ischemia.    8/21/18 35-40% Severe TR   5/27/16  DSE  LVEF 30-35% PA 44, severe TR      Last [facility]: 8/10/2020 Dr. Fernandez; Normal coronary arteries.  IVUS of LAD did not show features of significant Cardiac allograft vasculopathy (CAV)  Filling pressures normal. 5 successful RVBX.    Dr. Obregon  7/25/16   Results: Normal Coronaries      Immunosuppression: Tacro and prednisone  Immuno goal levels: Tacro 5 - 8     Prior or present malignancies: diffuse B cell Lymphoma (EBV mediatated)  Other complications: Type II DM, DVT 5/2011, Factor V Leiden     Health Maintenance:  Last CXR: 11/10/23  Dermatology  Opthamology   DEXA 11/11/21 No evidence of significant bone density loss       Review of Systems   Constitutional: Negative for chills and fever.   HENT:  Negative for hearing loss.    Eyes:  Negative for visual disturbance.   Cardiovascular:  Negative for chest pain, dyspnea on exertion, irregular heartbeat, leg swelling, orthopnea, palpitations, paroxysmal nocturnal dyspnea and syncope.   Respiratory:  Negative for cough and shortness of breath.    Musculoskeletal:  Negative for muscle weakness.   Gastrointestinal:  Negative for diarrhea, nausea and vomiting.   Neurological:  Negative for focal weakness.   Psychiatric/Behavioral:  Negative for memory loss.        Objective:   Blood pressure (!) 195/132, pulse 81, height 6' (1.829 m), weight 102.9 kg (226 lb 13.7 oz), SpO2 100 %.body mass index is 30.77 kg/m².    Physical Exam  Vitals reviewed.   Constitutional:       General: He is not in acute distress.  HENT:      Head: Atraumatic.   Eyes:       Extraocular Movements: Extraocular movements intact.   Cardiovascular:      Rate and Rhythm: Normal rate and regular rhythm.      Comments: S1, S2 noted. HSM over tricuspid area.  Pulmonary:      Breath sounds: Normal breath sounds.   Abdominal:      Palpations: Abdomen is soft.      Tenderness: There is no abdominal tenderness.   Musculoskeletal:         General: Normal range of motion.      Right lower leg: No edema.      Left lower leg: No edema.   Neurological:      General: No focal deficit present.      Mental Status: He is alert and oriented to person, place, and time.         Lab Results   Component Value Date    WBC 4.17 11/03/2023    HGB 16.1 11/03/2023    HCT 49.8 11/03/2023    MCV 95 11/03/2023     11/03/2023    CO2 23 11/03/2023    CREATININE 2.3 (H) 11/03/2023    CALCIUM 9.8 11/03/2023    ALBUMIN 4.3 11/03/2023    AST 16 11/03/2023    BNP 73 11/03/2023    ALT 10 11/03/2023    .4 (H) 01/04/2021    ALLOMAP 28 05/27/2016       Lab Results   Component Value Date    INR 1.1 07/08/2016    INR 1.1 10/16/2013    INR 1.4 (H) 08/07/2013       BNP   Date Value Ref Range Status   11/03/2023 73 0 - 99 pg/mL Final     Comment:     Values of less than 100 pg/ml are consistent with non-CHF populations.   12/20/2022 146 (H) 0 - 99 pg/mL Final     Comment:     Values of less than 100 pg/ml are consistent with non-CHF populations.   11/11/2021 713 (H) 0 - 99 pg/mL Final     Comment:     Values of less than 100 pg/ml are consistent with non-CHF populations.       LD   Date Value Ref Range Status   09/21/2020 214 110 - 260 U/L Final     Comment:     Results are increased in hemolyzed samples.   01/03/2020 150 110 - 260 U/L Final     Comment:     Results are increased in hemolyzed samples.   07/23/2019 157 110 - 260 U/L Final     Comment:     Results are increased in hemolyzed samples.       Tacrolimus Lvl   Date Value Ref Range Status   11/03/2023 12.9 5.0 - 15.0 ng/mL Final     Comment:     Testing performed  "by a chemiluminescent microparticle   immunoassay on the [employer] System.    CAUTION: No firm therapeutic range exists for tacrolimus in whole   blood. The   complexity of the clinical state, individual differences in   sensitivity to   immunosuppressive and nephrotoxic effects of tacrolimus,   co-administration   of other immunosuppressants, type of transplant, time post-transplant   and a   number of other factors contribute to different requirements for   optimal   blood levels of tacrolimus. Therefore, individual tacrolimus values   cannot   be used as the sole indicator for making changes in treatment regimen   and   each patient should be thoroughly evaluated clinically before changes   in   treatment regimens are made. Each user must establish his or her own   ranges   based on clinical experience.  Therapeutic ranges vary according to the commercial test used, and   therefore   should be established for each commercial test. Values obtained with   different assay methods cannot be used interchangeably due to   differences in   assay methods and cross-reactivity with metabolites, nor should   correction   factors be applied. Therefore, consistent use of one assay for   individual   patients is recommended.       No results found for: "SIROLIMUS"  No results found for: "CYCLOSPORINE"    No results found for this or any previous visit.    No results found for this or any previous visit.      Labs were reviewed with the patient.    Assessment:     1. Status post heart transplantation    2. S/P orthotopic heart transplant    3. Asymptomatic hypertensive urgency    4. Tricuspid valve insufficiency, unspecified etiology    5. Essential hypertension    6. Hyperlipidemia, unspecified hyperlipidemia type    7. Stage 3b chronic kidney disease    8. Personal history of immunosuppressive therapy    9. Immunodeficiency due to treatment with immunosuppressive medication        Plan:     - presents today for " follow-up.  Euvolemic on exam.  No cardiovascular complaints.  - has asymptomatic hypertensive urgency.  Blood pressure also elevated during echocardiogram study today.  - we will increase his clonidine from 0.2 mg b.i.d. to 0.3 mg b.i.d..  We will also add hydralazine 25 mg t.i.d..  We will enroll in digital Medicine for helping monitor his blood pressure and get better blood pressure control.  - tacrolimus level also elevated.  Creatinine slightly higher than before.  We will drop his tacrolimus from 1 mg-2 mg to 1 mg b.i.d..  We will recheck tacrolimus level and creatinine next week.      Return instructions as set forth by post transplant schedule or as needed:    Clinic: Return for labs and/or biopsy weekly the first month, every two weeks during month 2 and then monthly for the first year at the provider or coordinator's discretion. During the second year, return to clinic every 3 months. Post transplant year 3-5 return every 6 months. There will be a comprehensive post transplant evaluation every year that may include LHC/RHC/biopsy, stress test, echo, CXR, and other health screening exams.    In addition to the clinical assessment, I have ordered Allomap testing for this patient to assist in identification of moderate/severe acute cellular rejection (ACR) in a pt with stable Allograft function instead of endomyocardial biopsy.     Patient is reminded to call with any health changes as these can be early signs of transplant complications. Patient is advised to make sure any new medications or changes of old medications are discussed with a pharmacist or physician knowledgeable with transplant to avoid rejection/drug toxicity related to significant drug interactions.    Patient advised that it is recommended that all transplanted patients, and their close contacts and household members receive Covid vaccination.    UNOS Patient Status  Functional Status: 90% - Able to carry on normal activity: minor symptoms  of disease  Physical Capacity: No Limitations  Working for Income: No  If no, reason not working: Unknown    Fredy Thompson MD

## 2023-11-10 NOTE — PROGRESS NOTES
Subjective:      Patient ID:  Alfonso Leger is a 53 y.o. male who presents for   Chief Complaint   Patient presents with    Skin Check     TBSE      Alfonso Leger is a 53 y.o. male who presents for: FBSE screening exam for skin cancer.    Last office visit 09/12/2017 with Dr. Spears for a TBSE with no significant findings     The patient has no specific concerns today.    Pertinent history:  History of blistering sunburns: No  History of tanning bed use: No  Family history of melanoma: No  Personal history of mole removal: No  Personal history of skin cancer: No    Pt is a heart transplant recipient in 2012 on Prograf (tacrolimus), also hx of diffuse large B-cell lymphoma completed 6 cycles chemo in 2013      Review of Systems   Skin:  Positive for wears hat. Negative for daily sunscreen use, activity-related sunscreen use and recent sunburn.   Hematologic/Lymphatic: Does not bruise/bleed easily.       Objective:   Physical Exam   Constitutional: He appears well-developed and well-nourished. No distress.   Neurological: He is alert and oriented to person, place, and time. He is not disoriented.   Psychiatric: He has a normal mood and affect.   Skin:   Areas Examined (abnormalities noted in diagram):   Scalp / Hair Palpated and Inspected  Head / Face Inspection Performed  Neck Inspection Performed  Chest / Axilla Inspection Performed  Abdomen Inspection Performed  Genitals / Buttocks / Groin Inspection Performed  Back Inspection Performed  RUE Inspected  LUE Inspection Performed  RLE Inspected  LLE Inspection Performed  Nails and Digits Inspection Performed                Diagram Legend     Erythematous scaling macule/papule c/w actinic keratosis       Vascular papule c/w angioma      Pigmented verrucoid papule/plaque c/w seborrheic keratosis      Yellow umbilicated papule c/w sebaceous hyperplasia      Irregularly shaped tan macule c/w lentigo     1-2 mm smooth white papules consistent with Milia       Movable subcutaneous cyst with punctum c/w epidermal inclusion cyst      Subcutaneous movable cyst c/w pilar cyst      Firm pink to brown papule c/w dermatofibroma      Pedunculated fleshy papule(s) c/w skin tag(s)      Evenly pigmented macule c/w junctional nevus     Mildly variegated pigmented, slightly irregular-bordered macule c/w mildly atypical nevus      Flesh colored to evenly pigmented papule c/w intradermal nevus       Pink pearly papule/plaque c/w basal cell carcinoma      Erythematous hyperkeratotic cursted plaque c/w SCC      Surgical scar with no sign of skin cancer recurrence      Open and closed comedones      Inflammatory papules and pustules      Verrucoid papule consistent consistent with wart     Erythematous eczematous patches and plaques     Dystrophic onycholytic nail with subungual debris c/w onychomycosis     Umbilicated papule    Erythematous-base heme-crusted tan verrucoid plaque consistent with inflamed seborrheic keratosis     Erythematous Silvery Scaling Plaque c/w Psoriasis     See annotation      Assessment / Plan:        Multiple benign nevi  Examined with dermoscopy, reassurance benign    Screening exam for skin cancer  Total body skin examination performed today including at least 12 points as noted in physical examination. No lesions suspicious for malignancy noted.    Recommend daily sun protection/avoidance, use of at least SPF 30, broad spectrum sunscreen (OTC drug), skin self examinations, and routine physician surveillance to optimize early detection    I reviewed the ABCDE's of melanoma, ugly duckling sign and importance of monthly self-exams in mirror especially to examine feet.    History of immunosuppression  Heart transplant recipient 2012, reviewed increased risk of skin cancer due to immunosuppressing medications (Prograf) and recommend annual FBSE    RTC 1 year, sooner prn

## 2023-11-10 NOTE — TELEPHONE ENCOUNTER
Reason for visit:   post heart transplant clinic visit.     Immunosuppression:  Tacrolimus 1 mg in am and 2 mg in pm      Assessment: Denies SOB, n/v/d, Dizziness      Education: Tacrolimus and kidney function, important to get routine levels. We discussed digital medicine for better blood pressure control.    Medications, lab and echo results reviewed with patient and Dr. Thompson.  See MD note for orders and recommendations    Lab work next week with tacro level after dose change 1/2 to 1 mg bid.

## 2023-11-14 ENCOUNTER — LAB VISIT (OUTPATIENT)
Dept: LAB | Facility: HOSPITAL | Age: 53
End: 2023-11-14
Attending: INTERNAL MEDICINE
Payer: MEDICARE

## 2023-11-14 DIAGNOSIS — E78.2 MIXED HYPERLIPIDEMIA: ICD-10-CM

## 2023-11-14 DIAGNOSIS — Z94.1 STATUS POST HEART TRANSPLANT: ICD-10-CM

## 2023-11-14 DIAGNOSIS — T86.20 COMPLICATION OF HEART TRANSPLANT, UNSPECIFIED COMPLICATION: ICD-10-CM

## 2023-11-14 DIAGNOSIS — R06.02 SHORTNESS OF BREATH: ICD-10-CM

## 2023-11-14 DIAGNOSIS — Z79.899 ENCOUNTER FOR LONG-TERM (CURRENT) USE OF MEDICATIONS: ICD-10-CM

## 2023-11-14 LAB
ALBUMIN SERPL BCP-MCNC: 3.8 G/DL (ref 3.5–5.2)
ALP SERPL-CCNC: 101 U/L (ref 55–135)
ALT SERPL W/O P-5'-P-CCNC: 12 U/L (ref 10–44)
ANION GAP SERPL CALC-SCNC: 10 MMOL/L (ref 8–16)
AST SERPL-CCNC: 16 U/L (ref 10–40)
BILIRUB SERPL-MCNC: 0.5 MG/DL (ref 0.1–1)
BUN SERPL-MCNC: 13 MG/DL (ref 6–20)
CALCIUM SERPL-MCNC: 9.7 MG/DL (ref 8.7–10.5)
CHLORIDE SERPL-SCNC: 104 MMOL/L (ref 95–110)
CO2 SERPL-SCNC: 26 MMOL/L (ref 23–29)
CREAT SERPL-MCNC: 1.6 MG/DL (ref 0.5–1.4)
EST. GFR  (NO RACE VARIABLE): 51.5 ML/MIN/1.73 M^2
GLUCOSE SERPL-MCNC: 90 MG/DL (ref 70–110)
POTASSIUM SERPL-SCNC: 4.1 MMOL/L (ref 3.5–5.1)
PROT SERPL-MCNC: 8.1 G/DL (ref 6–8.4)
SODIUM SERPL-SCNC: 140 MMOL/L (ref 136–145)

## 2023-11-14 PROCEDURE — 80197 ASSAY OF TACROLIMUS: CPT | Performed by: INTERNAL MEDICINE

## 2023-11-14 PROCEDURE — 80053 COMPREHEN METABOLIC PANEL: CPT | Performed by: INTERNAL MEDICINE

## 2023-11-14 PROCEDURE — 36415 COLL VENOUS BLD VENIPUNCTURE: CPT | Performed by: INTERNAL MEDICINE

## 2023-11-15 LAB — TACROLIMUS BLD-MCNC: 12.3 NG/ML (ref 5–15)

## 2024-05-06 ENCOUNTER — PATIENT MESSAGE (OUTPATIENT)
Dept: TRANSPLANT | Facility: CLINIC | Age: 54
End: 2024-05-06
Payer: MEDICARE

## 2024-08-19 ENCOUNTER — TELEPHONE (OUTPATIENT)
Dept: TRANSPLANT | Facility: CLINIC | Age: 54
End: 2024-08-19
Payer: MEDICARE

## 2024-08-19 DIAGNOSIS — Z94.1 STATUS POST HEART TRANSPLANT: Primary | ICD-10-CM

## 2024-08-19 DIAGNOSIS — Z79.899 ENCOUNTER FOR LONG-TERM (CURRENT) USE OF MEDICATIONS: ICD-10-CM

## 2024-08-19 DIAGNOSIS — Z12.5 PROSTATE CANCER SCREENING: ICD-10-CM

## 2024-08-19 NOTE — LETTER
August 19, 2024    Alfonso Leger  [address]  [address]  [place] [address]  MRN: [id number]          Dear Alfonso Leger:    Congratulations on reaching 12 years!    We recommend you complete the following on an annual basis.  Our patients need to have a primary care physician near home.    For your annual visit, you will need the following tests completed:    Annual visit with the transplant cardiologist and transplant coordinator  Stress Echocardiogram.  Chest x-ray within the last year  Fasting labs: CBC, CMP, lipid panel, magnesium, medication levels, PSA screen for males  See Dermatology annually for a full body skin exam  See your Dentist twice a year  Get a flu shot annually  See your PCP for recommendations on colonoscopy and other preventative health measures    Please let me know if you have any further questions.  My direct number is [phone].    Sincerely,      Staci Reno RN, BSN  Heart Transplant Coordinator  [address] [place] [address] [address] [place] [address]  [phone]

## 2024-09-05 ENCOUNTER — TELEPHONE (OUTPATIENT)
Dept: TRANSPLANT | Facility: CLINIC | Age: 54
End: 2024-09-05
Payer: MEDICARE

## 2024-09-05 DIAGNOSIS — Z94.1 STATUS POST HEART TRANSPLANT: Primary | ICD-10-CM

## 2024-09-05 DIAGNOSIS — Z79.899 ENCOUNTER FOR LONG-TERM (CURRENT) USE OF MEDICATIONS: ICD-10-CM

## 2024-09-11 DIAGNOSIS — I16.0 ASYMPTOMATIC HYPERTENSIVE URGENCY: ICD-10-CM

## 2024-09-11 DIAGNOSIS — Z94.1 S/P ORTHOTOPIC HEART TRANSPLANT: ICD-10-CM

## 2024-09-12 ENCOUNTER — TELEPHONE (OUTPATIENT)
Dept: TRANSPLANT | Facility: CLINIC | Age: 54
End: 2024-09-12
Payer: MEDICARE

## 2024-09-12 RX ORDER — CLONIDINE HYDROCHLORIDE 0.1 MG/1
0.3 TABLET ORAL 2 TIMES DAILY
Qty: 180 TABLET | Refills: 11 | Status: SHIPPED | OUTPATIENT
Start: 2024-09-12 | End: 2025-09-12

## 2024-09-12 NOTE — TELEPHONE ENCOUNTER
Called [facility] pharmacy to check why refill failed by e-scribe, Pharmacy closed at 1:30 pm     Called pt and left a voice message for pt regarding the e-scribed and that the Pharmacy closed at 1:30 and I will call them on Friday in AM.

## 2024-11-17 DIAGNOSIS — Z94.1 S/P ORTHOTOPIC HEART TRANSPLANT: ICD-10-CM

## 2024-11-17 DIAGNOSIS — I16.0 ASYMPTOMATIC HYPERTENSIVE URGENCY: ICD-10-CM

## 2024-11-18 RX ORDER — TACROLIMUS 1 MG/1
CAPSULE ORAL
Qty: 60 CAPSULE | Refills: 11 | Status: SHIPPED | OUTPATIENT
Start: 2024-11-18

## 2024-11-19 ENCOUNTER — OFFICE VISIT (OUTPATIENT)
Dept: TRANSPLANT | Facility: CLINIC | Age: 54
End: 2024-11-19
Payer: MEDICARE

## 2024-11-19 ENCOUNTER — HOSPITAL ENCOUNTER (OUTPATIENT)
Dept: CARDIOLOGY | Facility: HOSPITAL | Age: 54
Discharge: HOME | End: 2024-11-19
Attending: INTERNAL MEDICINE
Payer: MEDICARE

## 2024-11-19 ENCOUNTER — TELEPHONE (OUTPATIENT)
Dept: TRANSPLANT | Facility: CLINIC | Age: 54
End: 2024-11-19

## 2024-11-19 VITALS
SYSTOLIC BLOOD PRESSURE: 150 MMHG | WEIGHT: 227.94 LBS | HEIGHT: 72 IN | WEIGHT: 229.25 LBS | DIASTOLIC BLOOD PRESSURE: 96 MMHG | BODY MASS INDEX: 31.05 KG/M2 | HEART RATE: 106 BPM | HEIGHT: 72 IN | BODY MASS INDEX: 30.87 KG/M2

## 2024-11-19 DIAGNOSIS — Z79.899 IMMUNODEFICIENCY DUE TO LONG TERM IMMUNOSUPPRESSIVE DRUG THERAPY: ICD-10-CM

## 2024-11-19 DIAGNOSIS — N18.32 STAGE 3B CHRONIC KIDNEY DISEASE: ICD-10-CM

## 2024-11-19 DIAGNOSIS — I10 ESSENTIAL HYPERTENSION: ICD-10-CM

## 2024-11-19 DIAGNOSIS — E78.2 MIXED HYPERLIPIDEMIA: ICD-10-CM

## 2024-11-19 DIAGNOSIS — D84.821 IMMUNODEFICIENCY DUE TO LONG TERM IMMUNOSUPPRESSIVE DRUG THERAPY: ICD-10-CM

## 2024-11-19 DIAGNOSIS — Z94.1 STATUS POST HEART TRANSPLANT: ICD-10-CM

## 2024-11-19 DIAGNOSIS — Z12.11 SCREENING FOR COLON CANCER: ICD-10-CM

## 2024-11-19 DIAGNOSIS — T45.1X5A IMMUNODEFICIENCY DUE TO LONG TERM IMMUNOSUPPRESSIVE DRUG THERAPY: ICD-10-CM

## 2024-11-19 DIAGNOSIS — Z94.1 STATUS POST HEART TRANSPLANTATION: Primary | ICD-10-CM

## 2024-11-19 DIAGNOSIS — Z79.899 ENCOUNTER FOR LONG-TERM (CURRENT) USE OF MEDICATIONS: ICD-10-CM

## 2024-11-19 DIAGNOSIS — R97.20 ELEVATED PSA: Primary | ICD-10-CM

## 2024-11-19 LAB
AORTIC ROOT ANNULUS: 3.86 CM
AV INDEX (PROSTH): 0.59
AV MEAN GRADIENT: 1.7 MMHG
AV PEAK GRADIENT: 3.2 MMHG
AV REGURGITATION PRESSURE HALF TIME: 911 MS
AV VALVE AREA BY VELOCITY RATIO: 2.3 CM²
AV VALVE AREA: 2 CM²
AV VELOCITY RATIO: 0.67
BSA FOR ECHO PROCEDURE: 2.3 M2
CV ECHO LV RWT: 0.45 CM
CV STRESS BASE HR: 106 BPM
DIASTOLIC BLOOD PRESSURE: 119 MMHG
DOP CALC AO PEAK VEL: 0.9 M/S
DOP CALC AO VTI: 16.1 CM
DOP CALC LVOT AREA: 3.5 CM2
DOP CALC LVOT DIAMETER: 2.1 CM
DOP CALC LVOT PEAK VEL: 0.6 M/S
DOP CALC LVOT STROKE VOLUME: 32.9 CM3
DOP CALC RVOT PEAK VEL: 0.44 M/S
DOP CALC RVOT VTI: 6.6 CM
DOP CALCLVOT PEAK VEL VTI: 9.5 CM
E/E' RATIO: 5 M/S
ECHO LV POSTERIOR WALL: 1.1 CM (ref 0.6–1.1)
EJECTION FRACTION: 40 %
FRACTIONAL SHORTENING: 12.2 % (ref 28–44)
INTERVENTRICULAR SEPTUM: 1.2 CM (ref 0.6–1.1)
IVC DIAMETER: 1.6 CM
LEFT INTERNAL DIMENSION IN SYSTOLE: 4.3 CM (ref 2.1–4)
LEFT VENTRICLE DIASTOLIC VOLUME INDEX: 50.29 ML/M2
LEFT VENTRICLE DIASTOLIC VOLUME: 113.66 ML
LEFT VENTRICLE MASS INDEX: 94.4 G/M2
LEFT VENTRICLE SYSTOLIC VOLUME INDEX: 35.9 ML/M2
LEFT VENTRICLE SYSTOLIC VOLUME: 81.04 ML
LEFT VENTRICULAR INTERNAL DIMENSION IN DIASTOLE: 4.9 CM (ref 3.5–6)
LEFT VENTRICULAR MASS: 213.3 G
LV LATERAL E/E' RATIO: 3.75 M/S
LV SEPTAL E/E' RATIO: 7.5 M/S
LVED V (TEICH): 113.66 ML
LVES V (TEICH): 81.04 ML
LVOT MG: 0.78 MMHG
LVOT MV: 0.41 CM/S
MV PEAK E VEL: 0.6 M/S
OHS CV CPX 1 MINUTE RECOVERY HEART RATE: 171 BPM
OHS CV CPX 85 PERCENT MAX PREDICTED HEART RATE MALE: 142
OHS CV CPX MAX PREDICTED HEART RATE: 167
OHS CV CPX PATIENT IS FEMALE: 0
OHS CV CPX PATIENT IS MALE: 1
OHS CV CPX PEAK DIASTOLIC BLOOD PRESSURE: 101 MMHG
OHS CV CPX PEAK HEAR RATE: 173 BPM
OHS CV CPX PEAK RATE PRESSURE PRODUCT: ABNORMAL
OHS CV CPX PEAK SYSTOLIC BLOOD PRESSURE: 220 MMHG
OHS CV CPX PERCENT MAX PREDICTED HEART RATE ACHIEVED: 104
OHS CV CPX RATE PRESSURE PRODUCT PRESENTING: ABNORMAL
OHS CV INITIAL DOSE: 10 MCG/KG/MIN
OHS CV PEAK DOSE: 10 MCG/KG/MIN
PISA AR MAX VEL: 4.25 M/S
PISA TR MAX VEL: 2.12 M/S
PV MEAN GRADIENT: 0 MMHG
PV PEAK GRADIENT: 2 MMHG
PV PEAK VELOCITY: 0.68 M/S
RA PRESSURE ESTIMATED: 3 MMHG
RIGHT VENTRICULAR END-DIASTOLIC DIMENSION: 5.32 CM
RV TB RVSP: 5 MMHG
SINUS: 4.09 CM
STJ: 4.24 CM
SYSTOLIC BLOOD PRESSURE: 162 MMHG
TDI LATERAL: 0.16 M/S
TDI SEPTAL: 0.08 M/S
TDI: 0.12 M/S
TR MAX PG: 18 MMHG
TRICUSPID ANNULAR PLANE SYSTOLIC EXCURSION: 1.31 CM
TV REST PULMONARY ARTERY PRESSURE: 21 MMHG
Z-SCORE OF LEFT VENTRICULAR DIMENSION IN END DIASTOLE: -5.26
Z-SCORE OF LEFT VENTRICULAR DIMENSION IN END SYSTOLE: -1.22

## 2024-11-19 PROCEDURE — 63600175 PHARM REV CODE 636 W HCPCS: Performed by: INTERNAL MEDICINE

## 2024-11-19 PROCEDURE — 25500020 PHARM REV CODE 255: Performed by: INTERNAL MEDICINE

## 2024-11-19 PROCEDURE — 93320 DOPPLER ECHO COMPLETE: CPT

## 2024-11-19 PROCEDURE — 93351 STRESS TTE COMPLETE: CPT | Mod: 26,,, | Performed by: INTERNAL MEDICINE

## 2024-11-19 PROCEDURE — 99215 OFFICE O/P EST HI 40 MIN: CPT | Mod: S$PBB,,, | Performed by: INTERNAL MEDICINE

## 2024-11-19 PROCEDURE — 93320 DOPPLER ECHO COMPLETE: CPT | Mod: 26,,, | Performed by: INTERNAL MEDICINE

## 2024-11-19 PROCEDURE — 93325 DOPPLER ECHO COLOR FLOW MAPG: CPT | Mod: 26,,, | Performed by: INTERNAL MEDICINE

## 2024-11-19 PROCEDURE — 99212 OFFICE O/P EST SF 10 MIN: CPT | Mod: PBBFAC,25 | Performed by: INTERNAL MEDICINE

## 2024-11-19 PROCEDURE — 99999 PR PBB SHADOW E&M-EST. PATIENT-LVL II: CPT | Mod: PBBFAC,,, | Performed by: INTERNAL MEDICINE

## 2024-11-19 RX ORDER — DOBUTAMINE HYDROCHLORIDE 400 MG/100ML
10-40 INJECTION INTRAVENOUS CONTINUOUS
Status: SHIPPED | OUTPATIENT
Start: 2024-11-19

## 2024-11-19 RX ADMIN — DOBUTAMINE HYDROCHLORIDE 10 MCG/KG/MIN: 400 INJECTION INTRAVENOUS at 10:11

## 2024-11-19 RX ADMIN — HUMAN ALBUMIN MICROSPHERES AND PERFLUTREN 0.11 MG: 10; .22 INJECTION, SOLUTION INTRAVENOUS at 10:11

## 2024-11-19 NOTE — PROGRESS NOTES
Subjective:   Mr. Leger is a 54 y.o. year old Black or  male who received a donation after brain death heart transplant on 10/29/12.        CMV status:   Donor: -  Recipient: -     Mr. Leger is a 53 male who underwent OHT on 10/29/12, with post-op severe TR, mild to moderately reduced LVEF.  Post-op developed PTLD, s/p R-CHOP/CEOP 12/2013.  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  Today he comes for his 12th post-Tx annual follow-up visit. Reports feeling well. Has no complaints. He underwent a DSE today (results are pending). Immunor regimen includes Tacro 1 mg twice daily.     Review of Systems   Constitutional: Negative. Negative for chills, decreased appetite, diaphoresis, fever, malaise/fatigue, night sweats, weight gain and weight loss.   Eyes: Negative.    Cardiovascular:  Negative for chest pain, claudication, cyanosis, dyspnea on exertion, irregular heartbeat, leg swelling, near-syncope, orthopnea, palpitations, paroxysmal nocturnal dyspnea and syncope.   Respiratory:  Negative for cough, hemoptysis and shortness of breath.    Endocrine: Negative.    Hematologic/Lymphatic: Negative.    Skin:  Negative for color change, dry skin and nail changes.   Musculoskeletal: Negative.    Gastrointestinal: Negative.    Genitourinary: Negative.    Neurological:  Negative for weakness.       Objective:   Blood pressure (!) 150/96, pulse 106, height 6' (1.829 m), weight 104 kg (229 lb 4.5 oz).body mass index is 31.1 kg/m².    Physical Exam  Vitals reviewed.   Constitutional:       Appearance: He is well-developed.      Comments: BP (!) 150/96 (Patient Position: Sitting)   Pulse 106   Ht 6' (1.829 m)   Wt 104 kg (229 lb 4.5 oz)   BMI 31.10 kg/m²      HENT:      Head: Normocephalic.   Neck:      Vascular: No carotid bruit or JVD.   Cardiovascular:       Rate and Rhythm: Regular rhythm.      Pulses: Normal pulses.      Heart sounds: Normal heart sounds. No murmur heard.  Pulmonary:      Effort: Pulmonary effort is normal.      Breath sounds: Normal breath sounds.   Abdominal:      General: Bowel sounds are normal.      Palpations: Abdomen is soft.   Skin:     General: Skin is warm.   Neurological:      Mental Status: He is alert.         Lab Results   Component Value Date    WBC 4.17 11/03/2023    HGB 16.1 11/03/2023    HCT 49.8 11/03/2023    MCV 95 11/03/2023     11/03/2023    CO2 26 11/14/2023    CREATININE 1.6 (H) 11/14/2023    CALCIUM 9.7 11/14/2023    ALBUMIN 3.8 11/14/2023    AST 16 11/14/2023    BNP 73 11/03/2023    ALT 12 11/14/2023    .4 (H) 01/04/2021    ALLOMAP 28 05/27/2016       Lab Results   Component Value Date    INR 1.1 07/08/2016    INR 1.1 10/16/2013    INR 1.4 (H) 08/07/2013       BNP   Date Value Ref Range Status   11/03/2023 73 0 - 99 pg/mL Final     Comment:     Values of less than 100 pg/ml are consistent with non-CHF populations.   12/20/2022 146 (H) 0 - 99 pg/mL Final     Comment:     Values of less than 100 pg/ml are consistent with non-CHF populations.   11/11/2021 713 (H) 0 - 99 pg/mL Final     Comment:     Values of less than 100 pg/ml are consistent with non-CHF populations.       LD   Date Value Ref Range Status   09/21/2020 214 110 - 260 U/L Final     Comment:     Results are increased in hemolyzed samples.   01/03/2020 150 110 - 260 U/L Final     Comment:     Results are increased in hemolyzed samples.   07/23/2019 157 110 - 260 U/L Final     Comment:     Results are increased in hemolyzed samples.       Tacrolimus Lvl   Date Value Ref Range Status   11/14/2023 12.3 5.0 - 15.0 ng/mL Final     Comment:     Testing performed by a chemiluminescent microparticle   immunoassay on the [employer] i System.    CAUTION: No firm therapeutic range exists for tacrolimus in whole   blood. The   complexity of the clinical state,  individual differences in   sensitivity to   immunosuppressive and nephrotoxic effects of tacrolimus,   co-administration   of other immunosuppressants, type of transplant, time post-transplant   and a   number of other factors contribute to different requirements for   optimal   blood levels of tacrolimus. Therefore, individual tacrolimus values   cannot   be used as the sole indicator for making changes in treatment regimen   and   each patient should be thoroughly evaluated clinically before changes   in   treatment regimens are made. Each user must establish his or her own   ranges   based on clinical experience.  Therapeutic ranges vary according to the commercial test used, and   therefore   should be established for each commercial test. Values obtained with   different assay methods cannot be used interchangeably due to   differences in   assay methods and cross-reactivity with metabolites, nor should   correction   factors be applied. Therefore, consistent use of one assay for   individual   patients is recommended.       Assessment:     1. Status post heart transplantation    2. Immunodeficiency due to long term immunosuppressive drug therapy    3. Essential hypertension    4. Mixed hyperlipidemia    5. Stage 3b chronic kidney disease        Plan:   Doing well from a Tx standpoint  Continue current immuno regimen   Needs to see Dermatology for annual skin cancer screening  Needs a screening colonoscopy and prostate cancer screening,    Return instructions as set forth by post transplant schedule or as needed:    Clinic: Return for labs and/or biopsy weekly the first month, every two weeks during month 2 and then monthly for the first year at the provider or coordinator's discretion. During the second year, return to clinic every 3 months. Post transplant year 3-5 return every 6 months. There will be a comprehensive post transplant evaluation every year that may include LHC/RHC/biopsy, stress test, echo,  CXR, and other health screening exams.    In addition to the clinical assessment, I have ordered Allomap testing for this patient to assist in identification of moderate/severe acute cellular rejection (ACR) in a pt with stable Allograft function instead of endomyocardial biopsy.     Patient is reminded to call with any health changes as these can be early signs of transplant complications. Patient is advised to make sure any new medications or changes of old medications are discussed with a pharmacist or physician knowledgeable with transplant to avoid rejection/drug toxicity related to significant drug interactions.    Patient advised that it is recommended that all transplanted patients, and their close contacts and household members receive Covid vaccination.    UNOS Patient Status  Functional Status: 90% - Able to carry on normal activity: minor symptoms of disease  Physical Capacity: No Limitations  Working for Income: Unknown    Advance Care Planning   Patient has ACP docs in file.     Yudith Lazar MD

## 2024-11-19 NOTE — NURSING NOTE
EKG tracings  and blood pressures reviewed with Dr. Lazar prior to infusion of dobutamine. Ok to proceed per Dr. Lazar.   EKG tracings reviewed with Dr. Lazar in recovery.  Patient scheduled to see Dr. Lazar once DSE completed.   Patient tolerated procedure well, denied any distress.

## 2024-11-20 NOTE — TELEPHONE ENCOUNTER
Called pt after discussing clinic visit from [facility] in [place], PSA Elevated and referral to Urology and referral for Colonoscopy placed as well.

## 2024-11-26 ENCOUNTER — TELEPHONE (OUTPATIENT)
Dept: TRANSPLANT | Facility: CLINIC | Age: 54
End: 2024-11-26
Payer: MEDICARE

## 2024-12-03 DIAGNOSIS — Z94.1 S/P ORTHOTOPIC HEART TRANSPLANT: ICD-10-CM

## 2024-12-04 RX ORDER — TACROLIMUS 1 MG/1
1 CAPSULE ORAL EVERY 12 HOURS
Qty: 180 CAPSULE | Refills: 4 | Status: SHIPPED | OUTPATIENT
Start: 2024-12-04

## 2024-12-08 DIAGNOSIS — I16.0 ASYMPTOMATIC HYPERTENSIVE URGENCY: ICD-10-CM

## 2024-12-08 DIAGNOSIS — Z94.1 S/P ORTHOTOPIC HEART TRANSPLANT: ICD-10-CM

## 2024-12-09 RX ORDER — CLONIDINE HYDROCHLORIDE 0.1 MG/1
0.3 TABLET ORAL 2 TIMES DAILY
Qty: 180 TABLET | Refills: 11 | Status: SHIPPED | OUTPATIENT
Start: 2024-12-09

## 2025-01-08 ENCOUNTER — PATIENT MESSAGE (OUTPATIENT)
Dept: TRANSPLANT | Facility: CLINIC | Age: 55
End: 2025-01-08
Payer: MEDICARE

## 2025-06-10 ENCOUNTER — PATIENT MESSAGE (OUTPATIENT)
Dept: TRANSPLANT | Facility: CLINIC | Age: 55
End: 2025-06-10
Payer: MEDICARE

## 2025-06-19 ENCOUNTER — RESULTS FOLLOW-UP (OUTPATIENT)
Dept: TRANSPLANT | Facility: CLINIC | Age: 55
End: 2025-06-19

## 2025-06-19 ENCOUNTER — HOSPITAL ENCOUNTER (INPATIENT)
Facility: HOSPITAL | Age: 55
LOS: 5 days | Discharge: HOME | DRG: 286 | End: 2025-06-24
Attending: INTERNAL MEDICINE | Admitting: INTERNAL MEDICINE
Payer: MEDICARE

## 2025-06-19 ENCOUNTER — HOSPITAL ENCOUNTER (EMERGENCY)
Facility: HOSPITAL | Age: 55
Discharge: STILL ADMITTED OR ADMINISTRATIVE | End: 2025-06-19
Attending: EMERGENCY MEDICINE
Payer: MEDICARE

## 2025-06-19 VITALS
HEART RATE: 118 BPM | DIASTOLIC BLOOD PRESSURE: 97 MMHG | TEMPERATURE: 98 F | RESPIRATION RATE: 17 BRPM | SYSTOLIC BLOOD PRESSURE: 126 MMHG | WEIGHT: 227.19 LBS | BODY MASS INDEX: 30.81 KG/M2 | OXYGEN SATURATION: 99 %

## 2025-06-19 DIAGNOSIS — D84.821 IMMUNODEFICIENCY DUE TO TREATMENT WITH IMMUNOSUPPRESSIVE MEDICATION: ICD-10-CM

## 2025-06-19 DIAGNOSIS — G93.89 MASS OF CEREBELLUM: ICD-10-CM

## 2025-06-19 DIAGNOSIS — Z94.1 H/O HEART TRANSPLANT: ICD-10-CM

## 2025-06-19 DIAGNOSIS — D84.9 IMMUNOSUPPRESSION: ICD-10-CM

## 2025-06-19 DIAGNOSIS — I82.90 VENOUS THROMBOEMBOLISM: ICD-10-CM

## 2025-06-19 DIAGNOSIS — Z94.1 STATUS POST HEART TRANSPLANTATION: ICD-10-CM

## 2025-06-19 DIAGNOSIS — B27.09 EBV MEDIATED PRIMARY LYMPHOMA OF LYMPH NODE: ICD-10-CM

## 2025-06-19 DIAGNOSIS — I49.9 ARRHYTHMIA: ICD-10-CM

## 2025-06-19 DIAGNOSIS — Z79.60 IMMUNODEFICIENCY DUE TO TREATMENT WITH IMMUNOSUPPRESSIVE MEDICATION: ICD-10-CM

## 2025-06-19 DIAGNOSIS — E87.6 HYPOKALEMIA: ICD-10-CM

## 2025-06-19 DIAGNOSIS — Z79.899 IMMUNOSUPPRESSION DUE TO DRUG THERAPY: ICD-10-CM

## 2025-06-19 DIAGNOSIS — I48.92 ATRIAL FLUTTER WITH RAPID VENTRICULAR RESPONSE: Primary | ICD-10-CM

## 2025-06-19 DIAGNOSIS — I48.92 ATRIAL FLUTTER: ICD-10-CM

## 2025-06-19 DIAGNOSIS — R06.02 SOB (SHORTNESS OF BREATH): ICD-10-CM

## 2025-06-19 DIAGNOSIS — M79.89 LEG SWELLING: ICD-10-CM

## 2025-06-19 DIAGNOSIS — N18.9 ACUTE KIDNEY INJURY SUPERIMPOSED ON CKD: ICD-10-CM

## 2025-06-19 DIAGNOSIS — N18.30 ACUTE RENAL FAILURE SUPERIMPOSED ON STAGE 3 CHRONIC KIDNEY DISEASE, UNSPECIFIED ACUTE RENAL FAILURE TYPE, UNSPECIFIED WHETHER STAGE 3A OR 3B CKD: ICD-10-CM

## 2025-06-19 DIAGNOSIS — D84.821 IMMUNOSUPPRESSION DUE TO DRUG THERAPY: ICD-10-CM

## 2025-06-19 DIAGNOSIS — N17.9 ACUTE RENAL FAILURE SUPERIMPOSED ON STAGE 3 CHRONIC KIDNEY DISEASE, UNSPECIFIED ACUTE RENAL FAILURE TYPE, UNSPECIFIED WHETHER STAGE 3A OR 3B CKD: ICD-10-CM

## 2025-06-19 DIAGNOSIS — D68.51 FACTOR V LEIDEN: Chronic | ICD-10-CM

## 2025-06-19 DIAGNOSIS — N18.32 STAGE 3B CHRONIC KIDNEY DISEASE: ICD-10-CM

## 2025-06-19 DIAGNOSIS — I07.1 TRICUSPID VALVE INSUFFICIENCY, UNSPECIFIED ETIOLOGY: ICD-10-CM

## 2025-06-19 DIAGNOSIS — R60.0 BILATERAL LEG EDEMA: ICD-10-CM

## 2025-06-19 DIAGNOSIS — I48.3 TYPICAL ATRIAL FLUTTER: ICD-10-CM

## 2025-06-19 DIAGNOSIS — N17.9 ACUTE KIDNEY INJURY SUPERIMPOSED ON CKD: ICD-10-CM

## 2025-06-19 DIAGNOSIS — I16.0 HYPERTENSIVE URGENCY: ICD-10-CM

## 2025-06-19 DIAGNOSIS — N18.30 STAGE 3 CHRONIC KIDNEY DISEASE: ICD-10-CM

## 2025-06-19 DIAGNOSIS — I82.413 ACUTE BILATERAL DEEP VEIN THROMBOSIS (DVT) OF FEMORAL VEINS: ICD-10-CM

## 2025-06-19 DIAGNOSIS — C85.80 EBV MEDIATED PRIMARY LYMPHOMA OF LYMPH NODE: ICD-10-CM

## 2025-06-19 DIAGNOSIS — I25.10 CAD (CORONARY ARTERY DISEASE): Primary | ICD-10-CM

## 2025-06-19 DIAGNOSIS — T86.298: ICD-10-CM

## 2025-06-19 DIAGNOSIS — C83.30 DIFFUSE LARGE B-CELL LYMPHOMA, UNSPECIFIED BODY REGION: ICD-10-CM

## 2025-06-19 DIAGNOSIS — I10 ESSENTIAL HYPERTENSION: ICD-10-CM

## 2025-06-19 DIAGNOSIS — I50.43 HEART FAILURE, ACUTE ON CHRONIC, SYSTOLIC AND DIASTOLIC: ICD-10-CM

## 2025-06-19 DIAGNOSIS — T45.1X5A IMMUNODEFICIENCY DUE TO TREATMENT WITH IMMUNOSUPPRESSIVE MEDICATION: ICD-10-CM

## 2025-06-19 DIAGNOSIS — I48.91 ATRIAL FIBRILLATION: ICD-10-CM

## 2025-06-19 LAB
ABSOLUTE EOSINOPHIL (OHS): 0.01 K/UL
ABSOLUTE EOSINOPHIL (OHS): 0.03 K/UL
ABSOLUTE MONOCYTE (OHS): 0.48 K/UL (ref 0.3–1)
ABSOLUTE MONOCYTE (OHS): 0.62 K/UL (ref 0.3–1)
ABSOLUTE NEUTROPHIL COUNT (OHS): 4.11 K/UL (ref 1.8–7.7)
ABSOLUTE NEUTROPHIL COUNT (OHS): 4.84 K/UL (ref 1.8–7.7)
ALBUMIN SERPL BCP-MCNC: 3.1 G/DL (ref 3.5–5.2)
ALBUMIN SERPL BCP-MCNC: 3.4 G/DL (ref 3.5–5.2)
ALLENS TEST: ABNORMAL
ALP SERPL-CCNC: 61 UNIT/L (ref 40–150)
ALP SERPL-CCNC: 65 UNIT/L (ref 40–150)
ALT SERPL W/O P-5'-P-CCNC: 8 UNIT/L (ref 10–44)
ALT SERPL W/O P-5'-P-CCNC: 9 UNIT/L (ref 10–44)
ANION GAP (OHS): 17 MMOL/L (ref 8–16)
ANION GAP (OHS): 20 MMOL/L (ref 8–16)
APTT PPP: 27.7 SECONDS (ref 21–32)
APTT PPP: 71.8 SECONDS (ref 21–32)
APTT PPP: >150 SECONDS (ref 21–32)
AST SERPL-CCNC: 11 UNIT/L (ref 11–45)
AST SERPL-CCNC: 12 UNIT/L (ref 11–45)
B-OH-BUTYR BLD STRIP-SCNC: 0.3 MMOL/L
BASOPHILS # BLD AUTO: 0.03 K/UL
BASOPHILS # BLD AUTO: 0.04 K/UL
BASOPHILS NFR BLD AUTO: 0.5 %
BASOPHILS NFR BLD AUTO: 0.7 %
BILIRUB SERPL-MCNC: 0.5 MG/DL (ref 0.1–1)
BILIRUB SERPL-MCNC: 0.8 MG/DL (ref 0.1–1)
BNP SERPL-MCNC: 362 PG/ML (ref 0–99)
BNP SERPL-MCNC: 709 PG/ML (ref 0–99)
BUN SERPL-MCNC: 27 MG/DL (ref 6–20)
BUN SERPL-MCNC: 30 MG/DL (ref 6–20)
CALCIUM SERPL-MCNC: 7.8 MG/DL (ref 8.7–10.5)
CALCIUM SERPL-MCNC: 8.9 MG/DL (ref 8.7–10.5)
CHLORIDE SERPL-SCNC: 102 MMOL/L (ref 95–110)
CHLORIDE SERPL-SCNC: 93 MMOL/L (ref 95–110)
CO2 SERPL-SCNC: 19 MMOL/L (ref 23–29)
CO2 SERPL-SCNC: 19 MMOL/L (ref 23–29)
CREAT SERPL-MCNC: 3 MG/DL (ref 0.5–1.4)
CREAT SERPL-MCNC: 3.5 MG/DL (ref 0.5–1.4)
DELSYS: ABNORMAL
EAG (OHS): 126 MG/DL (ref 68–131)
ERYTHROCYTE [DISTWIDTH] IN BLOOD BY AUTOMATED COUNT: 13.1 % (ref 11.5–14.5)
ERYTHROCYTE [DISTWIDTH] IN BLOOD BY AUTOMATED COUNT: 13.1 % (ref 11.5–14.5)
GFR SERPLBLD CREATININE-BSD FMLA CKD-EPI: 20 ML/MIN/1.73/M2
GFR SERPLBLD CREATININE-BSD FMLA CKD-EPI: 24 ML/MIN/1.73/M2
GLUCOSE SERPL-MCNC: 136 MG/DL (ref 70–110)
GLUCOSE SERPL-MCNC: 436 MG/DL (ref 70–110)
HBA1C MFR BLD: 6 % (ref 4–5.6)
HCO3 UR-SCNC: 23.2 MMOL/L (ref 24–28)
HCT VFR BLD AUTO: 37.4 % (ref 40–54)
HCT VFR BLD AUTO: 40.9 % (ref 40–54)
HCV AB SERPL QL IA: NEGATIVE
HGB BLD-MCNC: 12.3 GM/DL (ref 14–18)
HGB BLD-MCNC: 13.5 GM/DL (ref 14–18)
HIV 1+2 AB+HIV1 P24 AG SERPL QL IA: NEGATIVE
HOLD SPECIMEN: NORMAL
IMM GRANULOCYTES # BLD AUTO: 0.02 K/UL (ref 0–0.04)
IMM GRANULOCYTES # BLD AUTO: 0.03 K/UL (ref 0–0.04)
IMM GRANULOCYTES NFR BLD AUTO: 0.3 % (ref 0–0.5)
IMM GRANULOCYTES NFR BLD AUTO: 0.5 % (ref 0–0.5)
INR PPP: 1.2 (ref 0.8–1.2)
INR PPP: 1.7 (ref 0.8–1.2)
LACTATE SERPL-SCNC: 1.5 MMOL/L (ref 0.5–2.2)
LYMPHOCYTES # BLD AUTO: 1.1 K/UL (ref 1–4.8)
LYMPHOCYTES # BLD AUTO: 1.29 K/UL (ref 1–4.8)
MAGNESIUM SERPL-MCNC: 1.6 MG/DL (ref 1.6–2.6)
MAGNESIUM SERPL-MCNC: 1.7 MG/DL (ref 1.6–2.6)
MCH RBC QN AUTO: 29.3 PG (ref 27–31)
MCH RBC QN AUTO: 29.9 PG (ref 27–31)
MCHC RBC AUTO-ENTMCNC: 32.9 G/DL (ref 32–36)
MCHC RBC AUTO-ENTMCNC: 33 G/DL (ref 32–36)
MCV RBC AUTO: 89 FL (ref 82–98)
MCV RBC AUTO: 91 FL (ref 82–98)
NUCLEATED RBC (/100WBC) (OHS): 0 /100 WBC
NUCLEATED RBC (/100WBC) (OHS): 0 /100 WBC
OHS QRS DURATION: 128 MS
OHS QRS DURATION: 148 MS
OHS QTC CALCULATION: 436 MS
OHS QTC CALCULATION: 605 MS
PCO2 BLDA: 36.7 MMHG (ref 35–45)
PH SMN: 7.41 [PH] (ref 7.35–7.45)
PLATELET # BLD AUTO: 264 K/UL (ref 150–450)
PLATELET # BLD AUTO: 283 K/UL (ref 150–450)
PMV BLD AUTO: 10.2 FL (ref 9.2–12.9)
PMV BLD AUTO: 9.7 FL (ref 9.2–12.9)
PO2 BLDA: 29 MMHG (ref 40–60)
POC BE: -2 MMOL/L (ref -2–2)
POC SATURATED O2: 55 % (ref 95–100)
POC TCO2: 24 MMOL/L (ref 24–29)
POCT GLUCOSE: 129 MG/DL (ref 70–110)
POTASSIUM SERPL-SCNC: 3.1 MMOL/L (ref 3.5–5.1)
POTASSIUM SERPL-SCNC: 3.3 MMOL/L (ref 3.5–5.1)
PROT SERPL-MCNC: 6.9 GM/DL (ref 6–8.4)
PROT SERPL-MCNC: 8.3 GM/DL (ref 6–8.4)
PROTHROMBIN TIME: 13.9 SECONDS (ref 9–12.5)
PROTHROMBIN TIME: 18.3 SECONDS (ref 9–12.5)
RBC # BLD AUTO: 4.2 M/UL (ref 4.6–6.2)
RBC # BLD AUTO: 4.52 M/UL (ref 4.6–6.2)
RELATIVE EOSINOPHIL (OHS): 0.2 %
RELATIVE EOSINOPHIL (OHS): 0.5 %
RELATIVE LYMPHOCYTE (OHS): 16.5 % (ref 18–48)
RELATIVE LYMPHOCYTE (OHS): 21.7 % (ref 18–48)
RELATIVE MONOCYTE (OHS): 8.1 % (ref 4–15)
RELATIVE MONOCYTE (OHS): 9.3 % (ref 4–15)
RELATIVE NEUTROPHIL (OHS): 69 % (ref 38–73)
RELATIVE NEUTROPHIL (OHS): 72.7 % (ref 38–73)
SAMPLE: ABNORMAL
SITE: ABNORMAL
SODIUM SERPL-SCNC: 132 MMOL/L (ref 136–145)
SODIUM SERPL-SCNC: 138 MMOL/L (ref 136–145)
TROPONIN I SERPL DL<=0.01 NG/ML-MCNC: 0.02 NG/ML
TSH SERPL-ACNC: 1.48 UIU/ML (ref 0.4–4)
WBC # BLD AUTO: 5.95 K/UL (ref 3.9–12.7)
WBC # BLD AUTO: 6.65 K/UL (ref 3.9–12.7)

## 2025-06-19 PROCEDURE — 25000003 PHARM REV CODE 250: Performed by: STUDENT IN AN ORGANIZED HEALTH CARE EDUCATION/TRAINING PROGRAM

## 2025-06-19 PROCEDURE — 63600175 PHARM REV CODE 636 W HCPCS

## 2025-06-19 PROCEDURE — 93005 ELECTROCARDIOGRAM TRACING: CPT

## 2025-06-19 PROCEDURE — 96376 TX/PRO/DX INJ SAME DRUG ADON: CPT

## 2025-06-19 PROCEDURE — 83880 ASSAY OF NATRIURETIC PEPTIDE: CPT | Performed by: NURSE PRACTITIONER

## 2025-06-19 PROCEDURE — 85025 COMPLETE CBC W/AUTO DIFF WBC: CPT | Performed by: NURSE PRACTITIONER

## 2025-06-19 PROCEDURE — 85730 THROMBOPLASTIN TIME PARTIAL: CPT | Performed by: STUDENT IN AN ORGANIZED HEALTH CARE EDUCATION/TRAINING PROGRAM

## 2025-06-19 PROCEDURE — 96375 TX/PRO/DX INJ NEW DRUG ADDON: CPT

## 2025-06-19 PROCEDURE — 93010 ELECTROCARDIOGRAM REPORT: CPT | Mod: 76,,, | Performed by: INTERNAL MEDICINE

## 2025-06-19 PROCEDURE — 85025 COMPLETE CBC W/AUTO DIFF WBC: CPT | Performed by: STUDENT IN AN ORGANIZED HEALTH CARE EDUCATION/TRAINING PROGRAM

## 2025-06-19 PROCEDURE — 94761 N-INVAS EAR/PLS OXIMETRY MLT: CPT

## 2025-06-19 PROCEDURE — 99291 CRITICAL CARE FIRST HOUR: CPT | Mod: ,,, | Performed by: INTERNAL MEDICINE

## 2025-06-19 PROCEDURE — 82010 KETONE BODYS QUAN: CPT | Performed by: STUDENT IN AN ORGANIZED HEALTH CARE EDUCATION/TRAINING PROGRAM

## 2025-06-19 PROCEDURE — 80053 COMPREHEN METABOLIC PANEL: CPT | Performed by: STUDENT IN AN ORGANIZED HEALTH CARE EDUCATION/TRAINING PROGRAM

## 2025-06-19 PROCEDURE — 83735 ASSAY OF MAGNESIUM: CPT | Performed by: EMERGENCY MEDICINE

## 2025-06-19 PROCEDURE — 84484 ASSAY OF TROPONIN QUANT: CPT | Performed by: NURSE PRACTITIONER

## 2025-06-19 PROCEDURE — 63600175 PHARM REV CODE 636 W HCPCS: Performed by: EMERGENCY MEDICINE

## 2025-06-19 PROCEDURE — 96366 THER/PROPH/DIAG IV INF ADDON: CPT

## 2025-06-19 PROCEDURE — 99900035 HC TECH TIME PER 15 MIN (STAT)

## 2025-06-19 PROCEDURE — 96368 THER/DIAG CONCURRENT INF: CPT

## 2025-06-19 PROCEDURE — A9567 TECHNETIUM TC-99M AEROSOL: HCPCS | Performed by: EMERGENCY MEDICINE

## 2025-06-19 PROCEDURE — 63600175 PHARM REV CODE 636 W HCPCS: Performed by: STUDENT IN AN ORGANIZED HEALTH CARE EDUCATION/TRAINING PROGRAM

## 2025-06-19 PROCEDURE — A9540 TC99M MAA: HCPCS | Performed by: EMERGENCY MEDICINE

## 2025-06-19 PROCEDURE — 20000000 HC ICU ROOM

## 2025-06-19 PROCEDURE — 25000003 PHARM REV CODE 250: Performed by: EMERGENCY MEDICINE

## 2025-06-19 PROCEDURE — 85610 PROTHROMBIN TIME: CPT | Performed by: EMERGENCY MEDICINE

## 2025-06-19 PROCEDURE — 94799 UNLISTED PULMONARY SVC/PX: CPT

## 2025-06-19 PROCEDURE — 83605 ASSAY OF LACTIC ACID: CPT | Performed by: STUDENT IN AN ORGANIZED HEALTH CARE EDUCATION/TRAINING PROGRAM

## 2025-06-19 PROCEDURE — 83735 ASSAY OF MAGNESIUM: CPT | Performed by: STUDENT IN AN ORGANIZED HEALTH CARE EDUCATION/TRAINING PROGRAM

## 2025-06-19 PROCEDURE — 85730 THROMBOPLASTIN TIME PARTIAL: CPT | Performed by: EMERGENCY MEDICINE

## 2025-06-19 PROCEDURE — 85610 PROTHROMBIN TIME: CPT | Performed by: STUDENT IN AN ORGANIZED HEALTH CARE EDUCATION/TRAINING PROGRAM

## 2025-06-19 PROCEDURE — 93010 ELECTROCARDIOGRAM REPORT: CPT | Mod: ,,, | Performed by: INTERNAL MEDICINE

## 2025-06-19 PROCEDURE — 82803 BLOOD GASES ANY COMBINATION: CPT

## 2025-06-19 PROCEDURE — 99223 1ST HOSP IP/OBS HIGH 75: CPT | Mod: ,,, | Performed by: INTERNAL MEDICINE

## 2025-06-19 PROCEDURE — 83036 HEMOGLOBIN GLYCOSYLATED A1C: CPT

## 2025-06-19 PROCEDURE — 83880 ASSAY OF NATRIURETIC PEPTIDE: CPT | Performed by: STUDENT IN AN ORGANIZED HEALTH CARE EDUCATION/TRAINING PROGRAM

## 2025-06-19 PROCEDURE — 87389 HIV-1 AG W/HIV-1&-2 AB AG IA: CPT | Performed by: EMERGENCY MEDICINE

## 2025-06-19 PROCEDURE — 84443 ASSAY THYROID STIM HORMONE: CPT | Performed by: STUDENT IN AN ORGANIZED HEALTH CARE EDUCATION/TRAINING PROGRAM

## 2025-06-19 PROCEDURE — 96365 THER/PROPH/DIAG IV INF INIT: CPT

## 2025-06-19 PROCEDURE — 96374 THER/PROPH/DIAG INJ IV PUSH: CPT

## 2025-06-19 PROCEDURE — 86803 HEPATITIS C AB TEST: CPT | Performed by: EMERGENCY MEDICINE

## 2025-06-19 PROCEDURE — 84075 ASSAY ALKALINE PHOSPHATASE: CPT | Performed by: NURSE PRACTITIONER

## 2025-06-19 PROCEDURE — 99291 CRITICAL CARE FIRST HOUR: CPT

## 2025-06-19 RX ORDER — POTASSIUM CHLORIDE 20 MEQ/1
40 TABLET, EXTENDED RELEASE ORAL
Status: DISCONTINUED | OUTPATIENT
Start: 2025-06-19 | End: 2025-06-19

## 2025-06-19 RX ORDER — GLUCAGON 1 MG
1 KIT INJECTION
Status: DISCONTINUED | OUTPATIENT
Start: 2025-06-19 | End: 2025-06-22

## 2025-06-19 RX ORDER — POTASSIUM CHLORIDE 20 MEQ/1
40 TABLET, EXTENDED RELEASE ORAL EVERY 4 HOURS
Status: DISCONTINUED | OUTPATIENT
Start: 2025-06-19 | End: 2025-06-19

## 2025-06-19 RX ORDER — INSULIN ASPART 100 [IU]/ML
0-5 INJECTION, SOLUTION INTRAVENOUS; SUBCUTANEOUS
Status: DISCONTINUED | OUTPATIENT
Start: 2025-06-19 | End: 2025-06-22

## 2025-06-19 RX ORDER — HEPARIN SODIUM,PORCINE/D5W 25000/250
0-40 INTRAVENOUS SOLUTION INTRAVENOUS CONTINUOUS
Status: DISCONTINUED | OUTPATIENT
Start: 2025-06-19 | End: 2025-06-19 | Stop reason: HOSPADM

## 2025-06-19 RX ORDER — MAGNESIUM SULFATE HEPTAHYDRATE 40 MG/ML
2 INJECTION, SOLUTION INTRAVENOUS ONCE
Status: COMPLETED | OUTPATIENT
Start: 2025-06-19 | End: 2025-06-19

## 2025-06-19 RX ORDER — METOPROLOL TARTRATE 1 MG/ML
5 INJECTION, SOLUTION INTRAVENOUS
Status: COMPLETED | OUTPATIENT
Start: 2025-06-19 | End: 2025-06-19

## 2025-06-19 RX ORDER — HEPARIN SODIUM,PORCINE/D5W 25000/250
0-40 INTRAVENOUS SOLUTION INTRAVENOUS CONTINUOUS
Status: DISCONTINUED | OUTPATIENT
Start: 2025-06-19 | End: 2025-06-20

## 2025-06-19 RX ORDER — CLONIDINE HYDROCHLORIDE 0.1 MG/1
0.1 TABLET ORAL
Status: COMPLETED | OUTPATIENT
Start: 2025-06-19 | End: 2025-06-19

## 2025-06-19 RX ORDER — POTASSIUM CHLORIDE 7.45 MG/ML
10 INJECTION INTRAVENOUS
Status: COMPLETED | OUTPATIENT
Start: 2025-06-19 | End: 2025-06-19

## 2025-06-19 RX ORDER — METOPROLOL TARTRATE 1 MG/ML
5 INJECTION, SOLUTION INTRAVENOUS
Status: DISCONTINUED | OUTPATIENT
Start: 2025-06-19 | End: 2025-06-19

## 2025-06-19 RX ORDER — IBUPROFEN 200 MG
16 TABLET ORAL
Status: DISCONTINUED | OUTPATIENT
Start: 2025-06-19 | End: 2025-06-22

## 2025-06-19 RX ORDER — HYDRALAZINE HYDROCHLORIDE 25 MG/1
25 TABLET, FILM COATED ORAL EVERY 8 HOURS
Status: DISCONTINUED | OUTPATIENT
Start: 2025-06-19 | End: 2025-06-20

## 2025-06-19 RX ORDER — SODIUM CHLORIDE 0.9 % (FLUSH) 0.9 %
10 SYRINGE (ML) INJECTION
Status: DISCONTINUED | OUTPATIENT
Start: 2025-06-19 | End: 2025-06-19

## 2025-06-19 RX ORDER — INSULIN ASPART 100 [IU]/ML
0-10 INJECTION, SOLUTION INTRAVENOUS; SUBCUTANEOUS
Status: DISCONTINUED | OUTPATIENT
Start: 2025-06-19 | End: 2025-06-19

## 2025-06-19 RX ORDER — AMLODIPINE BESYLATE 10 MG/1
10 TABLET ORAL DAILY
Status: DISCONTINUED | OUTPATIENT
Start: 2025-06-20 | End: 2025-06-24

## 2025-06-19 RX ORDER — METOPROLOL TARTRATE 25 MG/1
25 TABLET, FILM COATED ORAL
Status: COMPLETED | OUTPATIENT
Start: 2025-06-19 | End: 2025-06-19

## 2025-06-19 RX ORDER — POTASSIUM CHLORIDE 750 MG/1
50 CAPSULE, EXTENDED RELEASE ORAL
Status: COMPLETED | OUTPATIENT
Start: 2025-06-19 | End: 2025-06-19

## 2025-06-19 RX ORDER — IBUPROFEN 200 MG
24 TABLET ORAL
Status: DISCONTINUED | OUTPATIENT
Start: 2025-06-19 | End: 2025-06-22

## 2025-06-19 RX ORDER — HEPARIN SODIUM,PORCINE/D5W 25000/250
0-40 INTRAVENOUS SOLUTION INTRAVENOUS CONTINUOUS
Status: DISCONTINUED | OUTPATIENT
Start: 2025-06-19 | End: 2025-06-19

## 2025-06-19 RX ADMIN — AMIODARONE HYDROCHLORIDE 150 MG: 1.5 INJECTION, SOLUTION INTRAVENOUS at 03:06

## 2025-06-19 RX ADMIN — METOPROLOL TARTRATE 25 MG: 25 TABLET, FILM COATED ORAL at 12:06

## 2025-06-19 RX ADMIN — METOROPROLOL TARTRATE 5 MG: 5 INJECTION, SOLUTION INTRAVENOUS at 01:06

## 2025-06-19 RX ADMIN — AMIODARONE HYDROCHLORIDE 1 MG/MIN: 1.8 INJECTION, SOLUTION INTRAVENOUS at 07:06

## 2025-06-19 RX ADMIN — CLONIDINE HYDROCHLORIDE 0.1 MG: 0.1 TABLET ORAL at 01:06

## 2025-06-19 RX ADMIN — HYDRALAZINE HYDROCHLORIDE 25 MG: 25 TABLET ORAL at 10:06

## 2025-06-19 RX ADMIN — KIT FOR THE PREPARATION OF TECHNETIUM TC 99M PENTETATE 30 MILLICURIE: 20 INJECTION, POWDER, LYOPHILIZED, FOR SOLUTION INTRAVENOUS; RESPIRATORY (INHALATION) at 02:06

## 2025-06-19 RX ADMIN — AMIODARONE HYDROCHLORIDE 1 MG/MIN: 1.8 INJECTION, SOLUTION INTRAVENOUS at 03:06

## 2025-06-19 RX ADMIN — POTASSIUM CHLORIDE 10 MEQ: 7.46 INJECTION, SOLUTION INTRAVENOUS at 01:06

## 2025-06-19 RX ADMIN — POTASSIUM CHLORIDE 50 MEQ: 750 CAPSULE, EXTENDED RELEASE ORAL at 08:06

## 2025-06-19 RX ADMIN — KIT FOR THE PREPARATION OF TECHNETIUM TC 99M ALBUMIN AGGREGATED 5 MILLICURIE: 2.5 INJECTION, POWDER, FOR SOLUTION INTRAVENOUS at 02:06

## 2025-06-19 RX ADMIN — HEPARIN SODIUM 18 UNITS/KG/HR: 10000 INJECTION, SOLUTION INTRAVENOUS at 01:06

## 2025-06-19 RX ADMIN — POTASSIUM CHLORIDE 50 MEQ: 750 CAPSULE, EXTENDED RELEASE ORAL at 10:06

## 2025-06-19 RX ADMIN — HEPARIN SODIUM AND DEXTROSE 18 UNITS/KG/HR: 10000; 5 INJECTION INTRAVENOUS at 07:06

## 2025-06-19 RX ADMIN — MAGNESIUM SULFATE HEPTAHYDRATE 2 G: 40 INJECTION, SOLUTION INTRAVENOUS at 08:06

## 2025-06-19 NOTE — ED NOTES
T/c to pharmacy regarding metoprolol not available to be pulled from the [facility].  Pharmacy stated pt-specific dose will be delivered.

## 2025-06-19 NOTE — ASSESSMENT & PLAN NOTE
History of DVT. Non-compliant with home lovenox.   Presented with bilateral LE swelling, found to have bilateral extensive occlusive DVTs  Started on heparin gtt at osh     - Continue heparin gtt.

## 2025-06-19 NOTE — ASSESSMENT & PLAN NOTE
History of DVT and factor V Leiden deficiency . Non-compliant with home lovenox. Presented with bilateral LE swelling, found to have bilateral extensive occlusive DVTs. Started on heparin gtt at osh     - Continue heparin gtt.

## 2025-06-19 NOTE — Clinical Note
The catheter was repositioned into the pulmonary artery. Hemodynamics were performed.  The patient's O2 saturation measured.  CO = 4.70   CI = 2.11

## 2025-06-19 NOTE — ASSESSMENT & PLAN NOTE
NILES on CKD stage 3 possibly secondary to prograf toxicity vs dehydration. Patient does not appear in ADHF on exam and low likelhood for rejection with hx of HFmrEF. However cannot explain elevated BNP so will need further evaluation. Possible baseline cr 1.5-2. Creatinine around 3 on arrival to OSH     - daily BMP to trend creatinine.   - AM prograf level.   - Will order formal Echo.   - Strict I and Os.

## 2025-06-19 NOTE — FIRST PROVIDER EVALUATION
Medical screening examination initiated.  I have conducted a focused provider triage encounter, findings are as follows:    Brief history of present illness:  Pt. C/o left lower leg swelling    Vitals:    06/19/25 1121   BP: (!) 175/106   BP Location: Left arm   Pulse: 95   Resp: 18   Temp: 98 °F (36.7 °C)   TempSrc: Oral   SpO2: 99%   Weight: 103.1 kg (227 lb 3.2 oz)       Pertinent physical exam:  left lower leg swelling    Brief workup plan:  labs imaging     Preliminary workup initiated; this workup will be continued and followed by the physician or advanced practice provider that is assigned to the patient when roomed.

## 2025-06-19 NOTE — PHARMACY MED REC
"Admission Medication History     The home medication history was taken by Ramón Moore.    You may go to "Admission" then "Reconcile Home Medications" tabs to review and/or act upon these items.     The home medication list has been updated by the Pharmacy department.   Please read ALL comments highlighted in yellow.   Please address this information as you see fit.    Feel free to contact us if you have any questions or require assistance.      The medications listed below were removed from the home medication list. Please reorder if appropriate:  Patient reports no longer taking the following medication(s):  LOVENOX 100MG/ML  MAG-OX 400MG  PRAVACHOL 40MG    Medications listed below were obtained from: Patient/family and Analytic software- [employer]  (Not in a hospital admission)        Ramón Moore  [id number]    Current Outpatient Medications on File Prior to Encounter   Medication Sig Dispense Refill Last Dose/Taking    amLODIPine (NORVASC) 10 MG tablet TAKE 1 TABLET BY MOUTH ONCE DAILY 90 tablet 2 6/19/2025    cloNIDine (CATAPRES) 0.1 MG tablet Take 3 tablets (0.3 mg total) by mouth 2 (two) times daily. 180 tablet 11 6/19/2025    hydrALAZINE (APRESOLINE) 25 MG tablet Take 1 tablet (25 mg total) by mouth 3 (three) times daily. 90 tablet 11 6/19/2025    tacrolimus (PROGRAF) 1 MG Cap Take 1 capsule (1 mg total) by mouth every 12 (twelve) hours. 180 capsule 4 6/19/2025     .          "

## 2025-06-19 NOTE — ED PROVIDER NOTES
SCRIBE #1 NOTE: I, Aby Wade, am scribing for, and in the presence of, Carmen Jarrell MD. I have scribed the entire note.       History     Chief Complaint   Patient presents with    Leg Swelling     Pt with stated history of heart transplant and lymphedema c/o left leg swelling x 2 weeks.  Pt also c/o SOB with walking.     Review of patient's allergies indicates:  No Known Allergies      History of Present Illness         6/19/2025, 11:53 AM  History obtained from the patient, medical records, and sister      History of Present Illness:Alfonso Leger is a 55 y.o. male patient with a PMHx of heart transplant in 2012, DVT, irregular heart rhythms, HTN, HLD, CHF, cardiomyopathy, blood transfusion(s), type 2 DM, stage 3 CKD, and immunodeficiency who presents to the Emergency Department for evaluation of LLE swelling which began approx. 2 weeks ago. Denies any trauma or injury. No fever, chills, syncope or presyncope.  Hx of DVT, not on anti-coagulation. Symptoms are constant and moderate in severity. Pt cannot ambulate or apply pressure to the LLE without feeling pain. Patient denies any CP or SOB. Prior Tx includes taking hydralazine this morning. Pt is noncompliant with his clonidine. Pt also stopped taking his Lovenox. No further complaints or concerns at this time.       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    Factor V Leiden 9/7/2012    Hypertension     Immunodeficiency due to treatment with immunosuppressive medication     Other and unspecified hyperlipidemia 10/11/2013    Stroke     Type II or unspecified type  diabetes mellitus without mention of complication, uncontrolled 10/11/2013    Typical atrial flutter 2025       Past Surgical History:  Past Surgical History:   Procedure Laterality Date    ABDOMINAL SURGERY      CARDIAC CATHETERIZATION      CATHETERIZATION OF BOTH LEFT AND RIGHT HEART N/A 8/10/2020    Procedure: CATHETERIZATION, HEART, BOTH LEFT AND RIGHT;  Surgeon: Michael Fernandez MD;  Location: [facility] CATH LAB;  Service: Cardiology;  Laterality: N/A;    CORONARY ANGIOGRAPHY N/A 8/10/2020    Procedure: ANGIOGRAM, CORONARY ARTERY;  Surgeon: Michael Fernandez MD;  Location: [facility] CATH LAB;  Service: Cardiology;  Laterality: N/A;    HEART TRANSPLANT      IVC FILTER RETRIEVAL      pt reports still in place         Family History:  Family History   Problem Relation Name Age of Onset    Diabetes Maternal Uncle      Cancer Paternal Grandfather      Celiac disease Neg Hx      Cirrhosis Neg Hx      Colon cancer Neg Hx      Colon polyps Neg Hx      Crohn's disease Neg Hx      Esophageal cancer Neg Hx      Inflammatory bowel disease Neg Hx      Liver cancer Neg Hx      Liver disease Neg Hx      Rectal cancer Neg Hx      Stomach cancer Neg Hx      Ulcerative colitis Neg Hx      Stroke Neg Hx      Kidney disease Neg Hx      Hypertension Neg Hx      Hyperlipidemia Neg Hx      Heart disease Neg Hx         Social History:  Social History     Tobacco Use    Smoking status: Former     Current packs/day: 0.00     Types: Cigarettes     Quit date: 2011     Years since quittin.1    Smokeless tobacco: Former   Substance and Sexual Activity    Alcohol use: No    Drug use: No    Sexual activity: Yes     Partners: Female     Birth control/protection: None        Review of Systems     Review of Systems   Constitutional:  Negative for fever.   HENT:  Negative for sore throat.    Respiratory:  Negative for shortness of breath.    Cardiovascular:  Positive for leg swelling (LLE). Negative for chest pain.   Gastrointestinal:  Negative  for nausea.   Genitourinary:  Negative for dysuria.   Musculoskeletal:  Negative for back pain.   Skin:  Negative for rash.   Neurological:  Negative for weakness.   Hematological:  Does not bruise/bleed easily.   All other systems reviewed and are negative.     Physical Exam     Initial Vitals [06/19/25 1121]   BP Pulse Resp Temp SpO2   (!) 175/106 95 18 98 °F (36.7 °C) 99 %      MAP       --          Physical Exam  Nursing Notes and Vital Signs Reviewed.  Constitutional: Patient is in no acute distress. Very poor historian. Well-developed and well-nourished.  Head: Atraumatic. Normocephalic.  Eyes: PERRL. EOM intact. Conjunctivae are not pale. No scleral icterus.  ENT: Mucous membranes are moist. Oropharynx is clear and symmetric.    Neck: Supple. Full ROM. No lymphadenopathy. JVD noted.   Cardiovascular: Tachycardic. Irregularly irregular rhythm. No murmurs, rubs, or gallops. Distal pulses are 2+ and symmetric.  Pulmonary/Chest: No respiratory distress. Clear to auscultation bilaterally. No wheezing or rales.  Abdominal: Soft and non-distended.  There is no tenderness. No rebound, guarding, or rigidity. Good bowel sounds.  Musculoskeletal: Moves all extremities. No obvious deformities. Non-pitting edema to the left distal leg. Trace edema to right distal leg.  DP 1 + equal and symmetric. No overlying skin changes or concerns for infection  Skin: Warm and dry.  Neurological:  Alert, awake, and appropriate.  Normal speech.  No acute focal neurological deficits are appreciated.  Psychiatric: Normal affect. Good eye contact. Appropriate in content.     ED Course   Critical Care    Date/Time: 6/19/2025 2:00 PM    Performed by: Carmen Jarrell MD  Authorized by: Carmen Jarrell MD  Direct patient critical care time: 45 minutes  Additional history critical care time: 8 minutes  Ordering / reviewing critical care time: 8 minutes  Documentation critical care time: 8 minutes  Consulting other physicians critical care  time: 12 minutes  Total critical care time (exclusive of procedural time) : 81 minutes  Critical care was necessary to treat or prevent imminent or life-threatening deterioration of the following conditions: cardiac failure, circulatory failure and renal failure.  Critical care was time spent personally by me on the following activities: blood draw for specimens, development of treatment plan with patient or surrogate, discussions with consultants, interpretation of cardiac output measurements, evaluation of patient's response to treatment, examination of patient, obtaining history from patient or surrogate, ordering and performing treatments and interventions, ordering and review of laboratory studies, ordering and review of radiographic studies, pulse oximetry, re-evaluation of patient's condition and review of old charts.        ED Vital Signs:  Vitals:    06/19/25 1121 06/19/25 1234 06/19/25 1302 06/19/25 1315   BP: (!) 175/106 (!) 175/106 (!) 176/111 (!) 165/117   Pulse: 95 (!) 150 (!) 171 (!) 139   Resp: 18 20 20   Temp: 98 °F (36.7 °C)      TempSrc: Oral      SpO2: 99% 99%  100%   Weight: 103.1 kg (227 lb 3.2 oz)       06/19/25 1320 06/19/25 1327 06/19/25 1330 06/19/25 1354   BP: (!) 156/117 (!) 147/104 (!) 147/104 (!) 150/110   Pulse:  (!) 135 (!) 137 (!) 135   Resp:   20    Temp:       TempSrc:       SpO2:   99%    Weight:        06/19/25 1515 06/19/25 1528 06/19/25 1537 06/19/25 1542   BP: (!) 142/95 (!) 146/103 121/83 (!) 125/95   Pulse: (!) 128 (!) 122 (!) 127 (!) 128   Resp: 20 20 20 20   Temp:       TempSrc:       SpO2: 99% 99% 99% 98%   Weight:        06/19/25 1552 06/19/25 1602   BP: 121/89 (!) 126/97   Pulse: (!) 130 (!) 118   Resp: 20 17   Temp:     TempSrc:     SpO2: 99% 99%   Weight:         Abnormal Lab Results:  Labs Reviewed   B-TYPE NATRIURETIC PEPTIDE - Abnormal       Result Value     (*)    COMPREHENSIVE METABOLIC PANEL - Abnormal    Sodium 138      Potassium 3.3 (*)     Chloride  102      CO2 19 (*)     Glucose 136 (*)     BUN 30 (*)     Creatinine 3.5 (*)     Calcium 8.9      Protein Total 8.3      Albumin 3.4 (*)     Bilirubin Total 0.8      ALP 65      AST 12      ALT 9 (*)     Anion Gap 17 (*)     eGFR 20 (*)    CBC WITH DIFFERENTIAL - Abnormal    WBC 6.65      RBC 4.52 (*)     HGB 13.5 (*)     HCT 40.9      MCV 91      MCH 29.9      MCHC 33.0      RDW 13.1      Platelet Count 264      MPV 9.7      Nucleated RBC 0      Neut % 72.7      Lymph % 16.5 (*)     Mono % 9.3      Eos % 0.5      Basophil % 0.5      Imm Grans % 0.5      Neut # 4.84      Lymph # 1.10      Mono # 0.62      Eos # 0.03      Baso # 0.03      Imm Grans # 0.03     PROTIME-INR - Abnormal    PT 13.9 (*)     INR 1.2     HEPATITIS C ANTIBODY - Normal    Hep C Ab Interp Negative     HIV 1 / 2 ANTIBODY - Normal    HIV 1/2 Ag/Ab Negative     TROPONIN I - Normal    Troponin-I 0.017     APTT - Normal    PTT 27.7     MAGNESIUM - Normal    Magnesium  1.6     HEP C VIRUS HOLD SPECIMEN    Extra Tube Hold for add-ons.     CBC W/ AUTO DIFFERENTIAL    Narrative:     The following orders were created for panel order CBC auto differential.  Procedure                               Abnormality         Status                     ---------                               -----------         ------                     CBC with Differential[[id number]]       Abnormal            Final result                 Please view results for these tests on the individual orders.        All Lab Results:  Results for orders placed or performed during the hospital encounter of 06/19/25   EKG 12-lead    Collection Time: 06/19/25 11:27 AM   Result Value Ref Range    QRS Duration 128 ms    [facility] QTC Calculation 436 ms   EKG 12-lead (Shortness of Breath) Age > 50    Collection Time: 06/19/25 12:00 PM   Result Value Ref Range    QRS Duration 148 ms    [facility] QTC Calculation 605 ms   Hepatitis C Antibody    Collection Time: 06/19/25 12:08 PM   Result Value Ref Range     Hep C Ab Interp Negative Negative   HCV Virus Hold Specimen    Collection Time: 06/19/25 12:08 PM   Result Value Ref Range    Extra Tube Hold for add-ons.    HIV 1/2 Ag/Ab (4th Gen)    Collection Time: 06/19/25 12:08 PM   Result Value Ref Range    HIV 1/2 Ag/Ab Negative Negative   Brain natriuretic peptide    Collection Time: 06/19/25 12:08 PM   Result Value Ref Range     (H) 0 - 99 pg/mL   Comprehensive metabolic panel    Collection Time: 06/19/25 12:08 PM   Result Value Ref Range    Sodium 138 136 - 145 mmol/L    Potassium 3.3 (L) 3.5 - 5.1 mmol/L    Chloride 102 95 - 110 mmol/L    CO2 19 (L) 23 - 29 mmol/L    Glucose 136 (H) 70 - 110 mg/dL    BUN 30 (H) 6 - 20 mg/dL    Creatinine 3.5 (H) 0.5 - 1.4 mg/dL    Calcium 8.9 8.7 - 10.5 mg/dL    Protein Total 8.3 6.0 - 8.4 gm/dL    Albumin 3.4 (L) 3.5 - 5.2 g/dL    Bilirubin Total 0.8 0.1 - 1.0 mg/dL    ALP 65 40 - 150 unit/L    AST 12 11 - 45 unit/L    ALT 9 (L) 10 - 44 unit/L    Anion Gap 17 (H) 8 - 16 mmol/L    eGFR 20 (L) >60 mL/min/1.73/m2   Troponin I    Collection Time: 06/19/25 12:08 PM   Result Value Ref Range    Troponin-I 0.017 <=0.026 ng/mL   CBC with Differential    Collection Time: 06/19/25 12:08 PM   Result Value Ref Range    WBC 6.65 3.90 - 12.70 K/uL    RBC 4.52 (L) 4.60 - 6.20 M/uL    HGB 13.5 (L) 14.0 - 18.0 gm/dL    HCT 40.9 40.0 - 54.0 %    MCV 91 82 - 98 fL    MCH 29.9 27.0 - 31.0 pg    MCHC 33.0 32.0 - 36.0 g/dL    RDW 13.1 11.5 - 14.5 %    Platelet Count 264 150 - 450 K/uL    MPV 9.7 9.2 - 12.9 fL    Nucleated RBC 0 <=0 /100 WBC    Neut % 72.7 38 - 73 %    Lymph % 16.5 (L) 18 - 48 %    Mono % 9.3 4 - 15 %    Eos % 0.5 <=8 %    Basophil % 0.5 <=1.9 %    Imm Grans % 0.5 0.0 - 0.5 %    Neut # 4.84 1.8 - 7.7 K/uL    Lymph # 1.10 1 - 4.8 K/uL    Mono # 0.62 0.3 - 1 K/uL    Eos # 0.03 <=0.5 K/uL    Baso # 0.03 <=0.2 K/uL    Imm Grans # 0.03 0.00 - 0.04 K/uL   Magnesium    Collection Time: 06/19/25 12:08 PM   Result Value Ref Range    Magnesium   1.6 1.6 - 2.6 mg/dL   APTT    Collection Time: 06/19/25 12:14 PM   Result Value Ref Range    PTT 27.7 21.0 - 32.0 seconds   Protime-INR    Collection Time: 06/19/25 12:14 PM   Result Value Ref Range    PT 13.9 (H) 9.0 - 12.5 seconds    INR 1.2 0.8 - 1.2       Imaging Results:  Imaging Results              NM Lung Scan Ventilation Perfusion (Final result)  Result time 06/19/25 15:26:09      Final result by Adan Webb MD (06/19/25 15:26:09)                   Impression:      Negative VQ scan. No evidence of pulmonary embolism.      Electronically signed by: Adan Webb MD  Date:    06/19/2025  Time:    15:26               Narrative:    EXAMINATION:  NM LUNG VENTILATION AND PERFUSION IMAGING    CLINICAL HISTORY:  Pulmonary embolism (PE) suspected, high prob;    TECHNIQUE:  Ventilation study performed with 1 mCi technetium 99 M DTPA and perfusion study performed with 5 mCi technetium 99 M MAA.    COMPARISON:  Chest x-ray June 19, 2025    FINDINGS:  The initial breath is grossly normal.    The perfusion study is normal.    There are no significant mismatched perfusion defects.                                        US Lower Extremity Veins Bilateral (Final result)  Result time 06/19/25 13:00:11   Procedure changed from US Lower Extremity Veins Left     Final result by Adan Webb MD (06/19/25 13:00:11)                   Impression:      Extensive bilateral lower extremity occlusive DVT.      Electronically signed by: Adan Webb MD  Date:    06/19/2025  Time:    13:00               Narrative:    EXAMINATION:  US LOWER EXTREMITY VEINS BILATERAL    CLINICAL HISTORY:  Other specified soft tissue disordersLLE SWELL;    COMPARISON:  None    FINDINGS:  Duplex and color flow imaging with spectral wave analysis performed.    There are noncompressible filling defects within the right and left common femoral vein, superficial femoral vein and popliteal veins.  There also thrombosed calf veins noted  bilaterally.    This report was flagged in Epic as abnormal.                                       X-Ray Chest 1 View (Final result)  Result time 06/19/25 11:58:44      Final result by Adan Webb MD (06/19/25 11:58:44)                   Impression:      No acute findings.      Electronically signed by: Adan Webb MD  Date:    06/19/2025  Time:    11:58               Narrative:    EXAMINATION:  XR CHEST 1 VIEW    CLINICAL HISTORY:  Congestive heart failure,    COMPARISON:  11/10/2023    FINDINGS:  Sternal wires are present.  Cardiomegaly is noted.  Lung fields are clear at this time.  No overt failure or infiltrate.                                       The EKG was ordered, reviewed, and independently interpreted by the ED provider.  Interpretation time: 11:39  Rate: 104 BPM  Rhythm: Atrial flutter with variable AV block with premature ventricular or aberrantly conducted complexes  Interpretation: Nonspecific intraventricular block. Minimal voltage criteria for LVH, may be normal variant ([place] product). No STEMI.    The repeat EKG was ordered, reviewed, and independently interpreted by the ED provider.  Interpretation time: 12:00  Rate: 145 BPM  Rhythm: Atrial flutter with variable AV block with premature ventricular or aberrantly conducted complexes  Interpretation: LVH with QRS widening (R in aVL, Ray product). No STEMI.  When compared to EKG performed today at 11:39, QRS duration has decreased.         The Emergency Provider reviewed the vital signs and test results, which are outlined above.     ED Discussion     2:09 PM: Re-evaluated pt. Informed patient/family/caretaker that there are no heart transplant services available at this time. I have discussed test results, shared treatment plan, and the need for transfer with patient and family at bedside. All historical, clinical, radiographic, and laboratory findings were reviewed with the patient/family/caretaker in detail. Patient will be  transferred by [facility] services with necessary care required en route. Patient/family/caretaker understands that there could be unforeseen vehicle accidents or loss of vital signs that could result in potential death or permanent disability. Pt and/or family/caretaker express understanding at this time and agree with all information. All questions answered. Pt and/or family/caretaker have no further questions or concerns at this time. Patient is ready for transfer.       Medical Decision Making  DDX: 1. Decompensated heart failure 2. DVT 3.     ECG atrial lutter with rvr, CXR clear, lab work reviewed and h/h stable, NILES noted, potassium low and replaced, BNP mildly elevated, trop normal, mag normal, patient hypertensive, afib with rvr, given lopressor iv x 3, po metoprolol, mild improvement in heart rate and BP, patient not in any resp distress, US positive for bilateral extensive DVTs, unable to do CTA to assess for PE Due to kidney fx, v/q scan done and low suspicion for PE, started on heparin gtt, case discussed with heart transplant services at [place] agrees with transfer, patient has not followed up since Nov 2024, recommends amiodarone gtt which was started, patient transferred in serious but stable condition.     Amount and/or Complexity of Data Reviewed  Independent Historian: caregiver     Details: Sister at bedside.  External Data Reviewed: labs, radiology, ECG and notes.     Details: EF noted on Echo 9/24 EF 40%, diastolic dysfunction  Labs: ordered. Decision-making details documented in ED Course.  Radiology: ordered. Decision-making details documented in ED Course.  ECG/medicine tests: ordered and independent interpretation performed. Decision-making details documented in ED Course.  Discussion of management or test interpretation with external provider(s): 12:25 PM: Discussed pt's case with Dr. Vicotr Manuel Morris MD (Cardiology). Dr. Morris confirmed on pt's EKGs that he has atrial flutter and agrees with  current care and management of the pt.    1:20 PM: Discussed with Dr. Pillo James MD (IR). Dr. James advises doing a CTA chest with contrast or a V/Q scan.    2:07 PM: Consult with Dr. Niles Whittaker MD (Heart Failure & Transplant Cardiology) at [facility] concerning pt. There are no heart transplant services, which the patient requires, offered at [facility] at this time. Dr. Sander Whittaker expresses understanding and will accept transfer.  Accepting Facility: [facility]  Accepting Physician: Dr. Sander Whittaker    Case discussed with Dr. Whittaker at length, after getting 3 rounds of IV lopressor, minimal change in heart rate, still hypertensive, start amiodarone gtt, patient also started on heparin gtt for bilateral DVTs, VQ scan obtained to rule out PE and negative for PE    Risk  Prescription drug management.  Decision regarding hospitalization.  Diagnosis or treatment significantly limited by social determinants of health.                ED Medication(s):  Medications   metoprolol injection 5 mg (5 mg Intravenous Given 6/19/25 1302)   metoprolol tartrate (LOPRESSOR) tablet 25 mg (25 mg Oral Given 6/19/25 1234)   heparin 25,000 units in dextrose 5% (100 units/ml) IV bolus from bag HIGH INTENSITY nomogram - [facility] (8,248 Units Intravenous Bolus from Bag 6/19/25 1318)   potassium chloride 10 mEq in 100 mL IVPB (0 mEq Intravenous Stopped 6/19/25 1543)   metoprolol injection 5 mg (5 mg Intravenous Given 6/19/25 1327)   cloNIDine tablet 0.1 mg (0.1 mg Oral Given 6/19/25 1320)   metoprolol injection 5 mg (5 mg Intravenous Given 6/19/25 1354)   amiodarone in dextrose 150 mg/100 mL (1.5 mg/mL) loading dose 150 mg (0 mg Intravenous Stopped 6/19/25 1537)   kit for prep of Tc-99m-albumin 2.5 mg SolR 5 millicurie (5 millicuries Intravenous Given 6/19/25 1450)   kit prep Tc 99m-pentetic acid (aerosol) 20 mg SolR 30 millicurie (30 millicuries Inhalation Given 6/19/25 1432)       Discharge  Medication List as of 6/19/2025  4:17 PM                  Scribe Attestation:   Scribe #1: I performed the above scribed service and the documentation accurately describes the services I performed. I attest to the accuracy of the note.     Attending:   Physician Attestation Statement for Scribe #1: I, Carmen Jarrell MD, personally performed the services described in this documentation, as scribed by Aby Wade, in my presence, and it is both accurate and complete.           Clinical Impression       ICD-10-CM ICD-9-CM   1. Atrial flutter with rapid ventricular response  I48.92 427.32   2. SOB (shortness of breath)  R06.02 786.05   3. Bilateral leg edema  R60.0 782.3   4. Leg swelling  M79.89 729.81   5. Acute bilateral deep vein thrombosis (DVT) of femoral veins  I82.413 453.41   6. Hypertensive urgency  I16.0 401.9   7. Acute renal failure superimposed on stage 3 chronic kidney disease, unspecified acute renal failure type, unspecified whether stage 3a or 3b CKD  N17.9 584.9    N18.30 585.3   8. H/O heart transplant  Z94.1 V42.1   9. Immunosuppression due to drug therapy  D84.821 V58.69    Z79.899    10. Hypokalemia  E87.6 276.8       Disposition:   Disposition: Transferred  Condition: Serious         Carmen Jarrell MD  06/20/25 0711       Carmen Jarrell MD  06/20/25 0713

## 2025-06-19 NOTE — EICU
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    Last VS: Wt 103.1 kg (227 lb 4.7 oz)   BMI 30.83 kg/m²       VICU Review  VICU nurse assessment :  [place] completed, LDA documentation reconciliation completed, and VTE prophylaxis review  Nursing orders placed : IP [facility]

## 2025-06-19 NOTE — Clinical Note
Order signed.  I would recommend obtaining her records from [facility] prior to seeing a gastroenterologist at [facility]. I would also recommend seeing an MD/DO.     MD Leyla   The procedural consent was signed. A history and physical note was completed in the chart.

## 2025-06-19 NOTE — ASSESSMENT & PLAN NOTE
OHT on 10/29/12, with post-op severe TR, mild to moderately reduced LVEF.  Post-op developed PTLD, s/p R-CHOP/CEOP 12/2013  Immunosuppression tacrolimus 1 mg BID     Plan:  -continue home tacro pending AM level

## 2025-06-19 NOTE — ASSESSMENT & PLAN NOTE
- last known A1c 6  - Currently hyperglycemic with sugars in the 400s. Beta hydroxybutare normal.   - Start moderate SS insulin.   - Management per Endocrinology.

## 2025-06-19 NOTE — HPI
Mr. Alfonso Leger is a 54 y/o AAM w/ PMHx of OHTx 10/29/12 (for EtOH CM) with post-op severe TR and chronic LV dysfunction (EF has been reduced to 40-50% since at least 2015, as low as 35%), hx of WPW, Factor V Leiden with h/o DVT (last 8/2013) and previous hx of IVC filter placement in 2011 previously on Lovenox sub q 100mg bid (non-compliant, stopped taking 5 years ago), HTN (not compliant with clonidine or amlodipine, only on hydralazine), HLD (not compliant with pravastatin), NIDDM2, CKD stage 3 (last known baseline CR 1.5-2 per nephrology note in 2021), and Non-Hodgkin lymphoma s/p 6 cycles of R-CHOP/CEOP in 2013 now in remission. Presented to outside hospital ED today after noting 1 1/2 week hx of bilateral lower extremity leg swelling and primarily left calf pain. Denied any chest pain, SOB, palpitations, nausea, vomiting, orthopnea, PND, abdominal distention, abdominal pain, fever, chills, diarrhea. States the only medications he is compliant with is his prograf 1mg bid and hydralazine 25mg tid. Stopped taking all other medications on his own. Last follow up with a doctor was last year (11/2024) at [facility] clinic.    In the ED,  labs notable hgb 13.5, potassium 3.3, bicarb 19, bun 30, cr 3.5 (baseline appears to be 1.5-2), magnesium 1.6. BNP 300s. VQ scan was negative for PE. Lower extremity U/S showed noncompressible filling defects within the right and left common femoral vein, superficial femoral vein and popliteal veins, and thrombosed calf veins noted bilaterally. Vitals on arrival notable for bp 175/106. EKG notable for typical atrial flutter with rates in the 140s. Patient received Lopressor IV and PO, and was started on amiodarone gtt and heparin gtt prior to transfer to [facility] for further management.     Of note, patient was evaluated by EP (Dr. Reddy) back on 8/28/2013 when WPW was first incidentally seen on his EKG. Patient was notably asymptomatic and ablation was not being purused at the time due to him  being neutropenic. Patient was discharged with 30 day event monitored (only showed 2 episodes of sinus tachycardia) and told to follow up as outpatient. No evidence in the system that patient ever followed up with EP. No previous history of Atrial flutter or atrial fibrillation seen.       CMV status:    Donor: -   Recipient: -          Prescribed Home Medications:   Clonidine 0.3 mg BID    Tacrolimus 1 mg BID    Hydralazine 25 TID    Amlodipine 10 mg qd  Pravastatin 40mg nightly       Cardiac Imaging:   Stress Echo (11/19/2024):    Left Ventricle: The left ventricle is normal in size. Normal wall thickness. There is concentric remodeling. Regional wall motion abnormalities present. Septal motion is consistent with post-operative status. There is mildly reduced systolic function with a visually estimated ejection fraction of 40 - 50%. Ejection fraction is approximately 40%. There is normal diastolic function.    Right Ventricle: Moderate right ventricular enlargement. Wall thickness is normal. Systolic function is mildly reduced.    Left Atrium: Atrial septum is bulging to the left.    Right Atrium: Right atrium is severely dilated.    Tricuspid Valve: There is severe regurgitation. No pulmonary hypertension.    Pulmonary Artery: The estimated pulmonary artery systolic pressure is 21 mmHg.    IVC/SVC: Normal venous pressure at 3 mmHg.    Stress Protocol: The patient was infused intravenously with dobutamine. The patient received a graduated infusion of the stress agent beginning at 10.0 mcg/kg/min to a peak dose of 10.0 mcg/kg/min. The peak heart rate was 173 bpm, which is 104% of age predicted maximum heart rate. Low-level exercise was used. The patient reported no symptoms during the stress test. The test was stopped because the end of the protocol was reached.    Baseline ECG: The Baseline ECG reveals sinus tachycardia with RBBB. The axis is normal. The ST segments are normal.    Stress ECG: There is no ST  segment deviation identified during the protocol. There are no arrhythmias during stress.    ECG Conclusion: The ECG portion of the study is negative for ischemia.    Post-stress Conclusion: The study is negative with no echocardiographic evidence of stress induced ischemia.      TTE (11/10/2023):    S/p OHT 10/29/12.    Left Ventricle: The left ventricle is dilated. Ventricular mass is normal. Increased wall thickness. regional wall motion abnormalities present. See diagram for wall motion findings. There is mildly reduced systolic function with a visually estimated ejection fraction of 40 - 45%. Ejection fraction by visual approximation is 40%. Grade I diastolic dysfunction. Average E/e' ratio is 5.43.    Right Ventricle: Moderate-severe right ventricular enlargement. Systolic function is borderline low.    Aortic Valve: The aortic valve is a trileaflet valve. There is mild aortic regurgitation.    Tricuspid Valve: There is severe regurgitation with a centrally directed jet.    IVC/SVC: Elevated venous pressure at 15 mmHg.      LHC (8/10/2020):   Normal coronary arteries.   IVUS of LAD did not show features of significant Cardiac allograft vasculopathy (CAV)   Filling pressures normal. 5 successful RVBX, under fluoro guidance, perfomed. A sample was sent for immunofluorescence..   US guided Right femoral artery and Rt IJ vein access (Rt radial artery occluded, noted in US)   In order to adequately assess the patient and form an appropriate treatment plan, it is necessary to perform a comprehensive Right Heart catheterization. A thorough study of the patient's cardiac and hemodynamic functioning, in addition to an Endomyocardial biopsy, was performed. The information to be obtained from the biopsy alone was insufficient to care for this patient.   Estimated blood loss: <10 mL      Biopsy (8/2020):   1.  The myocardium is C4d negative for antibody mediated rejection.    2.  There are 4 myocardial biopsy fragments.  With the material seen in    conjunction with the immunofluorescence procedure, there are 5 fragments. The    myocardium is devoid of a lymphocytic infiltrate. (ISHLT 0). The myocardium    does not have swollen endothelial cells or capillaries distended by enlarged    mononuclear cells, which would have pointed towards antibody mediated    rejection.  The myocardium is devoid of CD68 positive macrophages . The    picture here is graded as pAMR 0.    The positive and negative controls stained appropriately.

## 2025-06-20 ENCOUNTER — TELEPHONE (OUTPATIENT)
Dept: ADMINISTRATIVE | Facility: CLINIC | Age: 55
End: 2025-06-20
Payer: MEDICARE

## 2025-06-20 ENCOUNTER — ANESTHESIA EVENT (OUTPATIENT)
Dept: MEDSURG UNIT | Facility: HOSPITAL | Age: 55
End: 2025-06-20
Payer: MEDICARE

## 2025-06-20 LAB
ABSOLUTE EOSINOPHIL (OHS): 0.02 K/UL
ABSOLUTE MONOCYTE (OHS): 0.5 K/UL (ref 0.3–1)
ABSOLUTE NEUTROPHIL COUNT (OHS): 3.47 K/UL (ref 1.8–7.7)
ALBUMIN SERPL BCP-MCNC: 3.1 G/DL (ref 3.5–5.2)
ALP SERPL-CCNC: 59 UNIT/L (ref 40–150)
ALT SERPL W/O P-5'-P-CCNC: 7 UNIT/L (ref 10–44)
ANION GAP (OHS): 12 MMOL/L (ref 8–16)
APTT PPP: 101 SECONDS (ref 21–32)
APTT PPP: 54.8 SECONDS (ref 21–32)
ASCENDING AORTA: 4 CM
AST SERPL-CCNC: 11 UNIT/L (ref 11–45)
AV AREA BY CONTINUOUS VTI: 3.6 CM2
AV INDEX (PROSTH): 0.75
AV LVOT MEAN GRADIENT: 1 MMHG
AV LVOT PEAK GRADIENT: 1 MMHG
AV MEAN GRADIENT: 1 MMHG
AV PEAK GRADIENT: 2 MMHG
AV VALVE AREA BY VELOCITY RATIO: 3.5 CM²
AV VALVE AREA: 3.7 CM2
AV VELOCITY RATIO: 0.71
BACTERIA #/AREA URNS AUTO: ABNORMAL /HPF
BASOPHILS # BLD AUTO: 0.04 K/UL
BASOPHILS NFR BLD AUTO: 0.7 %
BILIRUB SERPL-MCNC: 0.4 MG/DL (ref 0.1–1)
BILIRUB UR QL STRIP.AUTO: ABNORMAL
BUN SERPL-MCNC: 32 MG/DL (ref 6–20)
CALCIUM SERPL-MCNC: 8.1 MG/DL (ref 8.7–10.5)
CHLORIDE SERPL-SCNC: 103 MMOL/L (ref 95–110)
CLARITY UR: CLEAR
CO2 SERPL-SCNC: 21 MMOL/L (ref 23–29)
COLOR UR AUTO: YELLOW
CREAT SERPL-MCNC: 3.3 MG/DL (ref 0.5–1.4)
CV ECHO LV RWT: 0.31 CM
DOP CALC AO PEAK VEL: 0.7 M/S
DOP CALC AO VTI: 10 CM
DOP CALC LVOT AREA: 4.9 CM2
DOP CALC LVOT DIAMETER: 2.5 CM
DOP CALC LVOT PEAK VEL: 0.5 M/S
DOP CALC LVOT STROKE VOLUME: 36.8 CM3
DOP CALCLVOT PEAK VEL VTI: 7.5 CM
E WAVE DECELERATION TIME: 123 MS
E/A RATIO: 1.27
ECHO EF ESTIMATED: 21 %
ECHO LV POSTERIOR WALL: 0.9 CM (ref 0.6–1.1)
ERYTHROCYTE [DISTWIDTH] IN BLOOD BY AUTOMATED COUNT: 13.3 % (ref 11.5–14.5)
FRACTIONAL SHORTENING: 15.5 % (ref 28–44)
GFR SERPLBLD CREATININE-BSD FMLA CKD-EPI: 21 ML/MIN/1.73/M2
GLUCOSE SERPL-MCNC: 130 MG/DL (ref 70–110)
GLUCOSE UR QL STRIP: NEGATIVE
HCT VFR BLD AUTO: 36.5 % (ref 40–54)
HGB BLD-MCNC: 12.2 GM/DL (ref 14–18)
HGB UR QL STRIP: NEGATIVE
HYALINE CASTS UR QL AUTO: 5 /LPF (ref 0–1)
IMM GRANULOCYTES # BLD AUTO: 0.02 K/UL (ref 0–0.04)
IMM GRANULOCYTES NFR BLD AUTO: 0.4 % (ref 0–0.5)
INDIRECT COOMBS: NORMAL
INTERVENTRICULAR SEPTUM: 0.9 CM (ref 0.6–1.1)
IVC DIAMETER: 3.69 CM
KETONES UR QL STRIP: ABNORMAL
LEFT INTERNAL DIMENSION IN SYSTOLE: 4.9 CM (ref 2.1–4)
LEFT VENTRICLE DIASTOLIC VOLUME: 143 ML
LEFT VENTRICLE SYSTOLIC VOLUME: 113 ML
LEFT VENTRICULAR INTERNAL DIMENSION IN DIASTOLE: 5.8 CM (ref 3.5–6)
LEFT VENTRICULAR MASS: 203.5 G
LEUKOCYTE ESTERASE UR QL STRIP: NEGATIVE
LVOT STOKE VOLUME INDEX: 16 ML/M2
LYMPHOCYTES # BLD AUTO: 1.33 K/UL (ref 1–4.8)
MAGNESIUM SERPL-MCNC: 2.3 MG/DL (ref 1.6–2.6)
MCH RBC QN AUTO: 29.6 PG (ref 27–31)
MCHC RBC AUTO-ENTMCNC: 33.4 G/DL (ref 32–36)
MCV RBC AUTO: 89 FL (ref 82–98)
MICROSCOPIC COMMENT: ABNORMAL
MV PEAK A VEL: 0.37 M/S
MV PEAK E VEL: 0.47 M/S
NITRITE UR QL STRIP: NEGATIVE
NUCLEATED RBC (/100WBC) (OHS): 0 /100 WBC
OHS CV RV/LV RATIO: 0.74 CM
OHS QRS DURATION: 156 MS
OHS QTC CALCULATION: 610 MS
PH UR STRIP: 6 [PH]
PISA TR MAX VEL: 2 M/S
PLATELET # BLD AUTO: 286 K/UL (ref 150–450)
PMV BLD AUTO: 10.1 FL (ref 9.2–12.9)
POCT GLUCOSE: 109 MG/DL (ref 70–110)
POCT GLUCOSE: 115 MG/DL (ref 70–110)
POCT GLUCOSE: 122 MG/DL (ref 70–110)
POTASSIUM SERPL-SCNC: 3.5 MMOL/L (ref 3.5–5.1)
PROT SERPL-MCNC: 6.8 GM/DL (ref 6–8.4)
PROT UR QL STRIP: ABNORMAL
RA PRESSURE ESTIMATED: 15 MMHG
RBC # BLD AUTO: 4.12 M/UL (ref 4.6–6.2)
RBC #/AREA URNS AUTO: 3 /HPF (ref 0–4)
RELATIVE EOSINOPHIL (OHS): 0.4 %
RELATIVE LYMPHOCYTE (OHS): 24.7 % (ref 18–48)
RELATIVE MONOCYTE (OHS): 9.3 % (ref 4–15)
RELATIVE NEUTROPHIL (OHS): 64.5 % (ref 38–73)
RH BLD: NORMAL
RIGHT VENTRICLE DIASTOLIC BASEL DIMENSION: 4.3 CM
RIGHT VENTRICLE FREE WALL STRAIN: 13 %
RV TB RVSP: 17 MMHG
RV TISSUE DOPPLER FREE WALL SYSTOLIC VELOCITY 1 (APICAL 4 CHAMBER VIEW): 10.41 CM/S
SINUS: 4.3 CM
SODIUM SERPL-SCNC: 136 MMOL/L (ref 136–145)
SP GR UR STRIP: >=1.03
SPECIMEN OUTDATE: NORMAL
SQUAMOUS #/AREA URNS AUTO: 1 /HPF
STJ: 3.6 CM
TACROLIMUS BLD-MCNC: 8.6 NG/ML (ref 5–15)
TRICUSPID ANNULAR PLANE SYSTOLIC EXCURSION: 1.4 CM
TV PEAK GRADIENT: 21 MMHG
TV REST PULMONARY ARTERY PRESSURE: 31 MMHG
UROBILINOGEN UR STRIP-ACNC: NEGATIVE EU/DL
WBC # BLD AUTO: 5.38 K/UL (ref 3.9–12.7)
WBC #/AREA URNS AUTO: 10 /HPF (ref 0–5)

## 2025-06-20 PROCEDURE — 25000003 PHARM REV CODE 250: Performed by: STUDENT IN AN ORGANIZED HEALTH CARE EDUCATION/TRAINING PROGRAM

## 2025-06-20 PROCEDURE — 99291 CRITICAL CARE FIRST HOUR: CPT | Mod: ,,, | Performed by: INTERNAL MEDICINE

## 2025-06-20 PROCEDURE — 63600175 PHARM REV CODE 636 W HCPCS: Performed by: INTERNAL MEDICINE

## 2025-06-20 PROCEDURE — 99499 UNLISTED E&M SERVICE: CPT | Mod: ,,, | Performed by: INTERNAL MEDICINE

## 2025-06-20 PROCEDURE — 25000003 PHARM REV CODE 250

## 2025-06-20 PROCEDURE — 02BK3ZX EXCISION OF RIGHT VENTRICLE, PERCUTANEOUS APPROACH, DIAGNOSTIC: ICD-10-PCS | Performed by: INTERNAL MEDICINE

## 2025-06-20 PROCEDURE — 93010 ELECTROCARDIOGRAM REPORT: CPT | Mod: ,,, | Performed by: INTERNAL MEDICINE

## 2025-06-20 PROCEDURE — 4A023N6 MEASUREMENT OF CARDIAC SAMPLING AND PRESSURE, RIGHT HEART, PERCUTANEOUS APPROACH: ICD-10-PCS | Performed by: INTERNAL MEDICINE

## 2025-06-20 PROCEDURE — 83735 ASSAY OF MAGNESIUM: CPT | Performed by: STUDENT IN AN ORGANIZED HEALTH CARE EDUCATION/TRAINING PROGRAM

## 2025-06-20 PROCEDURE — 94761 N-INVAS EAR/PLS OXIMETRY MLT: CPT

## 2025-06-20 PROCEDURE — 88307 TISSUE EXAM BY PATHOLOGIST: CPT | Mod: 26,,, | Performed by: PATHOLOGY

## 2025-06-20 PROCEDURE — 51798 US URINE CAPACITY MEASURE: CPT

## 2025-06-20 PROCEDURE — 99223 1ST HOSP IP/OBS HIGH 75: CPT | Mod: GC,,, | Performed by: INTERNAL MEDICINE

## 2025-06-20 PROCEDURE — C1894 INTRO/SHEATH, NON-LASER: HCPCS | Performed by: INTERNAL MEDICINE

## 2025-06-20 PROCEDURE — 27201423 OPTIME MED/SURG SUP & DEVICES STERILE SUPPLY: Performed by: INTERNAL MEDICINE

## 2025-06-20 PROCEDURE — 94799 UNLISTED PULMONARY SVC/PX: CPT

## 2025-06-20 PROCEDURE — 81003 URINALYSIS AUTO W/O SCOPE: CPT

## 2025-06-20 PROCEDURE — 86901 BLOOD TYPING SEROLOGIC RH(D): CPT | Performed by: STUDENT IN AN ORGANIZED HEALTH CARE EDUCATION/TRAINING PROGRAM

## 2025-06-20 PROCEDURE — 99222 1ST HOSP IP/OBS MODERATE 55: CPT | Mod: ,,,

## 2025-06-20 PROCEDURE — 93005 ELECTROCARDIOGRAM TRACING: CPT

## 2025-06-20 PROCEDURE — 93505 ENDOMYOCARDIAL BIOPSY: CPT | Performed by: INTERNAL MEDICINE

## 2025-06-20 PROCEDURE — 20000000 HC ICU ROOM

## 2025-06-20 PROCEDURE — 84520 ASSAY OF UREA NITROGEN: CPT | Performed by: STUDENT IN AN ORGANIZED HEALTH CARE EDUCATION/TRAINING PROGRAM

## 2025-06-20 PROCEDURE — C1769 GUIDE WIRE: HCPCS | Performed by: INTERNAL MEDICINE

## 2025-06-20 PROCEDURE — 88342 IMHCHEM/IMCYTCHM 1ST ANTB: CPT | Mod: 26,,, | Performed by: PATHOLOGY

## 2025-06-20 PROCEDURE — 99900035 HC TECH TIME PER 15 MIN (STAT)

## 2025-06-20 PROCEDURE — 85730 THROMBOPLASTIN TIME PARTIAL: CPT | Performed by: INTERNAL MEDICINE

## 2025-06-20 PROCEDURE — 85025 COMPLETE CBC W/AUTO DIFF WBC: CPT | Performed by: STUDENT IN AN ORGANIZED HEALTH CARE EDUCATION/TRAINING PROGRAM

## 2025-06-20 PROCEDURE — 88307 TISSUE EXAM BY PATHOLOGIST: CPT | Mod: TC | Performed by: INTERNAL MEDICINE

## 2025-06-20 PROCEDURE — 63600175 PHARM REV CODE 636 W HCPCS: Performed by: STUDENT IN AN ORGANIZED HEALTH CARE EDUCATION/TRAINING PROGRAM

## 2025-06-20 PROCEDURE — 80197 ASSAY OF TACROLIMUS: CPT | Performed by: STUDENT IN AN ORGANIZED HEALTH CARE EDUCATION/TRAINING PROGRAM

## 2025-06-20 PROCEDURE — 25000003 PHARM REV CODE 250: Performed by: INTERNAL MEDICINE

## 2025-06-20 PROCEDURE — 93505 ENDOMYOCARDIAL BIOPSY: CPT | Mod: 26,GC,, | Performed by: INTERNAL MEDICINE

## 2025-06-20 PROCEDURE — C1751 CATH, INF, PER/CENT/MIDLINE: HCPCS | Performed by: INTERNAL MEDICINE

## 2025-06-20 RX ORDER — TACROLIMUS 1 MG/1
1 CAPSULE ORAL EVERY MORNING
Status: DISCONTINUED | OUTPATIENT
Start: 2025-06-20 | End: 2025-06-24 | Stop reason: HOSPADM

## 2025-06-20 RX ORDER — ISOSORBIDE DINITRATE 20 MG/1
20 TABLET ORAL 3 TIMES DAILY
Status: DISCONTINUED | OUTPATIENT
Start: 2025-06-20 | End: 2025-06-21

## 2025-06-20 RX ORDER — DIPHENHYDRAMINE HCL 50 MG
50 CAPSULE ORAL ONCE
Status: DISCONTINUED | OUTPATIENT
Start: 2025-06-20 | End: 2025-06-20 | Stop reason: HOSPADM

## 2025-06-20 RX ORDER — LIDOCAINE HYDROCHLORIDE 20 MG/ML
INJECTION, SOLUTION INFILTRATION; PERINEURAL
Status: DISCONTINUED | OUTPATIENT
Start: 2025-06-20 | End: 2025-06-20 | Stop reason: HOSPADM

## 2025-06-20 RX ORDER — HEPARIN SODIUM,PORCINE/D5W 25000/250
0-40 INTRAVENOUS SOLUTION INTRAVENOUS CONTINUOUS
Status: DISCONTINUED | OUTPATIENT
Start: 2025-06-20 | End: 2025-06-20

## 2025-06-20 RX ORDER — POTASSIUM CHLORIDE 750 MG/1
50 CAPSULE, EXTENDED RELEASE ORAL ONCE
Status: COMPLETED | OUTPATIENT
Start: 2025-06-20 | End: 2025-06-20

## 2025-06-20 RX ORDER — HYDRALAZINE HYDROCHLORIDE 50 MG/1
50 TABLET, FILM COATED ORAL EVERY 8 HOURS
Status: DISCONTINUED | OUTPATIENT
Start: 2025-06-20 | End: 2025-06-20

## 2025-06-20 RX ORDER — TACROLIMUS 0.5 MG/1
0.5 CAPSULE ORAL EVERY EVENING
Status: DISCONTINUED | OUTPATIENT
Start: 2025-06-20 | End: 2025-06-24 | Stop reason: HOSPADM

## 2025-06-20 RX ORDER — SODIUM CHLORIDE 0.9 % (FLUSH) 0.9 %
10 SYRINGE (ML) INJECTION
Status: DISCONTINUED | OUTPATIENT
Start: 2025-06-20 | End: 2025-06-24 | Stop reason: HOSPADM

## 2025-06-20 RX ADMIN — HYDRALAZINE HYDROCHLORIDE 25 MG: 25 TABLET ORAL at 05:06

## 2025-06-20 RX ADMIN — AMIODARONE HYDROCHLORIDE 0.5 MG/MIN: 1.8 INJECTION, SOLUTION INTRAVENOUS at 10:06

## 2025-06-20 RX ADMIN — AMIODARONE HYDROCHLORIDE 0.5 MG/MIN: 1.8 INJECTION, SOLUTION INTRAVENOUS at 11:06

## 2025-06-20 RX ADMIN — POTASSIUM CHLORIDE 50 MEQ: 750 CAPSULE, EXTENDED RELEASE ORAL at 05:06

## 2025-06-20 RX ADMIN — APIXABAN 5 MG: 5 TABLET, FILM COATED ORAL at 04:06

## 2025-06-20 RX ADMIN — HYDRALAZINE HYDROCHLORIDE 75 MG: 25 TABLET ORAL at 09:06

## 2025-06-20 RX ADMIN — AMIODARONE HYDROCHLORIDE 1 MG/MIN: 1.8 INJECTION, SOLUTION INTRAVENOUS at 02:06

## 2025-06-20 RX ADMIN — TACROLIMUS 0.5 MG: 0.5 CAPSULE ORAL at 05:06

## 2025-06-20 RX ADMIN — TACROLIMUS 1 MG: 1 CAPSULE ORAL at 10:06

## 2025-06-20 RX ADMIN — HYDRALAZINE HYDROCHLORIDE 50 MG: 50 TABLET ORAL at 01:06

## 2025-06-20 RX ADMIN — ISOSORBIDE DINITRATE 20 MG: 20 TABLET ORAL at 09:06

## 2025-06-20 RX ADMIN — AMLODIPINE BESYLATE 10 MG: 10 TABLET ORAL at 06:06

## 2025-06-20 NOTE — SUBJECTIVE & OBJECTIVE
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    Factor V Leiden 9/7/2012    Hypertension     Immunodeficiency due to treatment with immunosuppressive medication     Other and unspecified hyperlipidemia 10/11/2013    Stroke     Type II or unspecified type diabetes mellitus without mention of complication, uncontrolled 10/11/2013    Typical atrial flutter 6/19/2025       Past Surgical History:   Procedure Laterality Date    ABDOMINAL SURGERY      CARDIAC CATHETERIZATION      CATHETERIZATION OF BOTH LEFT AND RIGHT HEART N/A 8/10/2020    Procedure: CATHETERIZATION, HEART, BOTH LEFT AND RIGHT;  Surgeon: Michael Fernandez MD;  Location: [facility] CATH LAB;  Service: Cardiology;  Laterality: N/A;    CORONARY ANGIOGRAPHY N/A 8/10/2020    Procedure: ANGIOGRAM, CORONARY ARTERY;  Surgeon: Michael Fernandez MD;  Location: [facility] CATH LAB;  Service: Cardiology;  Laterality: N/A;    HEART TRANSPLANT      IVC FILTER RETRIEVAL      pt reports still in place       Review of patient's allergies indicates:  No Known Allergies    Facility-Administered Medications Prior to Admission   Medication    DOBUTamine 1000 mg in D5W 250 mL infusion (premix titrating)     PTA Medications   Medication Sig    amLODIPine (NORVASC) 10 MG tablet TAKE 1 TABLET BY MOUTH ONCE DAILY    cloNIDine (CATAPRES) 0.1 MG tablet Take 3 tablets (0.3 mg total) by mouth 2 (two) times daily.    hydrALAZINE (APRESOLINE) 25 MG tablet Take 1 tablet (25 mg total) by mouth 3 (three) times daily.    tacrolimus (PROGRAF) 1 MG Cap Take 1 capsule (1 mg total) by mouth every 12 (twelve) hours.     Family History       Problem Relation (Age of Onset)    Cancer Paternal Grandfather    Diabetes Maternal  Uncle          Tobacco Use    Smoking status: Former     Current packs/day: 0.00     Types: Cigarettes     Quit date: 2011     Years since quittin.1    Smokeless tobacco: Former   Substance and Sexual Activity    Alcohol use: No    Drug use: No    Sexual activity: Yes     Partners: Female     Birth control/protection: None     Review of Systems   Constitutional: Negative for diaphoresis and fever.   Cardiovascular:  Negative for chest pain, dyspnea on exertion, leg swelling, near-syncope, orthopnea, palpitations, paroxysmal nocturnal dyspnea and syncope.   Respiratory:  Negative for cough and shortness of breath.    Gastrointestinal:  Negative for abdominal pain, diarrhea, nausea and vomiting.   Neurological:  Negative for light-headedness.   Psychiatric/Behavioral:  Negative for altered mental status and substance abuse.      Objective:     Vital Signs (Most Recent):  Temp: 97.7 °F (36.5 °C) (25 0705)  Pulse: (!) 130 (25 1001)  Resp: (!) 21 (25 1001)  BP: (!) 148/108 (25 1001)  SpO2: 100 % (25 1007) Vital Signs (24h Range):  Temp:  [97.6 °F (36.4 °C)-98 °F (36.7 °C)] 97.7 °F (36.5 °C)  Pulse:  [] 130  Resp:  [10-36] 21  SpO2:  [90 %-100 %] 100 %  BP: (111-176)/() 148/108     Weight: 101 kg (222 lb 9.6 oz)  Body mass index is 30.19 kg/m².    SpO2: 100 %         Intake/Output Summary (Last 24 hours) at 2025 1120  Last data filed at 2025 1001  Gross per 24 hour   Intake 651.44 ml   Output 200 ml   Net 451.44 ml       Lines/Drains/Airways       Central Venous Catheter Line  Duration                  Port A Cath Single Lumen 13 Left Chest 4293 days         Port A Cath Single Lumen 10/16/13 Left Chest 4265 days              Peripheral Intravenous Line  Duration             Peripheral IV 25 1800 18 G Left Antecubital <1 day    Peripheral IV 25 1800 20 G Posterior;Right Hand <1 day    Peripheral IV Single Lumen 25 2200 18 G 2 1/ in Long  PIVC Right Forearm <1 day                     Physical Exam  Constitutional:       General: He is not in acute distress.     Appearance: Normal appearance. He is well-developed.   HENT:      Mouth/Throat:      Mouth: Mucous membranes are moist.      Pharynx: Oropharynx is clear.   Cardiovascular:      Rate and Rhythm: Normal rate and regular rhythm.      Heart sounds: Normal heart sounds. No murmur heard.  Pulmonary:      Effort: Pulmonary effort is normal. No respiratory distress.      Breath sounds: Normal breath sounds.   Abdominal:      Palpations: Abdomen is soft.      Tenderness: There is no abdominal tenderness. There is no guarding.   Musculoskeletal:         General: No swelling. Normal range of motion.      Right lower leg: Edema present.      Left lower leg: Edema present.   Skin:     General: Skin is warm and dry.   Neurological:      Mental Status: He is alert and oriented to person, place, and time.   Psychiatric:         Mood and Affect: Mood normal.         Behavior: Behavior normal.            Significant Labs: All pertinent lab results from the last 24 hours have been reviewed.    Significant Imaging: Reviewed

## 2025-06-20 NOTE — BRIEF OP NOTE
Brief Operative Note:    : Niles Hector MD     Referring Physician: Carmen Jarrell     All Operators: Surgeon(s):  Gillies, Connor M, DO Jenkins, James S., MD Garikapati, Kiran, MD     Preoperative Diagnosis: Heart failure, acute on chronic, systolic and diastolic [I50.43]     Postop Diagnosis: Heart failure, acute on chronic, systolic and diastolic [I50.43]    Treatments/Procedures: Procedure(s) (LRB):  INSERTION, CATHETER, RIGHT HEART (Right)  Biopsy, Cardiac    Findings: Mildly elevated left and right sided filling pressures. See catheterization report for full details.    Estimated Blood loss: 20 cc    Specimens removed: No    Recommendations:   - Routine post-cath care as per orders    Connor M Gillies

## 2025-06-20 NOTE — PLAN OF CARE
CICU DAILY GOALS       A: Awake    RASS: Goal -    Actual - RASS (Ziegler Agitation-Sedation Scale): alert and calm   Restraint necessity:    B: Breath   SBT: Not intubated   C: Coordinate A & B, analgesics/sedatives   Pain: managed    SAT: Not intubated  D: Delirium   CAM-ICU:    E: Early(intubated/ Progressive (non-intubated) Mobility   MOVE Screen: Pass   Activity: Activity Management: Rolling - L1  FAS: Feeding/Nutrition   Diet order: Diet/Nutrition Received: NPO,   Fluid restriction:     T: Thrombus   DVT prophylaxis: VTE Core Measure: Pharmacological prophylaxis initiated/maintained  H: HOB Elevation   Head of Bed (HOB) Positioning: HOB elevated  U: Ulcer Prophylaxis   GI: yes  G: Glucose control   managed    S: Skin   Bathing/Skin Care: bath, complete (06/20/25 1101)  Wounds: No  Wound care consulted: No  B: Bowel Function   no issues   I: Indwelling Catheters   [place] necessity:     CVC necessity: NA  D: De-escalation Antibx   NA  Plan for the day    V/S monitoring     Family/Goals of care/Code Status   Code Status: Full Code     Pt had elevated diastolic pressures throughout day. Notified Adrian ZURITA, MD ordered to notify MD if SBP >160, DBP>110.  VS and assessment per flow sheet, patient progressing towards goals as tolerated, plan of care reviewed with Alfonso Leger and family, all concerns addressed.

## 2025-06-20 NOTE — CONSULTS
Food & Nutrition Education    Diet Education: A1C  Time Spent: 10 minutes  Learners: Patient    Nutrition Education provided with handouts: Meal Planning Using the Plate Method, CHO Counting for People with Diabetes    Comments: Discussed importance of choosing healthy carbohydrates and to control the amount of carbohydrates you eat at each meal for blood sugar management. Reminded patient not to skip meals. Encouraged intake of fresh, unprocessed foods. Reviewed limiting sugary beverages such as soda, fruit juice and other sweetened beverages. Food labels, CHO counting and recommended CHO intake reviewed. Educated patient on meal planning and eating out. Patient is aware of A1C 6% and what the lab value means. Patient agrees to make diet changes to comply with diabetic diet. All questions/concerns were addressed. Dietitian's contact information provided.    Follow-Up: Yes  Please Re-consult as needed    Thanks!

## 2025-06-20 NOTE — CONSULTS
[facility]  Cardiac Electrophysiology  Consult Note    Admission Date: 6/19/2025  Code Status: Full Code   Attending Provider: Niles Castro, *  Consulting Provider: Jarrell Parry MD  Principal Problem:Typical atrial flutter    Inpatient consult to Electrophysiology  Consult performed by: Jarrell Parry MD  Consult ordered by: Jarrell Parry MD  Reason for consult: atrial flutter        Subjective:     Chief Complaint:  Atrial Flutter    HPI:   Mr. Alfonso Leger is a 56 y/o AAM w/ PMHx of OHTx 10/29/12 (for EtOH CM) with post-op severe TR and chronic LV dysfunction (EF has been reduced to 40-50% since at least 2015, as low as 35%), hx of WPW, Factor V Leiden with h/o DVT (last 8/2013) and previous hx of IVC filter placement in 2011 previously on Lovenox sub q 100mg bid (non-compliant, stopped taking 5 years ago), HTN (not compliant with clonidine or amlodipine, only on hydralazine), HLD (not compliant with pravastatin), NIDDM2, CKD stage 3 (last known baseline CR 1.5-2 per nephrology note in 2021), and Non-Hodgkin lymphoma s/p 6 cycles of R-CHOP/CEOP in 2013 now in remission. Presented to outside hospital ED today after noting 1 1/2 week hx of bilateral lower extremity leg swelling and primarily left calf pain. Denied any chest pain, SOB, palpitations, nausea, vomiting, orthopnea, PND, abdominal distention, abdominal pain, fever, chills, diarrhea. States the only medications he is compliant with is his prograf 1mg bid and hydralazine 25mg tid. Stopped taking all other medications on his own. Last follow up with a doctor was last year (11/2024) at [facility] clinic.     In the ED,  labs notable hgb 13.5, potassium 3.3, bicarb 19, bun 30, cr 3.5 (baseline appears to be 1.5-2), magnesium 1.6. BNP 300s. VQ scan was negative for PE. Lower extremity U/S showed noncompressible filling defects within the right and left common femoral vein, superficial femoral vein and popliteal veins, and  thrombosed calf veins noted bilaterally. Vitals on arrival notable for bp 175/106. EKG notable for typical atrial flutter with rates in the 140s. Patient received Lopressor IV and PO, and was started on amiodarone gtt and heparin gtt prior to transfer to [facility] for further management. EP consulted for management of patients atrial flutter with RVR in the setting of hx of WPW.      Of note, patient was evaluated by EP (Dr. Reddy) back on 8/28/2013 when WPW was first incidentally seen on his EKG. Patient was notably asymptomatic and ablation was not being purused at the time due to him being neutropenic. Patient was discharged with 30 day event monitored (only showed 2 episodes of sinus tachycardia) and told to follow up as outpatient. No evidence in the system that patient ever followed up with EP. No previous history of Atrial flutter or atrial fibrillation seen.     Prescribed Home Medications:   Clonidine 0.3 mg BID    Tacrolimus 1 mg BID    Hydralazine 25 TID    Amlodipine 10 mg qd  Pravastatin 40mg nightly       Cardiac Imaging:   Stress Echo (11/19/2024):    Left Ventricle: The left ventricle is normal in size. Normal wall thickness. There is concentric remodeling. Regional wall motion abnormalities present. Septal motion is consistent with post-operative status. There is mildly reduced systolic function with a visually estimated ejection fraction of 40 - 50%. Ejection fraction is approximately 40%. There is normal diastolic function.    Right Ventricle: Moderate right ventricular enlargement. Wall thickness is normal. Systolic function is mildly reduced.    Left Atrium: Atrial septum is bulging to the left.    Right Atrium: Right atrium is severely dilated.    Tricuspid Valve: There is severe regurgitation. No pulmonary hypertension.    Pulmonary Artery: The estimated pulmonary artery systolic pressure is 21 mmHg.    IVC/SVC: Normal venous pressure at 3 mmHg.    Stress Protocol: The patient was infused  intravenously with dobutamine. The patient received a graduated infusion of the stress agent beginning at 10.0 mcg/kg/min to a peak dose of 10.0 mcg/kg/min. The peak heart rate was 173 bpm, which is 104% of age predicted maximum heart rate. Low-level exercise was used. The patient reported no symptoms during the stress test. The test was stopped because the end of the protocol was reached.    Baseline ECG: The Baseline ECG reveals sinus tachycardia with RBBB. The axis is normal. The ST segments are normal.    Stress ECG: There is no ST segment deviation identified during the protocol. There are no arrhythmias during stress.    ECG Conclusion: The ECG portion of the study is negative for ischemia.    Post-stress Conclusion: The study is negative with no echocardiographic evidence of stress induced ischemia.      TTE (11/10/2023):    S/p OHT 10/29/12.    Left Ventricle: The left ventricle is dilated. Ventricular mass is normal. Increased wall thickness. regional wall motion abnormalities present. See diagram for wall motion findings. There is mildly reduced systolic function with a visually estimated ejection fraction of 40 - 45%. Ejection fraction by visual approximation is 40%. Grade I diastolic dysfunction. Average E/e' ratio is 5.43.    Right Ventricle: Moderate-severe right ventricular enlargement. Systolic function is borderline low.    Aortic Valve: The aortic valve is a trileaflet valve. There is mild aortic regurgitation.    Tricuspid Valve: There is severe regurgitation with a centrally directed jet.    IVC/SVC: Elevated venous pressure at 15 mmHg.      LHC (8/10/2020):   Normal coronary arteries.   IVUS of LAD did not show features of significant Cardiac allograft vasculopathy (CAV)   Filling pressures normal. 5 successful RVBX, under fluoro guidance, perfomed. A sample was sent for immunofluorescence..   US guided Right femoral artery and Rt IJ vein access (Rt radial artery occluded, noted in US)   In  order to adequately assess the patient and form an appropriate treatment plan, it is necessary to perform a comprehensive Right Heart catheterization. A thorough study of the patient's cardiac and hemodynamic functioning, in addition to an Endomyocardial biopsy, was performed. The information to be obtained from the biopsy alone was insufficient to care for this patient.   Estimated blood loss: <10 mL      Biopsy (8/2020):   1.  The myocardium is C4d negative for antibody mediated rejection.    2.  There are 4 myocardial biopsy fragments. With the material seen in    conjunction with the immunofluorescence procedure, there are 5 fragments. The    myocardium is devoid of a lymphocytic infiltrate. (ISHLT 0). The myocardium    does not have swollen endothelial cells or capillaries distended by enlarged    mononuclear cells, which would have pointed towards antibody mediated    rejection.  The myocardium is devoid of CD68 positive macrophages . The    picture here is graded as pAMR 0.    The positive and negative controls stained appropriately.        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    Factor V Leiden 9/7/2012    Hypertension     Immunodeficiency due to treatment with immunosuppressive medication     Other and unspecified hyperlipidemia 10/11/2013    Stroke     Type II or unspecified type diabetes mellitus without mention of complication, uncontrolled 10/11/2013    Typical atrial flutter 6/19/2025       Past Surgical History:   Procedure Laterality Date    ABDOMINAL SURGERY      CARDIAC CATHETERIZATION      CATHETERIZATION OF BOTH LEFT AND RIGHT HEART N/A 8/10/2020    Procedure: CATHETERIZATION, HEART, BOTH LEFT AND  RIGHT;  Surgeon: Michael Fernandez MD;  Location: [facility] CATH LAB;  Service: Cardiology;  Laterality: N/A;    CORONARY ANGIOGRAPHY N/A 8/10/2020    Procedure: ANGIOGRAM, CORONARY ARTERY;  Surgeon: Michael Fernandez MD;  Location: [facility] CATH LAB;  Service: Cardiology;  Laterality: N/A;    HEART TRANSPLANT      IVC FILTER RETRIEVAL      pt reports still in place       Review of patient's allergies indicates:  No Known Allergies    Current Facility-Administered Medications on File Prior to Encounter   Medication    [COMPLETED] amiodarone in dextrose 150 mg/100 mL (1.5 mg/mL) loading dose 150 mg    [COMPLETED] cloNIDine tablet 0.1 mg    [COMPLETED] heparin 25,000 units in dextrose 5% (100 units/ml) IV bolus from bag HIGH INTENSITY nomogram - [facility]    [COMPLETED] kit for prep of Tc-99m-albumin 2.5 mg SolR 5 millicurie    [COMPLETED] kit prep Tc 99m-pentetic acid (aerosol) 20 mg SolR 30 millicurie    [COMPLETED] metoprolol injection 5 mg    [COMPLETED] metoprolol injection 5 mg    [COMPLETED] metoprolol injection 5 mg    [COMPLETED] metoprolol tartrate (LOPRESSOR) tablet 25 mg    [COMPLETED] potassium chloride 10 mEq in 100 mL IVPB    [DISCONTINUED] amiodarone 360 mg/200 mL (1.8 mg/mL) infusion    [DISCONTINUED] amiodarone 360 mg/200 mL (1.8 mg/mL) infusion    [DISCONTINUED] heparin 25,000 units in dextrose 5% (100 units/ml) IV bolus from bag HIGH INTENSITY nomogram - [facility]    [DISCONTINUED] heparin 25,000 units in dextrose 5% (100 units/ml) IV bolus from bag HIGH INTENSITY nomogram - [facility]    [DISCONTINUED] heparin 25,000 units in dextrose 5% 250 mL (100 units/mL) infusion HIGH INTENSITY nomogram - [facility]    [DISCONTINUED] metoprolol injection 5 mg     Current Outpatient Medications on File Prior to Encounter   Medication Sig    amLODIPine (NORVASC) 10 MG tablet TAKE 1 TABLET BY MOUTH ONCE DAILY    cloNIDine (CATAPRES) 0.1 MG tablet Take 3 tablets (0.3 mg total) by mouth 2 (two) times daily.    hydrALAZINE (APRESOLINE) 25 MG tablet Take 1  tablet (25 mg total) by mouth 3 (three) times daily.    tacrolimus (PROGRAF) 1 MG Cap Take 1 capsule (1 mg total) by mouth every 12 (twelve) hours.    [DISCONTINUED] enoxaparin (LOVENOX) 100 mg/mL Syrg INJECT 1 ML (100 MG TOTAL) INTO THE SKIN EVERY 12 (TWELVE) HOURS. (60 SYRINGES)    [DISCONTINUED] magnesium oxide (MAG-OX) 400 mg (241.3 mg magnesium) tablet Take 1 tablet (400 mg total) by mouth once daily.    [DISCONTINUED] pravastatin (PRAVACHOL) 40 MG tablet Take 1 tablet (40 mg total) by mouth every evening.     Family History       Problem Relation (Age of Onset)    Cancer Paternal Grandfather    Diabetes Maternal Uncle          Tobacco Use    Smoking status: Former     Current packs/day: 0.00     Types: Cigarettes     Quit date: 2011     Years since quittin.1    Smokeless tobacco: Former   Substance and Sexual Activity    Alcohol use: No    Drug use: No    Sexual activity: Yes     Partners: Female     Birth control/protection: None     ROS12 point ROS negative except for HPI  Objective:     Vital Signs (Most Recent):  Pulse: 100 (25)  SpO2: 98 % (25) Vital Signs (24h Range):  Temp:  [98 °F (36.7 °C)] 98 °F (36.7 °C)  Pulse:  [] 100  Resp:  [17-20] 17  SpO2:  [98 %-100 %] 98 %  BP: (121-176)/() 126/97       Weight: 103.1 kg (227 lb 4.7 oz)  Body mass index is 30.83 kg/m².    SpO2: 98 %        Physical Exam   Constitutional:       Comments: Alert, Oriented, No acute distress   HENT:      Head: Normocephalic and atraumatic.   Eyes:      Conjunctiva/sclera: Conjunctivae normal.   Neck:      Vascular: No hepatojugular reflux or JVD.   Cardiovascular:      Rate and Rhythm: Regular rhythm. Tachycardia present.   Pulmonary:      Comments: Normal effort, Clear to auscultation   Abdominal:      Comments: Soft, Non-tender, Non-distended   Musculoskeletal:      Cervical back: Normal range of motion.      Comments: B/l lower extremity swelling. Tenderness to palpation   Skin:      Comments: Dry, Intact, Warm   Neurological:      Mental Status: He is alert and oriented to person, place, and time.      Comments: Normal speech   Psychiatric:         Mood and Affect: Mood and affect normal.     Significant Labs: All pertinent lab results from the last 24 hours have been reviewed.    Significant Imaging: Echocardiogram: Transthoracic echo (TTE) complete (Cupid Only):   Results for orders placed or performed during the hospital encounter of 11/10/23   Echo   Result Value Ref Range    BSA 2.28 m2    LVOT stroke volume 39.87 cm3    LVIDd 5.09 3.5 - 6.0 cm    LV Systolic Volume 65.97 mL    LV Systolic Volume Index 29.5 mL/m2    LVIDs 3.90 2.1 - 4.0 cm    LV Diastolic Volume 123.01 mL    LV Diastolic Volume Index 54.92 mL/m2    IVS 1.13 (A) 0.6 - 1.1 cm    LVOT diameter 2.55 cm    LVOT area 5.1 cm2    FS 23 (A) 28 - 44 %    Left Ventricle Relative Wall Thickness 0.41 cm    PW 1.05 0.6 - 1.1 cm    LV mass 210.57 g    LV Mass Index 94 g/m2    MV Peak E Anjel 0.57 m/s    TDI LATERAL 0.12 m/s    TDI SEPTAL 0.09 m/s    E/E' ratio 5.43 m/s    MV Peak A Anjel 0.13 m/s    TR Max Anjel 2.17 m/s    E/A ratio 4.38     IVRT 108.47 msec    E wave deceleration time 159.65 msec    LV SEPTAL E/E' RATIO 6.33 m/s    LV LATERAL E/E' RATIO 4.75 m/s    LVOT peak anjel 0.48 m/s    RVDD 5.82 cm    TAPSE 2.45 cm    AV mean gradient 1 mmHg    AV peak gradient 2 mmHg    Ao peak anjel 0.67 m/s    Ao VTI 11.93 cm    LVOT peak VTI 7.81 cm    AV valve area 3.34 cm²    AV Velocity Ratio 0.72     AV index (prosthetic) 0.65     DEWAYNE by Velocity Ratio 3.66 cm²    MV stenosis pressure 1/2 time 46.30 ms    MV valve area p 1/2 method 4.75 cm2    Triscuspid Valve Regurgitation Peak Gradient 19 mmHg    Sinus 4.27 cm    STJ 3.14 cm    Ascending aorta 3.96 cm    Mean e' 0.11 m/s    ZLVIDS -1.73     ZLVIDD -4.54     TV resting pulmonary artery pressure 34 mmHg    RV TB RVSP 17 mmHg    Est. RA pres 15 mmHg    EF 40 %    Narrative      S/p OHT 10/29/12.     Left Ventricle: The left ventricle is dilated. Ventricular mass is   normal. Increased wall thickness. regional wall motion abnormalities   present. See diagram for wall motion findings. There is mildly reduced   systolic function with a visually estimated ejection fraction of 40 - 45%.   Ejection fraction by visual approximation is 40%. Grade I diastolic   dysfunction. Average E/e' ratio is 5.43.    Right Ventricle: Moderate-severe right ventricular enlargement.   Systolic function is borderline low.    Aortic Valve: The aortic valve is a trileaflet valve. There is mild   aortic regurgitation.    Tricuspid Valve: There is severe regurgitation with a centrally   directed jet.    IVC/SVC: Elevated venous pressure at 15 mmHg.                 Assessment and Plan:     * Typical atrial flutter  Mr. Alfonso Leger is a 56 y/o AAM w/ PMHx of OHTx 10/29/12 (for EtOH CM) with post-op severe TR and chronic LV dysfunction (EF has been reduced to 40-50% since at least 2015, as low as 35%), hx of WPW, Factor V Leiden with h/o DVT (last 8/2013) and previous hx of IVC filter placement in 2011 previously on Lovenox sub q 100mg bid (non-compliant, stopped taking 5 years ago), HTN (not compliant with clonidine or amlodipine, only on hydralazine), HLD (not compliant with pravastatin), NIDDM2, CKD stage 3 (last known baseline CR 1.5-2 per nephrology note in 2021), and Non-Hodgkin lymphoma s/p 6 cycles of R-CHOP/CEOP in 2013 now in remission who presented to the hospital with bilateral lower extremity edema and found to have DVT. Transferred to [facility] for evaluation of his NILES in the setting of OHTx on prograf. EP consulted for further evaluation and management of patients new onset typical atrial flutter w/ RVR in the setting of hx of WPW.     Recommendations:   - EKG and telemetry does not show any evidence of pre-excitation currently.   - Would favor rate control strategy with beta blockers rather then amiodarone gtt due to patient's  non-compliance with AC if ok from HTS standpoint.   - R/o rejection if that is a concern from HTS standpoint.  - Make NPO at midnight for possible ablation tomorrow. Will need to formally staff for final decision.   - Keep K>4 and Mg>2  - To be formally staffed in the AM. Case discussed with EP fellow.         Thank you for your consult. I will follow-up with patient. Please contact us if you have any additional questions.    Jarrell Parry MD  Cardiac Electrophysiology  [facility]

## 2025-06-20 NOTE — CONSULTS
[facility]  Endocrinology  Diabetes Consult Note    Consult Requested by: Niles Castro, *   Reason for admit: Typical atrial flutter    HISTORY OF PRESENT ILLNESS:  Reason for Consult: Management of T2DM, Hyperglycemia     Surgical Procedure and Date: OHTx 10/29/12     Diabetes diagnosis year:     Home Diabetes Medications:  None per chart review -- took insulin shortly after initial dx but has not needed any DM in > 10 years per pt     How often checking glucose at home? None      Diabetes Complications include:     Hyperglycemia    Complicating diabetes co morbidities:   CHF, HTN, HLD      HPI:   Patient is a 55 y.o. male with PMHx of OHTx 10/29/12 (for EtOH CM) with post-op severe TR and chronic LV dysfunction (EF has been reduced to 40-50% since at least , as low as 35%), hx of WPW, Factor V Leiden with h/o DVT (last 2013) and previous hx of IVC filter placement in  previously on Lovenox sub q 100mg bid (non-compliant, stopped taking 5 years ago), HTN (not compliant with clonidine or amlodipine, only on hydralazine), HLD (not compliant with pravastatin), NIDDM2, CKD stage 3 (last known baseline CR 1.5-2 per nephrology note in ), and Non-Hodgkin lymphoma s/p 6 cycles of R-CHOP/CEOP in  now in remission. Presented to outside hospital ED today after noting 1 1/2 week hx of bilateral lower extremity leg swelling and primarily left calf pain. Endocrine consulted for BG management.                 Interval HPI:   Overnight events: No acute events overnight. Patient in room [id number] A. Blood glucose stable. BG at goal on current insulin regimen (SSI ). Steroid use- None.      Renal function-   Lab Results   Component Value Date    CREATININE 3.3 (H) 2025        Vasopressors-  None     Diet Heart Healthy     Eatin%  Nausea: No  Hypoglycemia and intervention: No  Fever: No    PMH, PSH, FH, SH updated and reviewed     ROS:  Review of Systems    Constitutional:  Negative for unexpected weight change.   Cardiovascular:  Negative for chest pain.   Gastrointestinal:  Negative for constipation, diarrhea, nausea and vomiting.   Endocrine: Negative for polydipsia and polyuria.       Current Medications and/or Treatments Impacting Glycemic Control  Immunotherapy:    Immunosuppressants           Stop Route Frequency     tacrolimus capsule 0.5 mg         -- Oral Every evening     tacrolimus capsule 1 mg         -- Oral Every morning          Steroids:   Hormones (From admission, onward)      None          Pressors:    Autonomic Drugs (From admission, onward)      None          Hyperglycemia/Diabetes Medications:   Antihyperglycemics (From admission, onward)      Start     Stop Route Frequency Ordered    06/19/25 2252  insulin aspart U-100 pen 0-5 Units         -- SubQ Before meals & nightly PRN 06/19/25 2152             PHYSICAL EXAMINATION:  Vitals:    06/20/25 1001   BP: (!) 148/108   Pulse: (!) 130   Resp: (!) 21   Temp:      Body mass index is 30.19 kg/m².     Physical Exam  Constitutional:       General: He is not in acute distress.     Appearance: Normal appearance. He is not ill-appearing.   HENT:      Head: Normocephalic and atraumatic.      Right Ear: External ear normal.      Left Ear: External ear normal.      Nose: Nose normal.   Pulmonary:      Effort: Pulmonary effort is normal. No respiratory distress.   Neurological:      Mental Status: He is alert.   Psychiatric:         Mood and Affect: Mood normal.         Behavior: Behavior normal.            Labs Reviewed and Include   Recent Labs   Lab 06/20/25  0409   *   CALCIUM 8.1*   ALBUMIN 3.1*   PROT 6.8      K 3.5   CO2 21*      BUN 32*   CREATININE 3.3*   ALKPHOS 59   ALT 7*   AST 11   BILITOT 0.4     Lab Results   Component Value Date    WBC 5.38 06/20/2025    HGB 12.2 (L) 06/20/2025    HCT 36.5 (L) 06/20/2025    MCV 89 06/20/2025     06/20/2025     Recent Labs   Lab  06/19/25  1858   TSH 1.476     Lab Results   Component Value Date    HGBA1C 6.0 (H) 06/19/2025       Nutritional status:   Body mass index is 30.19 kg/m².  Lab Results   Component Value Date    ALBUMIN 3.1 (L) 06/20/2025    ALBUMIN 3.1 (L) 06/19/2025    ALBUMIN 3.4 (L) 06/19/2025     Lab Results   Component Value Date    PREALBUMIN 20 11/02/2012    PREALBUMIN 14 (L) 10/31/2012    PREALBUMIN 16 (L) 10/26/2012       Estimated Creatinine Clearance: 31.5 mL/min (A) (based on SCr of 3.3 mg/dL (H)).    Accu-Checks  Recent Labs     06/19/25  2123 06/20/25  0926 06/20/25  1310   POCTGLUCOSE 129* 122* 115*        ASSESSMENT and PLAN    Cardiac/Vascular  * Typical atrial flutter  Managed per primary team        Renal/  Acute kidney injury superimposed on CKD  Titrate insulin slowly to avoid hypoglycemia as the risk of hypoglycemia increases with decreased creatinine clearance.        Endocrine  Type 2 diabetes mellitus, without long-term current use of insulin  BG goal: 140-180.    - Continue LDC SSI PRN   - POCT Glucose before meals and at bedtime  - Hypoglycemia protocol in place      ** Please notify Endocrine for any change and/or advance in diet**  ** Please call Endocrine for any BG related issues **     Discharge Planning:   TBD. Please notify endocrinology prior to discharge.            Plan discussed with patient, family, and RN at bedside.     Yuki Gore PA-C  Endocrinology  [facility]

## 2025-06-20 NOTE — EICU
[facility] Night Rounds Checklist  24H Vital Sign Range:  Temp:  [97.8 °F (36.6 °C)-98 °F (36.7 °C)]   Pulse:  []   Resp:  [10-26]   BP: (111-176)/()   SpO2:  [90 %-100 %]     Video rounds and LDA reconciliation

## 2025-06-20 NOTE — CONSULTS
[facility] - Card[facility]  Interventional Cardiology  Consult Note    Patient Name: Alfonso Leger  MRN: [id number]  Admission Date: 6/19/2025  Hospital Length of Stay: 1 days  Code Status: Full Code   Attending Provider: Niles Castro, *  Consulting Provider: Connor M Gillies, DO  Primary Care Physician: No, Primary Doctor  Principal Problem:Typical atrial flutter    Patient information was obtained from patient, past medical records, and ER records.     Inpatient consult to Interventional Cardiology  Consult performed by: Gillies, Connor M, DO  Consult ordered by: Ruthy Campbell MD        Subjective:     Chief Complaint:  Dyspnea     HPI:  Alfonso Leger is a 55yoM with a hx of OHT 10/29/2012 (EtOH CM) with post op LV dysfunction (EF ranging between 35-45% over the years), factor V Leiden with hx of DVT (last in 2013), and previous IVC filter, HTN, HLD, T2DM, CKD3, non-hodkin lymphoma in remission who is here with heart failure exacerbation, NILES on CKD, and atrial flutter with RVR. We are consulted for RHC with biopsy to rule out rejection. He is anticoagulated on heparin and the plan is for LEATHA DCCV on Monday. He is not in shock.    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    Factor V Leiden 9/7/2012    Hypertension     Immunodeficiency due to treatment with immunosuppressive medication     Other and unspecified hyperlipidemia 10/11/2013    Stroke     Type II or unspecified type diabetes mellitus without mention of complication, uncontrolled 10/11/2013    Typical atrial flutter 6/19/2025       Past Surgical History:   Procedure Laterality Date    ABDOMINAL SURGERY      CARDIAC CATHETERIZATION       CATHETERIZATION OF BOTH LEFT AND RIGHT HEART N/A 8/10/2020    Procedure: CATHETERIZATION, HEART, BOTH LEFT AND RIGHT;  Surgeon: Michael Fernandez MD;  Location: [facility] CATH LAB;  Service: Cardiology;  Laterality: N/A;    CORONARY ANGIOGRAPHY N/A 8/10/2020    Procedure: ANGIOGRAM, CORONARY ARTERY;  Surgeon: Michael Fernandez MD;  Location: [facility] CATH LAB;  Service: Cardiology;  Laterality: N/A;    HEART TRANSPLANT      IVC FILTER RETRIEVAL      pt reports still in place       Review of patient's allergies indicates:  No Known Allergies    Facility-Administered Medications Prior to Admission   Medication    DOBUTamine 1000 mg in D5W 250 mL infusion (premix titrating)     PTA Medications   Medication Sig    amLODIPine (NORVASC) 10 MG tablet TAKE 1 TABLET BY MOUTH ONCE DAILY    cloNIDine (CATAPRES) 0.1 MG tablet Take 3 tablets (0.3 mg total) by mouth 2 (two) times daily.    hydrALAZINE (APRESOLINE) 25 MG tablet Take 1 tablet (25 mg total) by mouth 3 (three) times daily.    tacrolimus (PROGRAF) 1 MG Cap Take 1 capsule (1 mg total) by mouth every 12 (twelve) hours.     Family History       Problem Relation (Age of Onset)    Cancer Paternal Grandfather    Diabetes Maternal Uncle          Tobacco Use    Smoking status: Former     Current packs/day: 0.00     Types: Cigarettes     Quit date: 2011     Years since quittin.1    Smokeless tobacco: Former   Substance and Sexual Activity    Alcohol use: No    Drug use: No    Sexual activity: Yes     Partners: Female     Birth control/protection: None     Review of Systems   Constitutional: Negative for diaphoresis and fever.   Cardiovascular:  Negative for chest pain, dyspnea on exertion, leg swelling, near-syncope, orthopnea, palpitations, paroxysmal nocturnal dyspnea and syncope.   Respiratory:  Negative for cough and shortness of breath.    Gastrointestinal:  Negative for abdominal pain, diarrhea, nausea and vomiting.   Neurological:  Negative for light-headedness.    Psychiatric/Behavioral:  Negative for altered mental status and substance abuse.      Objective:     Vital Signs (Most Recent):  Temp: 97.7 °F (36.5 °C) (06/20/25 0705)  Pulse: (!) 130 (06/20/25 1001)  Resp: (!) 21 (06/20/25 1001)  BP: (!) 148/108 (06/20/25 1001)  SpO2: 100 % (06/20/25 1007) Vital Signs (24h Range):  Temp:  [97.6 °F (36.4 °C)-98 °F (36.7 °C)] 97.7 °F (36.5 °C)  Pulse:  [] 130  Resp:  [10-36] 21  SpO2:  [90 %-100 %] 100 %  BP: (111-176)/() 148/108     Weight: 101 kg (222 lb 9.6 oz)  Body mass index is 30.19 kg/m².    SpO2: 100 %         Intake/Output Summary (Last 24 hours) at 6/20/2025 1120  Last data filed at 6/20/2025 1001  Gross per 24 hour   Intake 651.44 ml   Output 200 ml   Net 451.44 ml       Lines/Drains/Airways       Central Venous Catheter Line  Duration                  Port A Cath Single Lumen 09/18/13 Left Chest 4293 days         Port A Cath Single Lumen 10/16/13 Left Chest 4265 days              Peripheral Intravenous Line  Duration             Peripheral IV 06/19/25 1800 18 G Left Antecubital <1 day    Peripheral IV 06/19/25 1800 20 G Posterior;Right Hand <1 day    Peripheral IV Single Lumen 06/19/25 2200 18 G 2 1/4 in Long PIVC Right Forearm <1 day                     Physical Exam  Constitutional:       General: He is not in acute distress.     Appearance: Normal appearance. He is well-developed.   HENT:      Mouth/Throat:      Mouth: Mucous membranes are moist.      Pharynx: Oropharynx is clear.   Cardiovascular:      Rate and Rhythm: Normal rate and regular rhythm.      Heart sounds: Normal heart sounds. No murmur heard.  Pulmonary:      Effort: Pulmonary effort is normal. No respiratory distress.      Breath sounds: Normal breath sounds.   Abdominal:      Palpations: Abdomen is soft.      Tenderness: There is no abdominal tenderness. There is no guarding.   Musculoskeletal:         General: No swelling. Normal range of motion.      Right lower leg: Edema present.       Left lower leg: Edema present.   Skin:     General: Skin is warm and dry.   Neurological:      Mental Status: He is alert and oriented to person, place, and time.   Psychiatric:         Mood and Affect: Mood normal.         Behavior: Behavior normal.            Significant Labs: All pertinent lab results from the last 24 hours have been reviewed.    Significant Imaging: Reviewed  Assessment and Plan:     Cardiac/Vascular  Status post heart transplantation  Right heart catheterization with biopsy to rule out rejection in the setting of ADHF  Plan for RIJ access, R CFV back up  Hold heparin 1 hour prior to procedure  Consented for blood, RHC, heart biopsy and we discussed all the risks of the procedure        VTE Risk Mitigation (From admission, onward)           Ordered     IP VTE HIGH RISK PATIENT  Once         06/19/25 1839     Place sequential compression device  Until discontinued         06/19/25 1839                    Thank you for your consult. I will follow-up with patient. Please contact us if you have any additional questions.    Connor M Gillies, DO  Interventional Cardiology   [facility]        IC STAFF  I have seen the patient, reviewed the Fellow's history and physical, assessment and plan. I have personally interviewed and examined the patient and agree with the findings.     IRENE Hector MD

## 2025-06-20 NOTE — HPI
Mr. Alfonso Leger is a 54 y/o AAM w/ PMHx of OHTx 10/29/12 (for EtOH CM) with post-op severe TR and chronic LV dysfunction (EF has been reduced to 40-50% since at least 2015, as low as 35%), hx of WPW, Factor V Leiden with h/o DVT (last 8/2013) and previous hx of IVC filter placement in 2011 previously on Lovenox sub q 100mg bid (non-compliant, stopped taking 5 years ago), HTN (not compliant with clonidine or amlodipine, only on hydralazine), HLD (not compliant with pravastatin), NIDDM2, CKD stage 3 (last known baseline CR 1.5-2 per nephrology note in 2021), and Non-Hodgkin lymphoma s/p 6 cycles of R-CHOP/CEOP in 2013 now in remission. Presented to outside hospital ED today after noting 1 1/2 week hx of bilateral lower extremity leg swelling and primarily left calf pain. Denied any chest pain, SOB, palpitations, nausea, vomiting, orthopnea, PND, abdominal distention, abdominal pain, fever, chills, diarrhea. States the only medications he is compliant with is his prograf 1mg bid and hydralazine 25mg tid. Stopped taking all other medications on his own. Last follow up with a doctor was last year (11/2024) at [facility] clinic.     In the ED,  labs notable hgb 13.5, potassium 3.3, bicarb 19, bun 30, cr 3.5 (baseline appears to be 1.5-2), magnesium 1.6. BNP 300s. VQ scan was negative for PE. Lower extremity U/S showed noncompressible filling defects within the right and left common femoral vein, superficial femoral vein and popliteal veins, and thrombosed calf veins noted bilaterally. Vitals on arrival notable for bp 175/106. EKG notable for typical atrial flutter with rates in the 140s. Patient received Lopressor IV and PO, and was started on amiodarone gtt and heparin gtt prior to transfer to [facility] [place] [place] for further management. EP consulted for management of patients atrial flutter with RVR in the setting of hx of WPW.      Of note, patient was evaluated by EP (Dr. Reddy) back on 8/28/2013 when WPW was first incidentally seen on his  EKG. Patient was notably asymptomatic and ablation was not being purused at the time due to him being neutropenic. Patient was discharged with 30 day event monitored (only showed 2 episodes of sinus tachycardia) and told to follow up as outpatient. No evidence in the system that patient ever followed up with EP. No previous history of Atrial flutter or atrial fibrillation seen.

## 2025-06-20 NOTE — ASSESSMENT & PLAN NOTE
Right heart catheterization with biopsy to rule out rejection in the setting of ADHF  Plan for RIJ access, R CFV back up  Hold heparin 1 hour prior to procedure  Consented for blood, RHC, heart biopsy and we discussed all the risks of the procedure

## 2025-06-20 NOTE — PROGRESS NOTES
[facility] - Cath Lab  Adult Nutrition  Progress Note    SUMMARY       Recommendations    Recommendation/Intervention:   1. Continue cardiac diet as tolerated      - recommend diabetic diet modifier due to high BGLs  - please continue to document PO % intake via flowsheets     2. Recommend boost glucose control TID    3. Encourage good intake      4. RD to monitor weight, labs, intake, tolerance    Goals:   1. % nutritional needs met with diet during admission     2. Maintain weight during admission  Nutrition Goal Status: new  Communication of RD Recs: other (comment) (POC)    Nutrition Discharge Planning    Nutrition Discharge Planning: Other (comments), Therapeutic diet (comments), Oral supplement regimen (comments)  Therapeutic diet (comments): cardiac diabetic diet  Oral supplement regimen (comments): oral nutrition supplement of choice and MVI  Other (comments): Diabetic diet education provided on 6/20    Reason for Assessment    Reason For Assessment: consult (hyperglycemia)  Diagnosis: cardiac disease, transplant/postoperative complications (typical atrial flutter)  General Information Comments: Pt admitted with typical atrial flutter. PMHx of heart transplant 10/29/12, CKD, T2DM, non-Hodgkin's lymphoma,  HTN, HLD, CKD 3, DVT. Upon assessment, pt reports poor appetite with 0-25% PO intake since admission. No GI s/s. Last BM 6/18. NFPE not warranted; pt appears well-nourished and does not meet criteria for malnutrition at this time. Minimal wt hx within that last year, overall stable.    Nutrition/Diet History    Nutrition Intake History: 2-3 meals and snacks  Food Allergies: NKFA  Factors Affecting Nutritional Intake: decreased appetite  Nutrition-related SDOH: None Identified    Anthropometrics    Height: 6' (182.9 cm)  Height (inches): 72 in  Height Method: Stated  Weight: 101 kg (222 lb 10.6 oz)  Weight (lb): 222.67 lb  Weight Method: Bed Scale  Ideal Body Weight (IBW), Male: 178 lb  % Ideal Body  Weight, Male (lb): 125.1 %  BMI (Calculated): 30.2  BMI Grade: 25 - 29.9 - overweight  Usual Body Weight (UBW), k kg  % Usual Body Weight: 101.21  % Weight Change From Usual Weight: 1 %    Lab/Procedures/Meds    Pertinent Labs Reviewed: reviewed  Pertinent Labs Comments: H/H 12.2/36.5 low, BUN 32 high, creatinine 3.3 high, GFR 21 low, glucose 130 high, Ca 8.1 low, albumin 3.1 low, ALT 7 low, A1C 6.0 high (2025)  Pertinent Medications Reviewed: reviewed  Pertinent Medications Comments: amlodipine, heparin, hydralazine, tacrolimus, amiodarone    Estimated/Assessed Needs    Weight Used For Calorie Calculations: 101 kg (222 lb 10.6 oz)  Energy Calorie Requirements (kcal): 2266 kcal  Energy Need Method: [place]-St Jeor (* 1.2)  Protein Requirements:  g/pro (0.8-1.0 g/kg)  Weight Used For Protein Calculations: 101 kg (222 lb 10.6 oz)        RDA Method (mL): 2266  CHO Requirement: 283 g    Nutrition Prescription Ordered    Current Diet Order: Heart Healthy    Evaluation of Received Nutrient/Fluid Intake    I/O: 527.1/0 on  - 124.4/200 on   Energy Calories Required: not meeting needs  Protein Required: not meeting needs  Fluid Required: other (see comments) (per MD)  Tolerance: tolerating  % Intake of Estimated Energy Needs: 0 - 25 %  % Meal Intake: 0 - 25 %    PES Statement    Inadequate energy intake related to Inadequate protein energy intake as evidenced by Intake <75% estimated needs  Status: New    Nutrition Risk    Level of Risk/Frequency of Follow-up: moderate (1-2/week)     Monitor and Evaluation    Monitor and Evaluation: Energy intake, Food and beverage intake, Protein intake, Carbohydrate intake, Diet order, Food and nutrition knowledge, Weight, Beliefs and attitudes, Electrolyte and renal panel, Gastrointestinal profile, Glucose/endocrine profile, Lipid profile, Inflammatory profile, Nutrition focused physical findings, Skin     Nutrition Follow-Up    RD Follow-up?: Yes

## 2025-06-20 NOTE — H&P
[facility] - Cardiac Intensive [facility]  Heart Transplant  H&P    Patient Name: Alfonso Leger  MRN: [id number]  Admission Date: 6/19/2025  Attending Physician: Niles Castro, *  Primary Care Provider: No, Primary Doctor  Principal Problem:Acute kidney injury superimposed on CKD    Subjective:     History of Present Illness:  Mr. Alfonso Leger is a 54 y/o AAM w/ PMHx of OHTx 10/29/12 (for EtOH CM) with post-op severe TR and chronic LV dysfunction (EF has been reduced to 40-50% since at least 2015, as low as 35%), hx of WPW, Factor V Leiden with h/o DVT (last 8/2013) and previous hx of IVC filter placement in 2011 previously on Lovenox sub q 100mg bid (non-compliant, stopped taking 5 years ago), HTN (not compliant with clonidine or amlodipine, only on hydralazine), HLD (not compliant with pravastatin), NIDDM2, CKD stage 3 (last known baseline CR 1.5-2 per nephrology note in 2021), and Non-Hodgkin lymphoma s/p 6 cycles of R-CHOP/CEOP in 2013 now in remission. Presented to outside hospital ED today after noting 1 1/2 week hx of bilateral lower extremity leg swelling and primarily left calf pain. Denied any chest pain, SOB, palpitations, nausea, vomiting, orthopnea, PND, abdominal distention, abdominal pain, fever, chills, diarrhea. States the only medications he is compliant with is his prograf 1mg bid and hydralazine 25mg tid. Stopped taking all other medications on his own. Last follow up with a doctor was last year (11/2024) at [facility] clinic.    In the ED,  labs notable hgb 13.5, potassium 3.3, bicarb 19, bun 30, cr 3.5 (baseline appears to be 1.5-2), magnesium 1.6. BNP 300s. VQ scan was negative for PE. Lower extremity U/S showed noncompressible filling defects within the right and left common femoral vein, superficial femoral vein and popliteal veins, and thrombosed calf veins noted bilaterally. Vitals on arrival notable for bp 175/106. EKG notable for typical atrial flutter with rates in the 140s. Patient  received Lopressor IV and PO, and was started on amiodarone gtt and heparin gtt prior [place] [facility] [place].     Of note, patient was evaluated by EP (Dr. Reddy) back on 8/28/2013 when WPW was first incidentally seen on his EKG. Patient was notably asymptomatic and ablation was not being purused at the time due to him being neutropenic. Patient was discharged with 30 day event monitored (only showed 2 episodes of sinus tachycardia) and told to follow up as outpatient. No evidence in the system that patient ever followed up with EP. No previous history of Atrial flutter or atrial fibrillation seen.       CMV status:    Donor: -   Recipient: -          Prescribed Home Medications:   Clonidine 0.3 mg BID    Tacrolimus 1 mg BID    Hydralazine 25 TID    Amlodipine 10 mg qd  Pravastatin 40mg nightly       Cardiac Imaging:   Stress Echo (11/19/2024):    Left Ventricle: The left ventricle is normal in size. Normal wall thickness. There is concentric remodeling. Regional wall motion abnormalities present. Septal motion is consistent with post-operative status. There is mildly reduced systolic function with a visually estimated ejection fraction of 40 - 50%. Ejection fraction is approximately 40%. There is normal diastolic function.    Right Ventricle: Moderate right ventricular enlargement. Wall thickness is normal. Systolic function is mildly reduced.    Left Atrium: Atrial septum is bulging to the left.    Right Atrium: Right atrium is severely dilated.    Tricuspid Valve: There is severe regurgitation. No pulmonary hypertension.    Pulmonary Artery: The estimated pulmonary artery systolic pressure is 21 mmHg.    IVC/SVC: Normal venous pressure at 3 mmHg.    Stress Protocol: The patient was infused intravenously with dobutamine. The patient received a graduated infusion of the stress agent beginning at 10.0 mcg/kg/min to a peak dose of 10.0 mcg/kg/min. The peak heart rate was 173 bpm, which is 104% of  age predicted maximum heart rate. Low-level exercise was used. The patient reported no symptoms during the stress test. The test was stopped because the end of the protocol was reached.    Baseline ECG: The Baseline ECG reveals sinus tachycardia with RBBB. The axis is normal. The ST segments are normal.    Stress ECG: There is no ST segment deviation identified during the protocol. There are no arrhythmias during stress.    ECG Conclusion: The ECG portion of the study is negative for ischemia.    Post-stress Conclusion: The study is negative with no echocardiographic evidence of stress induced ischemia.      TTE (11/10/2023):    S/p OHT 10/29/12.    Left Ventricle: The left ventricle is dilated. Ventricular mass is normal. Increased wall thickness. regional wall motion abnormalities present. See diagram for wall motion findings. There is mildly reduced systolic function with a visually estimated ejection fraction of 40 - 45%. Ejection fraction by visual approximation is 40%. Grade I diastolic dysfunction. Average E/e' ratio is 5.43.    Right Ventricle: Moderate-severe right ventricular enlargement. Systolic function is borderline low.    Aortic Valve: The aortic valve is a trileaflet valve. There is mild aortic regurgitation.    Tricuspid Valve: There is severe regurgitation with a centrally directed jet.    IVC/SVC: Elevated venous pressure at 15 mmHg.      LHC (8/10/2020):   Normal coronary arteries.   IVUS of LAD did not show features of significant Cardiac allograft vasculopathy (CAV)   Filling pressures normal. 5 successful RVBX, under fluoro guidance, perfomed. A sample was sent for immunofluorescence..   US guided Right femoral artery and Rt IJ vein access (Rt radial artery occluded, noted in US)   In order to adequately assess the patient and form an appropriate treatment plan, it is necessary to perform a comprehensive Right Heart catheterization. A thorough study of the patient's cardiac and hemodynamic  functioning, in addition to an Endomyocardial biopsy, was performed. The information to be obtained from the biopsy alone was insufficient to care for this patient.   Estimated blood loss: <10 mL      Biopsy (8/2020):   1.  The myocardium is C4d negative for antibody mediated rejection.    2.  There are 4 myocardial biopsy fragments. With the material seen in    conjunction with the immunofluorescence procedure, there are 5 fragments. The    myocardium is devoid of a lymphocytic infiltrate. (ISHLT 0). The myocardium    does not have swollen endothelial cells or capillaries distended by enlarged    mononuclear cells, which would have pointed towards antibody mediated    rejection.  The myocardium is devoid of CD68 positive macrophages . The    picture here is graded as pAMR 0.    The positive and negative controls stained appropriately.     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    Factor V Leiden 9/7/2012    Hypertension     Immunodeficiency due to treatment with immunosuppressive medication     Other and unspecified hyperlipidemia 10/11/2013    Stroke     Type II or unspecified type diabetes mellitus without mention of complication, uncontrolled 10/11/2013       Past Surgical History:   Procedure Laterality Date    ABDOMINAL SURGERY      CARDIAC CATHETERIZATION      CATHETERIZATION OF BOTH LEFT AND RIGHT HEART N/A 8/10/2020    Procedure: CATHETERIZATION, HEART, BOTH LEFT AND RIGHT;  Surgeon: Michael Fernandez MD;  Location: [facility] CATH LAB;  Service: Cardiology;  Laterality: N/A;    CORONARY ANGIOGRAPHY N/A 8/10/2020    Procedure: ANGIOGRAM, CORONARY ARTERY;  Surgeon: Michael Fernandez MD;  Location: [facility] CATH LAB;  Service:  Cardiology;  Laterality: N/A;    HEART TRANSPLANT      IVC FILTER RETRIEVAL      pt reports still in place       Review of patient's allergies indicates:  No Known Allergies    Current Facility-Administered Medications   Medication    amiodarone 360 mg/200 mL (1.8 mg/mL) infusion    [START ON 2025] amLODIPine tablet 10 mg    dextrose 50% injection 12.5 g    dextrose 50% injection 25 g    glucagon (human recombinant) injection 1 mg    glucose chewable tablet 16 g    glucose chewable tablet 24 g    heparin 25,000 units in dextrose 5% (100 units/ml) IV bolus from bag HIGH INTENSITY nomogram - [facility]    heparin 25,000 units in dextrose 5% (100 units/ml) IV bolus from bag HIGH INTENSITY nomogram - [facility]    heparin 25,000 units in dextrose 5% (100 units/ml) IV bolus from bag HIGH INTENSITY nomogram - [facility]    heparin 25,000 units in dextrose 5% 250 mL (100 units/mL) infusion HIGH INTENSITY nomogram - [facility]    hydrALAZINE tablet 25 mg    insulin aspart U-100 pen 0-10 Units    magnesium sulfate 2g in water 50mL IVPB (premix)    potassium chloride CR capsule 50 mEq     Family History       Problem Relation (Age of Onset)    Cancer Paternal Grandfather    Diabetes Maternal Uncle          Tobacco Use    Smoking status: Former     Current packs/day: 0.00     Types: Cigarettes     Quit date: 2011     Years since quittin.1    Smokeless tobacco: Former   Substance and Sexual Activity    Alcohol use: No    Drug use: No    Sexual activity: Yes     Partners: Female     Birth control/protection: None     Review of Ewlimfl54 point ROS negative except for HPI  Objective:     Vital Signs (Most Recent):  Pulse: 100 (25)  SpO2: 98 % (25) Vital Signs (24h Range):  Temp:  [98 °F (36.7 °C)] 98 °F (36.7 °C)  Pulse:  [] 100  Resp:  [17-20] 17  SpO2:  [98 %-100 %] 98 %  BP: (121-176)/() 126/97     Patient Vitals for the past 72 hrs (Last 3 readings):   Weight   25 1847 103.1 kg (227 lb 4.7 oz)  "    Body mass index is 30.83 kg/m².    No intake or output data in the 24 hours ending 06/19/25 2134       Physical Exam  Constitutional:       Comments: Alert, Oriented, No acute distress   HENT:      Head: Normocephalic and atraumatic.   Eyes:      Conjunctiva/sclera: Conjunctivae normal.   Neck:      Vascular: No hepatojugular reflux or JVD.   Cardiovascular:      Rate and Rhythm: Regular rhythm. Tachycardia present.   Pulmonary:      Comments: Normal effort, Clear to auscultation   Abdominal:      Comments: Soft, Non-tender, Non-distended   Musculoskeletal:      Cervical back: Normal range of motion.      Comments: B/l lower extremity swelling. Tenderness to palpation   Skin:     Comments: Dry, Intact, Warm   Neurological:      Mental Status: He is alert and oriented to person, place, and time.      Comments: Normal speech   Psychiatric:         Mood and Affect: Mood and affect normal.            Significant Labs:  CBC:  Recent Labs   Lab 06/19/25  1208 06/19/25  1858   WBC 6.65 5.95   RBC 4.52* 4.20*   HGB 13.5* 12.3*   HCT 40.9 37.4*    283   MCV 91 89   MCH 29.9 29.3   MCHC 33.0 32.9     BNP:  Recent Labs   Lab 06/19/25  1208 06/19/25  1858   * 709*     CMP:  Recent Labs   Lab 06/19/25  1208 06/19/25  1858   * 436*   CALCIUM 8.9 7.8*   ALBUMIN 3.4* 3.1*   PROT 8.3 6.9    132*   K 3.3* 3.1*   CO2 19* 19*    93*   BUN 30* 27*   CREATININE 3.5* 3.0*   ALKPHOS 65 61   ALT 9* 8*   AST 12 11   BILITOT 0.8 0.5      Coagulation:   Recent Labs   Lab 06/19/25  1214 06/19/25  1858   INR 1.2 1.7*   APTT 27.7 >150.0*     LDH:  No results for input(s): "LDH" in the last 72 hours.  Microbiology:  Microbiology Results (last 7 days)       ** No results found for the last 168 hours. **            I have reviewed all pertinent labs within the past 24 hours.    Diagnostic Results:  I have reviewed all pertinent imaging results/findings within the past 24 hours.    Assessment/Plan:     * Acute " kidney injury superimposed on CKD  NILES on CKD stage 3 possibly secondary to prograf toxicity vs dehydration. Patient does not appear in ADHF on exam and low likelhood for rejection with hx of HFmrEF. However cannot explain elevated BNP so will need further evaluation. Possible baseline cr 1.5-2. Creatinine around 3 on arrival to OSH     - daily BMP to trend creatinine.   - AM prograf level.   - Will order formal Echo.   - Strict I and Os.       Typical atrial flutter  Patient noted to be in typical atrial flutter with rates in 130-140s. 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 Asymptomatic and hemodyamically stable. [id number] score of 5 (CHF hx, HTN hx, Thromembolism hx, and DM2 hx). Hx of clotting disorder Factor V leiden. Non-compliant with home AC.     - Continue amiodarone gtt for now. Due to concern rate control with BB can lead to decompensation of HF, will continue amiodarone gtt for now. Patient explained risks of possible stroke if chemically converted and is agreeable with plan.   - Continue heparin gtt for now.   - ACLS if unstable.     Venous thromboembolism  History of DVT and factor V Leiden deficiency . Non-compliant with home lovenox. Presented with bilateral LE swelling, found to have bilateral extensive occlusive DVTs. Started on heparin gtt at osh     - Continue heparin gtt.     Status post heart transplantation  OHT on 10/29/12, with post-op severe TR, mild to moderately reduced LVEF.  Post-op developed PTLD, s/p R-CHOP/CEOP 12/2013  Immunosuppression tacrolimus 1 mg BID     Plan:  -continue home tacro pending AM level     Essential hypertension  - continue home hydralazine 25mg tid.   - will restart amlodopine 10mg daily    Type 2 diabetes mellitus, without long-term current use of insulin  - last known A1c 6  - Currently hyperglycemic with sugars in the 400s. Beta hydroxybutare normal.   - Start moderate SS insulin.   - Management per Endocrinology.        Factor V Leiden  History of DVT. Non-compliant with home lovenox.   Presented with bilateral LE swelling, found to have bilateral extensive occlusive DVTs  Started on heparin gtt at o[facility]     - Continue heparin gtt.         Jarrell Parry MD  Heart Transplant  [facility] - [facility]

## 2025-06-20 NOTE — SUBJECTIVE & OBJECTIVE
Interval HPI:   Overnight events: No acute events overnight. Patient in room [id number] A. Blood glucose stable. BG at goal on current insulin regimen (SSI ). Steroid use- None.      Renal function-   Lab Results   Component Value Date    CREATININE 3.3 (H) 2025        Vasopressors-  None     Diet Heart Healthy     Eatin%  Nausea: No  Hypoglycemia and intervention: No  Fever: No    PMH, PSH, FH, SH updated and reviewed     ROS:  Review of Systems   Constitutional:  Negative for unexpected weight change.   Cardiovascular:  Negative for chest pain.   Gastrointestinal:  Negative for constipation, diarrhea, nausea and vomiting.   Endocrine: Negative for polydipsia and polyuria.       Current Medications and/or Treatments Impacting Glycemic Control  Immunotherapy:    Immunosuppressants           Stop Route Frequency     tacrolimus capsule 0.5 mg         -- Oral Every evening     tacrolimus capsule 1 mg         -- Oral Every morning          Steroids:   Hormones (From admission, onward)      None          Pressors:    Autonomic Drugs (From admission, onward)      None          Hyperglycemia/Diabetes Medications:   Antihyperglycemics (From admission, onward)      Start     Stop Route Frequency Ordered    25 2252  insulin aspart U-100 pen 0-5 Units         -- SubQ Before meals & nightly PRN 25 2152             PHYSICAL EXAMINATION:  Vitals:    25 1001   BP: (!) 148/108   Pulse: (!) 130   Resp: (!) 21   Temp:      Body mass index is 30.19 kg/m².     Physical Exam  Constitutional:       General: He is not in acute distress.     Appearance: Normal appearance. He is not ill-appearing.   HENT:      Head: Normocephalic and atraumatic.      Right Ear: External ear normal.      Left Ear: External ear normal.      Nose: Nose normal.   Pulmonary:      Effort: Pulmonary effort is normal. No respiratory distress.   Neurological:      Mental Status: He is alert.   Psychiatric:         Mood and  Affect: Mood normal.         Behavior: Behavior normal.

## 2025-06-20 NOTE — NURSING
Rounds Report: Attended interdisciplinary rounds this afternoon with the transplant team including SW, physicians, fellows, JO's, and transplant coordinators. Discussed plan of care, including DC date, current medications. Plan for LHC/RHC, potential cardioversion.

## 2025-06-20 NOTE — ASSESSMENT & PLAN NOTE
Mr. Alfonso Leger is a 56 y/o AAM w/ PMHx of OHTx 10/29/12 (for EtOH CM) with post-op severe TR and chronic LV dysfunction (EF has been reduced to 40-50% since at least 2015, as low as 35%, most current EF is 40-45% 2024), hx of WPW, Factor V Leiden with h/o DVT (last 8/2013) and previous hx of IVC filter placement in 2011 previously on Lovenox sub q 100mg bid (non-compliant, stopped taking 5 years ago), HTN (not compliant with clonidine or amlodipine, only on hydralazine), HLD (not compliant with pravastatin), NIDDM2, CKD stage 3 (last known baseline CR 1.5-2 per nephrology note in 2021), and Non-Hodgkin lymphoma s/p 6 cycles of R-CHOP/CEOP in 2013 now in remission who presented to the hospital with bilateral lower extremity edema and found to have DVT. Transferred to [facility] [place] for evaluation of his NILES in the setting of OHTx on prograf. EP consulted for further evaluation and management of patients new onset typical atrial flutter w/ RVR in the setting of hx of WPW.     Recommendations:  - Continue heparin drip    - EKG and telemetry does not show any evidence of pre-excitation currently.   - Would favor rate control strategy with beta blockers rather then amiodarone gtt due to patient non-compliance with AC    - R/o rejection if that is a concern from HTS standpoint.  - Keep K>4 and Mg>2  - Plan for DCCV/LEATHA on 6/23, please make him NPO mn of Sunday

## 2025-06-20 NOTE — TELEPHONE ENCOUNTER
Per protocol, patient does not meet inclusion criteria for Ambulatory Pharmacy Hyperlipidemia Treatment. Mr. Leger is a transplant patient.    ----- Message from TERRANCE Maurer sent at 2025  1:39 PM CDT -----  Regarding: Order for SAUL LEGER    Patient Name: SAUL LEGER([id number])  Sex: Male  : 1970      PCP: ANDREW, PRIMARY DOCTOR    Center: [facility]     Types of orders made on 2025: Activity, Blood Bank, Cardiac Cath, Cardiac                                       Services, Case Request, Consult, Diet, ECG,                                       Echocardiography, Lab, Medications,                                       Nursing, Point of Care Testing-Docked                                       Device, Respiratory Care    Order Date:2025  Ordering User:GILLIES, CONNOR M [[id number]  Attending Provider:Niles Castro MD [[id number]  Authorizing Provider: Gillies, Connor M, DO [[id number]  Department:[facility] CARDIAC ICU[[id number]]    Order Specific Information  Order: Notify [facility] [Custom: [id number]]  Order #: [id number]: 1    Priority: Routine  Class: Hospital Performed    Released on: 2025  1:39 PM        Priority: Routine  Class: Hospital Performed    Released on: 2025  1:39 PM

## 2025-06-20 NOTE — SUBJECTIVE & OBJECTIVE
Interval History: Patient still in AFL with rates on 120-130 BPM. He is currently asymptomatic. Denies any SOB, palpitations, chest pain. Will plan for DCCV/LEATHA on Monday unless primary team wants it earlier (if patient medically stable and euvolemic form heart failure decompensation)    ROS  Objective:     Vital Signs (Most Recent):  Temp: 97.7 °F (36.5 °C) (06/20/25 0705)  Pulse: (!) 129 (06/20/25 0801)  Resp: (!) 33 (06/20/25 0801)  BP: (!) 153/109 (06/20/25 0801)  SpO2: 99 % (06/20/25 0801) Vital Signs (24h Range):  Temp:  [97.6 °F (36.4 °C)-98 °F (36.7 °C)] 97.7 °F (36.5 °C)  Pulse:  [] 129  Resp:  [10-36] 33  SpO2:  [90 %-100 %] 99 %  BP: (111-176)/() 153/109     Weight: 101 kg (222 lb 9.6 oz)  Body mass index is 30.19 kg/m².     SpO2: 99 %        Physical Exam       Significant Labs:   Recent Lab Results  (Last 5 results in the past 24 hours)        06/20/25  0713   06/20/25  0615   06/20/25  0511   06/20/25  0409   06/19/25  2123        Albumin       3.1         ALP       59         ALT       7         Anion Gap       12         Appearance, UA Clear               PTT     54.8  Comment: Refer to local heparin nomogram for intensity/dose specific therapeutic range.   101.0  Comment: Refer to local heparin nomogram for intensity/dose specific therapeutic range.         AST       11         Bacteria, UA Rare               Baso #       0.04         Basophil %       0.7         Bilirubin (UA) 1+  Comment: Positive urine bilirubin is not confirmed. Correlate with serum bilirubin and clinical presentation.               BILIRUBIN TOTAL       0.4  Comment: For infants and newborns, interpretation of results should be based   on gestational age, weight and in agreement with clinical   observations.    Premature Infant recommended reference ranges:   0-24 hours:  <8.0 mg/dL   24-48 hours: <12.0 mg/dL   3-5 days:    <15.0 mg/dL   6-29 days:   <15.0 mg/dL         BUN       32         Calcium       8.1          Chloride       103         CO2       21         Color, UA Yellow               Creatinine       3.3         eGFR       21  Comment: Estimated GFR calculated using the CKD-EPI creatinine (2021) equation.         Eos #       0.02         Eos %       0.4         Glucose       130         Glucose, UA Negative               Gran # (ANC)       3.47         Hematocrit       36.5         Hemoglobin       12.2         Hyaline Casts, UA 5               Immature Grans (Abs)       0.02  Comment: Mild elevation in immature granulocytes is non specific and can be seen in a variety of conditions including stress response, acute inflammation, trauma and pregnancy. Correlation with other laboratory and clinical findings is essential.         Immature Granulocytes       0.4         Ketones, UA Trace               Leukocyte Esterase, UA Negative               Lymph #       1.33         Lymph %       24.7         Magnesium        2.3         MCH       29.6         MCHC       33.4         MCV       89         Microscopic Comment   Comment: Other formed elements not mentioned in the report are not present in the microscopic examination.               Mono #       0.50         Mono %       9.3         MPV       10.1         Neut %       64.5         NITRITE UA Negative               nRBC       0         Blood, UA Negative               QRS Duration   156             OHS QTC Calculation   610             pH, UA 6.0               Platelet Count       286         POCT Glucose         129       Potassium       3.5         PROTEIN TOTAL       6.8         Protein, UA 2+  Comment: Recommend a 24 hour urine protein or a urine protein/creatinine ratio if globulin induced proteinuria is clinically suspected.               RBC       4.12         RBC, UA 3               RDW       13.3         Sodium       136         Spec Grav UA >=1.030               Squam Epithel, UA 1               Tacrolimus Lvl       8.6  Comment: Testing performed by a  chemiluminescent microparticle   immunoassay on the NER[employer] System.    CAUTION: No firm therapeutic range exists for tacrolimus in whole blood. The complexity of the clinical state, individual differences in sensitivity to immunosuppressive and nephrotoxic effects of tacrolimus, co-administration of other immunosuppressants, type of transplant, time post-transplant and a number of other factors contribute to different requirements for optimal blood levels of tacrolimus. Therefore, individual tacrolimus values cannot be used as the sole indicator for making changes in treatment regimen and each patient should be thoroughly evaluated clinically before changes in treatment regimens are made. Each user must establish his or her own ranges based on clinical experience.  Therapeutic ranges vary according to the commercial test used, and therefore should be established for each commercial test. Values obtained with different assay methods cannot be used interchangeably due to differences in assay methods and cross-reactivity with metabolites, nor should correction factors be applied. Therefore, consistent use of one assay for individual patients is recommended.           Urobilinogen, UA Negative               WBC, UA 10               WBC       5.38

## 2025-06-20 NOTE — ANESTHESIA PREPROCEDURE EVALUATION
[facility]  Anesthesia Pre-Operative Evaluation    PAPER CONSENT IN CHART    Patient Name: Alfonso Leger  YOB: 1970  MRN: [id number]    SUBJECTIVE:   06/20/2025    Pre-operative evaluation for Procedure(s) (LRB):  Transesophageal echo (LEATHA) intra-procedure log documentation (N/A)   Possible DCCV    Alfonso Leger is a 55 y.o. male with a PMHx significant for OHT in 2012 with postop LV EF 35-35%, factor V leiden, HTN, T2DM, PUD, CKD3, NHL in remission admitted for heart failure exacerbation and A flutter with RVR.  Patient now presents for the above procedure(s).    Previous Airway : None documented.    LDA:        Port A Cath Single Lumen 09/18/13 Left Chest (Active)   Dressing Type Transparent 10/18/13 0750   Dressing Status Intact;Dry 10/18/13 0750   Number of days: 4293            Port A Cath Single Lumen 10/16/13 Left Chest (Active)   Dressing Type Transparent 10/19/13 0815   Dressing Status Clean;Dry;Intact 10/19/13 0815   Number of days: 4265       Peripheral IV 06/19/25 1800 18 G Left Antecubital (Active)   Site Assessment Clean;Dry;Intact;No redness;No swelling 06/20/25 1505   Line Securement Device Secured with sutures 06/20/25 0705   Extremity Assessment Distal to IV No abnormal discoloration;No redness;No swelling;No warmth 06/20/25 1505   Dressing Status Clean;Dry;Intact 06/20/25 1505   Dressing Intervention Integrity maintained 06/20/25 1505   Dressing Change Due 06/23/25 06/20/25 1505   Site Change Due 06/28/25 06/20/25 1101   Reason Not Rotated Not due 06/20/25 1505   Number of days: 1       Peripheral IV 06/19/25 1800 20 G Posterior;Right Hand (Active)   Site Assessment Dry;Clean;Intact;No redness;No swelling 06/20/25 1505   Line Securement Device Secured with sutures 06/20/25 0705   Extremity Assessment Distal to IV No abnormal discoloration;No redness;No swelling;No warmth 06/20/25 1505   Dressing Status Clean;Dry;Intact 06/20/25 1505   Dressing  Intervention Integrity maintained 06/20/25 1505   Dressing Change Due 06/23/25 06/20/25 1505   Site Change Due 06/28/25 06/20/25 1101   Reason Not Rotated Not due 06/20/25 1505   Number of days: 1       Peripheral IV Single Lumen 06/19/25 2200 18 G 2 1/4 in Long PIVC Right Forearm (Active)   Site Assessment Clean;Dry;Intact;No redness;No swelling 06/20/25 1505   Line Securement Device Secured with sutures 06/20/25 0705   Extremity Assessment Distal to IV No abnormal discoloration;No redness;No swelling;No warmth 06/20/25 1505   Line Status Blood return noted 06/20/25 1505   Dressing Status Clean;Dry;Intact 06/20/25 1505   Dressing Intervention Integrity maintained 06/20/25 1505   Dressing Change Due 06/23/25 06/20/25 1505   Site Change Due 06/28/25 06/20/25 1101   Reason Not Rotated Not due 06/20/25 1505   Number of days: 0     Transthoracic Echo:  Results for orders placed during the hospital encounter of 06/19/25    Echo    Interpretation Summary    Post cardiac transplantation study (OHTx 10/29/12).    Left Ventricle: The left ventricle is normal in size. Ventricular mass is normal. Normal wall thickness. Global hypokinesis present. There is severely reduced systolic function with a visually estimated ejection fraction of 5 - 10%. There is diastolic dysfunction.    Right Ventricle: The right ventricle is mildly dilated Right ventricle wall motion has global hypokinesis. Systolic function is mildly reduced. Right ventriuclar free wall strain is -13.0%.    Aortic Valve: There is mild aortic regurgitation.    Tricuspid Valve: There is severe regurgitation. Reversed hepatic vein systolic flow. PASP is likely under-estimated in the setting of severe tricuspid regurgitation.    Aorta: The aortic root is mildly dilated measuring 4.3 cm. The ascending aorta is mildly dilated measuring 4.0 cm.    Pulmonary Artery: The estimated pulmonary artery systolic pressure is 31 mmHg.    IVC/SVC: Elevated venous pressure at 15  mmHg.    Pertinent findings were relayed to HTS team on 6/20/25 at 12:10p.      Problem List[1]    Review of patient's allergies indicates:  No Known Allergies    Current Outpatient Medications   Medication Instructions    amLODIPine (NORVASC) 10 MG tablet TAKE 1 TABLET BY MOUTH ONCE DAILY    cloNIDine (CATAPRES) 0.3 mg, Oral, 2 times daily    hydrALAZINE (APRESOLINE) 25 mg, Oral, 3 times daily    tacrolimus (PROGRAF) 1 mg, Oral, Every 12 hours       Past Surgical History:   Procedure Laterality Date    ABDOMINAL SURGERY      CARDIAC CATHETERIZATION      CATHETERIZATION OF BOTH LEFT AND RIGHT HEART N/A 8/10/2020    Procedure: CATHETERIZATION, HEART, BOTH LEFT AND RIGHT;  Surgeon: Michael Fernandez MD;  Location: [facility] CATH LAB;  Service: Cardiology;  Laterality: N/A;    CORONARY ANGIOGRAPHY N/A 8/10/2020    Procedure: ANGIOGRAM, CORONARY ARTERY;  Surgeon: Michael Fernandez MD;  Location: [facility] CATH LAB;  Service: Cardiology;  Laterality: N/A;    HEART TRANSPLANT      IVC FILTER RETRIEVAL      pt reports still in place     Social History     Substance and Sexual Activity   Drug Use No     Alcohol Use: Not on file     Tobacco Use: Medium Risk (6/19/2025)    Patient History     Smoking Tobacco Use: Former     Smokeless Tobacco Use: Former     Passive Exposure: Not on file     OBJECTIVE:     Vital Signs Range:      6/19/2025     6:47 PM 6/19/2025     7:01 PM 6/20/2025     1:38 PM   Vitals - 1 value per visit   SYSTOLIC  123    DIASTOLIC  87    Pulse  129    Temp  36.6 °C (97.8 °F)    Resp  15    SPO2  97 %    Weight (lb) 227.29     Weight (kg) 103.1     Height   6' (1.829 m)   BMI (Calculated)   30.2       CBC:   Lab Results   Component Value Date    WBC 5.38 06/20/2025    HGB 12.2 (L) 06/20/2025    HCT 36.5 (L) 06/20/2025    MCV 89 06/20/2025     06/20/2025     CMP:   Sodium   Date Value Ref Range Status   06/20/2025 136 136 - 145 mmol/L Final   11/19/2024 140 136 - 145 mmol/L Final     Potassium   Date Value Ref Range  Status   06/20/2025 3.5 3.5 - 5.1 mmol/L Final   11/19/2024 3.4 (L) 3.5 - 5.1 mmol/L Final     Chloride   Date Value Ref Range Status   06/20/2025 103 95 - 110 mmol/L Final   11/19/2024 103 95 - 110 mmol/L Final     CO2   Date Value Ref Range Status   06/20/2025 21 (L) 23 - 29 mmol/L Final   11/19/2024 23 23 - 29 mmol/L Final     Glucose   Date Value Ref Range Status   06/20/2025 130 (H) 70 - 110 mg/dL Final   11/19/2024 96 70 - 110 mg/dL Final     BUN   Date Value Ref Range Status   06/20/2025 32 (H) 6 - 20 mg/dL Final     Creatinine   Date Value Ref Range Status   06/20/2025 3.3 (H) 0.5 - 1.4 mg/dL Final     Calcium   Date Value Ref Range Status   06/20/2025 8.1 (L) 8.7 - 10.5 mg/dL Final   11/19/2024 9.7 8.7 - 10.5 mg/dL Final     Protein Total   Date Value Ref Range Status   06/20/2025 6.8 6.0 - 8.4 gm/dL Final     Total Protein   Date Value Ref Range Status   11/19/2024 8.8 (H) 6.0 - 8.4 g/dL Final     Albumin   Date Value Ref Range Status   06/20/2025 3.1 (L) 3.5 - 5.2 g/dL Final   11/19/2024 4.1 3.5 - 5.2 g/dL Final     Total Bilirubin   Date Value Ref Range Status   11/19/2024 0.9 0.1 - 1.0 mg/dL Final     Comment:     For infants and newborns, interpretation of results should be based  on gestational age, weight and in agreement with clinical  observations.    Premature Infant recommended reference ranges:  Up to 24 hours.............<8.0 mg/dL  Up to 48 hours............<12.0 mg/dL  3-5 days..................<15.0 mg/dL  6-29 days.................<15.0 mg/dL       Bilirubin Total   Date Value Ref Range Status   06/20/2025 0.4 0.1 - 1.0 mg/dL Final     Comment:     For infants and newborns, interpretation of results should be based   on gestational age, weight and in agreement with clinical   observations.    Premature Infant recommended reference ranges:   0-24 hours:  <8.0 mg/dL   24-48 hours: <12.0 mg/dL   3-5 days:    <15.0 mg/dL   6-29 days:   <15.0 mg/dL     Alkaline Phosphatase   Date Value Ref Range  Status   11/19/2024 94 40 - 150 U/L Final     ALP   Date Value Ref Range Status   06/20/2025 59 40 - 150 unit/L Final     AST   Date Value Ref Range Status   06/20/2025 11 11 - 45 unit/L Final   11/19/2024 22 10 - 40 U/L Final     ALT   Date Value Ref Range Status   06/20/2025 7 (L) 10 - 44 unit/L Final   11/19/2024 17 10 - 44 U/L Final     Anion Gap   Date Value Ref Range Status   06/20/2025 12 8 - 16 mmol/L Final     eGFR if    Date Value Ref Range Status   12/17/2021 46.5 (A) >60 mL/min/1.73 m^2 Final     eGFR if non    Date Value Ref Range Status   12/17/2021 40.2 (A) >60 mL/min/1.73 m^2 Final     Comment:     Calculation used to obtain the estimated glomerular filtration  rate (eGFR) is the CKD-EPI equation.        INR:  Lab Results   Component Value Date    INR 1.7 (H) 06/19/2025    INR 1.2 06/19/2025    INR 1.1 07/08/2016    PROTIME 18.3 (H) 06/19/2025    PROTIME 13.9 (H) 06/19/2025     Cardiac Studies    EKG:   Results for orders placed or performed during the hospital encounter of 06/19/25   EKG 12-lead    Collection Time: 06/20/25  6:15 AM   Result Value Ref Range    QRS Duration 156 ms    OHS QTC Calculation 610 ms    Narrative    Test Reason : I48.92,    Vent. Rate : 127 BPM     Atrial Rate : 254 BPM     P-R Int :    ms          QRS Dur : 156 ms      QT Int : 420 ms       P-R-T Axes : 248   4 -37 degrees    QTcB Int : 610 ms    Atrial flutter with 2:1 A-V conduction  Nonspecific intraventricular block  Abnormal ECG  When compared with ECG of 19-Jun-2025 12:00,  No significant change was found  Confirmed by Eliceo Torres ([id number]) on 6/20/2025 7:56:47 AM    Referred By: DERRICK NAVARRO           Confirmed By: Eliceo Torres       Transthoracic Echo:  Results for orders placed during the hospital encounter of 06/19/25    Echo    Interpretation Summary    Post cardiac transplantation study (OHTx 10/29/12).    Left Ventricle: The left ventricle is normal in size. Ventricular mass is  normal. Normal wall thickness. Global hypokinesis present. There is severely reduced systolic function with a visually estimated ejection fraction of 5 - 10%. There is diastolic dysfunction.    Right Ventricle: The right ventricle is mildly dilated Right ventricle wall motion has global hypokinesis. Systolic function is mildly reduced. Right ventriuclar free wall strain is -13.0%.    Aortic Valve: There is mild aortic regurgitation.    Tricuspid Valve: There is severe regurgitation. Reversed hepatic vein systolic flow. PASP is likely under-estimated in the setting of severe tricuspid regurgitation.    Aorta: The aortic root is mildly dilated measuring 4.3 cm. The ascending aorta is mildly dilated measuring 4.0 cm.    Pulmonary Artery: The estimated pulmonary artery systolic pressure is 31 mmHg.    IVC/SVC: Elevated venous pressure at 15 mmHg.    Pertinent findings were relayed to HTS team on 6/20/25 at 12:10p.      Transesophageal Echo:  No results found for this or any previous visit.      Nuclear Stress Test:  No results found for this or any previous visit.      Stress Echo:  Results for orders placed during the hospital encounter of 11/19/24    Stress Echo Which stress agent will be used? Pharmacological; Color Flow Doppler? No    Interpretation Summary    Left Ventricle: The left ventricle is normal in size. Normal wall thickness. There is concentric remodeling. Regional wall motion abnormalities present. Septal motion is consistent with post-operative status. There is mildly reduced systolic function with a visually estimated ejection fraction of 40 - 50%. Ejection fraction is approximately 40%. There is normal diastolic function.    Right Ventricle: Moderate right ventricular enlargement. Wall thickness is normal. Systolic function is mildly reduced.    Left Atrium: Atrial septum is bulging to the left.    Right Atrium: Right atrium is severely dilated.    Tricuspid Valve: There is severe regurgitation.  No  pulmonary hypertension.    Pulmonary Artery: The estimated pulmonary artery systolic pressure is 21 mmHg.    IVC/SVC: Normal venous pressure at 3 mmHg.    Stress Protocol: The patient was infused intravenously with dobutamine. The patient received a graduated infusion of the stress agent beginning at 10.0 mcg/kg/min to a peak dose of 10.0 mcg/kg/min. The peak heart rate was 173 bpm, which is 104% of age predicted maximum heart rate. Low-level exercise was used. The patient reported no symptoms during the stress test. The test was stopped because the end of the protocol was reached.    Baseline ECG: The Baseline ECG reveals sinus tachycardia with RBBB. The axis is normal. The ST segments are normal.    Stress ECG: There is no ST segment deviation identified during the protocol. There are no arrhythmias during stress.    ECG Conclusion: The ECG portion of the study is negative for ischemia.    Post-stress Conclusion: The study is negative with no echocardiographic evidence of stress induced ischemia.      Nuclear Stress Echo:  No results found for this or any previous visit.      Cardiac Catheterization:  Results for orders placed during the hospital encounter of 08/10/20    Cardiac catheterization    Conclusion  · Normal coronary arteries.  · IVUS of LAD did not show features of significant Cardiac allograft vasculopathy (CAV)  · Filling pressures normal. 5 successful RVBX, under fluoro guidance, perfomed. A sample was sent for immunofluorescence..  · US guided Right femoral artery and Rt IJ vein access (Rt radial artery occluded, noted in US)  · In order to adequately assess the patient and form an appropriate treatment plan, it is necessary to perform a comprehensive Right Heart catheterization. A thorough study of the patient's cardiac and hemodynamic functioning, in addition to an Endomyocardial biopsy, was performed. The information to be obtained from the biopsy alone was insufficient to care for this  patient.  · Estimated blood loss: <10 mL    I certify that I was present for catheter insertion, catheter manipulation, angiography, and angiographic interpretation of this patient.    Procedure Log documented by Documenter: Naima Ngo RT; Alysia Hancock RT and verified by Michael Fernandez MD.    Date: 8/10/2020  Time: 3:05 PM      Cardiac Device Check:  No results found for this or any previous visit.      ASSESSMENT/PLAN:                                                                                                                06/20/2025  Alfonso Leger is a 55 y.o., male.      Pre-op Assessment    I have reviewed the Patient Summary Reports.    I have reviewed the NPO Status.   I have reviewed the Medications.     Review of Systems  Cardiovascular:     Hypertension       CHF                Congestive Heart Failure (CHF)                Hypertension         Renal/:  Chronic Renal Disease        Kidney Function/Disease             Hepatic/GI:   PUD,           Peptic Ulcer Disease          Neurological:   CVA                       CVA - Cerebrovasular Accident                 Endocrine:  Diabetes    Diabetes                          Physical Exam  General: Oriented, Alert, Cooperative and Well nourished    Airway:  Mallampati: II     Dental:  Intact    Chest/Lungs:  Normal Respiratory Rate, Clear to auscultation    Heart:  Rate: Tachycardia        Anesthesia Plan  Type of Anesthesia, risks & benefits discussed:    Anesthesia Type: Gen Natural Airway, MAC  Intra-op Monitoring Plan: Standard ASA Monitors  Post Op Pain Control Plan: multimodal analgesia and IV/PO Opioids PRN  Induction:  IV  Airway Plan: Video and Direct  Informed Consent: Informed consent signed with the Patient and all parties understand the risks and agree with anesthesia plan.  All questions answered.   ASA Score: 4  Day of Surgery Review of History & Physical: H&P Update referred to the surgeon/provider.  Anesthesia Plan Notes: EF  5%, will make life raft case and run epi infusion    Ready For Surgery From Anesthesia Perspective.     .           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

## 2025-06-20 NOTE — ASSESSMENT & PLAN NOTE
Patient noted to be in typical atrial flutter with rates in 130-140s. 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 Asymptomatic and hemodyamically stable. [id number] score of 5 (CHF hx, HTN hx, Thromembolism hx, and DM2 hx). Hx of clotting disorder Factor V leiden. Non-compliant with home AC.     - Continue amiodarone gtt for now. Due to concern rate control with BB can lead to decompensation of HF, will continue amiodarone gtt for now. Patient explained risks of possible stroke if chemically converted and is agreeable with plan.   - Continue heparin gtt for now.   - ACLS if unstable.

## 2025-06-20 NOTE — SUBJECTIVE & OBJECTIVE
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    Factor V Leiden 9/7/2012    Hypertension     Immunodeficiency due to treatment with immunosuppressive medication     Other and unspecified hyperlipidemia 10/11/2013    Stroke     Type II or unspecified type diabetes mellitus without mention of complication, uncontrolled 10/11/2013       Past Surgical History:   Procedure Laterality Date    ABDOMINAL SURGERY      CARDIAC CATHETERIZATION      CATHETERIZATION OF BOTH LEFT AND RIGHT HEART N/A 8/10/2020    Procedure: CATHETERIZATION, HEART, BOTH LEFT AND RIGHT;  Surgeon: Michael Fernandez MD;  Location: [facility] CATH LAB;  Service: Cardiology;  Laterality: N/A;    CORONARY ANGIOGRAPHY N/A 8/10/2020    Procedure: ANGIOGRAM, CORONARY ARTERY;  Surgeon: Michael Fernandez MD;  Location: [facility] CATH LAB;  Service: Cardiology;  Laterality: N/A;    HEART TRANSPLANT      IVC FILTER RETRIEVAL      pt reports still in place       Review of patient's allergies indicates:  No Known Allergies    Current Facility-Administered Medications   Medication    amiodarone 360 mg/200 mL (1.8 mg/mL) infusion    [START ON 6/20/2025] amLODIPine tablet 10 mg    dextrose 50% injection 12.5 g    dextrose 50% injection 25 g    glucagon (human recombinant) injection 1 mg    glucose chewable tablet 16 g    glucose chewable tablet 24 g    heparin 25,000 units in dextrose 5% (100 units/ml) IV bolus from bag HIGH INTENSITY nomogram - [facility]    heparin 25,000 units in dextrose 5% (100 units/ml) IV bolus from bag HIGH INTENSITY nomogram - [facility]    heparin 25,000 units in dextrose 5% (100 units/ml) IV bolus from bag HIGH INTENSITY nomogram - [facility]    heparin 25,000 units in dextrose 5% 250 mL  (100 units/mL) infusion HIGH INTENSITY nomogram - [facility]    hydrALAZINE tablet 25 mg    insulin aspart U-100 pen 0-10 Units    magnesium sulfate 2g in water 50mL IVPB (premix)    potassium chloride CR capsule 50 mEq     Family History       Problem Relation (Age of Onset)    Cancer Paternal Grandfather    Diabetes Maternal Uncle          Tobacco Use    Smoking status: Former     Current packs/day: 0.00     Types: Cigarettes     Quit date: 2011     Years since quittin.1    Smokeless tobacco: Former   Substance and Sexual Activity    Alcohol use: No    Drug use: No    Sexual activity: Yes     Partners: Female     Birth control/protection: None     Review of [id number] point ROS negative except for HPI  Objective:     Vital Signs (Most Recent):  Pulse: 100 (25)  SpO2: 98 % (25) Vital Signs (24h Range):  Temp:  [98 °F (36.7 °C)] 98 °F (36.7 °C)  Pulse:  [] 100  Resp:  [17-20] 17  SpO2:  [98 %-100 %] 98 %  BP: (121-176)/() 126/97     Patient Vitals for the past 72 hrs (Last 3 readings):   Weight   25 1847 103.1 kg (227 lb 4.7 oz)     Body mass index is 30.83 kg/m².    No intake or output data in the 24 hours ending 25       Physical Exam  Constitutional:       Comments: Alert, Oriented, No acute distress   HENT:      Head: Normocephalic and atraumatic.   Eyes:      Conjunctiva/sclera: Conjunctivae normal.   Neck:      Vascular: No hepatojugular reflux or JVD.   Cardiovascular:      Rate and Rhythm: Regular rhythm. Tachycardia present.   Pulmonary:      Comments: Normal effort, Clear to auscultation   Abdominal:      Comments: Soft, Non-tender, Non-distended   Musculoskeletal:      Cervical back: Normal range of motion.      Comments: B/l lower extremity swelling. Tenderness to palpation   Skin:     Comments: Dry, Intact, Warm   Neurological:      Mental Status: He is alert and oriented to person, place, and time.      Comments: Normal speech   Psychiatric:          "Mood and Affect: Mood and affect normal.            Significant Labs:  CBC:  Recent Labs   Lab 06/19/25  1208 06/19/25  1858   WBC 6.65 5.95   RBC 4.52* 4.20*   HGB 13.5* 12.3*   HCT 40.9 37.4*    283   MCV 91 89   MCH 29.9 29.3   MCHC 33.0 32.9     BNP:  Recent Labs   Lab 06/19/25  1208 06/19/25  1858   * 709*     CMP:  Recent Labs   Lab 06/19/25  1208 06/19/25  1858   * 436*   CALCIUM 8.9 7.8*   ALBUMIN 3.4* 3.1*   PROT 8.3 6.9    132*   K 3.3* 3.1*   CO2 19* 19*    93*   BUN 30* 27*   CREATININE 3.5* 3.0*   ALKPHOS 65 61   ALT 9* 8*   AST 12 11   BILITOT 0.8 0.5      Coagulation:   Recent Labs   Lab 06/19/25  1214 06/19/25 1858   INR 1.2 1.7*   APTT 27.7 >150.0*     LDH:  No results for input(s): "LDH" in the last 72 hours.  Microbiology:  Microbiology Results (last 7 days)       ** No results found for the last 168 hours. **            I have reviewed all pertinent labs within the past 24 hours.    Diagnostic Results:  I have reviewed all pertinent imaging results/findings within the past 24 hours.    "

## 2025-06-20 NOTE — EICU
Virtual ICU Quality Rounds    Admit Date: 6/19/2025  Hospital Day: 1    ICU Day: 14h    24H Vital Sign Range:  Temp:  [97.6 °F (36.4 °C)-98 °F (36.7 °C)]   Pulse:  []   Resp:  [10-36]   BP: (111-176)/()   SpO2:  [90 %-100 %]     VICU Surveillance Screening  Daily review of  line necessity(optional): Central line(s) in place  Central line type (optional): Port  Central line indications : Inotropes, Amiodarone, and Multiple infusions  LDA reconciliation : Yes

## 2025-06-20 NOTE — PLAN OF CARE
Recommendations     Recommendation/Intervention:   1. Continue cardiac diet as tolerated                 - recommend diabetic diet modifier due to high BGLs  - please continue to document PO % intake via flowsheets     2. Recommend boost glucose control TID    3. Encourage good intake      4. RD to monitor weight, labs, intake, tolerance     Goals:   1. % nutritional needs met with diet during admission     2. Maintain weight during admission  Nutrition Goal Status: new  Communication of RD Recs: other (comment) (POC)     Nutrition Discharge Planning     Nutrition Discharge Planning: Other (comments), Therapeutic diet (comments), Oral supplement regimen (comments)  Therapeutic diet (comments): cardiac diabetic diet  Oral supplement regimen (comments): oral nutrition supplement of choice and MVI  Other (comments): Diabetic diet education provided on 6/20

## 2025-06-20 NOTE — HPI
Alfonso Leger is a 55yoM with a hx of OHT 10/29/2012 (EtOH CM) with post op LV dysfunction (EF ranging between 35-45% over the years), factor V Leiden with hx of DVT (last in 2013), and previous IVC filter, HTN, HLD, T2DM, CKD3, non-hodkin lymphoma in remission who is here with heart failure exacerbation, NILES on CKD, and atrial flutter with RVR. We are consulted for RHC with biopsy to rule out rejection. He is anticoagulated on heparin and the plan is for LEATHA DCCV on Monday. He is not in shock.

## 2025-06-20 NOTE — PROGRESS NOTES
[facility] - Cardiac Intensive Care  Cardiac Electrophysiology  Progress Note    Admission Date: 6/19/2025  Code Status: Full Code   Attending Physician: Niles Castro, *   Expected Discharge Date:   Principal Problem:Typical atrial flutter    Subjective:     Interval History: Patient still in AFL with rates on 120-130 BPM. He is currently asymptomatic. Denies any SOB, palpitations, chest pain. Will plan for DCCV/LEATHA on Monday unless primary team wants it earlier (if patient medically stable and euvolemic form heart failure decompensation)    ROS  Objective:     Vital Signs (Most Recent):  Temp: 97.7 °F (36.5 °C) (06/20/25 0705)  Pulse: (!) 129 (06/20/25 0801)  Resp: (!) 33 (06/20/25 0801)  BP: (!) 153/109 (06/20/25 0801)  SpO2: 99 % (06/20/25 0801) Vital Signs (24h Range):  Temp:  [97.6 °F (36.4 °C)-98 °F (36.7 °C)] 97.7 °F (36.5 °C)  Pulse:  [] 129  Resp:  [10-36] 33  SpO2:  [90 %-100 %] 99 %  BP: (111-176)/() 153/109     Weight: 101 kg (222 lb 9.6 oz)  Body mass index is 30.19 kg/m².     SpO2: 99 %        Physical Exam       Significant Labs:   Recent Lab Results  (Last 5 results in the past 24 hours)        06/20/25  0713   06/20/25  0615   06/20/25  0511   06/20/25  0409   06/19/25  2123        Albumin       3.1         ALP       59         ALT       7         Anion Gap       12         Appearance, UA Clear               PTT     54.8  Comment: Refer to local heparin nomogram for intensity/dose specific therapeutic range.   101.0  Comment: Refer to local heparin nomogram for intensity/dose specific therapeutic range.         AST       11         Bacteria, UA Rare               Baso #       0.04         Basophil %       0.7         Bilirubin (UA) 1+  Comment: Positive urine bilirubin is not confirmed. Correlate with serum bilirubin and clinical presentation.               BILIRUBIN TOTAL       0.4  Comment: For infants and newborns, interpretation of results should be based   on gestational  age, weight and in agreement with clinical   observations.    Premature Infant recommended reference ranges:   0-24 hours:  <8.0 mg/dL   24-48 hours: <12.0 mg/dL   3-5 days:    <15.0 mg/dL   6-29 days:   <15.0 mg/dL         BUN       32         Calcium       8.1         Chloride       103         CO2       21         Color, UA Yellow               Creatinine       3.3         eGFR       21  Comment: Estimated GFR calculated using the CKD-EPI creatinine (2021) equation.         Eos #       0.02         Eos %       0.4         Glucose       130         Glucose, UA Negative               Gran # (ANC)       3.47         Hematocrit       36.5         Hemoglobin       12.2         Hyaline Casts, UA 5               Immature Grans (Abs)       0.02  Comment: Mild elevation in immature granulocytes is non specific and can be seen in a variety of conditions including stress response, acute inflammation, trauma and pregnancy. Correlation with other laboratory and clinical findings is essential.         Immature Granulocytes       0.4         Ketones, UA Trace               Leukocyte Esterase, UA Negative               Lymph #       1.33         Lymph %       24.7         Magnesium        2.3         MCH       29.6         MCHC       33.4         MCV       89         Microscopic Comment   Comment: Other formed elements not mentioned in the report are not present in the microscopic examination.               Mono #       0.50         Mono %       9.3         MPV       10.1         Neut %       64.5         NITRITE UA Negative               nRBC       0         Blood, UA Negative               QRS Duration   156             [facility] QTC Calculation   610             pH, UA 6.0               Platelet Count       286         POCT Glucose         129       Potassium       3.5         PROTEIN TOTAL       6.8         Protein, UA 2+  Comment: Recommend a 24 hour urine protein or a urine protein/creatinine ratio if globulin induced  proteinuria is clinically suspected.               RBC       4.12         RBC, UA 3               RDW       13.3         Sodium       136         Spec Grav UA >=1.030               Squam Epithel, UA 1               Tacrolimus Lvl       8.6  Comment: Testing performed by a chemiluminescent microparticle   immunoassay on the [employer] System.    CAUTION: No firm therapeutic range exists for tacrolimus in whole blood. The complexity of the clinical state, individual differences in sensitivity to immunosuppressive and nephrotoxic effects of tacrolimus, co-administration of other immunosuppressants, type of transplant, time post-transplant and a number of other factors contribute to different requirements for optimal blood levels of tacrolimus. Therefore, individual tacrolimus values cannot be used as the sole indicator for making changes in treatment regimen and each patient should be thoroughly evaluated clinically before changes in treatment regimens are made. Each user must establish his or her own ranges based on clinical experience.  Therapeutic ranges vary according to the commercial test used, and therefore should be established for each commercial test. Values obtained with different assay methods cannot be used interchangeably due to differences in assay methods and cross-reactivity with metabolites, nor should correction factors be applied. Therefore, consistent use of one assay for individual patients is recommended.           Urobilinogen, UA Negative               WBC, UA 10               WBC       5.38                                  Assessment and Plan:     * Typical atrial flutter  Mr. Alfonso Leger is a 56 y/o AAM w/ PMHx of OHTx 10/29/12 (for EtOH CM) with post-op severe TR and chronic LV dysfunction (EF has been reduced to 40-50% since at least 2015, as low as 35%, most current EF is 40-45% 2024), hx of WPW, Factor V Leiden with h/o DVT (last 8/2013) and previous hx of IVC filter placement in  2011 previously on Lovenox sub q 100mg bid (non-compliant, stopped taking 5 years ago), HTN (not compliant with clonidine or amlodipine, only on hydralazine), HLD (not compliant with pravastatin), NIDDM2, CKD stage 3 (last known baseline CR 1.5-2 per nephrology note in 2021), and Non-Hodgkin lymphoma s/p 6 cycles of R-CHOP/CEOP in 2013 now in remission who presented to the hospital with bilateral lower extremity edema and found to have DVT. Transferred to [facility] for evaluation of his NILES in the setting of OHTx on prograf. EP consulted for further evaluation and management of patients new onset typical atrial flutter w/ RVR in the setting of hx of WPW.     Recommendations:  - Continue heparin drip    - EKG and telemetry does not show any evidence of pre-excitation currently.   - Would favor rate control strategy with beta blockers rather then amiodarone gtt due to patient non-compliance with AC    - R/o rejection if that is a concern from HTS standpoint.  - Keep K>4 and Mg>2  - Plan for DCCV/LEATHA on 6/23        Joel Kapoor MD  Cardiac Electrophysiology  [facility] - [facility]

## 2025-06-20 NOTE — SUBJECTIVE & OBJECTIVE
Interval History:     Continuous Infusions:   amiodarone in dextrose 5%  0.5 mg/min Intravenous Continuous 16.7 mL/hr at 06/20/25 1001 0.5 mg/min at 06/20/25 1001    heparin (porcine) in D5W  0-40 Units/kg/hr Intravenous Continuous 14.4 mL/hr at 06/20/25 1001 14 Units/kg/hr at 06/20/25 1001     Scheduled Meds:   amLODIPine  10 mg Oral Daily    heparin (PORCINE)  80 Units/kg Intravenous Once    hydrALAZINE  50 mg Oral Q8H    tacrolimus  0.5 mg Oral Daily PM    tacrolimus  1 mg Oral Daily AM     PRN Meds:  Current Facility-Administered Medications:     dextrose 50%, 12.5 g, Intravenous, PRN    dextrose 50%, 25 g, Intravenous, PRN    glucagon (human recombinant), 1 mg, Intramuscular, PRN    glucose, 16 g, Oral, PRN    glucose, 24 g, Oral, PRN    heparin (PORCINE), 60 Units/kg, Intravenous, PRN    heparin (PORCINE), 30 Units/kg, Intravenous, PRN    insulin aspart U-100, 0-5 Units, Subcutaneous, QID (AC + HS) PRN    Review of patient's allergies indicates:  No Known Allergies  Objective:     Vital Signs (Most Recent):  Temp: 97.7 °F (36.5 °C) (06/20/25 0705)  Pulse: (!) 130 (06/20/25 1001)  Resp: (!) 21 (06/20/25 1001)  BP: (!) 148/108 (06/20/25 1001)  SpO2: 100 % (06/20/25 1007) Vital Signs (24h Range):  Temp:  [97.6 °F (36.4 °C)-98 °F (36.7 °C)] 97.7 °F (36.5 °C)  Pulse:  [] 130  Resp:  [10-36] 21  SpO2:  [90 %-100 %] 100 %  BP: (111-176)/() 148/108     Patient Vitals for the past 72 hrs (Last 3 readings):   Weight   06/20/25 0501 101 kg (222 lb 9.6 oz)   06/19/25 1847 103.1 kg (227 lb 4.7 oz)     Body mass index is 30.19 kg/m².      Intake/Output Summary (Last 24 hours) at 6/20/2025 1056  Last data filed at 6/20/2025 1001  Gross per 24 hour   Intake 651.44 ml   Output 200 ml   Net 451.44 ml             Physical Exam   Constitutional:       Comments: Alert, Oriented, No acute distress   HENT:      Head: Normocephalic and atraumatic.   Eyes:      Conjunctiva/sclera: Conjunctivae normal.   Neck:       "Vascular: No hepatojugular reflux or JVD.   Cardiovascular:      Rate and Rhythm: Regular rhythm. Tachycardia present.   Pulmonary:      Comments: Normal effort, Clear to auscultation   Abdominal:      Comments: Soft, Non-tender, Non-distended   Musculoskeletal:      Cervical back: Normal range of motion.      Comments: B/l lower extremity swelling. Tenderness to palpation   Skin:     Comments: Dry, Intact, Warm   Neurological:      Mental Status: He is alert and oriented to person, place, and time.      Comments: Normal speech   Psychiatric:         Mood and Affect: Mood and affect normal.     Significant Labs:  CBC:  Recent Labs   Lab 06/19/25  1208 06/19/25 1858 06/20/25  0409   WBC 6.65 5.95 5.38   RBC 4.52* 4.20* 4.12*   HGB 13.5* 12.3* 12.2*   HCT 40.9 37.4* 36.5*    283 286   MCV 91 89 89   MCH 29.9 29.3 29.6   MCHC 33.0 32.9 33.4     BNP:  Recent Labs   Lab 06/19/25  1208 06/19/25 1858   * 709*     CMP:  Recent Labs   Lab 06/19/25  1208 06/19/25 1858 06/20/25  0409   * 436* 130*   CALCIUM 8.9 7.8* 8.1*   ALBUMIN 3.4* 3.1* 3.1*   PROT 8.3 6.9 6.8    132* 136   K 3.3* 3.1* 3.5   CO2 19* 19* 21*    93* 103   BUN 30* 27* 32*   CREATININE 3.5* 3.0* 3.3*   ALKPHOS 65 61 59   ALT 9* 8* 7*   AST 12 11 11   BILITOT 0.8 0.5 0.4      Coagulation:   Recent Labs   Lab 06/19/25  1214 06/19/25  1858 06/19/25 2122 06/20/25  0409 06/20/25  0511   INR 1.2 1.7*  --   --   --    APTT 27.7 >150.0* 71.8* 101.0* 54.8*     LDH:  No results for input(s): "LDH" in the last 72 hours.  Microbiology:  Microbiology Results (last 7 days)       ** No results found for the last 168 hours. **            I have reviewed all pertinent labs within the past 24 hours.    Estimated Creatinine Clearance: 31.5 mL/min (A) (based on SCr of 3.3 mg/dL (H)).    Diagnostic Results:  I have reviewed all pertinent imaging results/findings within the past 24 hours.  "

## 2025-06-20 NOTE — PROGRESS NOTES
[facility] - Cardiac Intensive Care  Heart Transplant  Progress Note    Patient Name: Alfonso Leger  MRN: [id number]  Admission Date: 6/19/2025  Hospital Length of Stay: 1 days  Attending Physician: Niles Castro, *  Primary Care Provider: No, Primary Doctor  Principal Problem:Typical atrial flutter    Subjective:   Interval History: Continues to be in a flutter with rates 120s. EP consulted and plant for DCCV likely Monday. IC consulted for RHC and biopsy to rule out rejection today. His tacro level is at goal. Remains hypertensive which is likely chronic problem for him as he has not had bp or DM addressed in a year (since Nov 2024) was hypertensive 170s on arrival. Reports he intermittently takes his clonidine and often misses doses however religiously takes his hydralazine. Reports he took him self off of lovenox over a year ago, he also acknowledges he knows he needs to be on a blood thinner given history of DVT and factor V Leiden.  He also acknowledges the importance of controlling his blood pressures and diabetes though reports he gets overwhelmed with too many medications. He is relatively asymptomatic from a rhythm standpoint denies palpitations, chest pain, light headedness, syncope or pre syncope. Tacro level on admission is at goal. Appreciate pharmacy assistance with dosing in setting of NILES. POCUS EF appears to be around 30%, IVC appears dilated and not collapsing >50%. Cr is 3.3 this morning, unclear if this is NILES or progression of CKD as his baseline is around 2 and he has uncontrolled diabetes and hypertension.         -RHC + biopsy with IC today   -Increased hydralazine to 50 mg TID as SBPs consistently 150s this am  -Tenatively plan on LEATHA DCCV Monday per EP       Continuous Infusions:   amiodarone in dextrose 5%  0.5 mg/min Intravenous Continuous 16.7 mL/hr at 06/20/25 1001 0.5 mg/min at 06/20/25 1001    heparin (porcine) in D5W  0-40 Units/kg/hr Intravenous Continuous 14.4 mL/hr at  06/20/25 1001 14 Units/kg/hr at 06/20/25 1001     Scheduled Meds:   amLODIPine  10 mg Oral Daily    heparin (PORCINE)  80 Units/kg Intravenous Once    hydrALAZINE  50 mg Oral Q8H    tacrolimus  0.5 mg Oral Daily PM    tacrolimus  1 mg Oral Daily AM     PRN Meds:  Current Facility-Administered Medications:     dextrose 50%, 12.5 g, Intravenous, PRN    dextrose 50%, 25 g, Intravenous, PRN    glucagon (human recombinant), 1 mg, Intramuscular, PRN    glucose, 16 g, Oral, PRN    glucose, 24 g, Oral, PRN    heparin (PORCINE), 60 Units/kg, Intravenous, PRN    heparin (PORCINE), 30 Units/kg, Intravenous, PRN    insulin aspart U-100, 0-5 Units, Subcutaneous, QID (AC + HS) PRN    Review of patient's allergies indicates:  No Known Allergies  Objective:     Vital Signs (Most Recent):  Temp: 97.7 °F (36.5 °C) (06/20/25 0705)  Pulse: (!) 130 (06/20/25 1001)  Resp: (!) 21 (06/20/25 1001)  BP: (!) 148/108 (06/20/25 1001)  SpO2: 100 % (06/20/25 1007) Vital Signs (24h Range):  Temp:  [97.6 °F (36.4 °C)-98 °F (36.7 °C)] 97.7 °F (36.5 °C)  Pulse:  [] 130  Resp:  [10-36] 21  SpO2:  [90 %-100 %] 100 %  BP: (111-176)/() 148/108     Patient Vitals for the past 72 hrs (Last 3 readings):   Weight   06/20/25 0501 101 kg (222 lb 9.6 oz)   06/19/25 1847 103.1 kg (227 lb 4.7 oz)     Body mass index is 30.19 kg/m².      Intake/Output Summary (Last 24 hours) at 6/20/2025 1056  Last data filed at 6/20/2025 1001  Gross per 24 hour   Intake 651.44 ml   Output 200 ml   Net 451.44 ml             Physical Exam   Constitutional:       Comments: Alert, Oriented, No acute distress   HENT:      Head: Normocephalic and atraumatic.   Eyes:      Conjunctiva/sclera: Conjunctivae normal.   Neck:      Vascular: No hepatojugular reflux or JVD.   Cardiovascular:      Rate and Rhythm: Regular rhythm. Tachycardia present.   Pulmonary:      Comments: Normal effort, Clear to auscultation   Abdominal:      Comments: Soft, Non-tender, Non-distended  "  Musculoskeletal:      Cervical back: Normal range of motion.      Comments: B/l lower extremity swelling. Tenderness to palpation   Skin:     Comments: Dry, Intact, Warm   Neurological:      Mental Status: He is alert and oriented to person, place, and time.      Comments: Normal speech   Psychiatric:         Mood and Affect: Mood and affect normal.     Significant Labs:  CBC:  Recent Labs   Lab 06/19/25  1208 06/19/25 1858 06/20/25  0409   WBC 6.65 5.95 5.38   RBC 4.52* 4.20* 4.12*   HGB 13.5* 12.3* 12.2*   HCT 40.9 37.4* 36.5*    283 286   MCV 91 89 89   MCH 29.9 29.3 29.6   MCHC 33.0 32.9 33.4     BNP:  Recent Labs   Lab 06/19/25 1208 06/19/25  1858   * 709*     CMP:  Recent Labs   Lab 06/19/25 1208 06/19/25 1858 06/20/25  0409   * 436* 130*   CALCIUM 8.9 7.8* 8.1*   ALBUMIN 3.4* 3.1* 3.1*   PROT 8.3 6.9 6.8    132* 136   K 3.3* 3.1* 3.5   CO2 19* 19* 21*    93* 103   BUN 30* 27* 32*   CREATININE 3.5* 3.0* 3.3*   ALKPHOS 65 61 59   ALT 9* 8* 7*   AST 12 11 11   BILITOT 0.8 0.5 0.4      Coagulation:   Recent Labs   Lab 06/19/25  1214 06/19/25 1858 06/19/25 2122 06/20/25  0409 06/20/25  0511   INR 1.2 1.7*  --   --   --    APTT 27.7 >150.0* 71.8* 101.0* 54.8*     LDH:  No results for input(s): "LDH" in the last 72 hours.  Microbiology:  Microbiology Results (last 7 days)       ** No results found for the last 168 hours. **            I have reviewed all pertinent labs within the past 24 hours.    Estimated Creatinine Clearance: 31.5 mL/min (A) (based on SCr of 3.3 mg/dL (H)).    Diagnostic Results:  I have reviewed all pertinent imaging results/findings within the past 24 hours.  Assessment and Plan:     Mr. Alfonso Leger is a 56 y/o AAM w/ PMHx of OHTx 10/29/12 (for EtOH CM) with post-op severe TR and chronic LV dysfunction (EF has been reduced to 40-50% since at least 2015, as low as 35%), hx of WPW, Factor V Leiden with h/o DVT (last 8/2013) and previous hx of IVC filter " placement in 2011 previously on Lovenox sub q 100mg bid (non-compliant, stopped taking 5 years ago), HTN (not compliant with clonidine or amlodipine, only on hydralazine), HLD (not compliant with pravastatin), NIDDM2, CKD stage 3 (last known baseline CR 1.5-2 per nephrology note in 2021), and Non-Hodgkin lymphoma s/p 6 cycles of R-CHOP/CEOP in 2013 now in remission. Presented to outside hospital ED today after noting 1 1/2 week hx of bilateral lower extremity leg swelling and primarily left calf pain. Denied any chest pain, SOB, palpitations, nausea, vomiting, orthopnea, PND, abdominal distention, abdominal pain, fever, chills, diarrhea. States the only medications he is compliant with is his prograf 1mg bid and hydralazine 25mg tid. Stopped taking all other medications on his own. Last follow up with a doctor was last year (11/2024) at [facility].    In the ED,  labs notable hgb 13.5, potassium 3.3, bicarb 19, bun 30, cr 3.5 (baseline appears to be 1.5-2), magnesium 1.6. BNP 300s. VQ scan was negative for PE. Lower extremity U/S showed noncompressible filling defects within the right and left common femoral vein, superficial femoral vein and popliteal veins, and thrombosed calf veins noted bilaterally. Vitals on arrival notable for bp 175/106. EKG notable for typical atrial flutter with rates in the 140s. Patient received Lopressor IV and PO, and was started on amiodarone gtt and heparin gtt prior to transfer to [facility] [place] for further management.     Of note, patient was evaluated by EP (Dr. Reddy) back on 8/28/2013 when WPW was first incidentally seen on his EKG. Patient was notably asymptomatic and ablation was not being purused at the time due to him being neutropenic. Patient was discharged with 30 day event monitored (only showed 2 episodes of sinus tachycardia) and told to follow up as outpatient. No evidence in the system that patient ever followed up with EP. No previous history of Atrial flutter or atrial  fibrillation seen.       CMV status:    Donor: -   Recipient: -          Prescribed Home Medications:   Clonidine 0.3 mg BID    Tacrolimus 1 mg BID    Hydralazine 25 TID    Amlodipine 10 mg qd  Pravastatin 40mg nightly       Cardiac Imaging:   Stress Echo (11/19/2024):    Left Ventricle: The left ventricle is normal in size. Normal wall thickness. There is concentric remodeling. Regional wall motion abnormalities present. Septal motion is consistent with post-operative status. There is mildly reduced systolic function with a visually estimated ejection fraction of 40 - 50%. Ejection fraction is approximately 40%. There is normal diastolic function.    Right Ventricle: Moderate right ventricular enlargement. Wall thickness is normal. Systolic function is mildly reduced.    Left Atrium: Atrial septum is bulging to the left.    Right Atrium: Right atrium is severely dilated.    Tricuspid Valve: There is severe regurgitation. No pulmonary hypertension.    Pulmonary Artery: The estimated pulmonary artery systolic pressure is 21 mmHg.    IVC/SVC: Normal venous pressure at 3 mmHg.    Stress Protocol: The patient was infused intravenously with dobutamine. The patient received a graduated infusion of the stress agent beginning at 10.0 mcg/kg/min to a peak dose of 10.0 mcg/kg/min. The peak heart rate was 173 bpm, which is 104% of age predicted maximum heart rate. Low-level exercise was used. The patient reported no symptoms during the stress test. The test was stopped because the end of the protocol was reached.    Baseline ECG: The Baseline ECG reveals sinus tachycardia with RBBB. The axis is normal. The ST segments are normal.    Stress ECG: There is no ST segment deviation identified during the protocol. There are no arrhythmias during stress.    ECG Conclusion: The ECG portion of the study is negative for ischemia.    Post-stress Conclusion: The study is negative with no echocardiographic evidence of stress induced  ischemia.      TTE (11/10/2023):    S/p OHT 10/29/12.    Left Ventricle: The left ventricle is dilated. Ventricular mass is normal. Increased wall thickness. regional wall motion abnormalities present. See diagram for wall motion findings. There is mildly reduced systolic function with a visually estimated ejection fraction of 40 - 45%. Ejection fraction by visual approximation is 40%. Grade I diastolic dysfunction. Average E/e' ratio is 5.43.    Right Ventricle: Moderate-severe right ventricular enlargement. Systolic function is borderline low.    Aortic Valve: The aortic valve is a trileaflet valve. There is mild aortic regurgitation.    Tricuspid Valve: There is severe regurgitation with a centrally directed jet.    IVC/SVC: Elevated venous pressure at 15 mmHg.      LHC (8/10/2020):   Normal coronary arteries.   IVUS of LAD did not show features of significant Cardiac allograft vasculopathy (CAV)   Filling pressures normal. 5 successful RVBX, under fluoro guidance, perfomed. A sample was sent for immunofluorescence..   US guided Right femoral artery and Rt IJ vein access (Rt radial artery occluded, noted in US)   In order to adequately assess the patient and form an appropriate treatment plan, it is necessary to perform a comprehensive Right Heart catheterization. A thorough study of the patient's cardiac and hemodynamic functioning, in addition to an Endomyocardial biopsy, was performed. The information to be obtained from the biopsy alone was insufficient to care for this patient.   Estimated blood loss: <10 mL      Biopsy (8/2020):   1.  The myocardium is C4d negative for antibody mediated rejection.    2.  There are 4 myocardial biopsy fragments. With the material seen in    conjunction with the immunofluorescence procedure, there are 5 fragments. The    myocardium is devoid of a lymphocytic infiltrate. (ISHLT 0). The myocardium    does not have swollen endothelial cells or capillaries distended by enlarged     mononuclear cells, which would have pointed towards antibody mediated    rejection.  The myocardium is devoid of CD68 positive macrophages . The    picture here is graded as pAMR 0.    The positive and negative controls stained appropriately.     * Typical atrial flutter  Patient noted to be in typical atrial flutter with rates in 130-140s. 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 Asymptomatic and hemodyamically stable. [id number] score of 5 (CHF hx, HTN hx, Thromembolism hx, and DM2 hx). Hx of clotting disorder Factor V leiden. Non-compliant with home AC.     - Continue amiodarone gtt for now. Due to concern rate control with BB can lead to decompensation of HF, will continue amiodarone gtt for now. Patient explained risks of possible stroke if chemically converted and is agreeable with plan.   - Continue heparin gtt for now.   - ACLS if unstable.     Acute kidney injury superimposed on CKD  NILES on CKD stage 3 possibly secondary to prograf toxicity vs dehydration. Patient does not appear in ADHF on exam and low likelhood for rejection with hx of HFmrEF. However cannot explain elevated BNP so will need further evaluation. Possible baseline cr 1.5-2. Creatinine around 3 on arrival to OSH     - daily BMP to trend creatinine.   - AM prograf level.   - Will order formal Echo.   - Strict I and Os.       Essential hypertension  - continue home hydralazine 25mg tid.   - will restart amlodopine 10mg daily    Type 2 diabetes mellitus, without long-term current use of insulin  - last known A1c 6  - Currently hyperglycemic with sugars in the 400s. Beta hydroxybutare normal.   - Start moderate SS insulin.   - Management per Endocrinology.       Factor V Leiden  History of DVT. Non-compliant with home lovenox.   Presented with bilateral LE swelling, found to have bilateral extensive occlusive DVTs  Started on heparin gtt at osh     - Continue heparin gtt.     Venous  thromboembolism  History of DVT and factor V Leiden deficiency . Non-compliant with home lovenox. Presented with bilateral LE swelling, found to have bilateral extensive occlusive DVTs. Started on heparin gtt at osh     - Continue heparin gtt.     Status post heart transplantation  OHT on 10/29/12, with post-op severe TR, mild to moderately reduced LVEF.  Post-op developed PTLD, s/p R-CHOP/CEOP 12/2013  Immunosuppression tacrolimus 1 mg BID     Plan:  -continue home tacro pending AM level         Ruthy Campbell MD  Heart Transplant  [facility] - Cardiac Intensive Care

## 2025-06-20 NOTE — ASSESSMENT & PLAN NOTE
Mr. Alfonso Leger is a 56 y/o AAM w/ PMHx of OHTx 10/29/12 (for EtOH CM) with post-op severe TR and chronic LV dysfunction (EF has been reduced to 40-50% since at least 2015, as low as 35%), hx of WPW, Factor V Leiden with h/o DVT (last 8/2013) and previous hx of IVC filter placement in 2011 previously on Lovenox sub q 100mg bid (non-compliant, stopped taking 5 years ago), HTN (not compliant with clonidine or amlodipine, only on hydralazine), HLD (not compliant with pravastatin), NIDDM2, CKD stage 3 (last known baseline CR 1.5-2 per nephrology note in 2021), and Non-Hodgkin lymphoma s/p 6 cycles of R-CHOP/CEOP in 2013 now in remission who presented to the hospital with bilateral lower extremity edema and found to have DVT. Transferred to [facility] for evaluation of his NILES in the setting of OHTx on prograf. EP consulted for further evaluation and management of patients new onset typical atrial flutter w/ RVR in the setting of hx of WPW.     Recommendations:   - EKG and telemetry does not show any evidence of pre-excitation currently.   - Would favor rate control strategy with beta blockers rather then amiodarone gtt due to patient non-compliance with AC if ok from HTS standpoint.   - R/o rejection if that is a concern from HTS standpoint.  - Make NPO at midnight for possible ablation, once formally staffed.   - Keep K>4 and Mg>2  - To be formally staffed in the AM. Case discussed with EP fellow.

## 2025-06-20 NOTE — PROGRESS NOTES
"Admit Note     Met with patient to assess needs. Patient is a 55 y.o. single male, admitted due to 1.5 week of bilateral LE leg swelling and left calf pain per medical record. Pt is s/p heart transplant 10/29/12.    Patient admitted from home on 6/19/2025.  At this time, patient presents as alert and oriented x 4, pleasant, calm, communicative, cooperative and asking and answering questions appropriately.  At this time, patients caregiver is not present.    Household/Family Systems     Patient resides alone at     [address] [place] [id number]    Cell [phone]  Mother Robi cell [phone]    Support system includes pt's mom and sister Lucie.  Patient does not have dependents that are need of being cared for.     Patients primary caregiver is abi Rosenbaum mother.    During admission, patient's caregiver plans to visit. Confirmed patient and patients caregivers do have access to reliable transportation.    Cognitive Status/Learning     Patient reports reading ability as some college and states patient does not have difficulty with reading, writing, seeing, hearing, comprehension, learning and memory. Pt does wear glasses.  Needed: N/A.   Highest education level: Attended College/Technical School    Vocation/Disability   .  Working for Income: No  If no, reason not working: Disability since 2012 due to cardiomyopathy.  Patient reports last worked at [employer]. Pt denies financial concerns at this time.    Adherence     Patient reports a medium level of adherence to patients health care regimen. Pt reports he has stopped taking some of his medications. Adherence counseling and education provided. Patient verbalizes understanding.    Substance Use    Patient reports the following substance usage.    Tobacco: pt reports quit in 2011. Pt states he quit "cold turkey."  Alcohol: pt reports quit in 2011. Highest level of use was 2 24 oz cans of Yuan/day.  Illicit " Drugs/Non-prescribed Medications: pt reports infrequent use of marijuana when younger. No current use reported.    Patient states clear understanding of the potential impact of substance use.      Services Utilizing/ADLS    Infusion Service: Prior to admission, patient utilizing? no  Home Health: Prior to admission, patient utilizing? no. In the past: [facility].  DME: Prior to admission, no  Pulmonary/Cardiac Rehab: Prior to admission, no  Dialysis:  Prior to admission, no  Transplant Specialty Pharmacy:  Prior to admission, no. Pt using [facility] for all medications.    Prior to admission, patient reports patient was independent with ADLS and was driving. Patient reports patient is able to care for self at this time..  Patient indicates a willingness to care for self once medically cleared to do so.    Insurance/Medications    Insured by   Payer/Plan Subscr  Sex Relation Sub. Ins. ID Effective Group Num   1. MEDICARE - [place]* SAUL STEPHEN* 1970 Male Self [id number] 3/1/15                                    [address]   2. [place]* SAUL STEPHEN* 1970 Male Self [id number]* 14                                    [address] [address]     Primary Insurance (for UNOS reporting): Public Insurance: Medicare Fee for Service   Secondary Insurance (for UNOS reporting): Public Insurance - Medicaid    Patient reports patient is able to obtain and afford medications at this time and at time of discharge.    Living Will/Healthcare Power of     Patient states patient does not have a LW and/or HCPA.  Pt aware mother is legal HCPA. Pt states mother aware of pt needs and able to make decisions on pt's behalf.     Coping/Mental Health    Patient is coping adequately with the aid of  family members, victor hugo and supportive [employer]. Patient denies mental health difficulties. General support provided.    Discharge Planning    At time of discharge, patient plans to return to patient's home under the care of self.   Patients mother or sister will transport patient.  Per rounds today, expected discharge date has not been medically determined at this time. Patient verbalizes understanding and is involved in treatment planning and discharge process.    Additional Concerns    Patient is being followed for needs, education, resources, information, emotional support, supportive counseling, and for supportive and skilled discharge plan of care.  providing ongoing psychosocial support, education, resources and d/c planning as needed.  SW remains available. Patient denies additional needs and/or concerns at this time. Patient verbalizes understanding and agreement with information reviewed, social work availability, and how to access available resources as needed.

## 2025-06-20 NOTE — PLAN OF CARE
CICU DAILY GOALS     A: Awake    RASS: Goal -    Actual -     Restraint necessity:    B: Breath   SBT: Not intubated   C: Coordinate A & B, analgesics/sedatives   Pain: managed    SAT: Not intubated  D: Delirium   CAM-ICU:    E: Early(intubated/ Progressive (non-intubated) Mobility   MOVE Screen: Pass   Activity: Activity Management: Rolling - L1  FAS: Feeding/Nutrition   Diet order: Diet/Nutrition Received: NPO,   Fluid restriction:     Nutritional Supplement Intake: Quantity 0, Type: n/a  T: Thrombus   DVT prophylaxis: VTE Core Measure: Pharmacological prophylaxis initiated/maintained  H: HOB Elevation   Head of Bed (HOB) Positioning: HOB at 20-30 degrees  U: Ulcer Prophylaxis   GI: no  G: Glucose control   managed    S: Skin      Wounds: No  Wound care consulted: No  B: Bowel Function   no issues   I: Indwelling Catheters   Degroto necessity:     CVC necessity: No  D: De-escalation Antibx   No  Plan for the day   Possible ablation   Family/Goals of care/Code Status   Code Status: Full Code     No acute events throughout day, VS and assessment per flow sheet, patient progressing towards goals as tolerated, plan of care reviewed with Alfonso Leger and family, all concerns addressed, will continue to monitor.    Problem: Diabetes Comorbidity  Goal: Blood Glucose Level Within Targeted Range  Outcome: Progressing     Problem: Skin Injury Risk Increased  Goal: Skin Health and Integrity  Outcome: Progressing     Problem: Dysrhythmia  Goal: Normalized Cardiac Rhythm  Outcome: Progressing

## 2025-06-20 NOTE — HPI
Reason for Consult: Management of T2DM, Hyperglycemia     Surgical Procedure and Date: OHTx 10/29/12     Diabetes diagnosis year: 2013    Home Diabetes Medications:  None per chart review -- took insulin shortly after initial dx but has not needed any DM in > 10 years per pt     How often checking glucose at home? None      Diabetes Complications include:     Hyperglycemia    Complicating diabetes co morbidities:   CHF, HTN, HLD      HPI:   Patient is a 55 y.o. male with PMHx of OHTx 10/29/12 (for EtOH CM) with post-op severe TR and chronic LV dysfunction (EF has been reduced to 40-50% since at least 2015, as low as 35%), hx of WPW, Factor V Leiden with h/o DVT (last 8/2013) and previous hx of IVC filter placement in 2011 previously on Lovenox sub q 100mg bid (non-compliant, stopped taking 5 years ago), HTN (not compliant with clonidine or amlodipine, only on hydralazine), HLD (not compliant with pravastatin), NIDDM2, CKD stage 3 (last known baseline CR 1.5-2 per nephrology note in 2021), and Non-Hodgkin lymphoma s/p 6 cycles of R-CHOP/CEOP in 2013 now in remission. Presented to outside hospital ED today after noting 1 1/2 week hx of bilateral lower extremity leg swelling and primarily left calf pain. Endocrine consulted for BG management.

## 2025-06-20 NOTE — ASSESSMENT & PLAN NOTE
BG goal: 140-180.    - Continue LDC SSI PRN   - POCT Glucose before meals and at bedtime  - Hypoglycemia protocol in place      ** Please notify Endocrine for any change and/or advance in diet**  ** Please call Endocrine for any BG related issues **     Discharge Planning:   TBD. Please notify endocrinology prior to discharge.

## 2025-06-20 NOTE — SUBJECTIVE & OBJECTIVE
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    Factor V Leiden 9/7/2012    Hypertension     Immunodeficiency due to treatment with immunosuppressive medication     Other and unspecified hyperlipidemia 10/11/2013    Stroke     Type II or unspecified type diabetes mellitus without mention of complication, uncontrolled 10/11/2013    Typical atrial flutter 6/19/2025       Past Surgical History:   Procedure Laterality Date    ABDOMINAL SURGERY      CARDIAC CATHETERIZATION      CATHETERIZATION OF BOTH LEFT AND RIGHT HEART N/A 8/10/2020    Procedure: CATHETERIZATION, HEART, BOTH LEFT AND RIGHT;  Surgeon: Michael Fernandez MD;  Location: [facility] CATH LAB;  Service: Cardiology;  Laterality: N/A;    CORONARY ANGIOGRAPHY N/A 8/10/2020    Procedure: ANGIOGRAM, CORONARY ARTERY;  Surgeon: Michael Fernandez MD;  Location: [facility] CATH LAB;  Service: Cardiology;  Laterality: N/A;    HEART TRANSPLANT      IVC FILTER RETRIEVAL      pt reports still in place       Review of patient's allergies indicates:  No Known Allergies    Current Facility-Administered Medications on File Prior to Encounter   Medication    [COMPLETED] amiodarone in dextrose 150 mg/100 mL (1.5 mg/mL) loading dose 150 mg    [COMPLETED] cloNIDine tablet 0.1 mg    [COMPLETED] heparin 25,000 units in dextrose 5% (100 units/ml) IV bolus from bag HIGH INTENSITY nomogram - [facility]    [COMPLETED] kit for prep of Tc-99m-albumin 2.5 mg SolR 5 millicurie    [COMPLETED] kit prep Tc 99m-pentetic acid (aerosol) 20 mg SolR 30 millicurie    [COMPLETED] metoprolol injection 5 mg    [COMPLETED] metoprolol injection 5 mg    [COMPLETED] metoprolol injection 5 mg    [COMPLETED] metoprolol tartrate (LOPRESSOR)  tablet 25 mg    [COMPLETED] potassium chloride 10 mEq in 100 mL IVPB    [DISCONTINUED] amiodarone 360 mg/200 mL (1.8 mg/mL) infusion    [DISCONTINUED] amiodarone 360 mg/200 mL (1.8 mg/mL) infusion    [DISCONTINUED] heparin 25,000 units in dextrose 5% (100 units/ml) IV bolus from bag HIGH INTENSITY nomogram - [facility]    [DISCONTINUED] heparin 25,000 units in dextrose 5% (100 units/ml) IV bolus from bag HIGH INTENSITY nomogram - [facility]    [DISCONTINUED] heparin 25,000 units in dextrose 5% 250 mL (100 units/mL) infusion HIGH INTENSITY nomogram - [facility]    [DISCONTINUED] metoprolol injection 5 mg     Current Outpatient Medications on File Prior to Encounter   Medication Sig    amLODIPine (NORVASC) 10 MG tablet TAKE 1 TABLET BY MOUTH ONCE DAILY    cloNIDine (CATAPRES) 0.1 MG tablet Take 3 tablets (0.3 mg total) by mouth 2 (two) times daily.    hydrALAZINE (APRESOLINE) 25 MG tablet Take 1 tablet (25 mg total) by mouth 3 (three) times daily.    tacrolimus (PROGRAF) 1 MG Cap Take 1 capsule (1 mg total) by mouth every 12 (twelve) hours.    [DISCONTINUED] enoxaparin (LOVENOX) 100 mg/mL Syrg INJECT 1 ML (100 MG TOTAL) INTO THE SKIN EVERY 12 (TWELVE) HOURS. (60 SYRINGES)    [DISCONTINUED] magnesium oxide (MAG-OX) 400 mg (241.3 mg magnesium) tablet Take 1 tablet (400 mg total) by mouth once daily.    [DISCONTINUED] pravastatin (PRAVACHOL) 40 MG tablet Take 1 tablet (40 mg total) by mouth every evening.     Family History       Problem Relation (Age of Onset)    Cancer Paternal Grandfather    Diabetes Maternal Uncle          Tobacco Use    Smoking status: Former     Current packs/day: 0.00     Types: Cigarettes     Quit date: 2011     Years since quittin.1    Smokeless tobacco: Former   Substance and Sexual Activity    Alcohol use: No    Drug use: No    Sexual activity: Yes     Partners: Female     Birth control/protection: None     ROS12 point ROS negative except for HPI  Objective:     Vital Signs (Most Recent):  Pulse: 100  (06/19/25 2043)  SpO2: 98 % (06/19/25 2043) Vital Signs (24h Range):  Temp:  [98 °F (36.7 °C)] 98 °F (36.7 °C)  Pulse:  [] 100  Resp:  [17-20] 17  SpO2:  [98 %-100 %] 98 %  BP: (121-176)/() 126/97       Weight: 103.1 kg (227 lb 4.7 oz)  Body mass index is 30.83 kg/m².    SpO2: 98 %        Physical Exam   Constitutional:       Comments: Alert, Oriented, No acute distress   HENT:      Head: Normocephalic and atraumatic.   Eyes:      Conjunctiva/sclera: Conjunctivae normal.   Neck:      Vascular: No hepatojugular reflux or JVD.   Cardiovascular:      Rate and Rhythm: Regular rhythm. Tachycardia present.   Pulmonary:      Comments: Normal effort, Clear to auscultation   Abdominal:      Comments: Soft, Non-tender, Non-distended   Musculoskeletal:      Cervical back: Normal range of motion.      Comments: B/l lower extremity swelling. Tenderness to palpation   Skin:     Comments: Dry, Intact, Warm   Neurological:      Mental Status: He is alert and oriented to person, place, and time.      Comments: Normal speech   Psychiatric:         Mood and Affect: Mood and affect normal.     Significant Labs: All pertinent lab results from the last 24 hours have been reviewed.    Significant Imaging: Echocardiogram: Transthoracic echo (TTE) complete (Cupid Only):   Results for orders placed or performed during the hospital encounter of 11/10/23   Echo   Result Value Ref Range    BSA 2.28 m2    LVOT stroke volume 39.87 cm3    LVIDd 5.09 3.5 - 6.0 cm    LV Systolic Volume 65.97 mL    LV Systolic Volume Index 29.5 mL/m2    LVIDs 3.90 2.1 - 4.0 cm    LV Diastolic Volume 123.01 mL    LV Diastolic Volume Index 54.92 mL/m2    IVS 1.13 (A) 0.6 - 1.1 cm    LVOT diameter 2.55 cm    LVOT area 5.1 cm2    FS 23 (A) 28 - 44 %    Left Ventricle Relative Wall Thickness 0.41 cm    PW 1.05 0.6 - 1.1 cm    LV mass 210.57 g    LV Mass Index 94 g/m2    MV Peak E Anjel 0.57 m/s    TDI LATERAL 0.12 m/s    TDI SEPTAL 0.09 m/s    E/E' ratio 5.43  m/s    MV Peak A Anjel 0.13 m/s    TR Max Anjel 2.17 m/s    E/A ratio 4.38     IVRT 108.47 msec    E wave deceleration time 159.65 msec    LV SEPTAL E/E' RATIO 6.33 m/s    LV LATERAL E/E' RATIO 4.75 m/s    LVOT peak anjel 0.48 m/s    RVDD 5.82 cm    TAPSE 2.45 cm    AV mean gradient 1 mmHg    AV peak gradient 2 mmHg    Ao peak anjel 0.67 m/s    Ao VTI 11.93 cm    LVOT peak VTI 7.81 cm    AV valve area 3.34 cm²    AV Velocity Ratio 0.72     AV index (prosthetic) 0.65     DEWAYNE by Velocity Ratio 3.66 cm²    MV stenosis pressure 1/2 time 46.30 ms    MV valve area p 1/2 method 4.75 cm2    Triscuspid Valve Regurgitation Peak Gradient 19 mmHg    Sinus 4.27 cm    STJ 3.14 cm    Ascending aorta 3.96 cm    Mean e' 0.11 m/s    ZLVIDS -1.73     ZLVIDD -4.54     TV resting pulmonary artery pressure 34 mmHg    RV TB RVSP 17 mmHg    Est. RA pres 15 mmHg    EF 40 %    Narrative      S/p OHT 10/29/12.    Left Ventricle: The left ventricle is dilated. Ventricular mass is   normal. Increased wall thickness. regional wall motion abnormalities   present. See diagram for wall motion findings. There is mildly reduced   systolic function with a visually estimated ejection fraction of 40 - 45%.   Ejection fraction by visual approximation is 40%. Grade I diastolic   dysfunction. Average E/e' ratio is 5.43.    Right Ventricle: Moderate-severe right ventricular enlargement.   Systolic function is borderline low.    Aortic Valve: The aortic valve is a trileaflet valve. There is mild   aortic regurgitation.    Tricuspid Valve: There is severe regurgitation with a centrally   directed jet.    IVC/SVC: Elevated venous pressure at 15 mmHg.

## 2025-06-21 PROBLEM — T86.298: Status: ACTIVE | Noted: 2025-06-21

## 2025-06-21 LAB
ABSOLUTE EOSINOPHIL (OHS): 0.03 K/UL
ABSOLUTE MONOCYTE (OHS): 0.64 K/UL (ref 0.3–1)
ABSOLUTE NEUTROPHIL COUNT (OHS): 4.23 K/UL (ref 1.8–7.7)
ALBUMIN SERPL BCP-MCNC: 3.2 G/DL (ref 3.5–5.2)
ALP SERPL-CCNC: 70 UNIT/L (ref 40–150)
ALT SERPL W/O P-5'-P-CCNC: 7 UNIT/L (ref 10–44)
ANION GAP (OHS): 13 MMOL/L (ref 8–16)
APTT PPP: 31 SECONDS (ref 21–32)
APTT PPP: 32.5 SECONDS (ref 21–32)
APTT PPP: 59.2 SECONDS (ref 21–32)
APTT PPP: 78.2 SECONDS (ref 21–32)
AST SERPL-CCNC: 11 UNIT/L (ref 11–45)
BASOPHILS # BLD AUTO: 0.02 K/UL
BASOPHILS NFR BLD AUTO: 0.3 %
BILIRUB SERPL-MCNC: 0.5 MG/DL (ref 0.1–1)
BUN SERPL-MCNC: 27 MG/DL (ref 6–20)
CALCIUM SERPL-MCNC: 8.6 MG/DL (ref 8.7–10.5)
CHLORIDE SERPL-SCNC: 107 MMOL/L (ref 95–110)
CO2 SERPL-SCNC: 21 MMOL/L (ref 23–29)
CREAT SERPL-MCNC: 2.8 MG/DL (ref 0.5–1.4)
CREAT UR-MCNC: 391 MG/DL (ref 23–375)
ERYTHROCYTE [DISTWIDTH] IN BLOOD BY AUTOMATED COUNT: 13.5 % (ref 11.5–14.5)
GFR SERPLBLD CREATININE-BSD FMLA CKD-EPI: 26 ML/MIN/1.73/M2
GLUCOSE SERPL-MCNC: 129 MG/DL (ref 70–110)
HCT VFR BLD AUTO: 37.7 % (ref 40–54)
HGB BLD-MCNC: 12.5 GM/DL (ref 14–18)
IMM GRANULOCYTES # BLD AUTO: 0.02 K/UL (ref 0–0.04)
IMM GRANULOCYTES NFR BLD AUTO: 0.3 % (ref 0–0.5)
INR PPP: 1.4 (ref 0.8–1.2)
LYMPHOCYTES # BLD AUTO: 0.91 K/UL (ref 1–4.8)
MAGNESIUM SERPL-MCNC: 2.2 MG/DL (ref 1.6–2.6)
MCH RBC QN AUTO: 29.8 PG (ref 27–31)
MCHC RBC AUTO-ENTMCNC: 33.2 G/DL (ref 32–36)
MCV RBC AUTO: 90 FL (ref 82–98)
NUCLEATED RBC (/100WBC) (OHS): 0 /100 WBC
PLATELET # BLD AUTO: 311 K/UL (ref 150–450)
PMV BLD AUTO: 10.1 FL (ref 9.2–12.9)
POCT GLUCOSE: 114 MG/DL (ref 70–110)
POCT GLUCOSE: 123 MG/DL (ref 70–110)
POCT GLUCOSE: 125 MG/DL (ref 70–110)
POCT GLUCOSE: 126 MG/DL (ref 70–110)
POTASSIUM SERPL-SCNC: 4 MMOL/L (ref 3.5–5.1)
PROT SERPL-MCNC: 7.4 GM/DL (ref 6–8.4)
PROT UR-MCNC: 137 MG/DL
PROT/CREAT UR: 0.35 MG/G{CREAT}
PROTHROMBIN TIME: 15.3 SECONDS (ref 9–12.5)
RBC # BLD AUTO: 4.2 M/UL (ref 4.6–6.2)
RELATIVE EOSINOPHIL (OHS): 0.5 %
RELATIVE LYMPHOCYTE (OHS): 15.6 % (ref 18–48)
RELATIVE MONOCYTE (OHS): 10.9 % (ref 4–15)
RELATIVE NEUTROPHIL (OHS): 72.4 % (ref 38–73)
SODIUM SERPL-SCNC: 141 MMOL/L (ref 136–145)
TACROLIMUS BLD-MCNC: 8.2 NG/ML (ref 5–15)
URATE SERPL-MCNC: 12.2 MG/DL (ref 3.4–7)
WBC # BLD AUTO: 5.85 K/UL (ref 3.9–12.7)

## 2025-06-21 PROCEDURE — 82570 ASSAY OF URINE CREATININE: CPT | Performed by: STUDENT IN AN ORGANIZED HEALTH CARE EDUCATION/TRAINING PROGRAM

## 2025-06-21 PROCEDURE — 25000003 PHARM REV CODE 250

## 2025-06-21 PROCEDURE — 94761 N-INVAS EAR/PLS OXIMETRY MLT: CPT

## 2025-06-21 PROCEDURE — 99900035 HC TECH TIME PER 15 MIN (STAT)

## 2025-06-21 PROCEDURE — 99291 CRITICAL CARE FIRST HOUR: CPT | Mod: GC,,, | Performed by: INTERNAL MEDICINE

## 2025-06-21 PROCEDURE — 25000003 PHARM REV CODE 250: Performed by: STUDENT IN AN ORGANIZED HEALTH CARE EDUCATION/TRAINING PROGRAM

## 2025-06-21 PROCEDURE — 85730 THROMBOPLASTIN TIME PARTIAL: CPT | Performed by: INTERNAL MEDICINE

## 2025-06-21 PROCEDURE — 25000003 PHARM REV CODE 250: Performed by: INTERNAL MEDICINE

## 2025-06-21 PROCEDURE — 80197 ASSAY OF TACROLIMUS: CPT | Performed by: STUDENT IN AN ORGANIZED HEALTH CARE EDUCATION/TRAINING PROGRAM

## 2025-06-21 PROCEDURE — 85025 COMPLETE CBC W/AUTO DIFF WBC: CPT | Performed by: STUDENT IN AN ORGANIZED HEALTH CARE EDUCATION/TRAINING PROGRAM

## 2025-06-21 PROCEDURE — 83735 ASSAY OF MAGNESIUM: CPT | Performed by: STUDENT IN AN ORGANIZED HEALTH CARE EDUCATION/TRAINING PROGRAM

## 2025-06-21 PROCEDURE — 63600175 PHARM REV CODE 636 W HCPCS: Performed by: STUDENT IN AN ORGANIZED HEALTH CARE EDUCATION/TRAINING PROGRAM

## 2025-06-21 PROCEDURE — 82040 ASSAY OF SERUM ALBUMIN: CPT | Performed by: STUDENT IN AN ORGANIZED HEALTH CARE EDUCATION/TRAINING PROGRAM

## 2025-06-21 PROCEDURE — 20000000 HC ICU ROOM

## 2025-06-21 PROCEDURE — 85610 PROTHROMBIN TIME: CPT | Performed by: STUDENT IN AN ORGANIZED HEALTH CARE EDUCATION/TRAINING PROGRAM

## 2025-06-21 PROCEDURE — 93010 ELECTROCARDIOGRAM REPORT: CPT | Mod: ,,, | Performed by: INTERNAL MEDICINE

## 2025-06-21 PROCEDURE — 99222 1ST HOSP IP/OBS MODERATE 55: CPT | Mod: ,,, | Performed by: INTERNAL MEDICINE

## 2025-06-21 PROCEDURE — 93005 ELECTROCARDIOGRAM TRACING: CPT

## 2025-06-21 PROCEDURE — 84550 ASSAY OF BLOOD/URIC ACID: CPT | Performed by: STUDENT IN AN ORGANIZED HEALTH CARE EDUCATION/TRAINING PROGRAM

## 2025-06-21 PROCEDURE — 63600175 PHARM REV CODE 636 W HCPCS: Performed by: INTERNAL MEDICINE

## 2025-06-21 PROCEDURE — 85730 THROMBOPLASTIN TIME PARTIAL: CPT | Performed by: STUDENT IN AN ORGANIZED HEALTH CARE EDUCATION/TRAINING PROGRAM

## 2025-06-21 PROCEDURE — 85730 THROMBOPLASTIN TIME PARTIAL: CPT

## 2025-06-21 RX ORDER — MUPIROCIN 20 MG/G
OINTMENT TOPICAL 2 TIMES DAILY
Status: DISCONTINUED | OUTPATIENT
Start: 2025-06-21 | End: 2025-06-24 | Stop reason: HOSPADM

## 2025-06-21 RX ORDER — HEPARIN SODIUM,PORCINE/D5W 25000/250
0-40 INTRAVENOUS SOLUTION INTRAVENOUS CONTINUOUS
Status: DISCONTINUED | OUTPATIENT
Start: 2025-06-21 | End: 2025-06-22

## 2025-06-21 RX ORDER — ACETAMINOPHEN 325 MG/1
650 TABLET ORAL EVERY 6 HOURS PRN
Status: DISCONTINUED | OUTPATIENT
Start: 2025-06-21 | End: 2025-06-24 | Stop reason: HOSPADM

## 2025-06-21 RX ADMIN — EMPAGLIFLOZIN 10 MG: 10 TABLET, FILM COATED ORAL at 09:06

## 2025-06-21 RX ADMIN — AMIODARONE HYDROCHLORIDE 0.5 MG/MIN: 1.8 INJECTION, SOLUTION INTRAVENOUS at 09:06

## 2025-06-21 RX ADMIN — TACROLIMUS 0.5 MG: 0.5 CAPSULE ORAL at 05:06

## 2025-06-21 RX ADMIN — ISOSORBIDE DINITRATE 20 MG: 20 TABLET ORAL at 07:06

## 2025-06-21 RX ADMIN — ACETAMINOPHEN 650 MG: 325 TABLET ORAL at 05:06

## 2025-06-21 RX ADMIN — TACROLIMUS 1 MG: 1 CAPSULE ORAL at 07:06

## 2025-06-21 RX ADMIN — MUPIROCIN: 20 OINTMENT TOPICAL at 08:06

## 2025-06-21 RX ADMIN — AMIODARONE HYDROCHLORIDE 0.5 MG/MIN: 1.8 INJECTION, SOLUTION INTRAVENOUS at 05:06

## 2025-06-21 RX ADMIN — HYDRALAZINE HYDROCHLORIDE 75 MG: 25 TABLET ORAL at 05:06

## 2025-06-21 RX ADMIN — HEPARIN SODIUM AND DEXTROSE 18 UNITS/KG/HR: 10000; 5 INJECTION INTRAVENOUS at 09:06

## 2025-06-21 RX ADMIN — SACUBITRIL AND VALSARTAN 1 TABLET: 24; 26 TABLET, FILM COATED ORAL at 08:06

## 2025-06-21 RX ADMIN — HEPARIN SODIUM AND DEXTROSE 15 UNITS/KG/HR: 10000; 5 INJECTION INTRAVENOUS at 05:06

## 2025-06-21 RX ADMIN — AMLODIPINE BESYLATE 10 MG: 10 TABLET ORAL at 07:06

## 2025-06-21 RX ADMIN — SACUBITRIL AND VALSARTAN 1 TABLET: 24; 26 TABLET, FILM COATED ORAL at 09:06

## 2025-06-21 NOTE — SUBJECTIVE & OBJECTIVE
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    Factor V Leiden 9/7/2012    Hypertension     Immunodeficiency due to treatment with immunosuppressive medication     Other and unspecified hyperlipidemia 10/11/2013    Stroke     Type II or unspecified type diabetes mellitus without mention of complication, uncontrolled 10/11/2013    Typical atrial flutter 6/19/2025       Past Surgical History:   Procedure Laterality Date    ABDOMINAL SURGERY      CARDIAC CATHETERIZATION      CATHETERIZATION OF BOTH LEFT AND RIGHT HEART N/A 8/10/2020    Procedure: CATHETERIZATION, HEART, BOTH LEFT AND RIGHT;  Surgeon: Michael Fernandez MD;  Location: [facility] CATH LAB;  Service: Cardiology;  Laterality: N/A;    CORONARY ANGIOGRAPHY N/A 8/10/2020    Procedure: ANGIOGRAM, CORONARY ARTERY;  Surgeon: Michael Fernandez MD;  Location: [facility] CATH LAB;  Service: Cardiology;  Laterality: N/A;    HEART TRANSPLANT      IVC FILTER RETRIEVAL      pt reports still in place       Review of patient's allergies indicates:  No Known Allergies  Current Facility-Administered Medications   Medication Frequency    acetaminophen tablet 650 mg Q6H PRN    amiodarone 360 mg/200 mL (1.8 mg/mL) infusion Continuous    amLODIPine tablet 10 mg Daily    dextrose 50% injection 12.5 g PRN    dextrose 50% injection 25 g PRN    empagliflozin (Jardiance) tablet 10 mg Daily    glucagon (human recombinant) injection 1 mg PRN    glucose chewable tablet 16 g PRN    glucose chewable tablet 24 g PRN    heparin 25,000 units in dextrose 5% (100 units/ml) IV bolus from bag HIGH INTENSITY nomogram - [facility] Once    heparin 25,000 units in dextrose 5% (100 units/ml) IV bolus from bag HIGH INTENSITY nomogram -  [facility] PRN    heparin 25,000 units in dextrose 5% (100 units/ml) IV bolus from bag HIGH INTENSITY nomogram - [facility] PRN    heparin 25,000 units in dextrose 5% 250 mL (100 units/mL) infusion HIGH INTENSITY nomogram - [facility] Continuous    insulin aspart U-100 pen 0-5 Units QID (AC + HS) PRN    sacubitriL-valsartan 24-26 mg per tablet 1 tablet BID    sodium chloride 0.9% bolus Continuous PRN    sodium chloride 0.9% flush 10 mL PRN    tacrolimus capsule 0.5 mg Daily PM    tacrolimus capsule 1 mg Daily AM     Family History       Problem Relation (Age of Onset)    Cancer Paternal Grandfather    Diabetes Maternal Uncle          Tobacco Use    Smoking status: Former     Current packs/day: 0.00     Types: Cigarettes     Quit date: 2011     Years since quittin.1    Smokeless tobacco: Former   Substance and Sexual Activity    Alcohol use: No    Drug use: No    Sexual activity: Yes     Partners: Female     Birth control/protection: None     Review of Systems   Constitutional:  Negative for chills and fever.   Genitourinary:  Negative for difficulty urinating.     Objective:     Vital Signs (Most Recent):  Temp: 98 °F (36.7 °C) (25 0659)  Pulse: (!) 130 (25 0659)  Resp: (!) 21 (25 0659)  BP: (!) 131/95 (25 0659)  SpO2: 98 % (25 0659) Vital Signs (24h Range):  Temp:  [97.7 °F (36.5 °C)-98.1 °F (36.7 °C)] 98 °F (36.7 °C)  Pulse:  [127-133] 130  Resp:  [16-37] 21  SpO2:  [93 %-100 %] 98 %  BP: ()/() 131/95     Weight: 101 kg (222 lb 10.6 oz) (25 1338)  Body mass index is 30.2 kg/m².  Body surface area is 2.27 meters squared.    I/O last 3 completed shifts:  In: 1482.4 [P.O.:90; I.V.:892.4; IV Piggyback:500]  Out: 700 [Urine:700]     Physical Exam  Constitutional:       General: He is not in acute distress.  Pulmonary:      Effort: No respiratory distress.   Abdominal:      Palpations: Abdomen is soft.   Musculoskeletal:         General: Swelling present.   Neurological:      Mental  Status: He is alert. Mental status is at baseline.   Psychiatric:         Mood and Affect: Mood normal.          Significant Labs:  CBC:   Recent Labs   Lab 06/21/25 0319   WBC 5.85   RBC 4.20*   HGB 12.5*   HCT 37.7*      MCV 90   MCH 29.8   MCHC 33.2     CMP:   Recent Labs   Lab 06/21/25 0319   *   CALCIUM 8.6*   ALBUMIN 3.2*   PROT 7.4      K 4.0   CO2 21*      BUN 27*   CREATININE 2.8*   ALKPHOS 70   ALT 7*   AST 11   BILITOT 0.5     All labs within the past 24 hours have been reviewed.

## 2025-06-21 NOTE — HPI
Alfonso is a pleasant 56 yo man w pmhx significant for heart transplant 10/29/2012 (EtOH CM) with post op LV dysfunction (EF ranging between 35-45% over the years, now 5-10%) on long term immunosuppression prograf, factor V Leiden with hx of DVT (last in 2013), and previous IVC filter, HTN, HLD, T2DM, CKD3 diagnosed in 2021, non-hodkin lymphoma in remission who is here with heart failure exacerbation, NILES on CKD, and atrial flutter with RVR. He has LEATHA DCCV planned for Monday. Regarding his CKD, it is difficult to determine his current BL Cr, he previously followed w Nephrology in 2021, at the time his Cr was 1.5 to 2, he had some labs in Nov 2024 where his Cr was 2.0, but there is no more recent Cr documented. The etiology of his CKD is uncertain, but possibly d/t long term immunosuppression vs CRS vs longstanding HTN vs previous DMT2. No famhx of CKD or known use of NSAIDs. There is 2+ protein in urine, which he seems to have had since 2021. He received R heart cath w biopsy 6/20. BP is stable, off pressors. Cr 3.3 at time of consult. Nephrology has been consulted for eval for NILES on CKD.

## 2025-06-21 NOTE — CONSULTS
[facility] - Cardiac Intensive Care  Nephrology  Consult Note    Patient Name: Alfonso Leger  MRN: [id number]  Admission Date: 6/19/2025  Hospital Length of Stay: 2 days  Attending Provider: Niles Castro, *   Primary Care Physician: No, Primary Doctor  Principal Problem:Typical atrial flutter    Inpatient consult to Nephrology  Consult performed by: Csa Anderson MD  Consult ordered by: Ruthy Campbell MD  Reason for consult: NILES on CKD?        Subjective:     HPI: Alfonso is a pleasant 54 yo man w pmhx significant for heart transplant 10/29/2012 (EtOH CM) with post op LV dysfunction (EF ranging between 35-45% over the years, now 5-10%) on long term immunosuppression prograf, factor V Leiden with hx of DVT (last in 2013), and previous IVC filter, HTN, HLD, T2DM, CKD3 diagnosed in 2021, non-hodkin lymphoma in remission who is here with heart failure exacerbation, NILES on CKD, and atrial flutter with RVR. He has LEATHA DCCV planned for Monday. Regarding his CKD, it is difficult to determine his current BL Cr, he previously followed w Nephrology in 2021, at the time his Cr was 1.5 to 2, he had some labs in Nov 2024 where his Cr was 2.0, but there is no more recent Cr documented. The etiology of his CKD is uncertain, but possibly d/t long term immunosuppression vs CRS vs longstanding HTN vs previous DMT2. No famhx of CKD or known use of NSAIDs. There is 2+ protein in urine, which he seems to have had since 2021. He received R heart cath w biopsy 6/20. BP is stable, off pressors. Cr 3.3 at time of consult. Nephrology has been consulted for eval for NILES on CKD.     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    Factor V Leiden  9/7/2012    Hypertension     Immunodeficiency due to treatment with immunosuppressive medication     Other and unspecified hyperlipidemia 10/11/2013    Stroke     Type II or unspecified type diabetes mellitus without mention of complication, uncontrolled 10/11/2013    Typical atrial flutter 6/19/2025       Past Surgical History:   Procedure Laterality Date    ABDOMINAL SURGERY      CARDIAC CATHETERIZATION      CATHETERIZATION OF BOTH LEFT AND RIGHT HEART N/A 8/10/2020    Procedure: CATHETERIZATION, HEART, BOTH LEFT AND RIGHT;  Surgeon: Michael Fernandez MD;  Location: [facility] CATH LAB;  Service: Cardiology;  Laterality: N/A;    CORONARY ANGIOGRAPHY N/A 8/10/2020    Procedure: ANGIOGRAM, CORONARY ARTERY;  Surgeon: Michael Fernandez MD;  Location: [facility] CATH LAB;  Service: Cardiology;  Laterality: N/A;    HEART TRANSPLANT      IVC FILTER RETRIEVAL      pt reports still in place       Review of patient's allergies indicates:  No Known Allergies  Current Facility-Administered Medications   Medication Frequency    acetaminophen tablet 650 mg Q6H PRN    amiodarone 360 mg/200 mL (1.8 mg/mL) infusion Continuous    amLODIPine tablet 10 mg Daily    dextrose 50% injection 12.5 g PRN    dextrose 50% injection 25 g PRN    empagliflozin (Jardiance) tablet 10 mg Daily    glucagon (human recombinant) injection 1 mg PRN    glucose chewable tablet 16 g PRN    glucose chewable tablet 24 g PRN    heparin 25,000 units in dextrose 5% (100 units/ml) IV bolus from bag HIGH INTENSITY nomogram - [facility] Once    heparin 25,000 units in dextrose 5% (100 units/ml) IV bolus from bag [facility]IGH INTENSITY nom[facility] - [facility] PRN    heparin 25,000 units in dextrose 5% (100 units/ml) IV bolus from bag [facility]IGH INTENSITY nom[facility] - [facility] PRN    heparin 25,000 units in dextrose 5% 250 mL (100 units/mL) infusion HIGH INTENSITY nomogram - [facility] Continuous    insulin aspart U-100 pen 0-5 Units QID (AC + HS) PRN    sacubitriL-valsartan 24-26 mg per tablet 1 tablet BID    sodium chloride  0.9% bolus Continuous PRN    sodium chloride 0.9% flush 10 mL PRN    tacrolimus capsule 0.5 mg Daily PM    tacrolimus capsule 1 mg Daily AM     Family History       Problem Relation (Age of Onset)    Cancer Paternal Grandfather    Diabetes Maternal Uncle          Tobacco Use    Smoking status: Former     Current packs/day: 0.00     Types: Cigarettes     Quit date: 2011     Years since quittin.1    Smokeless tobacco: Former   Substance and Sexual Activity    Alcohol use: No    Drug use: No    Sexual activity: Yes     Partners: Female     Birth control/protection: None     Review of Systems   Constitutional:  Negative for chills and fever.   Genitourinary:  Negative for difficulty urinating.     Objective:     Vital Signs (Most Recent):  Temp: 98 °F (36.7 °C) (25 0659)  Pulse: (!) 130 (25 0659)  Resp: (!) 21 (25 0659)  BP: (!) 131/95 (25 0659)  SpO2: 98 % (25 0659) Vital Signs (24h Range):  Temp:  [97.7 °F (36.5 °C)-98.1 °F (36.7 °C)] 98 °F (36.7 °C)  Pulse:  [127-133] 130  Resp:  [16-37] 21  SpO2:  [93 %-100 %] 98 %  BP: ()/() 131/95     Weight: 101 kg (222 lb 10.6 oz) (25 1338)  Body mass index is 30.2 kg/m².  Body surface area is 2.27 meters squared.    I/O last 3 completed shifts:  In: 1482.4 [P.O.:90; I.V.:892.4; IV Piggyback:500]  Out: 700 [Urine:700]     Physical Exam  Constitutional:       General: He is not in acute distress.  Pulmonary:      Effort: No respiratory distress.   Abdominal:      Palpations: Abdomen is soft.   Musculoskeletal:         General: Swelling present.   Neurological:      Mental Status: He is alert. Mental status is at baseline.   Psychiatric:         Mood and Affect: Mood normal.          Significant Labs:  CBC:   Recent Labs   Lab 25  0319   WBC 5.85   RBC 4.20*   HGB 12.5*   HCT 37.7*      MCV 90   MCH 29.8   MCHC 33.2     CMP:   Recent Labs   Lab 25  0319   *   CALCIUM 8.6*   ALBUMIN 3.2*   PROT 7.4       K 4.0   CO2 21*      BUN 27*   CREATININE 2.8*   ALKPHOS 70   ALT 7*   AST 11   BILITOT 0.5     All labs within the past 24 hours have been reviewed.  Assessment/Plan:   NILES on CKD3a/b 2/2 below  Atrial flutter  Transplant rejection? On long term immunosuppression  Heart transplant 10/29/2012  Factor V leiden w hx DVT and IVC filter  Non Hodgkins lymphoma in remission  Anemia in CKD  Proteinuria  DM2  Hx HTN    -urine micro today when a sample is available  -most likely this is NILES with progression of his chronic kidney disease 2/2 CRS vs long term immunosupression w long term HTN and DM2. Possibly exacerbated by recent cardiac events. His BL Cr is difficult to gauge, back in 2024 it may have been around 2, Cr 2.8 today, trending down. Recheck labs tomorrow.   -/700. Stable UOP.   -RP US ordered and reviewed, no hydronephrosis  -UA reviewed, 2+ protein which he has had before in 2021. Will quantify w UPCR.   -hg at goal 10-11    Thank you for your consult. I will follow-up with patient. Please contact us if you have any additional questions.    Cas Anderson MD  Nephrology  [facility] - [facility]    ATTENDING PHYSICIAN ATTESTATION  I have personally verified the history and examined the patient. I thoroughly reviewed the demographic, clinical, laboratorial and imaging information available in medical records. I agree with the assessment and recommendations provided by the subspecialty resident who was under my supervision.

## 2025-06-21 NOTE — CARE UPDATE
-Glucose Goal 140-180    -A1C:   Hemoglobin A1C   Date Value Ref Range Status   11/19/2024 5.7 (H) 4.0 - 5.6 % Final     Comment:     ADA Screening Guidelines:  5.7-6.4%  Consistent with prediabetes  >or=6.5%  Consistent with diabetes    High levels of fetal hemoglobin interfere with the HbA1C  assay. Heterozygous hemoglobin variants (HbS, HgC, etc)do  not significantly interfere with this assay.   However, presence of multiple variants may affect accuracy.       Hemoglobin A1c   Date Value Ref Range Status   06/19/2025 6.0 (H) 4.0 - 5.6 % Final     Comment:     ADA Screening Guidelines:  5.7-6.4%  Consistent with prediabetes  >=6.5%  Consistent with diabetes    High levels of fetal hemoglobin interfere with the HbA1C  assay. Heterozygous hemoglobin variants (HbS, HgC, etc)do  not significantly interfere with this assay.   However, presence of multiple variants may affect accuracy.         -HOME REGIMEN: does not take DM meds    -GLUCOSE TREND FOR THE PAST 24HRS:   Recent Labs   Lab 06/19/25  2123 06/20/25  0926 06/20/25  1310 06/20/25  1710 06/21/25  0758   POCTGLUCOSE 129* 122* 115* 109 126*         -NO HYPOGYCEMIAS NOTED     - Diet  Diet Adult Regular    -TOLERATING 100 % OF PO DIET             Plan:   - Continue LDC SSI PRN   - Farxiga per primary team   - POCT Glucose before meals and at bedtime  - Hypoglycemia protocol in place      ** Please notify Endocrine for any change and/or advance in diet**  ** Please call Endocrine for any BG related issues **     Discharge Planning:   TBD. Please notify endocrinology prior to discharge.

## 2025-06-21 NOTE — EICU
[facility] Night Rounds Checklist  24H Vital Sign Range:  Temp:  [97.6 °F (36.4 °C)-98.1 °F (36.7 °C)]   Pulse:  [125-133]   Resp:  [14-37]   BP: ()/()   SpO2:  [93 %-100 %]     Video rounds and LDA reconciliation

## 2025-06-21 NOTE — EICU
Virtual ICU Quality Rounds    Admit Date: 6/19/2025  Hospital Day: 2    ICU Day: 1d 16h    24H Vital Sign Range:  Temp:  [98 °F (36.7 °C)-98.1 °F (36.7 °C)]   Pulse:  [127-133]   Resp:  [16-37]   BP: ()/()   SpO2:  [95 %-99 %]     VICU Surveillance Screening    Daily review of  line necessity(optional): Central line(s) in place    Central line type (optional): Port    Central line indications : Long term central access needed/required        LDA reconciliation : Yes

## 2025-06-21 NOTE — SUBJECTIVE & OBJECTIVE
Interval History: NAEON. No acute complains. Remains in asx, hemodynamicall stable aflutter with rates in 130s.     Continuous Infusions:   amiodarone in dextrose 5%  0.5 mg/min Intravenous Continuous 16.7 mL/hr at 06/21/25 0916 0.5 mg/min at 06/21/25 0916    heparin (porcine) in D5W  0-40 Units/kg/hr Intravenous Continuous 18.2 mL/hr at 06/21/25 0937 18 Units/kg/hr at 06/21/25 0937    sodium chloride 0.9%    Continuous PRN   500 mL at 06/20/25 1422     Scheduled Meds:   amLODIPine  10 mg Oral Daily    empagliflozin  10 mg Oral Daily    sacubitriL-valsartan  1 tablet Oral BID    tacrolimus  0.5 mg Oral Daily PM    tacrolimus  1 mg Oral Daily AM     PRN Meds:  Current Facility-Administered Medications:     acetaminophen, 650 mg, Oral, Q6H PRN    dextrose 50%, 12.5 g, Intravenous, PRN    dextrose 50%, 25 g, Intravenous, PRN    glucagon (human recombinant), 1 mg, Intramuscular, PRN    glucose, 16 g, Oral, PRN    glucose, 24 g, Oral, PRN    heparin (PORCINE), 60 Units/kg, Intravenous, PRN    heparin (PORCINE), 30 Units/kg, Intravenous, PRN    insulin aspart U-100, 0-5 Units, Subcutaneous, QID (AC + HS) PRN    sodium chloride 0.9%, , , Continuous PRN    sodium chloride 0.9%, 10 mL, Intravenous, PRN    Review of patient's allergies indicates:  No Known Allergies  Objective:     Vital Signs (Most Recent):  Temp: 98 °F (36.7 °C) (06/21/25 0659)  Pulse: (!) 133 (06/21/25 0840)  Resp: (!) 22 (06/21/25 0840)  BP: (!) 131/95 (06/21/25 0659)  SpO2: 99 % (06/21/25 0840) Vital Signs (24h Range):  Temp:  [97.7 °F (36.5 °C)-98.1 °F (36.7 °C)] 98 °F (36.7 °C)  Pulse:  [127-133] 133  Resp:  [16-37] 22  SpO2:  [93 %-100 %] 99 %  BP: ()/() 131/95     Patient Vitals for the past 72 hrs (Last 3 readings):   Weight   06/20/25 1338 101 kg (222 lb 10.6 oz)   06/20/25 0501 101 kg (222 lb 9.6 oz)   06/19/25 1847 103.1 kg (227 lb 4.7 oz)     Body mass index is 30.2 kg/m².      Intake/Output Summary (Last 24 hours) at 6/21/2025  "0956  Last data filed at 6/21/2025 0601  Gross per 24 hour   Intake 862.07 ml   Output 500 ml   Net 362.07 ml             Physical Exam   Constitutional:       Comments: Alert, Oriented, No acute distress   HENT:      Head: Normocephalic and atraumatic.   Eyes:      Conjunctiva/sclera: Conjunctivae normal.   Neck:      Vascular: No hepatojugular reflux or JVD.   Cardiovascular:      Rate and Rhythm: Regular rhythm. Tachycardia present.   Pulmonary:      Comments: Normal effort, Clear to auscultation   Abdominal:      Comments: Soft, Non-tender, Non-distended   Musculoskeletal:      Cervical back: Normal range of motion.      Comments: B/l lower extremity swelling. Tenderness to palpation   Skin:     Comments: Dry, Intact, Warm   Neurological:      Mental Status: He is alert and oriented to person, place, and time.      Comments: Normal speech   Psychiatric:         Mood and Affect: Mood and affect normal.     Significant Labs:  CBC:  Recent Labs   Lab 06/19/25 1858 06/20/25  0409 06/21/25  0319   WBC 5.95 5.38 5.85   RBC 4.20* 4.12* 4.20*   HGB 12.3* 12.2* 12.5*   HCT 37.4* 36.5* 37.7*    286 311   MCV 89 89 90   MCH 29.3 29.6 29.8   MCHC 32.9 33.4 33.2     BNP:  Recent Labs   Lab 06/19/25  1208 06/19/25  1858   * 709*     CMP:  Recent Labs   Lab 06/19/25 1858 06/20/25  0409 06/21/25  0319   * 130* 129*   CALCIUM 7.8* 8.1* 8.6*   ALBUMIN 3.1* 3.1* 3.2*   PROT 6.9 6.8 7.4   * 136 141   K 3.1* 3.5 4.0   CO2 19* 21* 21*   CL 93* 103 107   BUN 27* 32* 27*   CREATININE 3.0* 3.3* 2.8*   ALKPHOS 61 59 70   ALT 8* 7* 7*   AST 11 11 11   BILITOT 0.5 0.4 0.5      Coagulation:   Recent Labs   Lab 06/19/25  1214 06/19/25  1858 06/19/25 2122 06/20/25  0409 06/20/25  0511   INR 1.2 1.7*  --   --   --    APTT 27.7 >150.0* 71.8* 101.0* 54.8*     LDH:  No results for input(s): "LDH" in the last 72 hours.  Microbiology:  Microbiology Results (last 7 days)       ** No results found for the last 168 hours. " **            I have reviewed all pertinent labs within the past 24 hours.    Estimated Creatinine Clearance: 37.1 mL/min (A) (based on SCr of 2.8 mg/dL (H)).    Diagnostic Results:  I have reviewed all pertinent imaging results/findings within the past 24 hours.

## 2025-06-21 NOTE — ASSESSMENT & PLAN NOTE
OHT on 10/29/12, with post-op severe TR, mild to moderately reduced LVEF.  Post-op developed PTLD, s/p R-CHOP/CEOP 12/2013  Immunosuppression tacrolimus 1 mg BID     Plan:  -continue tacro 1/0.5 mg

## 2025-06-21 NOTE — NURSING
CICU DAILY GOALS       A: Awake    RASS: Goal -    Actual - RASS (Ziegler Agitation-Sedation Scale): alert and calm   Restraint necessity:    B: Breath   SBT: NA   C: Coordinate A & B, analgesics/sedatives   Pain: managed    SAT: NA  D: Delirium   CAM-ICU:    E: Early(intubated/ Progressive (non-intubated) Mobility   MOVE Screen: Pass   Activity: Activity Management: Ankle pumps - L1, Arm raise - L1, Heel slide - L1  FAS: Feeding/Nutrition   Diet order: Diet/Nutrition Received: regular,   Fluid restriction:     Nutritional Supplement Intake: Quantity 0, Type: Boost  T: Thrombus   DVT prophylaxis: VTE Core Measure: Pharmacological prophylaxis initiated/maintained  H: HOB Elevation   Head of Bed (HOB) Positioning: HOB at 15 degrees  U: Ulcer Prophylaxis   GI: no  G: Glucose control   managed    S: Skin   Bathing/Skin Care: bath, complete (06/20/25 1101)  Wounds: No  Wound care consulted: No  B: Bowel Function   no issues   I: Indwelling Catheters   [place] necessity:     CVC necessity: No  D: De-escalation Antibx   No  Family/Goals of care/Code Status   Code Status: Full Code     No acute events throughout day, VS and assessment per flow sheet, patient progressing towards goals as tolerated, plan of care reviewed with Alfonso Leger and family, all concerns addressed, will continue to monitor.

## 2025-06-21 NOTE — ASSESSMENT & PLAN NOTE
Patient noted to be in typical atrial flutter with rates in 130-140s. 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 Asymptomatic and hemodyamically stable. [id number] score of 5 (CHF hx, HTN hx, Thromembolism hx, and DM2 hx). Hx of clotting disorder Factor V leiden. Non-compliant with home AC.     - Continue amiodarone gtt for now. Due to concern rate control with BB can lead to decompensation of HF, will continue amiodarone gtt for now. Patient explained risks of possible stroke if chemically converted and is agreeable with plan.   - Continue heparin gtt for now.   - ACLS if unstable.   - As per EP, plan for LEATHA and cardioversion on Monday.

## 2025-06-21 NOTE — ASSESSMENT & PLAN NOTE
NILES on CKD stage 3 possibly secondary to prograf toxicity vs dehydration. Patient does not appear in ADHF on exam and low likelhood for rejection with hx of HFmrEF. However cannot explain elevated BNP so will need further evaluation. Possible baseline cr 1.5-2. Creatinine around 3 on arrival to OSH     - daily BMP to trend creatinine.   - Improving.   - Strict I and Os.

## 2025-06-21 NOTE — PROGRESS NOTES
[facility] - Cardiac Intensive Care  Heart Transplant  Progress Note    Patient Name: Alfonso Leger  MRN: [id number]  Admission Date: 6/19/2025  Hospital Length of Stay: 2 days  Attending Physician: Niles Castro, *  Primary Care Provider: Moriah, Primary Doctor  Principal Problem:Secondary graft-dysfunction of transplanted heart    Subjective:   Interval History: NAEON. No acute complains. Remains in asx, hemodynamicall stable aflutter with rates in 130s.     Continuous Infusions:   amiodarone in dextrose 5%  0.5 mg/min Intravenous Continuous 16.7 mL/hr at 06/21/25 0916 0.5 mg/min at 06/21/25 0916    heparin (porcine) in D5W  0-40 Units/kg/hr Intravenous Continuous 18.2 mL/hr at 06/21/25 0937 18 Units/kg/hr at 06/21/25 0937    sodium chloride 0.9%    Continuous PRN   500 mL at 06/20/25 1422     Scheduled Meds:   amLODIPine  10 mg Oral Daily    empagliflozin  10 mg Oral Daily    sacubitriL-valsartan  1 tablet Oral BID    tacrolimus  0.5 mg Oral Daily PM    tacrolimus  1 mg Oral Daily AM     PRN Meds:  Current Facility-Administered Medications:     acetaminophen, 650 mg, Oral, Q6H PRN    dextrose 50%, 12.5 g, Intravenous, PRN    dextrose 50%, 25 g, Intravenous, PRN    glucagon (human recombinant), 1 mg, Intramuscular, PRN    glucose, 16 g, Oral, PRN    glucose, 24 g, Oral, PRN    heparin (PORCINE), 60 Units/kg, Intravenous, PRN    heparin (PORCINE), 30 Units/kg, Intravenous, PRN    insulin aspart U-100, 0-5 Units, Subcutaneous, QID (AC + HS) PRN    sodium chloride 0.9%, , , Continuous PRN    sodium chloride 0.9%, 10 mL, Intravenous, PRN    Review of patient's allergies indicates:  No Known Allergies  Objective:     Vital Signs (Most Recent):  Temp: 98 °F (36.7 °C) (06/21/25 0659)  Pulse: (!) 133 (06/21/25 0840)  Resp: (!) 22 (06/21/25 0840)  BP: (!) 131/95 (06/21/25 0659)  SpO2: 99 % (06/21/25 0840) Vital Signs (24h Range):  Temp:  [97.7 °F (36.5 °C)-98.1 °F (36.7 °C)] 98 °F (36.7 °C)  Pulse:  [127-133]  133  Resp:  [16-37] 22  SpO2:  [93 %-100 %] 99 %  BP: ()/() 131/95     Patient Vitals for the past 72 hrs (Last 3 readings):   Weight   06/20/25 1338 101 kg (222 lb 10.6 oz)   06/20/25 0501 101 kg (222 lb 9.6 oz)   06/19/25 1847 103.1 kg (227 lb 4.7 oz)     Body mass index is 30.2 kg/m².      Intake/Output Summary (Last 24 hours) at 6/21/2025 0956  Last data filed at 6/21/2025 0601  Gross per 24 hour   Intake 862.07 ml   Output 500 ml   Net 362.07 ml             Physical Exam   Constitutional:       Comments: Alert, Oriented, No acute distress   HENT:      Head: Normocephalic and atraumatic.   Eyes:      Conjunctiva/sclera: Conjunctivae normal.   Neck:      Vascular: No hepatojugular reflux or JVD.   Cardiovascular:      Rate and Rhythm: Regular rhythm. Tachycardia present.   Pulmonary:      Comments: Normal effort, Clear to auscultation   Abdominal:      Comments: Soft, Non-tender, Non-distended   Musculoskeletal:      Cervical back: Normal range of motion.      Comments: B/l lower extremity swelling. Tenderness to palpation   Skin:     Comments: Dry, Intact, Warm   Neurological:      Mental Status: He is alert and oriented to person, place, and time.      Comments: Normal speech   Psychiatric:         Mood and Affect: Mood and affect normal.     Significant Labs:  CBC:  Recent Labs   Lab 06/19/25 1858 06/20/25 0409 06/21/25  0319   WBC 5.95 5.38 5.85   RBC 4.20* 4.12* 4.20*   HGB 12.3* 12.2* 12.5*   HCT 37.4* 36.5* 37.7*    286 311   MCV 89 89 90   MCH 29.3 29.6 29.8   MCHC 32.9 33.4 33.2     BNP:  Recent Labs   Lab 06/19/25  1208 06/19/25  1858   * 709*     CMP:  Recent Labs   Lab 06/19/25  1858 06/20/25  0409 06/21/25  0319   * 130* 129*   CALCIUM 7.8* 8.1* 8.6*   ALBUMIN 3.1* 3.1* 3.2*   PROT 6.9 6.8 7.4   * 136 141   K 3.1* 3.5 4.0   CO2 19* 21* 21*   CL 93* 103 107   BUN 27* 32* 27*   CREATININE 3.0* 3.3* 2.8*   ALKPHOS 61 59 70   ALT 8* 7* 7*   AST 11 11 11   BILITOT  "0.5 0.4 0.5      Coagulation:   Recent Labs   Lab 06/19/25  1214 06/19/25  1858 06/19/25  2122 06/20/25  0409 06/20/25  0511   INR 1.2 1.7*  --   --   --    APTT 27.7 >150.0* 71.8* 101.0* 54.8*     LDH:  No results for input(s): "LDH" in the last 72 hours.  Microbiology:  Microbiology Results (last 7 days)       ** No results found for the last 168 hours. **            I have reviewed all pertinent labs within the past 24 hours.    Estimated Creatinine Clearance: 37.1 mL/min (A) (based on SCr of 2.8 mg/dL (H)).    Diagnostic Results:  I have reviewed all pertinent imaging results/findings within the past 24 hours.  Assessment and Plan:     Mr. Alfonso Leger is a 54 y/o AAM w/ PMHx of OHTx 10/29/12 (for EtOH CM) with post-op severe TR and chronic LV dysfunction (EF has been reduced to 40-50% since at least 2015, as low as 35%), hx of WPW, Factor V Leiden with h/o DVT (last 8/2013) and previous hx of IVC filter placement in 2011 previously on Lovenox sub q 100mg bid (non-compliant, stopped taking 5 years ago), HTN (not compliant with clonidine or amlodipine, only on hydralazine), HLD (not compliant with pravastatin), NIDDM2, CKD stage 3 (last known baseline CR 1.5-2 per nephrology note in 2021), and Non-Hodgkin lymphoma s/p 6 cycles of R-CHOP/CEOP in 2013 now in remission. Presented to outside hospital ED today after noting 1 1/2 week hx of bilateral lower extremity leg swelling and primarily left calf pain. Denied any chest pain, SOB, palpitations, nausea, vomiting, orthopnea, PND, abdominal distention, abdominal pain, fever, chills, diarrhea. States the only medications he is compliant with is his prograf 1mg bid and hydralazine 25mg tid. Stopped taking all other medications on his own. Last follow up with a doctor was last year (11/2024) at [facility] clinic.    In the ED,  labs notable hgb 13.5, potassium 3.3, bicarb 19, bun 30, cr 3.5 (baseline appears to be 1.5-2), magnesium 1.6. BNP 300s. VQ scan was negative for PE. " Lower extremity U/S showed noncompressible filling defects within the right and left common femoral vein, superficial femoral vein and popliteal veins, and thrombosed calf veins noted bilaterally. Vitals on arrival notable for bp 175/106. EKG notable for typical atrial flutter with rates in the 140s. Patient received Lopressor IV and PO, and was started on amiodarone gtt and heparin gtt prior [place] [facility] [place]     Of note, patient was evaluated by EP (Dr. Reddy) back on 8/28/2013 when WPW was first incidentally seen on his EKG. Patient was notably asymptomatic and ablation was not being purused at the time due to him being neutropenic. Patient was discharged with 30 day event monitored (only showed 2 episodes of sinus tachycardia) and told to follow up as outpatient. No evidence in the system that patient ever followed up with EP. No previous history of Atrial flutter or atrial fibrillation seen.       CMV status:    Donor: -   Recipient: -          Prescribed Home Medications:   Clonidine 0.3 mg BID    Tacrolimus 1 mg BID    Hydralazine 25 TID    Amlodipine 10 mg qd  Pravastatin 40mg nightly       Cardiac Imaging:   Stress Echo (11/19/2024):    Left Ventricle: The left ventricle is normal in size. Normal wall thickness. There is concentric remodeling. Regional wall motion abnormalities present. Septal motion is consistent with post-operative status. There is mildly reduced systolic function with a visually estimated ejection fraction of 40 - 50%. Ejection fraction is approximately 40%. There is normal diastolic function.    Right Ventricle: Moderate right ventricular enlargement. Wall thickness is normal. Systolic function is mildly reduced.    Left Atrium: Atrial septum is bulging to the left.    Right Atrium: Right atrium is severely dilated.    Tricuspid Valve: There is severe regurgitation. No pulmonary hypertension.    Pulmonary Artery: The estimated pulmonary artery systolic pressure  is 21 mmHg.    IVC/SVC: Normal venous pressure at 3 mmHg.    Stress Protocol: The patient was infused intravenously with dobutamine. The patient received a graduated infusion of the stress agent beginning at 10.0 mcg/kg/min to a peak dose of 10.0 mcg/kg/min. The peak heart rate was 173 bpm, which is 104% of age predicted maximum heart rate. Low-level exercise was used. The patient reported no symptoms during the stress test. The test was stopped because the end of the protocol was reached.    Baseline ECG: The Baseline ECG reveals sinus tachycardia with RBBB. The axis is normal. The ST segments are normal.    Stress ECG: There is no ST segment deviation identified during the protocol. There are no arrhythmias during stress.    ECG Conclusion: The ECG portion of the study is negative for ischemia.    Post-stress Conclusion: The study is negative with no echocardiographic evidence of stress induced ischemia.      TTE (11/10/2023):    S/p OHT 10/29/12.    Left Ventricle: The left ventricle is dilated. Ventricular mass is normal. Increased wall thickness. regional wall motion abnormalities present. See diagram for wall motion findings. There is mildly reduced systolic function with a visually estimated ejection fraction of 40 - 45%. Ejection fraction by visual approximation is 40%. Grade I diastolic dysfunction. Average E/e' ratio is 5.43.    Right Ventricle: Moderate-severe right ventricular enlargement. Systolic function is borderline low.    Aortic Valve: The aortic valve is a trileaflet valve. There is mild aortic regurgitation.    Tricuspid Valve: There is severe regurgitation with a centrally directed jet.    IVC/SVC: Elevated venous pressure at 15 mmHg.      LHC (8/10/2020):   Normal coronary arteries.   IVUS of LAD did not show features of significant Cardiac allograft vasculopathy (CAV)   Filling pressures normal. 5 successful RVBX, under fluoro guidance, perfomed. A sample was sent for immunofluorescence..    US guided Right femoral artery and Rt IJ vein access (Rt radial artery occluded, noted in US)   In order to adequately assess the patient and form an appropriate treatment plan, it is necessary to perform a comprehensive Right Heart catheterization. A thorough study of the patient's cardiac and hemodynamic functioning, in addition to an Endomyocardial biopsy, was performed. The information to be obtained from the biopsy alone was insufficient to care for this patient.   Estimated blood loss: <10 mL      Biopsy (8/2020):   1.  The myocardium is C4d negative for antibody mediated rejection.    2.  There are 4 myocardial biopsy fragments. With the material seen in    conjunction with the immunofluorescence procedure, there are 5 fragments. The    myocardium is devoid of a lymphocytic infiltrate. (ISHLT 0). The myocardium    does not have swollen endothelial cells or capillaries distended by enlarged    mononuclear cells, which would have pointed towards antibody mediated    rejection.  The myocardium is devoid of CD68 positive macrophages . The    picture here is graded as pAMR 0.    The positive and negative controls stained appropriately.     * Secondary graft-dysfunction of transplanted heart  Acute on chronic graft dysfunction: known to have impaired graft function, LVEF 40% (but as low as 35%), with baseline RWMA (e.g inferior, posterior and septal walls, as well as proximal anterior walls in prior stress echo). This admission EF down to 15%-20%. More likely to have underlying CAV than acute rejection at this time in transplant course.     Plan:  -s/p RHC + biopsy. Pending bx results.   -Unfortunately renal function precludes LHC at this point in time.  -Will not start empirical steroids, but follow result of biopsy and DSA, as I consider rejection less likely and other more plausible causes of graft dysfunction are likely present: david Nunes and CVA.  -Appreciate EP assistance in management of david Nunes. Will  continue amiodarone, No CCB or Bblocker in the setting of significantly reduced systolic function.              Typical atrial flutter  Patient noted to be in typical atrial flutter with rates in 130-140s. 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 Asymptomatic and hemodyamically stable. [id number] score of 5 (CHF hx, HTN hx, Thromembolism hx, and DM2 hx). Hx of clotting disorder Factor V leiden. Non-compliant with home AC.     - Continue amiodarone gtt for now. Due to concern rate control with BB can lead to decompensation of HF, will continue amiodarone gtt for now. Patient explained risks of possible stroke if chemically converted and is agreeable with plan.   - Continue heparin gtt for now.   - ACLS if unstable.   - As per EP, plan for LEATHA and cardioversion on Monday.     Acute kidney injury superimposed on CKD  NILES on CKD stage 3 possibly secondary to prograf toxicity vs dehydration. Patient does not appear in ADHF on exam and low likelhood for rejection with hx of HFmrEF. However cannot explain elevated BNP so will need further evaluation. Possible baseline cr 1.5-2. Creatinine around 3 on arrival to OSH     - daily BMP to trend creatinine.   - Improving.   - Strict I and Os.       Venous thromboembolism  History of DVT and factor V Leiden deficiency . Non-compliant with home lovenox. Presented with bilateral LE swelling, found to have bilateral extensive occlusive DVTs. Started on heparin gtt at osh     - Continue heparin gtt.     Status post heart transplantation  OHT on 10/29/12, with post-op severe TR, mild to moderately reduced LVEF.  Post-op developed PTLD, s/p R-CHOP/CEOP 12/2013  Immunosuppression tacrolimus 1 mg BID     Plan:  -continue tacro 1/0.5 mg    Essential hypertension  - continue home hydralazine 25mg tid.   - will restart amlodopine 10mg daily    Type 2 diabetes mellitus, without long-term current use of insulin  - last known A1c 6  - Currently  hyperglycemic with sugars in the 400s. Beta hydroxybutare normal.   - Start moderate SS insulin.   - Management per Endocrinology.       Factor V Leiden  History of DVT. Non-compliant with home lovenox.   Presented with bilateral LE swelling, found to have bilateral extensive occlusive DVTs  Started on heparin gtt at osh     - Continue heparin gtt.         Jarrell Parry MD  Heart Transplant  [facility] - Cardiac Intensive [facility]

## 2025-06-21 NOTE — ASSESSMENT & PLAN NOTE
Acute on chronic graft dysfunction: known to have impaired graft function, LVEF 40% (but as low as 35%), with baseline RWMA (e.g inferior, posterior and septal walls, as well as proximal anterior walls in prior stress echo). This admission EF down to 15%-20%. More likely to have underlying CAV than acute rejection at this time in transplant course.     Plan:  -s/p RHC + biopsy. Pending bx results.   -Unfortunately renal function precludes LHC at this point in time.  -Will not start empirical steroids, but follow result of biopsy and DSA, as I consider rejection less likely and other more plausible causes of graft dysfunction are likely present: a. Flutter and CVA.  -Appreciate EP assistance in management of a. Flutter. Will continue amiodarone, No CCB or Bblocker in the setting of significantly reduced systolic function.

## 2025-06-22 PROBLEM — N18.32 STAGE 3B CHRONIC KIDNEY DISEASE: Status: ACTIVE | Noted: 2025-06-22

## 2025-06-22 PROBLEM — I50.43 HEART FAILURE, ACUTE ON CHRONIC, SYSTOLIC AND DIASTOLIC: Status: ACTIVE | Noted: 2025-06-22

## 2025-06-22 LAB
ABSOLUTE EOSINOPHIL (OHS): 0.04 K/UL
ABSOLUTE MONOCYTE (OHS): 0.59 K/UL (ref 0.3–1)
ABSOLUTE NEUTROPHIL COUNT (OHS): 4.02 K/UL (ref 1.8–7.7)
ALBUMIN SERPL BCP-MCNC: 3.1 G/DL (ref 3.5–5.2)
ALP SERPL-CCNC: 64 UNIT/L (ref 40–150)
ALT SERPL W/O P-5'-P-CCNC: 10 UNIT/L (ref 10–44)
ANION GAP (OHS): 12 MMOL/L (ref 8–16)
APTT PPP: 58.4 SECONDS (ref 21–32)
AST SERPL-CCNC: 15 UNIT/L (ref 11–45)
BASOPHILS # BLD AUTO: 0.04 K/UL
BASOPHILS NFR BLD AUTO: 0.7 %
BILIRUB SERPL-MCNC: 0.5 MG/DL (ref 0.1–1)
BUN SERPL-MCNC: 20 MG/DL (ref 6–20)
CALCIUM SERPL-MCNC: 8.6 MG/DL (ref 8.7–10.5)
CHLORIDE SERPL-SCNC: 107 MMOL/L (ref 95–110)
CO2 SERPL-SCNC: 21 MMOL/L (ref 23–29)
CREAT SERPL-MCNC: 2.5 MG/DL (ref 0.5–1.4)
ERYTHROCYTE [DISTWIDTH] IN BLOOD BY AUTOMATED COUNT: 13.8 % (ref 11.5–14.5)
GFR SERPLBLD CREATININE-BSD FMLA CKD-EPI: 30 ML/MIN/1.73/M2
GLUCOSE SERPL-MCNC: 114 MG/DL (ref 70–110)
HCT VFR BLD AUTO: 37.5 % (ref 40–54)
HGB BLD-MCNC: 12.1 GM/DL (ref 14–18)
IMM GRANULOCYTES # BLD AUTO: 0.03 K/UL (ref 0–0.04)
IMM GRANULOCYTES NFR BLD AUTO: 0.5 % (ref 0–0.5)
LYMPHOCYTES # BLD AUTO: 1.01 K/UL (ref 1–4.8)
MAGNESIUM SERPL-MCNC: 1.8 MG/DL (ref 1.6–2.6)
MCH RBC QN AUTO: 29.5 PG (ref 27–31)
MCHC RBC AUTO-ENTMCNC: 32.3 G/DL (ref 32–36)
MCV RBC AUTO: 92 FL (ref 82–98)
NUCLEATED RBC (/100WBC) (OHS): 0 /100 WBC
OHS QRS DURATION: 126 MS
OHS QTC CALCULATION: 494 MS
PLATELET # BLD AUTO: 342 K/UL (ref 150–450)
PMV BLD AUTO: 9.8 FL (ref 9.2–12.9)
POTASSIUM SERPL-SCNC: 4 MMOL/L (ref 3.5–5.1)
PROT SERPL-MCNC: 7.2 GM/DL (ref 6–8.4)
RBC # BLD AUTO: 4.1 M/UL (ref 4.6–6.2)
RELATIVE EOSINOPHIL (OHS): 0.7 %
RELATIVE LYMPHOCYTE (OHS): 17.6 % (ref 18–48)
RELATIVE MONOCYTE (OHS): 10.3 % (ref 4–15)
RELATIVE NEUTROPHIL (OHS): 70.2 % (ref 38–73)
SODIUM SERPL-SCNC: 140 MMOL/L (ref 136–145)
TACROLIMUS BLD-MCNC: 7.4 NG/ML (ref 5–15)
WBC # BLD AUTO: 5.73 K/UL (ref 3.9–12.7)

## 2025-06-22 PROCEDURE — 63600175 PHARM REV CODE 636 W HCPCS: Performed by: STUDENT IN AN ORGANIZED HEALTH CARE EDUCATION/TRAINING PROGRAM

## 2025-06-22 PROCEDURE — 25000003 PHARM REV CODE 250: Performed by: STUDENT IN AN ORGANIZED HEALTH CARE EDUCATION/TRAINING PROGRAM

## 2025-06-22 PROCEDURE — 83735 ASSAY OF MAGNESIUM: CPT | Performed by: STUDENT IN AN ORGANIZED HEALTH CARE EDUCATION/TRAINING PROGRAM

## 2025-06-22 PROCEDURE — 25000003 PHARM REV CODE 250

## 2025-06-22 PROCEDURE — 99900035 HC TECH TIME PER 15 MIN (STAT)

## 2025-06-22 PROCEDURE — 85730 THROMBOPLASTIN TIME PARTIAL: CPT

## 2025-06-22 PROCEDURE — 99233 SBSQ HOSP IP/OBS HIGH 50: CPT | Mod: ,,, | Performed by: INTERNAL MEDICINE

## 2025-06-22 PROCEDURE — 94761 N-INVAS EAR/PLS OXIMETRY MLT: CPT

## 2025-06-22 PROCEDURE — 80197 ASSAY OF TACROLIMUS: CPT | Performed by: STUDENT IN AN ORGANIZED HEALTH CARE EDUCATION/TRAINING PROGRAM

## 2025-06-22 PROCEDURE — 85025 COMPLETE CBC W/AUTO DIFF WBC: CPT | Performed by: STUDENT IN AN ORGANIZED HEALTH CARE EDUCATION/TRAINING PROGRAM

## 2025-06-22 PROCEDURE — 63600175 PHARM REV CODE 636 W HCPCS: Performed by: INTERNAL MEDICINE

## 2025-06-22 PROCEDURE — 99291 CRITICAL CARE FIRST HOUR: CPT | Mod: GC,,, | Performed by: INTERNAL MEDICINE

## 2025-06-22 PROCEDURE — 20000000 HC ICU ROOM

## 2025-06-22 PROCEDURE — 80053 COMPREHEN METABOLIC PANEL: CPT | Performed by: STUDENT IN AN ORGANIZED HEALTH CARE EDUCATION/TRAINING PROGRAM

## 2025-06-22 RX ORDER — POLYETHYLENE GLYCOL 3350 17 G/17G
17 POWDER, FOR SOLUTION ORAL DAILY
Status: DISCONTINUED | OUTPATIENT
Start: 2025-06-22 | End: 2025-06-24 | Stop reason: HOSPADM

## 2025-06-22 RX ORDER — SENNOSIDES 8.6 MG/1
8.6 TABLET ORAL DAILY PRN
Status: DISCONTINUED | OUTPATIENT
Start: 2025-06-22 | End: 2025-06-24 | Stop reason: HOSPADM

## 2025-06-22 RX ORDER — MAGNESIUM SULFATE 1 G/100ML
1 INJECTION INTRAVENOUS ONCE
Status: COMPLETED | OUTPATIENT
Start: 2025-06-22 | End: 2025-06-22

## 2025-06-22 RX ADMIN — EMPAGLIFLOZIN 10 MG: 10 TABLET, FILM COATED ORAL at 08:06

## 2025-06-22 RX ADMIN — MAGNESIUM SULFATE HEPTAHYDRATE 1 G: 500 INJECTION, SOLUTION INTRAMUSCULAR; INTRAVENOUS at 05:06

## 2025-06-22 RX ADMIN — ACETAMINOPHEN 650 MG: 325 TABLET ORAL at 05:06

## 2025-06-22 RX ADMIN — SACUBITRIL AND VALSARTAN 1 TABLET: 24; 26 TABLET, FILM COATED ORAL at 09:06

## 2025-06-22 RX ADMIN — AMIODARONE HYDROCHLORIDE 0.5 MG/MIN: 1.8 INJECTION, SOLUTION INTRAVENOUS at 05:06

## 2025-06-22 RX ADMIN — MUPIROCIN: 20 OINTMENT TOPICAL at 09:06

## 2025-06-22 RX ADMIN — AMLODIPINE BESYLATE 10 MG: 10 TABLET ORAL at 08:06

## 2025-06-22 RX ADMIN — TACROLIMUS 0.5 MG: 0.5 CAPSULE ORAL at 05:06

## 2025-06-22 RX ADMIN — MUPIROCIN: 20 OINTMENT TOPICAL at 08:06

## 2025-06-22 RX ADMIN — APIXABAN 10 MG: 5 TABLET, FILM COATED ORAL at 11:06

## 2025-06-22 RX ADMIN — SACUBITRIL AND VALSARTAN 1 TABLET: 24; 26 TABLET, FILM COATED ORAL at 08:06

## 2025-06-22 RX ADMIN — APIXABAN 10 MG: 5 TABLET, FILM COATED ORAL at 09:06

## 2025-06-22 RX ADMIN — HEPARIN SODIUM AND DEXTROSE 15 UNITS/KG/HR: 10000; 5 INJECTION INTRAVENOUS at 09:06

## 2025-06-22 RX ADMIN — POLYETHYLENE GLYCOL 3350 17 G: 17 POWDER, FOR SOLUTION ORAL at 08:06

## 2025-06-22 RX ADMIN — SENNOSIDES 8.6 MG: 8.6 TABLET, FILM COATED ORAL at 08:06

## 2025-06-22 RX ADMIN — TACROLIMUS 1 MG: 1 CAPSULE ORAL at 08:06

## 2025-06-22 NOTE — PROCEDURES
ICU Vascular Access for Midline/AccuCath     CICU RM:  [id number]     Date/time inserted:  6/21/25 2045.Needle advanced into the vessel under real time ultrasound guidance. Catheter flushes and withdraws blood with ease.      Attempts:  1     Insertion SIte: Right brachial      Catheter type/size: BARD 18 g AccuCath     Lot#: [id number]     Max dwell date: 7/1/25      Ivy MCKEONN, RN

## 2025-06-22 NOTE — PLAN OF CARE
Cardiac ICU Care Plan    POC reviewed with Alfonso Leger. Questions and concerns addressed. No acute events overnight. Pt progressing toward goals. See below and flowsheets for full assessment and VS info.       Neuro:  Eric Coma Scale  Best Eye Response: 4-->(E4) spontaneous  Best Motor Response: 6-->(M6) obeys commands  Best Verbal Response: 5-->(V5) oriented  [place] Coma Scale Score: 15  Assessment Qualifiers: Patient not sedated/intubated  Pupil PERRLA: yes    24 hr Temp:  [97.9 °F (36.6 °C)-98.5 °F (36.9 °C)]      CV:  Rhythm: atrial rhythm   DVT prophylaxis: VTE Core Measure: Pharmacological prophylaxis initiated/maintained      Pulses  Right Radial Pulse: 1+ (weak)  Left Radial Pulse: 1+ (weak)  Right Dorsalis Pedis Pulse: 1+ (weak)  Left Dorsalis Pedis Pulse: 1+ (weak)  Right Posterior Tibial Pulse: 1+ (weak)  Left Posterior Tibial Pulse: 1+ (weak)    Resp:   Room air       GI/:  GI prophylaxis: no  Diet/Nutrition Received: regular  Last Bowel Movement: 06/18/25  Voiding Characteristics: voids spontaneously without difficulty   Intake/Output Summary (Last 24 hours) at 6/22/2025 0551  Last data filed at 6/22/2025 0551  Gross per 24 hour   Intake 992.08 ml   Output 2425 ml   Net -1432.92 ml          Labs/Accuchecks:  Recent Labs   Lab 06/20/25  0409 06/21/25  0319 06/22/25  0317   WBC 5.38 5.85 5.73   RBC 4.12* 4.20* 4.10*   HGB 12.2* 12.5* 12.1*   HCT 36.5* 37.7* 37.5*    311 342      Recent Labs   Lab 06/19/25  1214 06/19/25  1858 06/19/25 2122 06/21/25  0935 06/21/25  1536 06/21/25 2122 06/22/25  0317   INR 1.2 1.7*  --  1.4*  --   --   --    APTT 27.7 >150.0*   < > 31.0  32.5* 78.2* 59.2* 58.4*    < > = values in this interval not displayed.      Recent Labs     06/22/25  0317      K 4.0   CO2 21*      BUN 20   CREATININE 2.5*   ALKPHOS 64   ALT 10   AST 15   BILITOT 0.5       Recent Labs   Lab 06/19/25  1208   TROPONINI 0.017      Recent Labs     06/19/25  2044   PH 7.409    PCO2 36.7   PO2 29*   HCO3 23.2*   POCSATURATED 55   BE -2       Electrolytes: Electrolytes replaced  Accuchecks: ACHS    Gtts/LDAs:   amiodarone in dextrose 5%  0.5 mg/min Intravenous Continuous 16.7 mL/hr at 06/22/25 0521 0.5 mg/min at 06/22/25 0521    heparin (porcine) in D5W  0-40 Units/kg/hr Intravenous Continuous 15.2 mL/hr at 06/22/25 0505 15 Units/kg/hr at 06/22/25 0505    sodium chloride 0.9%    Continuous PRN   500 mL at 06/20/25 1422       Lines/Drains/Airways       Peripheral Intravenous Line  Duration             Peripheral IV 06/21/25 1423 18 G Anterior;Left Upper Arm <1 day    Peripheral IV Single Lumen 06/21/25 2045 18 G 2 1/4 in Anterior;Right Upper Arm <1 day                    Skin/Wounds  Bathing/Skin Care: electrode patches/site rotation (06/21/25 0700)  Wounds: No  Wound care consulted: No

## 2025-06-22 NOTE — SUBJECTIVE & OBJECTIVE
Interval History: NAEON. No acute complains. Remains in asx, hemodynamicall stable aflutter with rates in 130s.     Continuous Infusions:   amiodarone in dextrose 5%  0.5 mg/min Intravenous Continuous 16.7 mL/hr at 06/22/25 1105 0.5 mg/min at 06/22/25 1105    sodium chloride 0.9%    Continuous PRN   500 mL at 06/20/25 1422     Scheduled Meds:   amLODIPine  10 mg Oral Daily    apixaban  10 mg Oral BID    [START ON 6/29/2025] apixaban  5 mg Oral BID    empagliflozin  10 mg Oral Daily    mupirocin   Nasal BID    polyethylene glycol  17 g Oral Daily    sacubitriL-valsartan  1 tablet Oral BID    tacrolimus  0.5 mg Oral Daily PM    tacrolimus  1 mg Oral Daily AM     PRN Meds:  Current Facility-Administered Medications:     acetaminophen, 650 mg, Oral, Q6H PRN    senna, 8.6 mg, Oral, Daily PRN    sodium chloride 0.9%, , , Continuous PRN    sodium chloride 0.9%, 10 mL, Intravenous, PRN    Review of patient's allergies indicates:  No Known Allergies  Objective:     Vital Signs (Most Recent):  Temp: 97.8 °F (36.6 °C) (06/22/25 1105)  Pulse: (!) 130 (06/22/25 1105)  Resp: (!) 21 (06/22/25 1105)  BP: (!) 144/91 (06/22/25 1105)  SpO2: 97 % (06/22/25 1105) Vital Signs (24h Range):  Temp:  [97.8 °F (36.6 °C)-98.5 °F (36.9 °C)] 97.8 °F (36.6 °C)  Pulse:  [128-135] 130  Resp:  [10-45] 21  SpO2:  [96 %-100 %] 97 %  BP: (106-160)/() 144/91     Patient Vitals for the past 72 hrs (Last 3 readings):   Weight   06/22/25 0325 102.5 kg (225 lb 14.4 oz)   06/20/25 1338 101 kg (222 lb 10.6 oz)   06/20/25 0501 101 kg (222 lb 9.6 oz)     Body mass index is 30.64 kg/m².      Intake/Output Summary (Last 24 hours) at 6/22/2025 1154  Last data filed at 6/22/2025 1135  Gross per 24 hour   Intake 1485.05 ml   Output 3250 ml   Net -1764.95 ml             Physical Exam   Constitutional:       Comments: Alert, Oriented, No acute distress   HENT:      Head: Normocephalic and atraumatic.   Eyes:      Conjunctiva/sclera: Conjunctivae normal.   Neck:  "     Vascular: No hepatojugular reflux or JVD.   Cardiovascular:      Rate and Rhythm: Regular rhythm. Tachycardia present.   Pulmonary:      Comments: Normal effort, Clear to auscultation   Abdominal:      Comments: Soft, Non-tender, Non-distended   Musculoskeletal:      Cervical back: Normal range of motion.      Comments: B/l lower extremity swelling. Tenderness to palpation   Skin:     Comments: Dry, Intact, Warm   Neurological:      Mental Status: He is alert and oriented to person, place, and time.      Comments: Normal speech   Psychiatric:         Mood and Affect: Mood and affect normal.     Significant Labs:  CBC:  Recent Labs   Lab 06/20/25  0409 06/21/25 0319 06/22/25 0317   WBC 5.38 5.85 5.73   RBC 4.12* 4.20* 4.10*   HGB 12.2* 12.5* 12.1*   HCT 36.5* 37.7* 37.5*    311 342   MCV 89 90 92   MCH 29.6 29.8 29.5   MCHC 33.4 33.2 32.3     BNP:  Recent Labs   Lab 06/19/25  1208 06/19/25  1858   * 709*     CMP:  Recent Labs   Lab 06/20/25  0409 06/21/25 0319 06/22/25 0317   * 129* 114*   CALCIUM 8.1* 8.6* 8.6*   ALBUMIN 3.1* 3.2* 3.1*   PROT 6.8 7.4 7.2    141 140   K 3.5 4.0 4.0   CO2 21* 21* 21*    107 107   BUN 32* 27* 20   CREATININE 3.3* 2.8* 2.5*   ALKPHOS 59 70 64   ALT 7* 7* 10   AST 11 11 15   BILITOT 0.4 0.5 0.5      Coagulation:   Recent Labs   Lab 06/19/25  1214 06/19/25  1858 06/19/25 2122 06/21/25  0935 06/21/25  1536 06/21/25 2122 06/22/25 0317   INR 1.2 1.7*  --  1.4*  --   --   --    APTT 27.7 >150.0*   < > 31.0  32.5* 78.2* 59.2* 58.4*    < > = values in this interval not displayed.     LDH:  No results for input(s): "LDH" in the last 72 hours.  Microbiology:  Microbiology Results (last 7 days)       ** No results found for the last 168 hours. **            I have reviewed all pertinent labs within the past 24 hours.    Estimated Creatinine Clearance: 41.9 mL/min (A) (based on SCr of 2.5 mg/dL (H)).    Diagnostic Results:  I have reviewed all pertinent " imaging results/findings within the past 24 hours.   Finasteride Male Counseling: Finasteride Counseling:  I discussed with the patient the risks of use of finasteride including but not limited to decreased libido, decreased ejaculate volume, gynecomastia, and depression. Women should not handle medication.  All of the patient's questions and concerns were addressed. Finasteride Counseling:  I discussed with the patient the risks of use of finasteride including but not limited to decreased libido, decreased ejaculate volume, gynecomastia, and depression. Women should not handle medication.  All of the patient's questions and concerns were addressed.

## 2025-06-22 NOTE — PROGRESS NOTES
Alerted by primary RN at the start of nightshift of some unresolved Heparin sign-offs from day shift. Upon looking in the MAR and flowsheets, it appears that no hourly infusion rate verify or gtt volumes was charted for the Amio or Heparin gtts during dayshift other than the initial new bag of Amio hung at 0916 and the initiation of a new Heparin gtt/bolus at 0937. The primary nurse and myself re-checked and re-verified all of the previous Heparin changes made during the day. The appropriate signs off were made and volumes claimed. Total volume totaling over 217cc for the amio gtt and 211cc for the Heparin gtt during 6/21AM shift. See flowsheets

## 2025-06-22 NOTE — ASSESSMENT & PLAN NOTE
Patient noted to be in typical atrial flutter with rates in 130-140s. 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 Asymptomatic and hemodyamically stable. [id number] score of 5 (CHF hx, HTN hx, Thromembolism hx, and DM2 hx). Hx of clotting disorder Factor V leiden. Non-compliant with home AC.     - Continue amiodarone gtt for now. Due to concern rate control with BB can lead to decompensation of HF, will continue amiodarone gtt for now. Patient explained risks of possible stroke if chemically converted and is agreeable with plan.   - Will stop heparin. Start eliquis.   - ACLS if unstable.   - As per EP, plan for LEATHA and cardioversion on Monday.

## 2025-06-22 NOTE — PLAN OF CARE
Cardiac ICU Care Plan    POC reviewed with Alfonso Leger. Questions and concerns addressed. No acute events today. Amio continues to infuse for management of aflutter.  Heparin gtt transitioned to PO Eliquis for anticoagulation.  Plan for LEATHA/DCCV tomorrow.  Pt progressing toward goals.  See below and flowsheets for full assessment and VS info.     Neuro:  Eric Coma Scale  Best Eye Response: 4-->(E4) spontaneous  Best Motor Response: 6-->(M6) obeys commands  Best Verbal Response: 5-->(V5) oriented  Eric Coma Scale Score: 15  Assessment Qualifiers: Patient not sedated/intubated, No eye obstruction present  Pupil PERRLA: yes    24 hr Temp:  [97.9 °F (36.6 °C)-98.5 °F (36.9 °C)]      CV:  Rhythm: atrial rhythm--atrial flutter  DVT prophylaxis: VTE Core Measure: Pharmacological prophylaxis initiated/maintained (Heparin gtt discontinued and Eliquis initiated)    Pulses  Right Radial Pulse: 2+ (normal)  Left Radial Pulse: 2+ (normal)  Right Dorsalis Pedis Pulse: 2+ (normal)  Left Dorsalis Pedis Pulse: 2+ (normal)  Right Posterior Tibial Pulse: 1+ (weak)  Left Posterior Tibial Pulse: Doppler    Resp:  Room air. IS performed with encouragement throughout the shift.     GI/:  GI prophylaxis: None ordered  Diet/Nutrition Received: regular diet  Last Bowel Movement: 06/18/25--bowel management regimen initiated this morning--pt had a BM this morning  Voiding Characteristics: voids spontaneously without difficulty via urinal    Intake/Output Summary (Last 24 hours) at 6/22/2025 1311  Last data filed at 6/22/2025 1305  Gross per 24 hour   Intake 1758.33 ml   Output 3250 ml   Net -1491.67 ml     Labs/Accuchecks:  Recent Labs   Lab 06/20/25  0409 06/21/25  0319 06/22/25  0317   WBC 5.38 5.85 5.73   RBC 4.12* 4.20* 4.10*   HGB 12.2* 12.5* 12.1*   HCT 36.5* 37.7* 37.5*    311 342      Recent Labs   Lab 06/19/25  1214 06/19/25  1858 06/19/25  2122 06/21/25  0935 06/21/25  1536 06/21/25  2122 06/22/25  0317   INR  1.2 1.7*  --  1.4*  --   --   --    APTT 27.7 >150.0*   < > 31.0  32.5* 78.2* 59.2* 58.4*    < > = values in this interval not displayed.      Recent Labs     06/22/25  0317      K 4.0   CO2 21*      BUN 20   CREATININE 2.5*   ALKPHOS 64   ALT 10   AST 15   BILITOT 0.5       Recent Labs   Lab 06/19/25  1208   TROPONINI 0.017      Recent Labs     06/19/25  2044   PH 7.409   PCO2 36.7   PO2 29*   HCO3 23.2*   POCSATURATED 55   BE -2       Electrolytes: Electrolytes replaced  Accuchecks: Discontinued.  Endocrinology signed off.     Gtts/LDAs:   amiodarone in dextrose 5%  0.5 mg/min Intravenous Continuous 16.7 mL/hr at 06/22/25 1305 0.5 mg/min at 06/22/25 1305       Lines/Drains/Airways       Peripheral Intravenous Line  Duration             Peripheral IV 06/21/25 1423 18 G Anterior;Left Upper Arm <1 day    Peripheral IV Single Lumen 06/21/25 2045 18 G 2 1/4 in Anterior;Right Upper Arm <1 day                  Skin/Wounds     06/22/25 1000 06/22/25 1005 06/22/25 1015   Hygiene Care   Bathing/Skin Care  --  linen changed  --    Oral Care  --   --  teeth brushed;tongue brushed;mouth wash rinse   Perineal Care perineum cleansed  --   --    Hygiene Assistance independent per care provider x 1 other (see comments)   CHG Bath  --   --   --    Linen Change  --  (S)  linen change completed  --       06/22/25 1020   Hygiene Care   Bathing/Skin Care bath, complete;back care;dressed/undressed;electrode patches/site rotation;foot care   Oral Care  --    Perineal Care  --    Hygiene Assistance per care provider x 1;per patient with 1 assist   CHG Bath (S)  Bath completed   Linen Change  --      Generalized skin remains CDI.  Pressure reduction techniques in use.

## 2025-06-22 NOTE — SUBJECTIVE & OBJECTIVE
Objective:     Vital Signs (Most Recent):  Temp: 98.1 °F (36.7 °C) (06/22/25 0705)  Pulse: (!) 128 (06/22/25 0711)  Resp: (!) 21 (06/22/25 0705)  BP: (!) 138/97 (06/22/25 0705)  SpO2: 100 % (06/22/25 0705) Vital Signs (24h Range):  Temp:  [97.9 °F (36.6 °C)-98.5 °F (36.9 °C)] 98.1 °F (36.7 °C)  Pulse:  [128-135] 128  Resp:  [10-45] 21  SpO2:  [96 %-100 %] 100 %  BP: (106-155)/() 138/97     Weight: 102.5 kg (225 lb 14.4 oz) (06/22/25 0325)  Body mass index is 30.64 kg/m².  Body surface area is 2.28 meters squared.    I/O last 3 completed shifts:  In: 1286.4 [P.O.:240; I.V.:1046.4]  Out: 2775 [Urine:2775]     Physical Exam  Constitutional:       General: He is not in acute distress.  Pulmonary:      Effort: No respiratory distress.   Abdominal:      Palpations: Abdomen is soft.   Musculoskeletal:         General: Swelling present.   Neurological:      Mental Status: He is alert. Mental status is at baseline.   Psychiatric:         Mood and Affect: Mood normal.          Significant Labs:  CBC:   Recent Labs   Lab 06/22/25 0317   WBC 5.73   RBC 4.10*   HGB 12.1*   HCT 37.5*      MCV 92   MCH 29.5   MCHC 32.3     CMP:   Recent Labs   Lab 06/22/25 0317   *   CALCIUM 8.6*   ALBUMIN 3.1*   PROT 7.2      K 4.0   CO2 21*      BUN 20   CREATININE 2.5*   ALKPHOS 64   ALT 10   AST 15   BILITOT 0.5     All labs within the past 24 hours have been reviewed.

## 2025-06-22 NOTE — ASSESSMENT & PLAN NOTE
History of DVT and factor V Leiden deficiency . Non-compliant with home lovenox. Presented with bilateral LE swelling, found to have bilateral extensive occlusive DVTs. Started on heparin gtt at [facility]     - Stop heparin gtt. Start eliquis 10mg bid for 7 days followed by eliquis 5mg bid daily.

## 2025-06-22 NOTE — EICU
Intervention Initiated From:  [facility] / EICU    Suzanna intervened regarding:  Rounding (Video assessment)    VICU Night Rounds Checklist  24H Vital Sign Range:  Temp:  [97.9 °F (36.6 °C)-98.5 °F (36.9 °C)]   Pulse:  [130-135]   Resp:  [10-45]   BP: (106-155)/()   SpO2:  [96 %-100 %]     Video rounds and LDA reconciliation    Nursing orders placed : IP [id number] Peripheral IV Access

## 2025-06-22 NOTE — EICU
Virtual ICU Quality Rounds    Admit Date: 6/19/2025  Hospital Day: 3    ICU Day: 2d 13h    24H Vital Sign Range:  Temp:  [97.9 °F (36.6 °C)-98.5 °F (36.9 °C)]   Pulse:  [128-135]   Resp:  [10-45]   BP: (106-155)/()   SpO2:  [96 %-100 %]     VICU Surveillance Screening                    LDA reconciliation : Yes

## 2025-06-22 NOTE — NURSING
On arrival to shift, some discrepancies were noted by primary RN when trying to infusion rate verify heparin gtt. Escalated to charge RN and HTS fellow. Instructed to draw stat PTT and results came back within therapeutic range per nomogram, plan to draw again in 6 hrs. Care ongoing.       Latest Reference Range & Units 06/21/25 21:22   PTT 21.0 - 32.0 seconds 59.2 (H)   (H): Data is abnormally high

## 2025-06-22 NOTE — CARE UPDATE
-Glucose Goal 140-180    -A1C:   Hemoglobin A1C   Date Value Ref Range Status   11/19/2024 5.7 (H) 4.0 - 5.6 % Final     Comment:     ADA Screening Guidelines:  5.7-6.4%  Consistent with prediabetes  >or=6.5%  Consistent with diabetes    High levels of fetal hemoglobin interfere with the HbA1C  assay. Heterozygous hemoglobin variants (HbS, HgC, etc)do  not significantly interfere with this assay.   However, presence of multiple variants may affect accuracy.       Hemoglobin A1c   Date Value Ref Range Status   06/19/2025 6.0 (H) 4.0 - 5.6 % Final     Comment:     ADA Screening Guidelines:  5.7-6.4%  Consistent with prediabetes  >=6.5%  Consistent with diabetes    High levels of fetal hemoglobin interfere with the HbA1C  assay. Heterozygous hemoglobin variants (HbS, HgC, etc)do  not significantly interfere with this assay.   However, presence of multiple variants may affect accuracy.         -HOME REGIMEN: does not take DM meds    -GLUCOSE TREND FOR THE PAST 24HRS:   Recent Labs   Lab 06/20/25  1310 06/20/25  1710 06/21/25  0758 06/21/25  1153 06/21/25  1619 06/21/25  2052   POCTGLUCOSE 115* 109 126* 125* 123* 114*         -NO HYPOGYCEMIAS NOTED     - Diet  Diet Adult Regular    -TOLERATING 100 % OF PO DIET         Plan:   No SSI requirements since admission. (Initial BG excursion likely due to drawing labs from IV rather than fingerstick). DM diet controlled at home and A1C within Pre-DM range. Given this, endocrine will sign off at this time. Please do not hesitate to reach out for BG related questions/concerns.

## 2025-06-22 NOTE — PROGRESS NOTES
[facility] - [facility]  Heart Transplant  Progress Note    Patient Name: Alfonso Leger  MRN: [id number]  Admission Date: 6/19/2025  Hospital Length of Stay: 3 days  Attending Physician: Niles Castro, *  Primary Care Provider: Moriah, Primary Doctor  Principal Problem:Secondary graft-dysfunction of transplanted heart    Subjective:   Interval History: NAEON. No acute complains. Remains in asx, hemodynamicall stable aflutter with rates in 130s.     Continuous Infusions:   amiodarone in dextrose 5%  0.5 mg/min Intravenous Continuous 16.7 mL/hr at 06/22/25 1105 0.5 mg/min at 06/22/25 1105    sodium chloride 0.9%    Continuous PRN   500 mL at 06/20/25 1422     Scheduled Meds:   amLODIPine  10 mg Oral Daily    apixaban  10 mg Oral BID    [START ON 6/29/2025] apixaban  5 mg Oral BID    empagliflozin  10 mg Oral Daily    mupirocin   Nasal BID    polyethylene glycol  17 g Oral Daily    sacubitriL-valsartan  1 tablet Oral BID    tacrolimus  0.5 mg Oral Daily PM    tacrolimus  1 mg Oral Daily AM     PRN Meds:  Current Facility-Administered Medications:     acetaminophen, 650 mg, Oral, Q6H PRN    senna, 8.6 mg, Oral, Daily PRN    sodium chloride 0.9%, , , Continuous PRN    sodium chloride 0.9%, 10 mL, Intravenous, PRN    Review of patient's allergies indicates:  No Known Allergies  Objective:     Vital Signs (Most Recent):  Temp: 97.8 °F (36.6 °C) (06/22/25 1105)  Pulse: (!) 130 (06/22/25 1105)  Resp: (!) 21 (06/22/25 1105)  BP: (!) 144/91 (06/22/25 1105)  SpO2: 97 % (06/22/25 1105) Vital Signs (24h Range):  Temp:  [97.8 °F (36.6 °C)-98.5 °F (36.9 °C)] 97.8 °F (36.6 °C)  Pulse:  [128-135] 130  Resp:  [10-45] 21  SpO2:  [96 %-100 %] 97 %  BP: (106-160)/() 144/91     Patient Vitals for the past 72 hrs (Last 3 readings):   Weight   06/22/25 0325 102.5 kg (225 lb 14.4 oz)   06/20/25 1338 101 kg (222 lb 10.6 oz)   06/20/25 0501 101 kg (222 lb 9.6 oz)     Body mass index is 30.64 kg/m².      Intake/Output  Summary (Last 24 hours) at 6/22/2025 1154  Last data filed at 6/22/2025 1135  Gross per 24 hour   Intake 1485.05 ml   Output 3250 ml   Net -1764.95 ml             Physical Exam   Constitutional:       Comments: Alert, Oriented, No acute distress   HENT:      Head: Normocephalic and atraumatic.   Eyes:      Conjunctiva/sclera: Conjunctivae normal.   Neck:      Vascular: No hepatojugular reflux or JVD.   Cardiovascular:      Rate and Rhythm: Regular rhythm. Tachycardia present.   Pulmonary:      Comments: Normal effort, Clear to auscultation   Abdominal:      Comments: Soft, Non-tender, Non-distended   Musculoskeletal:      Cervical back: Normal range of motion.      Comments: B/l lower extremity swelling. Tenderness to palpation   Skin:     Comments: Dry, Intact, Warm   Neurological:      Mental Status: He is alert and oriented to person, place, and time.      Comments: Normal speech   Psychiatric:         Mood and Affect: Mood and affect normal.     Significant Labs:  CBC:  Recent Labs   Lab 06/20/25  0409 06/21/25 0319 06/22/25 0317   WBC 5.38 5.85 5.73   RBC 4.12* 4.20* 4.10*   HGB 12.2* 12.5* 12.1*   HCT 36.5* 37.7* 37.5*    311 342   MCV 89 90 92   MCH 29.6 29.8 29.5   MCHC 33.4 33.2 32.3     BNP:  Recent Labs   Lab 06/19/25  1208 06/19/25 1858   * 709*     CMP:  Recent Labs   Lab 06/20/25  0409 06/21/25  0319 06/22/25 0317   * 129* 114*   CALCIUM 8.1* 8.6* 8.6*   ALBUMIN 3.1* 3.2* 3.1*   PROT 6.8 7.4 7.2    141 140   K 3.5 4.0 4.0   CO2 21* 21* 21*    107 107   BUN 32* 27* 20   CREATININE 3.3* 2.8* 2.5*   ALKPHOS 59 70 64   ALT 7* 7* 10   AST 11 11 15   BILITOT 0.4 0.5 0.5      Coagulation:   Recent Labs   Lab 06/19/25  1214 06/19/25  1858 06/19/25 2122 06/21/25  0935 06/21/25  1536 06/21/25 2122 06/22/25  0317   INR 1.2 1.7*  --  1.4*  --   --   --    APTT 27.7 >150.0*   < > 31.0  32.5* 78.2* 59.2* 58.4*    < > = values in this interval not displayed.     LDH:  No  "results for input(s): "LDH" in the last 72 hours.  Microbiology:  Microbiology Results (last 7 days)       ** No results found for the last 168 hours. **            I have reviewed all pertinent labs within the past 24 hours.    Estimated Creatinine Clearance: 41.9 mL/min (A) (based on SCr of 2.5 mg/dL (H)).    Diagnostic Results:  I have reviewed all pertinent imaging results/findings within the past 24 hours.  Assessment and Plan:     Mr. Alfonso Leger is a 56 y/o AAM w/ PMHx of OHTx 10/29/12 (for EtOH CM) with post-op severe TR and chronic LV dysfunction (EF has been reduced to 40-50% since at least 2015, as low as 35%), hx of WPW, Factor V Leiden with h/o DVT (last 8/2013) and previous hx of IVC filter placement in 2011 previously on Lovenox sub q 100mg bid (non-compliant, stopped taking 5 years ago), HTN (not compliant with clonidine or amlodipine, only on hydralazine), HLD (not compliant with pravastatin), NIDDM2, CKD stage 3 (last known baseline CR 1.5-2 per nephrology note in 2021), and Non-Hodgkin lymphoma s/p 6 cycles of R-CHOP/CEOP in 2013 now in remission. Presented to outside hospital ED today after noting 1 1/2 week hx of bilateral lower extremity leg swelling and primarily left calf pain. Denied any chest pain, SOB, palpitations, nausea, vomiting, orthopnea, PND, abdominal distention, abdominal pain, fever, chills, diarrhea. States the only medications he is compliant with is his prograf 1mg bid and hydralazine 25mg tid. Stopped taking all other medications on his own. Last follow up with a doctor was last year (11/2024) at [facility].    In the ED,  labs notable hgb 13.5, potassium 3.3, bicarb 19, bun 30, cr 3.5 (baseline appears to be 1.5-2), magnesium 1.6. BNP 300s. VQ scan was negative for PE. Lower extremity U/S showed noncompressible filling defects within the right and left common femoral vein, superficial femoral vein and popliteal veins, and thrombosed calf veins noted bilaterally. Vitals on " arrival notable for bp 175/106. EKG notable for typical atrial flutter with rates in the 140s. Patient received Lopressor IV and PO, and was started on amiodarone gtt and heparin gtt prior [place] [facility] [place]     Of note, patient was evaluated by EP (Dr. Reddy) back on 8/28/2013 when WPW was first incidentally seen on his EKG. Patient was notably asymptomatic and ablation was not being purused at the time due to him being neutropenic. Patient was discharged with 30 day event monitored (only showed 2 episodes of sinus tachycardia) and told to follow up as outpatient. No evidence in the system that patient ever followed up with EP. No previous history of Atrial flutter or atrial fibrillation seen.       CMV status:    Donor: -   Recipient: -          Prescribed Home Medications:   Clonidine 0.3 mg BID    Tacrolimus 1 mg BID    Hydralazine 25 TID    Amlodipine 10 mg qd  Pravastatin 40mg nightly       Cardiac Imaging:   Stress Echo (11/19/2024):    Left Ventricle: The left ventricle is normal in size. Normal wall thickness. There is concentric remodeling. Regional wall motion abnormalities present. Septal motion is consistent with post-operative status. There is mildly reduced systolic function with a visually estimated ejection fraction of 40 - 50%. Ejection fraction is approximately 40%. There is normal diastolic function.    Right Ventricle: Moderate right ventricular enlargement. Wall thickness is normal. Systolic function is mildly reduced.    Left Atrium: Atrial septum is bulging to the left.    Right Atrium: Right atrium is severely dilated.    Tricuspid Valve: There is severe regurgitation. No pulmonary hypertension.    Pulmonary Artery: The estimated pulmonary artery systolic pressure is 21 mmHg.    IVC/SVC: Normal venous pressure at 3 mmHg.    Stress Protocol: The patient was infused intravenously with dobutamine. The patient received a graduated infusion of the stress agent beginning at  10.0 mcg/kg/min to a peak dose of 10.0 mcg/kg/min. The peak heart rate was 173 bpm, which is 104% of age predicted maximum heart rate. Low-level exercise was used. The patient reported no symptoms during the stress test. The test was stopped because the end of the protocol was reached.    Baseline ECG: The Baseline ECG reveals sinus tachycardia with RBBB. The axis is normal. The ST segments are normal.    Stress ECG: There is no ST segment deviation identified during the protocol. There are no arrhythmias during stress.    ECG Conclusion: The ECG portion of the study is negative for ischemia.    Post-stress Conclusion: The study is negative with no echocardiographic evidence of stress induced ischemia.      TTE (11/10/2023):    S/p OHT 10/29/12.    Left Ventricle: The left ventricle is dilated. Ventricular mass is normal. Increased wall thickness. regional wall motion abnormalities present. See diagram for wall motion findings. There is mildly reduced systolic function with a visually estimated ejection fraction of 40 - 45%. Ejection fraction by visual approximation is 40%. Grade I diastolic dysfunction. Average E/e' ratio is 5.43.    Right Ventricle: Moderate-severe right ventricular enlargement. Systolic function is borderline low.    Aortic Valve: The aortic valve is a trileaflet valve. There is mild aortic regurgitation.    Tricuspid Valve: There is severe regurgitation with a centrally directed jet.    IVC/SVC: Elevated venous pressure at 15 mmHg.      LHC (8/10/2020):   Normal coronary arteries.   IVUS of LAD did not show features of significant Cardiac allograft vasculopathy (CAV)   Filling pressures normal. 5 successful RVBX, under fluoro guidance, perfomed. A sample was sent for immunofluorescence..   US guided Right femoral artery and Rt IJ vein access (Rt radial artery occluded, noted in US)   In order to adequately assess the patient and form an appropriate treatment plan, it is necessary to perform a  comprehensive Right Heart catheterization. A thorough study of the patient's cardiac and hemodynamic functioning, in addition to an Endomyocardial biopsy, was performed. The information to be obtained from the biopsy alone was insufficient to care for this patient.   Estimated blood loss: <10 mL      Biopsy (8/2020):   1.  The myocardium is C4d negative for antibody mediated rejection.    2.  There are 4 myocardial biopsy fragments. With the material seen in    conjunction with the immunofluorescence procedure, there are 5 fragments. The    myocardium is devoid of a lymphocytic infiltrate. (ISHLT 0). The myocardium    does not have swollen endothelial cells or capillaries distended by enlarged    mononuclear cells, which would have pointed towards antibody mediated    rejection.  The myocardium is devoid of CD68 positive macrophages . The    picture here is graded as pAMR 0.    The positive and negative controls stained appropriately.     * Secondary graft-dysfunction of transplanted heart  Acute on chronic graft dysfunction: known to have impaired graft function, LVEF 40% (but as low as 35%), with baseline RWMA (e.g inferior, posterior and septal walls, as well as proximal anterior walls in prior stress echo). This admission EF down to 15%-20%. More likely to have underlying CAV than acute rejection at this time in transplant course.     Plan:  -s/p RHC + biopsy. Pending bx results.   -Unfortunately renal function precludes LHC at this point in time.  -Will not start empirical steroids, but follow result of biopsy and DSA, as I consider rejection less likely and other more plausible causes of graft dysfunction are likely present: a. Flutter and CVA.  -Appreciate EP assistance in management of a. Flutter. Will continue amiodarone, No CCB or Bblocker in the setting of significantly reduced systolic function.              Typical atrial flutter  Patient noted to be in typical atrial flutter with rates in 130-140s. No  evidence of pre-excitation seen currently but evidence of WPW on previous ekgs. Asymptomatic and hemodyamically stable. [id number] score of 5 (CHF hx, HTN hx, Thromembolism hx, and DM2 hx). Hx of clotting disorder Factor V leiden. Non-compliant with home AC.     - Continue amiodarone gtt for now. Due to concern rate control with BB can lead to decompensation of HF, will continue amiodarone gtt for now. Patient explained risks of possible stroke if chemically converted and is agreeable with plan.   - Will stop heparin. Start eliquis.   - ACLS if unstable.   - As per EP, plan for LEATHA and cardioversion on Monday.     Acute kidney injury superimposed on CKD  NILES on CKD stage 3 possibly secondary to prograf toxicity vs dehydration. Patient does not appear in ADHF on exam and low likelhood for rejection with hx of HFmrEF. However cannot explain elevated BNP so will need further evaluation. Possible baseline cr 1.5-2. Creatinine around 3 on arrival to OSH     - daily BMP to trend creatinine.   - Improving.   - Strict I and Os.       Venous thromboembolism  History of DVT and factor V Leiden deficiency . Non-compliant with home lovenox. Presented with bilateral LE swelling, found to have bilateral extensive occlusive DVTs. Started on heparin gtt at osh     - Stop heparin gtt. Start eliquis 10mg bid for 7 days followed by eliquis 5mg bid daily.     Status post heart transplantation  OHT on 10/29/12, with post-op severe TR, mild to moderately reduced LVEF.  Post-op developed PTLD, s/p R-CHOP/CEOP 12/2013  Immunosuppression tacrolimus 1 mg BID     Plan:  -continue tacro 1/0.5 mg    Essential hypertension  - continue home hydralazine 25mg tid.   - will restart amlodopine 10mg daily    Type 2 diabetes mellitus, without long-term current use of insulin  - last known A1c 6  - Currently hyperglycemic with sugars in the 400s. Beta hydroxybutare normal.   - Start moderate SS insulin.   - Management per Endocrinology.       Factor V  Chad  History of DVT. Non-compliant with home lovenox.   Presented with bilateral LE swelling, found to have bilateral extensive occlusive DVTs  Started on heparin gtt at osh     - Continue heparin gtt.         Jarrell Parry MD  Heart Transplant  [facility] - [facility]

## 2025-06-22 NOTE — PROGRESS NOTES
[facility] - [facility]  Nephrology  Progress Note    Patient Name: Alfonso Leger  MRN: [id number]  Admission Date: 6/19/2025  Hospital Length of Stay: 3 days  Attending Provider: Niles Castro, *   Primary Care Physician: No, Primary Doctor  Principal Problem:Secondary graft-dysfunction of transplanted heart    Subjective:     HPI: Alfonso is a pleasant 54 yo man w pmhx significant for heart transplant 10/29/2012 (EtOH CM) with post op LV dysfunction (EF ranging between 35-45% over the years, now 5-10%) on long term immunosuppression prograf, factor V Leiden with hx of DVT (last in 2013), and previous IVC filter, HTN, HLD, T2DM, CKD3 diagnosed in 2021, non-hodkin lymphoma in remission who is here with heart failure exacerbation, NILES on CKD, and atrial flutter with RVR. He has LEATHA DCCV planned for Monday. Regarding his CKD, it is difficult to determine his current BL Cr, he previously followed w Nephrology in 2021, at the time his Cr was 1.5 to 2, he had some labs in Nov 2024 where his Cr was 2.0, but there is no more recent Cr documented. The etiology of his CKD is uncertain, but possibly d/t long term immunosuppression vs CRS vs longstanding HTN vs previous DMT2. No famhx of CKD or known use of NSAIDs. There is 2+ protein in urine, which he seems to have had since 2021. He received R heart cath w biopsy 6/20. BP is stable, off pressors. Cr 3.3 at time of consult. Nephrology has been consulted for eval for NILES on CKD.       Objective:     Vital Signs (Most Recent):  Temp: 98.1 °F (36.7 °C) (06/22/25 0705)  Pulse: (!) 128 (06/22/25 0711)  Resp: (!) 21 (06/22/25 0705)  BP: (!) 138/97 (06/22/25 0705)  SpO2: 100 % (06/22/25 0705) Vital Signs (24h Range):  Temp:  [97.9 °F (36.6 °C)-98.5 °F (36.9 °C)] 98.1 °F (36.7 °C)  Pulse:  [128-135] 128  Resp:  [10-45] 21  SpO2:  [96 %-100 %] 100 %  BP: (106-155)/() 138/97     Weight: 102.5 kg (225 lb 14.4 oz) (06/22/25 0325)  Body mass index is 30.64  kg/m².  Body surface area is 2.28 meters squared.    I/O last 3 completed shifts:  In: 1286.4 [P.O.:240; I.V.:1046.4]  Out: 2775 [Urine:2775]     Physical Exam  Constitutional:       General: He is not in acute distress.  Pulmonary:      Effort: No respiratory distress.   Abdominal:      Palpations: Abdomen is soft.   Musculoskeletal:         General: Swelling present.   Neurological:      Mental Status: He is alert. Mental status is at baseline.   Psychiatric:         Mood and Affect: Mood normal.          Significant Labs:  CBC:   Recent Labs   Lab 06/22/25 0317   WBC 5.73   RBC 4.10*   HGB 12.1*   HCT 37.5*      MCV 92   MCH 29.5   MCHC 32.3     CMP:   Recent Labs   Lab 06/22/25 0317   *   CALCIUM 8.6*   ALBUMIN 3.1*   PROT 7.2      K 4.0   CO2 21*      BUN 20   CREATININE 2.5*   ALKPHOS 64   ALT 10   AST 15   BILITOT 0.5     All labs within the past 24 hours have been reviewed.  Assessment/Plan:   NILES on CKD3a/b 2/2 below  Atrial flutter  Transplant rejection? On long term immunosuppression  Heart transplant 10/29/2012  Factor V leiden w hx DVT and IVC filter  Non Hodgkins lymphoma in remission  Anemia in CKD  Proteinuria  DM2  Hx HTN     -urine micro 6/21 showed large number of uric acid crystals (possibly not clinically relevant)  -most likely this is NILES with progression of his chronic kidney disease 2/2 CRS vs long term immunosupression w long term HTN and DM2. Possibly exacerbated by recent cardiac events. His BL Cr is difficult to gauge, back in 2024 it may have been around 2.  -Cr continues to trend down 2.5 today. Good UOP overnight without diuretics. IO  1086/2425  -No hydronephrosis on imaging.   -UA reviewed, 2+ protein which he has had before in 2021. UPCR 0.35, subnephrotic range.   -hg at goal 10-11    Thank you for your consult. I will follow-up with patient. Please contact us if you have any additional questions.    Cas Anderson MD  Nephrology  [facility] - Cardiac  Intensive Care    ATTENDING PHYSICIAN ATTESTATION  I have personally verified the history and examined the patient. I thoroughly reviewed the demographic, clinical, laboratorial and imaging information available in medical records. I agree with the assessment and recommendations provided by the subspecialty resident who was under my supervision.

## 2025-06-23 ENCOUNTER — ANESTHESIA (OUTPATIENT)
Dept: MEDSURG UNIT | Facility: HOSPITAL | Age: 55
End: 2025-06-23
Payer: MEDICARE

## 2025-06-23 LAB
ABSOLUTE EOSINOPHIL (OHS): 0.05 K/UL
ABSOLUTE MONOCYTE (OHS): 0.71 K/UL (ref 0.3–1)
ABSOLUTE NEUTROPHIL COUNT (OHS): 3.41 K/UL (ref 1.8–7.7)
ALBUMIN SERPL BCP-MCNC: 2.9 G/DL (ref 3.5–5.2)
ALP SERPL-CCNC: 60 UNIT/L (ref 40–150)
ALT SERPL W/O P-5'-P-CCNC: <5 UNIT/L (ref 10–44)
ANION GAP (OHS): 11 MMOL/L (ref 8–16)
AST SERPL-CCNC: 9 UNIT/L (ref 11–45)
BASOPHILS # BLD AUTO: 0.05 K/UL
BASOPHILS NFR BLD AUTO: 0.9 %
BILIRUB SERPL-MCNC: 0.3 MG/DL (ref 0.1–1)
BSA FOR ECHO PROCEDURE: 2.23 M2
BUN SERPL-MCNC: 20 MG/DL (ref 6–20)
CALCIUM SERPL-MCNC: 8.5 MG/DL (ref 8.7–10.5)
CHLORIDE SERPL-SCNC: 107 MMOL/L (ref 95–110)
CO2 SERPL-SCNC: 21 MMOL/L (ref 23–29)
CREAT SERPL-MCNC: 2.4 MG/DL (ref 0.5–1.4)
ERYTHROCYTE [DISTWIDTH] IN BLOOD BY AUTOMATED COUNT: 14.1 % (ref 11.5–14.5)
ESTROGEN SERPL-MCNC: NORMAL PG/ML
GFR SERPLBLD CREATININE-BSD FMLA CKD-EPI: 31 ML/MIN/1.73/M2
GLUCOSE SERPL-MCNC: 118 MG/DL (ref 70–110)
HCT VFR BLD AUTO: 38.8 % (ref 40–54)
HGB BLD-MCNC: 12.2 GM/DL (ref 14–18)
IMM GRANULOCYTES # BLD AUTO: 0.01 K/UL (ref 0–0.04)
IMM GRANULOCYTES NFR BLD AUTO: 0.2 % (ref 0–0.5)
INSULIN SERPL-ACNC: NORMAL U[IU]/ML
LAB AP GROSS DESCRIPTION: NORMAL
LAB AP PERFORMING LOCATION(S): NORMAL
LAB AP REPORT FOOTNOTES: NORMAL
LYMPHOCYTES # BLD AUTO: 1.12 K/UL (ref 1–4.8)
MAGNESIUM SERPL-MCNC: 1.9 MG/DL (ref 1.6–2.6)
MCH RBC QN AUTO: 29.6 PG (ref 27–31)
MCHC RBC AUTO-ENTMCNC: 31.4 G/DL (ref 32–36)
MCV RBC AUTO: 94 FL (ref 82–98)
NUCLEATED RBC (/100WBC) (OHS): 0 /100 WBC
OHS QRS DURATION: 128 MS
OHS QTC CALCULATION: 527 MS
PLATELET # BLD AUTO: 352 K/UL (ref 150–450)
PMV BLD AUTO: 9.7 FL (ref 9.2–12.9)
POCT GLUCOSE: 94 MG/DL (ref 70–110)
POTASSIUM SERPL-SCNC: 4.3 MMOL/L (ref 3.5–5.1)
PROT SERPL-MCNC: 6.7 GM/DL (ref 6–8.4)
RBC # BLD AUTO: 4.12 M/UL (ref 4.6–6.2)
RELATIVE EOSINOPHIL (OHS): 0.9 %
RELATIVE LYMPHOCYTE (OHS): 20.9 % (ref 18–48)
RELATIVE MONOCYTE (OHS): 13.3 % (ref 4–15)
RELATIVE NEUTROPHIL (OHS): 63.8 % (ref 38–73)
SODIUM SERPL-SCNC: 139 MMOL/L (ref 136–145)
T3RU NFR SERPL: NORMAL %
TACROLIMUS BLD-MCNC: 6.4 NG/ML (ref 5–15)
WBC # BLD AUTO: 5.35 K/UL (ref 3.9–12.7)

## 2025-06-23 PROCEDURE — 25000003 PHARM REV CODE 250

## 2025-06-23 PROCEDURE — 63600175 PHARM REV CODE 636 W HCPCS

## 2025-06-23 PROCEDURE — 93005 ELECTROCARDIOGRAM TRACING: CPT

## 2025-06-23 PROCEDURE — 99900035 HC TECH TIME PER 15 MIN (STAT)

## 2025-06-23 PROCEDURE — 85025 COMPLETE CBC W/AUTO DIFF WBC: CPT | Performed by: STUDENT IN AN ORGANIZED HEALTH CARE EDUCATION/TRAINING PROGRAM

## 2025-06-23 PROCEDURE — 99291 CRITICAL CARE FIRST HOUR: CPT | Mod: ,,, | Performed by: INTERNAL MEDICINE

## 2025-06-23 PROCEDURE — 83735 ASSAY OF MAGNESIUM: CPT | Performed by: STUDENT IN AN ORGANIZED HEALTH CARE EDUCATION/TRAINING PROGRAM

## 2025-06-23 PROCEDURE — 93010 ELECTROCARDIOGRAM REPORT: CPT | Mod: ,,, | Performed by: INTERNAL MEDICINE

## 2025-06-23 PROCEDURE — 37000009 HC ANESTHESIA EA ADD 15 MINS: Performed by: INTERNAL MEDICINE

## 2025-06-23 PROCEDURE — 25000003 PHARM REV CODE 250: Performed by: STUDENT IN AN ORGANIZED HEALTH CARE EDUCATION/TRAINING PROGRAM

## 2025-06-23 PROCEDURE — 94761 N-INVAS EAR/PLS OXIMETRY MLT: CPT

## 2025-06-23 PROCEDURE — 5A2204Z RESTORATION OF CARDIAC RHYTHM, SINGLE: ICD-10-PCS | Performed by: INTERNAL MEDICINE

## 2025-06-23 PROCEDURE — 80053 COMPREHEN METABOLIC PANEL: CPT | Performed by: STUDENT IN AN ORGANIZED HEALTH CARE EDUCATION/TRAINING PROGRAM

## 2025-06-23 PROCEDURE — 92960 CARDIOVERSION ELECTRIC EXT: CPT | Performed by: INTERNAL MEDICINE

## 2025-06-23 PROCEDURE — 80197 ASSAY OF TACROLIMUS: CPT | Performed by: STUDENT IN AN ORGANIZED HEALTH CARE EDUCATION/TRAINING PROGRAM

## 2025-06-23 PROCEDURE — 20000000 HC ICU ROOM

## 2025-06-23 PROCEDURE — 63600175 PHARM REV CODE 636 W HCPCS: Performed by: STUDENT IN AN ORGANIZED HEALTH CARE EDUCATION/TRAINING PROGRAM

## 2025-06-23 PROCEDURE — 99233 SBSQ HOSP IP/OBS HIGH 50: CPT | Mod: ,,, | Performed by: INTERNAL MEDICINE

## 2025-06-23 PROCEDURE — 37000008 HC ANESTHESIA 1ST 15 MINUTES: Performed by: INTERNAL MEDICINE

## 2025-06-23 PROCEDURE — 63600175 PHARM REV CODE 636 W HCPCS: Performed by: INTERNAL MEDICINE

## 2025-06-23 PROCEDURE — 92960 CARDIOVERSION ELECTRIC EXT: CPT | Mod: ,,, | Performed by: INTERNAL MEDICINE

## 2025-06-23 RX ORDER — DEXMEDETOMIDINE HYDROCHLORIDE 100 UG/ML
INJECTION, SOLUTION INTRAVENOUS
Status: DISCONTINUED | OUTPATIENT
Start: 2025-06-23 | End: 2025-06-23

## 2025-06-23 RX ORDER — LIDOCAINE HYDROCHLORIDE 20 MG/ML
INJECTION INTRAVENOUS
Status: DISCONTINUED | OUTPATIENT
Start: 2025-06-23 | End: 2025-06-23

## 2025-06-23 RX ORDER — SODIUM CHLORIDE 0.9 % (FLUSH) 0.9 %
3 SYRINGE (ML) INJECTION
Status: DISCONTINUED | OUTPATIENT
Start: 2025-06-23 | End: 2025-06-24 | Stop reason: HOSPADM

## 2025-06-23 RX ORDER — ETOMIDATE 2 MG/ML
INJECTION INTRAVENOUS
Status: DISCONTINUED | OUTPATIENT
Start: 2025-06-23 | End: 2025-06-23

## 2025-06-23 RX ORDER — AMIODARONE HYDROCHLORIDE 200 MG/1
400 TABLET ORAL 2 TIMES DAILY
Status: DISCONTINUED | OUTPATIENT
Start: 2025-06-23 | End: 2025-06-24 | Stop reason: HOSPADM

## 2025-06-23 RX ORDER — MAGNESIUM SULFATE 1 G/100ML
1 INJECTION INTRAVENOUS ONCE
Status: COMPLETED | OUTPATIENT
Start: 2025-06-23 | End: 2025-06-23

## 2025-06-23 RX ORDER — PROPOFOL 10 MG/ML
VIAL (ML) INTRAVENOUS CONTINUOUS PRN
Status: DISCONTINUED | OUTPATIENT
Start: 2025-06-23 | End: 2025-06-23

## 2025-06-23 RX ORDER — HYDRALAZINE HYDROCHLORIDE 25 MG/1
25 TABLET, FILM COATED ORAL ONCE
Status: DISCONTINUED | OUTPATIENT
Start: 2025-06-23 | End: 2025-06-24

## 2025-06-23 RX ORDER — GLUCAGON 1 MG
1 KIT INJECTION
Status: DISCONTINUED | OUTPATIENT
Start: 2025-06-23 | End: 2025-06-24 | Stop reason: HOSPADM

## 2025-06-23 RX ORDER — VASOPRESSIN 20 [USP'U]/ML
INJECTION, SOLUTION INTRAMUSCULAR; SUBCUTANEOUS
Status: DISCONTINUED | OUTPATIENT
Start: 2025-06-23 | End: 2025-06-23

## 2025-06-23 RX ADMIN — TACROLIMUS 1 MG: 1 CAPSULE ORAL at 07:06

## 2025-06-23 RX ADMIN — AMIODARONE HYDROCHLORIDE 0.5 MG/MIN: 1.8 INJECTION, SOLUTION INTRAVENOUS at 04:06

## 2025-06-23 RX ADMIN — EMPAGLIFLOZIN 10 MG: 10 TABLET, FILM COATED ORAL at 07:06

## 2025-06-23 RX ADMIN — TACROLIMUS 0.5 MG: 0.5 CAPSULE ORAL at 05:06

## 2025-06-23 RX ADMIN — EPINEPHRINE 0.04 MCG/KG/MIN: 1 INJECTION, SOLUTION, CONCENTRATE INTRAVENOUS at 02:06

## 2025-06-23 RX ADMIN — MUPIROCIN: 20 OINTMENT TOPICAL at 08:06

## 2025-06-23 RX ADMIN — ETOMIDATE 6 MG: 2 INJECTION, SOLUTION INTRAVENOUS at 02:06

## 2025-06-23 RX ADMIN — LIDOCAINE HYDROCHLORIDE 100 MG: 20 INJECTION INTRAVENOUS at 02:06

## 2025-06-23 RX ADMIN — SACUBITRIL AND VALSARTAN 1 TABLET: 24; 26 TABLET, FILM COATED ORAL at 08:06

## 2025-06-23 RX ADMIN — APIXABAN 10 MG: 5 TABLET, FILM COATED ORAL at 08:06

## 2025-06-23 RX ADMIN — AMLODIPINE BESYLATE 10 MG: 10 TABLET ORAL at 07:06

## 2025-06-23 RX ADMIN — PROPOFOL 50 MCG/KG/MIN: 10 INJECTION, EMULSION INTRAVENOUS at 02:06

## 2025-06-23 RX ADMIN — MUPIROCIN: 20 OINTMENT TOPICAL at 07:06

## 2025-06-23 RX ADMIN — VASOPRESSIN 2 UNITS: 20 INJECTION INTRAVENOUS at 02:06

## 2025-06-23 RX ADMIN — ACETAMINOPHEN 650 MG: 325 TABLET ORAL at 07:06

## 2025-06-23 RX ADMIN — AMIODARONE HYDROCHLORIDE 400 MG: 200 TABLET ORAL at 08:06

## 2025-06-23 RX ADMIN — DEXMEDETOMIDINE 12 MCG: 200 INJECTION, SOLUTION INTRAVENOUS at 02:06

## 2025-06-23 RX ADMIN — SODIUM CHLORIDE: 9 INJECTION, SOLUTION INTRAVENOUS at 02:06

## 2025-06-23 RX ADMIN — ETOMIDATE 14 MG: 2 INJECTION, SOLUTION INTRAVENOUS at 02:06

## 2025-06-23 RX ADMIN — APIXABAN 10 MG: 5 TABLET, FILM COATED ORAL at 07:06

## 2025-06-23 RX ADMIN — SACUBITRIL AND VALSARTAN 1 TABLET: 24; 26 TABLET, FILM COATED ORAL at 07:06

## 2025-06-23 RX ADMIN — MAGNESIUM SULFATE HEPTAHYDRATE 1 G: 500 INJECTION, SOLUTION INTRAMUSCULAR; INTRAVENOUS at 06:06

## 2025-06-23 RX ADMIN — AMIODARONE HYDROCHLORIDE 400 MG: 200 TABLET ORAL at 09:06

## 2025-06-23 NOTE — ASSESSMENT & PLAN NOTE
History of DVT. Non-compliant with home lovenox.   Presented with bilateral LE swelling, found to have bilateral extensive occlusive DVTs  Started on heparin gtt at osh     - Continue eliquis.

## 2025-06-23 NOTE — PLAN OF CARE
CICU DAILY GOALS       A: Awake    RASS: Goal -    Actual - RASS (Ziegler Agitation-Sedation Scale): alert and calm   Restraint necessity:    B: Breath   SBT: Not intubated   C: Coordinate A & B, analgesics/sedatives   Pain: managed    SAT: Not intubated  D: Delirium   CAM-ICU:    E: Early(intubated/ Progressive (non-intubated) Mobility   MOVE Screen: Pass   Activity: Activity Management: Ankle pumps - L1, Arm raise - L1  FAS: Feeding/Nutrition   Diet order: Diet/Nutrition Received: NPO,   Fluid restriction:     Nutritional Supplement Intake: Quantity 0, Type: Boost  T: Thrombus   DVT prophylaxis: VTE Core Measure: Pharmacological prophylaxis initiated/maintained  H: HOB Elevation   Head of Bed (HOB) Positioning: HOB at 30 degrees  U: Ulcer Prophylaxis   GI: no  G: Glucose control   managed Glycemic Management: blood glucose monitored  S: Skin   Bathing/Skin Care: bath, complete;dressed/undressed (06/23/25 0601)  Wounds: No  Wound care consulted: No  B: Bowel Function   no issues   I: Indwelling Catheters   [place] necessity:     CVC necessity: No  D: De-escalation Antibx   No  Plan for the day   Patient underwent successful LEATHA and cardioversion today without incident.   Family/Goals of care/Code Status   Code Status: Full Code     No acute events throughout day, VS and assessment per flow sheet, patient progressing towards goals as tolerated, plan of care reviewed with Alfonso Leger and family, all concerns addressed, will continue to monitor.

## 2025-06-23 NOTE — SUBJECTIVE & OBJECTIVE
Objective:     Vital Signs (Most Recent):  Temp: 98 °F (36.7 °C) (06/23/25 0701)  Pulse: (!) 126 (06/23/25 0701)  Resp: 15 (06/23/25 0701)  BP: (!) 165/100 (MD Brinda notified. Instructed to administer morning medications now) (06/23/25 0701)  SpO2: 98 % (06/23/25 0701) Vital Signs (24h Range):  Temp:  [97.4 °F (36.3 °C)-98.3 °F (36.8 °C)] 98 °F (36.7 °C)  Pulse:  [124-135] 126  Resp:  [15-40] 15  SpO2:  [90 %-100 %] 98 %  BP: ()/() 165/100     Weight: 97.5 kg (214 lb 14.4 oz) (06/23/25 0601)  Body mass index is 29.15 kg/m².  Body surface area is 2.23 meters squared.    I/O last 3 completed shifts:  In: 2759.1 [P.O.:1420; I.V.:1339.1]  Out: 3825 [Urine:3825]     Physical Exam  Constitutional:       General: He is not in acute distress.  Pulmonary:      Effort: No respiratory distress.   Abdominal:      Palpations: Abdomen is soft.   Musculoskeletal:         General: Swelling present.   Neurological:      Mental Status: He is alert. Mental status is at baseline.   Psychiatric:         Mood and Affect: Mood normal.          Significant Labs:  CBC:   Recent Labs   Lab 06/23/25  0354   WBC 5.35   RBC 4.12*   HGB 12.2*   HCT 38.8*      MCV 94   MCH 29.6   MCHC 31.4*     CMP:   Recent Labs   Lab 06/23/25  0354   *   CALCIUM 8.5*   ALBUMIN 2.9*   PROT 6.7      K 4.3   CO2 21*      BUN 20   CREATININE 2.4*   ALKPHOS 60   ALT <5*   AST 9*   BILITOT 0.3     All labs within the past 24 hours have been reviewed.

## 2025-06-23 NOTE — PROGRESS NOTES
[facility]  Cardiac Electrophysiology  Progress Note    Admission Date: 6/19/2025  Code Status: Full Code   Attending Physician: Niles Castro, *   Expected Discharge Date: 6/23/2025  Principal Problem:Secondary graft-dysfunction of transplanted heart    Subjective:     Interval History: Patient in AFL. He is on Eliquis (dosed for DVT). Will undergo LEATHA/DCCV today and will switch to PO amio after.     ROS  Objective:     Vital Signs (Most Recent):  Temp: 98 °F (36.7 °C) (06/23/25 0701)  Pulse: (!) 126 (06/23/25 0701)  Resp: 15 (06/23/25 0701)  BP: (!) 165/100 (MD Brinda notified. Instructed to administer morning medications now) (06/23/25 0701)  SpO2: 98 % (06/23/25 0701) Vital Signs (24h Range):  Temp:  [97.4 °F (36.3 °C)-98.3 °F (36.8 °C)] 98 °F (36.7 °C)  Pulse:  [124-135] 126  Resp:  [15-40] 15  SpO2:  [90 %-100 %] 98 %  BP: ()/() 165/100     Weight: 97.5 kg (214 lb 14.4 oz)  Body mass index is 29.15 kg/m².     SpO2: 98 %        Physical Exam       Significant Labs:   Recent Lab Results         06/23/25  0354        Albumin 2.9       ALP 60       ALT <5       Anion Gap 11       AST 9       Baso # 0.05       Basophil % 0.9       BILIRUBIN TOTAL 0.3  Comment: For infants and newborns, interpretation of results should be based   on gestational age, weight and in agreement with clinical   observations.    Premature Infant recommended reference ranges:   0-24 hours:  <8.0 mg/dL   24-48 hours: <12.0 mg/dL   3-5 days:    <15.0 mg/dL   6-29 days:   <15.0 mg/dL       BUN 20       Calcium 8.5       Chloride 107       CO2 21       Creatinine 2.4       eGFR 31  Comment: Estimated GFR calculated using the CKD-EPI creatinine (2021) equation.       Eos # 0.05       Eos % 0.9       Glucose 118       Gran # (ANC) 3.41       Hematocrit 38.8       Hemoglobin 12.2       Immature Grans (Abs) 0.01  Comment: Mild elevation in immature granulocytes is non specific and can be seen in a variety  of conditions including stress response, acute inflammation, trauma and pregnancy. Correlation with other laboratory and clinical findings is essential.       Immature Granulocytes 0.2       Lymph # 1.12       Lymph % 20.9       Magnesium  1.9       MCH 29.6       MCHC 31.4       MCV 94       Mono # 0.71       Mono % 13.3       MPV 9.7       Neut % 63.8       nRBC 0       Platelet Count 352       Potassium 4.3       PROTEIN TOTAL 6.7       RBC 4.12       RDW 14.1       Sodium 139       Tacrolimus Lvl 6.4  Comment: Testing performed by a chemiluminescent microparticle   immunoassay on the [employer] System.    CAUTION: No firm therapeutic range exists for tacrolimus in whole blood. The complexity of the clinical state, individual differences in sensitivity to immunosuppressive and nephrotoxic effects of tacrolimus, co-administration of other immunosuppressants, type of transplant, time post-transplant and a number of other factors contribute to different requirements for optimal blood levels of tacrolimus. Therefore, individual tacrolimus values cannot be used as the sole indicator for making changes in treatment regimen and each patient should be thoroughly evaluated clinically before changes in treatment regimens are made. Each user must establish his or her own ranges based on clinical experience.  Therapeutic ranges vary according to the commercial test used, and therefore should be established for each commercial test. Values obtained with different assay methods cannot be used interchangeably due to differences in assay methods and cross-reactivity with metabolites, nor should correction factors be applied. Therefore, consistent use of one assay for individual patients is recommended.         WBC 5.35                 Assessment and Plan:     Typical atrial flutter  Mr. Alfonso Leger is a 56 y/o AAM w/ PMHx of OHTx 10/29/12 (for EtOH CM) with post-op severe TR and chronic LV dysfunction (EF has been reduced  to 40-50% since at least 2015, as low as 35%, most current EF is 40-45% 2024), hx of WPW, Factor V Leiden with h/o DVT (last 8/2013) and previous hx of IVC filter placement in 2011 previously on Lovenox sub q 100mg bid (non-compliant, stopped taking 5 years ago), HTN (not compliant with clonidine or amlodipine, only on hydralazine), HLD (not compliant with pravastatin), NIDDM2, CKD stage 3 (last known baseline CR 1.5-2 per nephrology note in 2021), and Non-Hodgkin lymphoma s/p 6 cycles of R-CHOP/CEOP in 2013 now in remission who presented to the hospital with bilateral lower extremity edema and found to have DVT. Transferred to [facility] [place] for evaluation of his NILES in the setting of OHTx on prograf. EP consulted for further evaluation and management of patients new onset typical atrial flutter w/ RVR in the setting of hx of WPW.     Recommendations:  - Continue Eliquis   - Keep K>4 and Mg>2  - Plan for DCCV/LEATHA on today at 2 pm, please make him NPO         Joel Kapoor MD  Cardiac Electrophysiology  [facility]

## 2025-06-23 NOTE — ASSESSMENT & PLAN NOTE
Mr. Alfonso Leger is a 56 y/o AAM w/ PMHx of OHTx 10/29/12 (for EtOH CM) with post-op severe TR and chronic LV dysfunction (EF has been reduced to 40-50% since at least 2015, as low as 35%, most current EF is 40-45% 2024), hx of WPW, Factor V Leiden with h/o DVT (last 8/2013) and previous hx of IVC filter placement in 2011 previously on Lovenox sub q 100mg bid (non-compliant, stopped taking 5 years ago), HTN (not compliant with clonidine or amlodipine, only on hydralazine), HLD (not compliant with pravastatin), NIDDM2, CKD stage 3 (last known baseline CR 1.5-2 per nephrology note in 2021), and Non-Hodgkin lymphoma s/p 6 cycles of R-CHOP/CEOP in 2013 now in remission who presented to the hospital with bilateral lower extremity edema and found to have DVT. Transferred to [facility] for evaluation of his NILES in the setting of OHTx on prograf. EP consulted for further evaluation and management of patients new onset typical atrial flutter w/ RVR in the setting of hx of WPW.     Recommendations:  - Continue Eliquis   - Keep K>4 and Mg>2  - Plan for DCCV/LEATHA on today at 2 pm, please make him NPO

## 2025-06-23 NOTE — PT/OT/SLP PROGRESS
Physical Therapy      Patient Name:  Alfonso Leger   MRN:  [id number]    Patient not seen today secondary to Off the floor for procedure/surgery (cardioversion). PT unable to return for a second attempt. Will follow-up when appropriate.    6/23/2025

## 2025-06-23 NOTE — ASSESSMENT & PLAN NOTE
Acute on chronic graft dysfunction: known to have impaired graft function, LVEF 40% (but as low as 35%), with baseline RWMA (e.g inferior, posterior and septal walls, as well as proximal anterior walls in prior stress echo). This admission EF down to 15%-20%. More likely to have underlying CAV than acute rejection at this time in transplant course.     Plan:  -s/p RHC + biopsy which was negative acute cellular and antibody mediated rejection.   -Unfortunately renal function precludes LHC at this point in time.Will plan PET scan tomorrow. NPO at midnight.

## 2025-06-23 NOTE — TRANSFER OF CARE
Anesthesia Transfer of Care Note    Patient: Alfonso Leger    Procedure(s) Performed: Procedure(s) (LRB):  Transesophageal echo (LEATHA) intra-procedure log documentation (N/A)  Cardioversion or Defibrillation    Patient location: ICU    Anesthesia Type: general    Transport from OR: Transported from OR on 6-10 L/min O2 by face mask with adequate spontaneous ventilation. Continuous SpO2 monitoring in transport. Continuous ECG monitoring in transport    Post pain: adequate analgesia    Post assessment: no apparent anesthetic complications and tolerated procedure well    Post vital signs: stable    Level of consciousness: awake    Nausea/Vomiting: no nausea/vomiting    Complications: none    Transfer of care protocol was followed      Last vitals: Visit Vitals  /85   Pulse (!) 113   Temp 36.6 °C (97.9 °F) (Oral)   Resp 19   Ht 6' (1.829 m)   Wt 97.5 kg (214 lb 15.2 oz)   SpO2 100%   BMI 29.15 kg/m²

## 2025-06-23 NOTE — EICU
Virtual ICU Quality Rounds    Admit Date: 6/19/2025  Hospital Day: 4    ICU Day: 3d 13h    24H Vital Sign Range:  Temp:  [97.4 °F (36.3 °C)-98.3 °F (36.8 °C)]   Pulse:  [124-135]   Resp:  [15-40]   BP: ()/()   SpO2:  [90 %-100 %]     VICU Surveillance Screening                    LDA reconciliation : Yes

## 2025-06-23 NOTE — PROGRESS NOTES
[facility]  Heart Transplant  Progress Note    Patient Name: Alfonso Leger  MRN: [id number]  Admission Date: 6/19/2025  Hospital Length of Stay: 4 days  Attending Physician: Yudith Lazar MD  Primary Care Provider: Moriah, Primary Doctor  Principal Problem:Secondary graft-dysfunction of transplanted heart    Subjective:   Interval History: NAEON. No acute complaints. Remains in aflutter. Pending LEATHA/Cardioversion today.     Continuous Infusions:      Scheduled Meds:   amiodarone  400 mg Oral BID    amLODIPine  10 mg Oral Daily    apixaban  10 mg Oral BID    [START ON 6/29/2025] apixaban  5 mg Oral BID    empagliflozin  10 mg Oral Daily    mupirocin   Nasal BID    polyethylene glycol  17 g Oral Daily    sacubitriL-valsartan  1 tablet Oral BID    tacrolimus  0.5 mg Oral Daily PM    tacrolimus  1 mg Oral Daily AM     PRN Meds:  Current Facility-Administered Medications:     acetaminophen, 650 mg, Oral, Q6H PRN    senna, 8.6 mg, Oral, Daily PRN    sodium chloride 0.9%, 10 mL, Intravenous, PRN    Review of patient's allergies indicates:  No Known Allergies  Objective:     Vital Signs (Most Recent):  Temp: 98 °F (36.7 °C) (06/23/25 0701)  Pulse: (!) 126 (06/23/25 0701)  Resp: 15 (06/23/25 0701)  BP: (!) 165/100 (MD Brinda notified. Instructed to administer morning medications now) (06/23/25 0701)  SpO2: 98 % (06/23/25 0701) Vital Signs (24h Range):  Temp:  [97.4 °F (36.3 °C)-98.3 °F (36.8 °C)] 98 °F (36.7 °C)  Pulse:  [124-135] 126  Resp:  [15-37] 15  SpO2:  [90 %-100 %] 98 %  BP: ()/() 165/100     Patient Vitals for the past 72 hrs (Last 3 readings):   Weight   06/23/25 0601 97.5 kg (214 lb 14.4 oz)   06/22/25 0325 102.5 kg (225 lb 14.4 oz)   06/20/25 1338 101 kg (222 lb 10.6 oz)     Body mass index is 29.15 kg/m².      Intake/Output Summary (Last 24 hours) at 6/23/2025 1004  Last data filed at 6/23/2025 0601  Gross per 24 hour   Intake 1339.14 ml   Output 1175 ml   Net 164.14 ml       "       Physical Exam   Constitutional:       Comments: Alert, Oriented, No acute distress   HENT:      Head: Normocephalic and atraumatic.   Eyes:      Conjunctiva/sclera: Conjunctivae normal.   Neck:      Vascular: No hepatojugular reflux or JVD.   Cardiovascular:      Rate and Rhythm: Regular rhythm. Tachycardia present.   Pulmonary:      Comments: Normal effort, Clear to auscultation   Abdominal:      Comments: Soft, Non-tender, Non-distended   Musculoskeletal:      Cervical back: Normal range of motion.      Comments: B/l lower extremity swelling. Tenderness to palpation   Skin:     Comments: Dry, Intact, Warm   Neurological:      Mental Status: He is alert and oriented to person, place, and time.      Comments: Normal speech   Psychiatric:         Mood and Affect: Mood and affect normal.     Significant Labs:  CBC:  Recent Labs   Lab 06/21/25 0319 06/22/25 0317 06/23/25  0354   WBC 5.85 5.73 5.35   RBC 4.20* 4.10* 4.12*   HGB 12.5* 12.1* 12.2*   HCT 37.7* 37.5* 38.8*    342 352   MCV 90 92 94   MCH 29.8 29.5 29.6   MCHC 33.2 32.3 31.4*     BNP:  Recent Labs   Lab 06/19/25  1208 06/19/25 1858   * 709*     CMP:  Recent Labs   Lab 06/21/25 0319 06/22/25 0317 06/23/25  0354   * 114* 118*   CALCIUM 8.6* 8.6* 8.5*   ALBUMIN 3.2* 3.1* 2.9*   PROT 7.4 7.2 6.7    140 139   K 4.0 4.0 4.3   CO2 21* 21* 21*    107 107   BUN 27* 20 20   CREATININE 2.8* 2.5* 2.4*   ALKPHOS 70 64 60   ALT 7* 10 <5*   AST 11 15 9*   BILITOT 0.5 0.5 0.3      Coagulation:   Recent Labs   Lab 06/19/25  1214 06/19/25  1858 06/19/25 2122 06/21/25  0935 06/21/25  1536 06/21/25 2122 06/22/25 0317   INR 1.2 1.7*  --  1.4*  --   --   --    APTT 27.7 >150.0*   < > 31.0  32.5* 78.2* 59.2* 58.4*    < > = values in this interval not displayed.     LDH:  No results for input(s): "LDH" in the last 72 hours.  Microbiology:  Microbiology Results (last 7 days)       ** No results found for the last 168 hours. **      "       I have reviewed all pertinent labs within the past 24 hours.    Estimated Creatinine Clearance: 42.6 mL/min (A) (based on SCr of 2.4 mg/dL (H)).    Diagnostic Results:  I have reviewed all pertinent imaging results/findings within the past 24 hours.  Assessment and Plan:     Mr. Alfonso Leger is a 56 y/o AAM w/ PMHx of OHTx 10/29/12 (for EtOH CM) with post-op severe TR and chronic LV dysfunction (EF has been reduced to 40-50% since at least 2015, as low as 35%), hx of WPW, Factor V Leiden with h/o DVT (last 8/2013) and previous hx of IVC filter placement in 2011 previously on Lovenox sub q 100mg bid (non-compliant, stopped taking 5 years ago), HTN (not compliant with clonidine or amlodipine, only on hydralazine), HLD (not compliant with pravastatin), NIDDM2, CKD stage 3 (last known baseline CR 1.5-2 per nephrology note in 2021), and Non-Hodgkin lymphoma s/p 6 cycles of R-CHOP/CEOP in 2013 now in remission. Presented to outside hospital ED today after noting 1 1/2 week hx of bilateral lower extremity leg swelling and primarily left calf pain. Denied any chest pain, SOB, palpitations, nausea, vomiting, orthopnea, PND, abdominal distention, abdominal pain, fever, chills, diarrhea. States the only medications he is compliant with is his prograf 1mg bid and hydralazine 25mg tid. Stopped taking all other medications on his own. Last follow up with a doctor was last year (11/2024) at [facility] clinic.    In the ED,  labs notable hgb 13.5, potassium 3.3, bicarb 19, bun 30, cr 3.5 (baseline appears to be 1.5-2), magnesium 1.6. BNP 300s. VQ scan was negative for PE. Lower extremity U/S showed noncompressible filling defects within the right and left common femoral vein, superficial femoral vein and popliteal veins, and thrombosed calf veins noted bilaterally. Vitals on arrival notable for bp 175/106. EKG notable for typical atrial flutter with rates in the 140s. Patient received Lopressor IV and PO, and was started on  amiodarone gtt and heparin gtt prior [place] [facility] [place].     Of note, patient was evaluated by EP (Dr. Reddy) back on 8/28/2013 when WPW was first incidentally seen on his EKG. Patient was notably asymptomatic and ablation was not being purused at the time due to him being neutropenic. Patient was discharged with 30 day event monitored (only showed 2 episodes of sinus tachycardia) and told to follow up as outpatient. No evidence in the system that patient ever followed up with EP. No previous history of Atrial flutter or atrial fibrillation seen.       CMV status:    Donor: -   Recipient: -          Prescribed Home Medications:   Clonidine 0.3 mg BID    Tacrolimus 1 mg BID    Hydralazine 25 TID    Amlodipine 10 mg qd  Pravastatin 40mg nightly       Cardiac Imaging:   Stress Echo (11/19/2024):    Left Ventricle: The left ventricle is normal in size. Normal wall thickness. There is concentric remodeling. Regional wall motion abnormalities present. Septal motion is consistent with post-operative status. There is mildly reduced systolic function with a visually estimated ejection fraction of 40 - 50%. Ejection fraction is approximately 40%. There is normal diastolic function.    Right Ventricle: Moderate right ventricular enlargement. Wall thickness is normal. Systolic function is mildly reduced.    Left Atrium: Atrial septum is bulging to the left.    Right Atrium: Right atrium is severely dilated.    Tricuspid Valve: There is severe regurgitation. No pulmonary hypertension.    Pulmonary Artery: The estimated pulmonary artery systolic pressure is 21 mmHg.    IVC/SVC: Normal venous pressure at 3 mmHg.    Stress Protocol: The patient was infused intravenously with dobutamine. The patient received a graduated infusion of the stress agent beginning at 10.0 mcg/kg/min to a peak dose of 10.0 mcg/kg/min. The peak heart rate was 173 bpm, which is 104% of age predicted maximum heart rate. Low-level  exercise was used. The patient reported no symptoms during the stress test. The test was stopped because the end of the protocol was reached.    Baseline ECG: The Baseline ECG reveals sinus tachycardia with RBBB. The axis is normal. The ST segments are normal.    Stress ECG: There is no ST segment deviation identified during the protocol. There are no arrhythmias during stress.    ECG Conclusion: The ECG portion of the study is negative for ischemia.    Post-stress Conclusion: The study is negative with no echocardiographic evidence of stress induced ischemia.      TTE (11/10/2023):    S/p OHT 10/29/12.    Left Ventricle: The left ventricle is dilated. Ventricular mass is normal. Increased wall thickness. regional wall motion abnormalities present. See diagram for wall motion findings. There is mildly reduced systolic function with a visually estimated ejection fraction of 40 - 45%. Ejection fraction by visual approximation is 40%. Grade I diastolic dysfunction. Average E/e' ratio is 5.43.    Right Ventricle: Moderate-severe right ventricular enlargement. Systolic function is borderline low.    Aortic Valve: The aortic valve is a trileaflet valve. There is mild aortic regurgitation.    Tricuspid Valve: There is severe regurgitation with a centrally directed jet.    IVC/SVC: Elevated venous pressure at 15 mmHg.      LHC (8/10/2020):   Normal coronary arteries.   IVUS of LAD did not show features of significant Cardiac allograft vasculopathy (CAV)   Filling pressures normal. 5 successful RVBX, under fluoro guidance, perfomed. A sample was sent for immunofluorescence..   US guided Right femoral artery and Rt IJ vein access (Rt radial artery occluded, noted in US)   In order to adequately assess the patient and form an appropriate treatment plan, it is necessary to perform a comprehensive Right Heart catheterization. A thorough study of the patient's cardiac and hemodynamic functioning, in addition to an Endomyocardial  biopsy, was performed. The information to be obtained from the biopsy alone was insufficient to care for this patient.   Estimated blood loss: <10 mL      Biopsy (8/2020):   1.  The myocardium is C4d negative for antibody mediated rejection.    2.  There are 4 myocardial biopsy fragments. With the material seen in    conjunction with the immunofluorescence procedure, there are 5 fragments. The    myocardium is devoid of a lymphocytic infiltrate. (ISHLT 0). The myocardium    does not have swollen endothelial cells or capillaries distended by enlarged    mononuclear cells, which would have pointed towards antibody mediated    rejection.  The myocardium is devoid of CD68 positive macrophages . The    picture here is graded as pAMR 0.    The positive and negative controls stained appropriately.     * Secondary graft-dysfunction of transplanted heart  Acute on chronic graft dysfunction: known to have impaired graft function, LVEF 40% (but as low as 35%), with baseline RWMA (e.g inferior, posterior and septal walls, as well as proximal anterior walls in prior stress echo). This admission EF down to 15%-20%. More likely to have underlying CAV than acute rejection at this time in transplant course.     Plan:  -s/p RHC + biopsy which was negative acute cellular and antibody mediated rejection.   -Unfortunately renal function precludes LHC at this point in time.Will plan PET scan tomorrow. NPO at midnight.                 Typical atrial flutter  Patient noted to be in typical atrial flutter with rates in 130-140s. 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 Asymptomatic and hemodyamically stable. [id number] score of 5 (CHF hx, HTN hx, Thromembolism hx, and DM2 hx). Hx of clotting disorder Factor V leiden. Non-compliant with home AC.     - Continue amiodarone gtt for now. Due to concern rate control with BB can lead to decompensation of HF, will continue amiodarone gtt for now. Patient  explained risks of possible stroke if chemically converted and is agreeable with plan.   - Continue eliquis  - ACLS if unstable.   - As per EP, pending LEATHA and DCCV for today.     Acute kidney injury superimposed on CKD  NILES on CKD stage 3 possibly secondary to prograf toxicity vs dehydration. Patient does not appear in ADHF on exam and low likelhood for rejection with hx of HFmrEF. However cannot explain elevated BNP so will need further evaluation. Possible baseline cr 1.5-2. Creatinine around 3 on arrival to OSH     - daily BMP to trend creatinine.   - Improving.   - Strict I and Os.       Venous thromboembolism  History of DVT and factor V Leiden deficiency . Non-compliant with home lovenox. Presented with bilateral LE swelling, found to have bilateral extensive occlusive DVTs. Started on heparin gtt at osh     - Stop heparin gtt. Start eliquis 10mg bid for 7 days followed by eliquis 5mg bid daily.     Status post heart transplantation  OHT on 10/29/12, with post-op severe TR, mild to moderately reduced LVEF.  Post-op developed PTLD, s/p R-CHOP/CEOP 12/2013  Immunosuppression tacrolimus 1 mg BID     Plan:  -continue tacro 1/0.5 mg    Essential hypertension  - continue home hydralazine 25mg tid.   - will restart amlodopine 10mg daily    Type 2 diabetes mellitus, without long-term current use of insulin  - last known A1c 6  - Currently hyperglycemic with sugars in the 400s. Beta hydroxybutare normal.   - Start moderate SS insulin.   - Management per Endocrinology.       Factor V Leiden  History of DVT. Non-compliant with home lovenox.   Presented with bilateral LE swelling, found to have bilateral extensive occlusive DVTs  Started on heparin gtt at osh     - Continue eliquis.         Jarrell aPrry MD  Heart Transplant  [facility] - [facility]

## 2025-06-23 NOTE — PLAN OF CARE
CICU DAILY GOALS     - Pt NPO since midnight for LEATHA with possible cardioversion   - CHG bath completed   - Low UOP throughout night, MD Sean notified        A: Awake    RASS: Goal -    Actual - RASS (Ziegler Agitation-Sedation Scale): alert and calm   Restraint necessity:    B: Breath   SBT: Not intubated   C: Coordinate A & B, analgesics/sedatives   Pain: managed    SAT: Not intubated  D: Delirium   CAM-ICU:    E: Early(intubated/ Progressive (non-intubated) Mobility   MOVE Screen: Pass   Activity: Activity Management: Rolling - L1  FAS: Feeding/Nutrition   Diet order: Diet/Nutrition Received: NPO,   Fluid restriction:    T: Thrombus   DVT prophylaxis: VTE Core Measure: Pharmacological prophylaxis initiated/maintained  H: HOB Elevation   Head of Bed (HOB) Positioning: HOB at 20-30 degrees  U: Ulcer Prophylaxis   GI: no  G: Glucose control   managed Glycemic Management: blood glucose monitored  S: Skin   Bathing/Skin Care: bath, complete;dressed/undressed (06/23/25 0601)  Wounds: No  Wound care consulted: No  B: Bowel Function   no issues   I: Indwelling Catheters   [place] necessity:     CVC necessity: No  D: De-escalation Antibx   No  Plan for the day     Family/Goals of care/Code Status   Code Status: Full Code     No acute events throughout day, VS and assessment per flow sheet, patient progressing towards goals as tolerated, plan of care reviewed with Alfonso Leger and family, all concerns addressed, will continue to monitor.

## 2025-06-23 NOTE — NURSING TRANSFER
Rounds Report: Attended interdisciplinary rounds this afternoon with the transplant team including SW, physicians, fellows, JO's, and transplant coordinators. Discussed plan of care, including potential DC date for tomorrow, and current plan to cardiovert.

## 2025-06-23 NOTE — CONSULTS
[facility]  LEATHA Consult      Alfonso Leger  YOB: 1970  Medical Record Number:  [id number]  Attending Physician:  Yudith Lazar MD   Date of Admission: 6/19/2025       Hospital Day:  4  Current Principal Problem:  Secondary graft-dysfunction of transplanted heart    Patient information was obtained from patient and past medical records.  History     Cc: Atrial flutter     HPI  Mr. Alfonso Leger is a 56 y/o AAM w/ PMHx of OHTx 10/29/12 (for EtOH CM) with post-op severe TR and chronic LV dysfunction (EF has been reduced to 40-50% since at least 2015, as low as 35%), hx of WPW, Factor V Leiden with h/o DVT (last 8/2013) and previous hx of IVC filter placement in 2011 previously on Lovenox sub q 100mg bid (non-compliant, stopped taking 5 years ago), HTN (not compliant with clonidine or amlodipine, only on hydralazine), HLD (not compliant with pravastatin), NIDDM2, CKD stage 3 (last known baseline CR 1.5-2 per nephrology note in 2021), and Non-Hodgkin lymphoma s/p 6 cycles of R-CHOP/CEOP in 2013 now in remission. Presented to outside hospital ED today after noting 1 1/2 week hx of bilateral lower extremity leg swelling and primarily left calf pain. Denied any chest pain, SOB, palpitations, nausea, vomiting, orthopnea, PND, abdominal distention, abdominal pain, fever, chills, diarrhea. States the only medications he is compliant with is his prograf 1mg bid and hydralazine 25mg tid. Stopped taking all other medications on his own. Last follow up with a doctor was last year (11/2024) at [facility] clinic.     In the ED,  labs notable hgb 13.5, potassium 3.3, bicarb 19, bun 30, cr 3.5 (baseline appears to be 1.5-2), magnesium 1.6. BNP 300s. VQ scan was negative for PE. Lower extremity U/S showed noncompressible filling defects within the right and left common femoral vein, superficial femoral vein and popliteal veins, and thrombosed calf veins noted bilaterally. Vitals on arrival  notable for bp 175/106. EKG notable for typical atrial flutter with rates in the 140s. Patient received Lopressor IV and PO, and was started on amiodarone gtt and heparin gtt prior to transfer to [facility] for further management.      Of note, patient was evaluated by EP (Dr. Reddy) back on 8/28/2013 when WPW was first incidentally seen on his EKG. Patient was notably asymptomatic and ablation was not being purused at the time due to him being neutropenic. Patient was discharged with 30 day event monitored (only showed 2 episodes of sinus tachycardia) and told to follow up as outpatient. No evidence in the system that patient ever followed up with EP. No previous history of Atrial flutter or atrial fibrillation seen.       Today, he presents for LEATHA/DCCV.       Anticoagulant/antiplatelets: Eliquis 10 mg BID   ECG: Atrial flutter with 2:1 A-V conduction at 133 bpm (6/21/25)  Platelet count: 352  INR: 1.4  History of stroke:  no  Dysphagia or odynophagia:  no  Liver Disease, esophageal disease, or known varices:  no  Upper GI Bleeding:  no  Snoring:  no   Sleep Apnea:  no  Prior neck surgery or radiation:  no  History of anesthetic difficulties:  no  Family history of anesthetic difficulties:  no  Last oral intake: last pm   Able to move neck in all directions:  yes  Use of GLP-1:  no      Medications - Outpatient  Prior to Admission medications    Medication Sig Start Date End Date Taking? Authorizing Provider   amLODIPine (NORVASC) 10 MG tablet TAKE 1 TABLET BY MOUTH ONCE DAILY 5/12/23   Yudith Lazar MD   cloNIDine (CATAPRES) 0.1 MG tablet Take 3 tablets (0.3 mg total) by mouth 2 (two) times daily. 12/9/24   Yudith Lazar MD   hydrALAZINE (APRESOLINE) 25 MG tablet Take 1 tablet (25 mg total) by mouth 3 (three) times daily. 11/10/23 6/19/25  Fredy Thompson MD   tacrolimus (PROGRAF) 1 MG Cap Take 1 capsule (1 mg total) by mouth every 12 (twelve) hours. 12/4/24   Yudith Lazar MD       Medications -  Current  Scheduled Meds:   amiodarone  400 mg Oral BID    amLODIPine  10 mg Oral Daily    apixaban  10 mg Oral BID    [START ON 6/29/2025] apixaban  5 mg Oral BID    empagliflozin  10 mg Oral Daily    mupirocin   Nasal BID    polyethylene glycol  17 g Oral Daily    sacubitriL-valsartan  1 tablet Oral BID    tacrolimus  0.5 mg Oral Daily PM    tacrolimus  1 mg Oral Daily AM     Continuous Infusions:  PRN Meds:.  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    Factor V Leiden 9/7/2012    Hypertension     Immunodeficiency due to treatment with immunosuppressive medication     Other and unspecified hyperlipidemia 10/11/2013    Stroke     Type II or unspecified type diabetes mellitus without mention of complication, uncontrolled 10/11/2013    Typical atrial flutter 6/19/2025       Past Surgical History  Past Surgical History:   Procedure Laterality Date    ABDOMINAL SURGERY      CARDIAC CATHETERIZATION      CATHETERIZATION OF BOTH LEFT AND RIGHT HEART N/A 8/10/2020    Procedure: CATHETERIZATION, HEART, BOTH LEFT AND RIGHT;  Surgeon: Michael Fernandez MD;  Location: [facility] CATH LAB;  Service: Cardiology;  Laterality: N/A;    CORONARY ANGIOGRAPHY N/A 8/10/2020    Procedure: ANGIOGRAM, CORONARY ARTERY;  Surgeon: Michael Fernandez MD;  Location: [facility] CATH LAB;  Service: Cardiology;  Laterality: N/A;    HEART TRANSPLANT      IVC FILTER RETRIEVAL      pt reports still in place        Social History  Social History     Socioeconomic History    Marital status: Single    Highest education level: 12th grade   Tobacco Use    Smoking status: Former     Current packs/day: 0.00     Types: Cigarettes     Quit date: 2011     Years since quittin.1    Smokeless tobacco: Former   Substance and Sexual Activity    Alcohol use: No    Drug use: No    Sexual activity: Yes     Partners: Female     Birth control/protection: None   Social History Narrative    Single, lives alone, no children.  Worked at [employer] prior to becoming disabled.     Social Drivers of Health     Financial Resource Strain: Low Risk  (2025)    Overall Financial Resource Strain (CARDIA)     Difficulty of Paying Living Expenses: Not hard at all   Food Insecurity: No Food Insecurity (2025)    Hunger Vital Sign     Worried About Running Out of Food in the Last Year: Never true     Ran Out of Food in the Last Year: Never true   Transportation Needs: No Transportation Needs (2025)    PRAPARE - Transportation     Lack of Transportation (Medical): No     Lack of Transportation (Non-Medical): No   Physical Activity: Inactive (2020)    Exercise Vital Sign     Days of Exercise per Week: 0 days     Minutes of Exercise per Session: 0 min   Stress: No Stress Concern Present (2025)    [employer] - Occupational Stress Questionnaire     Feeling of Stress : Only a little   Housing Stability: Low Risk  (2025)    Housing Stability Vital Sign     Unable to Pay for Housing in the Last Year: No     Homeless in the Last Year: No       ROS  10 point ROS performed and negative except as stated in HPI     Physical Examination     Vital Signs  24 Hour VS Range    Temp:  [97.4 °F (36.3 °C)-98.3 °F (36.8 °C)]   Pulse:  [124-133]   Resp:  [15-37]   BP: ()/()   SpO2:  [90 %-100 %]     Intake/Output Summary (Last 24 hours) at 2025 1153  Last data filed at 2025 1103  Gross per 24  "hour   Intake 1422.46 ml   Output 1375 ml   Net 47.46 ml         Physical Exam:   Constitutional: no acute distress  HEENT: NCAT, EOMI, no scleral icterus  Cardiovascular: Irregularly irregular rate and rhythm   Pulmonary: Normal respiratory effort   Abdomen: nontender, non-distended   Neuro: alert and oriented, no focal deficits  Extremities: warm, no edema   MSK: no deformities  Integument: intact, no rashes     Data       Recent Labs   Lab 06/19/25  1858 06/20/25  0409 06/21/25  0319 06/22/25 0317 06/23/25  0354   WBC 5.95 5.38 5.85 5.73 5.35   HGB 12.3* 12.2* 12.5* 12.1* 12.2*   HCT 37.4* 36.5* 37.7* 37.5* 38.8*    286 311 342 352        Recent Labs   Lab 06/19/25  1214 06/19/25 1858 06/21/25  0935   PROTIME 13.9* 18.3* 15.3*   INR 1.2 1.7* 1.4*        Recent Labs   Lab 06/19/25 1858 06/20/25  0409 06/21/25  0319 06/22/25  0317 06/23/25  0354   * 136 141 140 139   K 3.1* 3.5 4.0 4.0 4.3   CL 93* 103 107 107 107   CO2 19* 21* 21* 21* 21*   BUN 27* 32* 27* 20 20   CREATININE 3.0* 3.3* 2.8* 2.5* 2.4*   ANIONGAP 20* 12 13 12 11   CALCIUM 7.8* 8.1* 8.6* 8.6* 8.5*        Recent Labs   Lab 06/19/25 1858 06/20/25  0409 06/21/25  0319 06/22/25  0317 06/23/25  0354   PROT 6.9 6.8 7.4 7.2 6.7   ALBUMIN 3.1* 3.1* 3.2* 3.1* 2.9*   BILITOT 0.5 0.4 0.5 0.5 0.3   ALKPHOS 61 59 70 64 60   AST 11 11 11 15 9*   ALT 8* 7* 7* 10 <5*        Recent Labs   Lab 06/19/25  1208   TROPONINI 0.017        BNP (pg/mL)   Date Value   06/19/2025 709 (H)   06/19/2025 362 (H)   11/19/2024 165 (H)   11/03/2023 73   12/20/2022 146 (H)   11/11/2021 713 (H)   11/11/2021 486 (H)       No results for input(s): "LABBLOO" in the last 168 hours.     Assessment & Plan     #Atrial flutter  -patient presents for LEATHA/DCCV    Last TTE 6/20/25    Post cardiac transplantation study (OHTx 10/29/12).    Left Ventricle: The left ventricle is normal in size. Ventricular mass is normal. Normal wall thickness. Global hypokinesis present. There is " severely reduced systolic function with a visually estimated ejection fraction of 5 - 10%. There is diastolic dysfunction.    Right Ventricle: The right ventricle is mildly dilated Right ventricle wall motion has global hypokinesis. Systolic function is mildly reduced. Right ventriuclar free wall strain is -13.0%.    Aortic Valve: There is mild aortic regurgitation.    Tricuspid Valve: There is severe regurgitation. Reversed hepatic vein systolic flow. PASP is likely under-estimated in the setting of severe tricuspid regurgitation.    Aorta: The aortic root is mildly dilated measuring 4.3 cm. The ascending aorta is mildly dilated measuring 4.0 cm.    Pulmonary Artery: The estimated pulmonary artery systolic pressure is 31 mmHg.    IVC/SVC: Elevated venous pressure at 15 mmHg.     Pertinent findings were relayed to HTS team on 6/20/25 at 12:10p.    TTE 11/10/23    S/p OHT 10/29/12.    Left Ventricle: The left ventricle is dilated. Ventricular mass is normal. Increased wall thickness. regional wall motion abnormalities present. See diagram for wall motion findings. There is mildly reduced systolic function with a visually estimated ejection fraction of 40 - 45%. Ejection fraction by visual approximation is 40%. Grade I diastolic dysfunction. Average E/e' ratio is 5.43.    Right Ventricle: Moderate-severe right ventricular enlargement. Systolic function is borderline low.    Aortic Valve: The aortic valve is a trileaflet valve. There is mild aortic regurgitation.    Tricuspid Valve: There is severe regurgitation with a centrally directed jet.    IVC/SVC: Elevated venous pressure at 15 mmHg.      -No absolute contraindications of esophageal stricture, tumor, perforation, laceration,or diverticulum and/or active GI bleed.  -The risks, benefits & alternatives of the procedure were explained to the patient.   -The risks of transesophageal echo include but are not limited to:  Dental trauma, esophageal trauma/perforation,  bleeding, laryngospasm/brochospasm, aspiration, sore throat/hoarseness, & dislodgement of the endotracheal tube/nasogastric tube (where applicable).    -The risks of moderate sedation include hypotension, respiratory depression, arrhythmias, bronchospasm, & death.    -Informed consent was obtained. The patient is agreeable to proceed with the procedure and all questions and concerns addressed.    Case was discussed with an attending physician in echocardiography lab prior to procedure.    Sheyla Benson PA-C  [facility]

## 2025-06-23 NOTE — SUBJECTIVE & OBJECTIVE
Interval History: NAEON. No acute complaints. Remains in aflutter. Pending LEATHA/Cardioversion today.     Continuous Infusions:      Scheduled Meds:   amiodarone  400 mg Oral BID    amLODIPine  10 mg Oral Daily    apixaban  10 mg Oral BID    [START ON 6/29/2025] apixaban  5 mg Oral BID    empagliflozin  10 mg Oral Daily    mupirocin   Nasal BID    polyethylene glycol  17 g Oral Daily    sacubitriL-valsartan  1 tablet Oral BID    tacrolimus  0.5 mg Oral Daily PM    tacrolimus  1 mg Oral Daily AM     PRN Meds:  Current Facility-Administered Medications:     acetaminophen, 650 mg, Oral, Q6H PRN    senna, 8.6 mg, Oral, Daily PRN    sodium chloride 0.9%, 10 mL, Intravenous, PRN    Review of patient's allergies indicates:  No Known Allergies  Objective:     Vital Signs (Most Recent):  Temp: 98 °F (36.7 °C) (06/23/25 0701)  Pulse: (!) 126 (06/23/25 0701)  Resp: 15 (06/23/25 0701)  BP: (!) 165/100 (MD Brinda notified. Instructed to administer morning medications now) (06/23/25 0701)  SpO2: 98 % (06/23/25 0701) Vital Signs (24h Range):  Temp:  [97.4 °F (36.3 °C)-98.3 °F (36.8 °C)] 98 °F (36.7 °C)  Pulse:  [124-135] 126  Resp:  [15-37] 15  SpO2:  [90 %-100 %] 98 %  BP: ()/() 165/100     Patient Vitals for the past 72 hrs (Last 3 readings):   Weight   06/23/25 0601 97.5 kg (214 lb 14.4 oz)   06/22/25 0325 102.5 kg (225 lb 14.4 oz)   06/20/25 1338 101 kg (222 lb 10.6 oz)     Body mass index is 29.15 kg/m².      Intake/Output Summary (Last 24 hours) at 6/23/2025 1004  Last data filed at 6/23/2025 0601  Gross per 24 hour   Intake 1339.14 ml   Output 1175 ml   Net 164.14 ml             Physical Exam   Constitutional:       Comments: Alert, Oriented, No acute distress   HENT:      Head: Normocephalic and atraumatic.   Eyes:      Conjunctiva/sclera: Conjunctivae normal.   Neck:      Vascular: No hepatojugular reflux or JVD.   Cardiovascular:      Rate and Rhythm: Regular rhythm. Tachycardia present.   Pulmonary:       "Comments: Normal effort, Clear to auscultation   Abdominal:      Comments: Soft, Non-tender, Non-distended   Musculoskeletal:      Cervical back: Normal range of motion.      Comments: B/l lower extremity swelling. Tenderness to palpation   Skin:     Comments: Dry, Intact, Warm   Neurological:      Mental Status: He is alert and oriented to person, place, and time.      Comments: Normal speech   Psychiatric:         Mood and Affect: Mood and affect normal.     Significant Labs:  CBC:  Recent Labs   Lab 06/21/25 0319 06/22/25 0317 06/23/25  0354   WBC 5.85 5.73 5.35   RBC 4.20* 4.10* 4.12*   HGB 12.5* 12.1* 12.2*   HCT 37.7* 37.5* 38.8*    342 352   MCV 90 92 94   MCH 29.8 29.5 29.6   MCHC 33.2 32.3 31.4*     BNP:  Recent Labs   Lab 06/19/25  1208 06/19/25  1858   * 709*     CMP:  Recent Labs   Lab 06/21/25 0319 06/22/25 0317 06/23/25  0354   * 114* 118*   CALCIUM 8.6* 8.6* 8.5*   ALBUMIN 3.2* 3.1* 2.9*   PROT 7.4 7.2 6.7    140 139   K 4.0 4.0 4.3   CO2 21* 21* 21*    107 107   BUN 27* 20 20   CREATININE 2.8* 2.5* 2.4*   ALKPHOS 70 64 60   ALT 7* 10 <5*   AST 11 15 9*   BILITOT 0.5 0.5 0.3      Coagulation:   Recent Labs   Lab 06/19/25  1214 06/19/25  1858 06/19/25 2122 06/21/25  0935 06/21/25  1536 06/21/25 2122 06/22/25 0317   INR 1.2 1.7*  --  1.4*  --   --   --    APTT 27.7 >150.0*   < > 31.0  32.5* 78.2* 59.2* 58.4*    < > = values in this interval not displayed.     LDH:  No results for input(s): "LDH" in the last 72 hours.  Microbiology:  Microbiology Results (last 7 days)       ** No results found for the last 168 hours. **            I have reviewed all pertinent labs within the past 24 hours.    Estimated Creatinine Clearance: 42.6 mL/min (A) (based on SCr of 2.4 mg/dL (H)).    Diagnostic Results:  I have reviewed all pertinent imaging results/findings within the past 24 hours.  "

## 2025-06-23 NOTE — ASSESSMENT & PLAN NOTE
Patient noted to be in typical atrial flutter with rates in 130-140s. 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 Asymptomatic and hemodyamically stable. [id number] score of 5 (CHF hx, HTN hx, Thromembolism hx, and DM2 hx). Hx of clotting disorder Factor V leiden. Non-compliant with home AC.     - Continue amiodarone gtt for now. Due to concern rate control with BB can lead to decompensation of HF, will continue amiodarone gtt for now. Patient explained risks of possible stroke if chemically converted and is agreeable with plan.   - Continue eliquis  - ACLS if unstable.   - As per EP, pending LEATHA and DCCV for today.

## 2025-06-23 NOTE — ANESTHESIA POSTPROCEDURE EVALUATION
Anesthesia Post Evaluation    Patient: Alfonso Leger    Procedure(s) Performed: Procedure(s) (LRB):  Transesophageal echo (LEATHA) intra-procedure log documentation (N/A)  Cardioversion or Defibrillation    Final Anesthesia Type: general      Patient location during evaluation: PACU  Patient participation: Yes- Able to Participate  Level of consciousness: awake and alert  Post-procedure vital signs: reviewed and stable  Pain management: adequate  Airway patency: patent    PONV status at discharge: No PONV  Anesthetic complications: no      Cardiovascular status: blood pressure returned to baseline  Respiratory status: unassisted  Hydration status: euvolemic  Follow-up not needed.              Vitals Value Taken Time   /83 06/23/25 15:01   Temp 98 06/23/25 15:15   Pulse 109 06/23/25 15:14   Resp 13 06/23/25 15:14   SpO2 99 % 06/23/25 15:14   Vitals shown include unfiled device data.      No case tracking events are documented in the log.      Pain/Lino Score: Pain Rating Prior to Med Admin: 2 (6/22/2025  5:41 PM)  Pain Rating Post Med Admin: 0 (6/22/2025  6:38 PM)

## 2025-06-23 NOTE — EICU
Intervention Initiated From:  COR / MARY ELLENU    Suzanna intervened regarding:  Rounding (Video assessment)    VICU Night Rounds Checklist  24H Vital Sign Range:  Temp:  [97.4 °F (36.3 °C)-98.3 °F (36.8 °C)]   Pulse:  [126-135]   Resp:  [13-40]   BP: ()/()   SpO2:  [90 %-100 %]     Video rounds and LDA reconciliation

## 2025-06-23 NOTE — SUBJECTIVE & OBJECTIVE
Interval History: Patient in AFL. He is on Eliquis (dosed for DVT). Will undergo LEATHA/DCCV today and will switch to PO amio after.     ROS  Objective:     Vital Signs (Most Recent):  Temp: 98 °F (36.7 °C) (06/23/25 0701)  Pulse: (!) 126 (06/23/25 0701)  Resp: 15 (06/23/25 0701)  BP: (!) 165/100 (MD Brinda notified. Instructed to administer morning medications now) (06/23/25 0701)  SpO2: 98 % (06/23/25 0701) Vital Signs (24h Range):  Temp:  [97.4 °F (36.3 °C)-98.3 °F (36.8 °C)] 98 °F (36.7 °C)  Pulse:  [124-135] 126  Resp:  [15-40] 15  SpO2:  [90 %-100 %] 98 %  BP: ()/() 165/100     Weight: 97.5 kg (214 lb 14.4 oz)  Body mass index is 29.15 kg/m².     SpO2: 98 %        Physical Exam       Significant Labs:   Recent Lab Results         06/23/25  0354        Albumin 2.9       ALP 60       ALT <5       Anion Gap 11       AST 9       Baso # 0.05       Basophil % 0.9       BILIRUBIN TOTAL 0.3  Comment: For infants and newborns, interpretation of results should be based   on gestational age, weight and in agreement with clinical   observations.    Premature Infant recommended reference ranges:   0-24 hours:  <8.0 mg/dL   24-48 hours: <12.0 mg/dL   3-5 days:    <15.0 mg/dL   6-29 days:   <15.0 mg/dL       BUN 20       Calcium 8.5       Chloride 107       CO2 21       Creatinine 2.4       eGFR 31  Comment: Estimated GFR calculated using the CKD-EPI creatinine (2021) equation.       Eos # 0.05       Eos % 0.9       Glucose 118       Gran # (ANC) 3.41       Hematocrit 38.8       Hemoglobin 12.2       Immature Grans (Abs) 0.01  Comment: Mild elevation in immature granulocytes is non specific and can be seen in a variety of conditions including stress response, acute inflammation, trauma and pregnancy. Correlation with other laboratory and clinical findings is essential.       Immature Granulocytes 0.2       Lymph # 1.12       Lymph % 20.9       Magnesium  1.9       MCH 29.6       MCHC 31.4       MCV 94       Mono #  0.71       Mono % 13.3       MPV 9.7       Neut % 63.8       nRBC 0       Platelet Count 352       Potassium 4.3       PROTEIN TOTAL 6.7       RBC 4.12       RDW 14.1       Sodium 139       Tacrolimus Lvl 6.4  Comment: Testing performed by a chemiluminescent microparticle   immunoassay on the [employer] System.    CAUTION: No firm therapeutic range exists for tacrolimus in whole blood. The complexity of the clinical state, individual differences in sensitivity to immunosuppressive and nephrotoxic effects of tacrolimus, co-administration of other immunosuppressants, type of transplant, time post-transplant and a number of other factors contribute to different requirements for optimal blood levels of tacrolimus. Therefore, individual tacrolimus values cannot be used as the sole indicator for making changes in treatment regimen and each patient should be thoroughly evaluated clinically before changes in treatment regimens are made. Each user must establish his or her own ranges based on clinical experience.  Therapeutic ranges vary according to the commercial test used, and therefore should be established for each commercial test. Values obtained with different assay methods cannot be used interchangeably due to differences in assay methods and cross-reactivity with metabolites, nor should correction factors be applied. Therefore, consistent use of one assay for individual patients is recommended.         WBC 5.35

## 2025-06-23 NOTE — PROGRESS NOTES
[facility]  Nephrology  Progress Note    Patient Name: Alfonso Leger  MRN: [id number]  Admission Date: 6/19/2025  Hospital Length of Stay: 4 days  Attending Provider: Niles Castro, *   Primary Care Physician: No, Primary Doctor  Principal Problem:Secondary graft-dysfunction of transplanted heart    Subjective:     HPI: Alfonso is a pleasant 54 yo man w pmhx significant for heart transplant 10/29/2012 (EtOH CM) with post op LV dysfunction (EF ranging between 35-45% over the years, now 5-10%) on long term immunosuppression prograf, factor V Leiden with hx of DVT (last in 2013), and previous IVC filter, HTN, HLD, T2DM, CKD3 diagnosed in 2021, non-hodkin lymphoma in remission who is here with heart failure exacerbation, NILES on CKD, and atrial flutter with RVR. He has LEATHA DCCV planned for Monday. Regarding his CKD, it is difficult to determine his current BL Cr, he previously followed w Nephrology in 2021, at the time his Cr was 1.5 to 2, he had some labs in Nov 2024 where his Cr was 2.0, but there is no more recent Cr documented. The etiology of his CKD is uncertain, but possibly d/t long term immunosuppression vs CRS vs longstanding HTN vs previous DMT2. No famhx of CKD or known use of NSAIDs. There is 2+ protein in urine, which he seems to have had since 2021. He received R heart cath w biopsy 6/20. BP is stable, off pressors. Cr 3.3 at time of consult. Nephrology has been consulted for eval for NILES on CKD.       Objective:     Vital Signs (Most Recent):  Temp: 98 °F (36.7 °C) (06/23/25 0701)  Pulse: (!) 126 (06/23/25 0701)  Resp: 15 (06/23/25 0701)  BP: (!) 165/100 (MD Brinda notified. Instructed to administer morning medications now) (06/23/25 0701)  SpO2: 98 % (06/23/25 0701) Vital Signs (24h Range):  Temp:  [97.4 °F (36.3 °C)-98.3 °F (36.8 °C)] 98 °F (36.7 °C)  Pulse:  [124-135] 126  Resp:  [15-40] 15  SpO2:  [90 %-100 %] 98 %  BP: ()/() 165/100     Weight: 97.5 kg  (214 lb 14.4 oz) (06/23/25 0601)  Body mass index is 29.15 kg/m².  Body surface area is 2.23 meters squared.    I/O last 3 completed shifts:  In: 2759.1 [P.O.:1420; I.V.:1339.1]  Out: 3825 [Urine:3825]     Physical Exam  Constitutional:       General: He is not in acute distress.  Pulmonary:      Effort: No respiratory distress.   Abdominal:      Palpations: Abdomen is soft.   Musculoskeletal:         General: Swelling present.   Neurological:      Mental Status: He is alert. Mental status is at baseline.   Psychiatric:         Mood and Affect: Mood normal.          Significant Labs:  CBC:   Recent Labs   Lab 06/23/25  0354   WBC 5.35   RBC 4.12*   HGB 12.2*   HCT 38.8*      MCV 94   MCH 29.6   MCHC 31.4*     CMP:   Recent Labs   Lab 06/23/25  0354   *   CALCIUM 8.5*   ALBUMIN 2.9*   PROT 6.7      K 4.3   CO2 21*      BUN 20   CREATININE 2.4*   ALKPHOS 60   ALT <5*   AST 9*   BILITOT 0.3     All labs within the past 24 hours have been reviewed.  Assessment/Plan:   NILES on CKD3a/b 2/2 below  Atrial flutter  Transplant rejection? On long term immunosuppression  Heart transplant 10/29/2012  Factor V leiden w hx DVT and IVC filter  Non Hodgkins lymphoma in remission  Anemia in CKD  Proteinuria  DM2  Hx HTN     -urine micro 6/21 showed large number of uric acid crystals (possibly not clinically relevant)  -most likely this is NILES with progression of his chronic kidney disease 2/2 CRS vs long term immunosupression w long term HTN and DM2. Possibly exacerbated by recent cardiac events. His BL Cr is difficult to gauge, back in 2024 it may have been around 2.   -Cr continues to trend down 2.4 today. Good UOP overnight without diuretics. IO  1672/1700.  -UA reviewed, 2+ protein which he has had before in 2021. UPCR 0.35, subnephrotic range.  -cardioversion today  -0.6 g/kg daily protein and 2 g sodium daily diet   -hg at goal 10-11    Thank you for your consult. I will follow-up with patient. Please  contact us if you have any additional questions.    Cas Anderson MD  Nephrology  [facility]

## 2025-06-24 VITALS
OXYGEN SATURATION: 95 % | SYSTOLIC BLOOD PRESSURE: 121 MMHG | WEIGHT: 219.31 LBS | TEMPERATURE: 98 F | DIASTOLIC BLOOD PRESSURE: 82 MMHG | HEART RATE: 104 BPM | RESPIRATION RATE: 25 BRPM | HEIGHT: 72 IN | BODY MASS INDEX: 29.71 KG/M2

## 2025-06-24 LAB
ABSOLUTE EOSINOPHIL (OHS): 0.04 K/UL
ABSOLUTE MONOCYTE (OHS): 0.66 K/UL (ref 0.3–1)
ABSOLUTE NEUTROPHIL COUNT (OHS): 3.07 K/UL (ref 1.8–7.7)
ALBUMIN SERPL BCP-MCNC: 2.8 G/DL (ref 3.5–5.2)
ALP SERPL-CCNC: 57 UNIT/L (ref 40–150)
ALT SERPL W/O P-5'-P-CCNC: 10 UNIT/L (ref 10–44)
ANION GAP (OHS): 10 MMOL/L (ref 8–16)
AST SERPL-CCNC: 14 UNIT/L (ref 11–45)
BASOPHILS # BLD AUTO: 0.03 K/UL
BASOPHILS NFR BLD AUTO: 0.6 %
BILIRUB SERPL-MCNC: 0.6 MG/DL (ref 0.1–1)
BUN SERPL-MCNC: 20 MG/DL (ref 6–20)
CALCIUM SERPL-MCNC: 8.6 MG/DL (ref 8.7–10.5)
CHLORIDE SERPL-SCNC: 107 MMOL/L (ref 95–110)
CO2 SERPL-SCNC: 21 MMOL/L (ref 23–29)
CREAT SERPL-MCNC: 2.4 MG/DL (ref 0.5–1.4)
ERYTHROCYTE [DISTWIDTH] IN BLOOD BY AUTOMATED COUNT: 14 % (ref 11.5–14.5)
GFR SERPLBLD CREATININE-BSD FMLA CKD-EPI: 31 ML/MIN/1.73/M2
GLUCOSE SERPL-MCNC: 101 MG/DL (ref 70–110)
HCT VFR BLD AUTO: 37.4 % (ref 40–54)
HGB BLD-MCNC: 12.1 GM/DL (ref 14–18)
IMM GRANULOCYTES # BLD AUTO: 0.02 K/UL (ref 0–0.04)
IMM GRANULOCYTES NFR BLD AUTO: 0.4 % (ref 0–0.5)
INDIRECT COOMBS: NORMAL
LYMPHOCYTES # BLD AUTO: 1.11 K/UL (ref 1–4.8)
MAGNESIUM SERPL-MCNC: 1.8 MG/DL (ref 1.6–2.6)
MCH RBC QN AUTO: 30 PG (ref 27–31)
MCHC RBC AUTO-ENTMCNC: 32.4 G/DL (ref 32–36)
MCV RBC AUTO: 93 FL (ref 82–98)
NUCLEATED RBC (/100WBC) (OHS): 0 /100 WBC
PLATELET # BLD AUTO: 357 K/UL (ref 150–450)
PMV BLD AUTO: 9.6 FL (ref 9.2–12.9)
POTASSIUM SERPL-SCNC: 4.4 MMOL/L (ref 3.5–5.1)
PROT SERPL-MCNC: 6.9 GM/DL (ref 6–8.4)
RBC # BLD AUTO: 4.04 M/UL (ref 4.6–6.2)
RELATIVE EOSINOPHIL (OHS): 0.8 %
RELATIVE LYMPHOCYTE (OHS): 22.5 % (ref 18–48)
RELATIVE MONOCYTE (OHS): 13.4 % (ref 4–15)
RELATIVE NEUTROPHIL (OHS): 62.3 % (ref 38–73)
RH BLD: NORMAL
SODIUM SERPL-SCNC: 138 MMOL/L (ref 136–145)
SPECIMEN OUTDATE: NORMAL
TACROLIMUS BLD-MCNC: 7.2 NG/ML (ref 5–15)
WBC # BLD AUTO: 4.93 K/UL (ref 3.9–12.7)

## 2025-06-24 PROCEDURE — 63600175 PHARM REV CODE 636 W HCPCS

## 2025-06-24 PROCEDURE — 97161 PT EVAL LOW COMPLEX 20 MIN: CPT

## 2025-06-24 PROCEDURE — 25000003 PHARM REV CODE 250: Performed by: STUDENT IN AN ORGANIZED HEALTH CARE EDUCATION/TRAINING PROGRAM

## 2025-06-24 PROCEDURE — 63600175 PHARM REV CODE 636 W HCPCS: Performed by: INTERNAL MEDICINE

## 2025-06-24 PROCEDURE — 97116 GAIT TRAINING THERAPY: CPT

## 2025-06-24 PROCEDURE — 80197 ASSAY OF TACROLIMUS: CPT | Performed by: STUDENT IN AN ORGANIZED HEALTH CARE EDUCATION/TRAINING PROGRAM

## 2025-06-24 PROCEDURE — 99900035 HC TECH TIME PER 15 MIN (STAT)

## 2025-06-24 PROCEDURE — 86901 BLOOD TYPING SEROLOGIC RH(D): CPT | Performed by: INTERNAL MEDICINE

## 2025-06-24 PROCEDURE — 27000221 HC OXYGEN, UP TO 24 HOURS

## 2025-06-24 PROCEDURE — 85025 COMPLETE CBC W/AUTO DIFF WBC: CPT | Performed by: STUDENT IN AN ORGANIZED HEALTH CARE EDUCATION/TRAINING PROGRAM

## 2025-06-24 PROCEDURE — 99233 SBSQ HOSP IP/OBS HIGH 50: CPT | Mod: ,,, | Performed by: INTERNAL MEDICINE

## 2025-06-24 PROCEDURE — 83735 ASSAY OF MAGNESIUM: CPT | Performed by: STUDENT IN AN ORGANIZED HEALTH CARE EDUCATION/TRAINING PROGRAM

## 2025-06-24 PROCEDURE — 80053 COMPREHEN METABOLIC PANEL: CPT | Performed by: STUDENT IN AN ORGANIZED HEALTH CARE EDUCATION/TRAINING PROGRAM

## 2025-06-24 PROCEDURE — 94761 N-INVAS EAR/PLS OXIMETRY MLT: CPT

## 2025-06-24 RX ORDER — AMIODARONE HYDROCHLORIDE 200 MG/1
TABLET ORAL
Qty: 210 TABLET | Refills: 0 | Status: SHIPPED | OUTPATIENT
Start: 2025-06-24 | End: 2025-06-24

## 2025-06-24 RX ORDER — AMLODIPINE BESYLATE 10 MG/1
10 TABLET ORAL DAILY
Qty: 90 TABLET | Refills: 2 | Status: SHIPPED | OUTPATIENT
Start: 2025-06-24 | End: 2025-06-24

## 2025-06-24 RX ORDER — AMLODIPINE BESYLATE 10 MG/1
10 TABLET ORAL DAILY
Qty: 90 TABLET | Refills: 2 | Status: SHIPPED | OUTPATIENT
Start: 2025-06-24

## 2025-06-24 RX ORDER — MAGNESIUM SULFATE HEPTAHYDRATE 40 MG/ML
2 INJECTION, SOLUTION INTRAVENOUS ONCE
Status: COMPLETED | OUTPATIENT
Start: 2025-06-24 | End: 2025-06-24

## 2025-06-24 RX ORDER — AMIODARONE HYDROCHLORIDE 200 MG/1
TABLET ORAL
Qty: 210 TABLET | Refills: 0 | Status: SHIPPED | OUTPATIENT
Start: 2025-06-24 | End: 2025-12-21

## 2025-06-24 RX ORDER — AMLODIPINE BESYLATE 10 MG/1
10 TABLET ORAL DAILY
Status: DISCONTINUED | OUTPATIENT
Start: 2025-06-24 | End: 2025-06-24 | Stop reason: HOSPADM

## 2025-06-24 RX ADMIN — ACETAMINOPHEN 650 MG: 325 TABLET ORAL at 08:06

## 2025-06-24 RX ADMIN — AMIODARONE HYDROCHLORIDE 400 MG: 200 TABLET ORAL at 08:06

## 2025-06-24 RX ADMIN — POLYETHYLENE GLYCOL 3350 17 G: 17 POWDER, FOR SOLUTION ORAL at 08:06

## 2025-06-24 RX ADMIN — MUPIROCIN: 20 OINTMENT TOPICAL at 08:06

## 2025-06-24 RX ADMIN — APIXABAN 10 MG: 5 TABLET, FILM COATED ORAL at 08:06

## 2025-06-24 RX ADMIN — TACROLIMUS 1 MG: 1 CAPSULE ORAL at 08:06

## 2025-06-24 RX ADMIN — AMLODIPINE BESYLATE 10 MG: 10 TABLET ORAL at 09:06

## 2025-06-24 RX ADMIN — EMPAGLIFLOZIN 10 MG: 10 TABLET, FILM COATED ORAL at 08:06

## 2025-06-24 RX ADMIN — SACUBITRIL AND VALSARTAN 1 TABLET: 24; 26 TABLET, FILM COATED ORAL at 08:06

## 2025-06-24 RX ADMIN — MAGNESIUM SULFATE HEPTAHYDRATE 2 G: 40 INJECTION, SOLUTION INTRAVENOUS at 06:06

## 2025-06-24 NOTE — PROGRESS NOTES
Discharge    Pt presents in room aaox4 with pleasant affect. Pt's mother is present in room. Pt voices agreement with plan to discharge to home today. Pt's mother will drive pt home. No home needs voiced by pt or indicated by medical team. Pt reports coping well and denies further needs, questions, concerns at this time and none indicated. Providing psychosocial and counseling support, education, resources, assistance and discharge planning as indicated. SW remains available.

## 2025-06-24 NOTE — ASSESSMENT & PLAN NOTE
Acute on chronic graft dysfunction: known to have impaired graft function, LVEF 40% (but as low as 35%), with baseline RWMA (e.g inferior, posterior and septal walls, as well as proximal anterior walls in prior stress echo). This admission EF down to 15%-20%. More likely to have underlying CAV than acute rejection at this time in transplant course.     Plan:  -s/p RHC + biopsy which was negative acute cellular and antibody mediated rejection.   -Unfortunately renal function precludes LHC at this point in time.Will have PET scan scheduled as outpatient.

## 2025-06-24 NOTE — EICU
Intervention Initiated From:  [facility] / MARY ELLENU    Suzanna intervened regarding:  Rounding (Video assessment)  VICU Night Rounds Checklist  24H Vital Sign Range:  Temp:  [97.9 °F (36.6 °C)-98.6 °F (37 °C)]   Pulse:  []   Resp:  [8-38]   BP: ()/()   SpO2:  [90 %-100 %]     Video rounds

## 2025-06-24 NOTE — PROGRESS NOTES
[facility]  Heart Transplant  Progress Note    Patient Name: Alfonso Leger  MRN: [id number]  Admission Date: 6/19/2025  Hospital Length of Stay: 5 days  Attending Physician: Yudith Lazar MD  Primary Care Provider: Moriah, Primary Doctor  Principal Problem:Secondary graft-dysfunction of transplanted heart    Subjective:   Interval History: NAEON. No acute complaints. S/p cardioversion today.     Continuous Infusions:      Scheduled Meds:   amiodarone  400 mg Oral BID    amLODIPine  10 mg Oral Daily    apixaban  10 mg Oral BID    [START ON 6/29/2025] apixaban  5 mg Oral BID    empagliflozin  10 mg Oral Daily    mupirocin   Nasal BID    polyethylene glycol  17 g Oral Daily    sacubitriL-valsartan  1 tablet Oral BID    tacrolimus  0.5 mg Oral Daily PM    tacrolimus  1 mg Oral Daily AM     PRN Meds:  Current Facility-Administered Medications:     acetaminophen, 650 mg, Oral, Q6H PRN    dextrose 50%, 12.5 g, Intravenous, PRN    glucagon (human recombinant), 1 mg, Intramuscular, PRN    senna, 8.6 mg, Oral, Daily PRN    sodium chloride 0.9%, 10 mL, Intravenous, PRN    sodium chloride 0.9%, 3 mL, Intravenous, PRN    Review of patient's allergies indicates:  No Known Allergies  Objective:     Vital Signs (Most Recent):  Temp: 98 °F (36.7 °C) (06/24/25 0701)  Pulse: 95 (06/24/25 0737)  Resp: (!) 29 (06/24/25 0701)  BP: 115/86 (06/24/25 0701)  SpO2: 99 % (06/24/25 0701) Vital Signs (24h Range):  Temp:  [97.9 °F (36.6 °C)-98.6 °F (37 °C)] 98 °F (36.7 °C)  Pulse:  [] 95  Resp:  [11-40] 29  SpO2:  [86 %-100 %] 99 %  BP: ()/(60-96) 115/86     Patient Vitals for the past 72 hrs (Last 3 readings):   Weight   06/24/25 0501 99.5 kg (219 lb 4.8 oz)   06/23/25 1415 97.5 kg (214 lb 15.2 oz)   06/23/25 0601 97.5 kg (214 lb 14.4 oz)     Body mass index is 29.74 kg/m².      Intake/Output Summary (Last 24 hours) at 6/24/2025 0902  Last data filed at 6/24/2025 0701  Gross per 24 hour   Intake 812.41 ml  "  Output 1275 ml   Net -462.59 ml             Physical Exam   Constitutional:       Comments: Alert, Oriented, No acute distress   HENT:      Head: Normocephalic and atraumatic.   Eyes:      Conjunctiva/sclera: Conjunctivae normal.   Neck:      Vascular: No hepatojugular reflux or JVD.   Cardiovascular:      Rate and Rhythm: Regular rhythm. Tachycardia present.   Pulmonary:      Comments: Normal effort, Clear to auscultation   Abdominal:      Comments: Soft, Non-tender, Non-distended   Musculoskeletal:      Cervical back: Normal range of motion.      Comments: B/l lower extremity swelling. Tenderness to palpation   Skin:     Comments: Dry, Intact, Warm   Neurological:      Mental Status: He is alert and oriented to person, place, and time.      Comments: Normal speech   Psychiatric:         Mood and Affect: Mood and affect normal.     Significant Labs:  CBC:  Recent Labs   Lab 06/22/25 0317 06/23/25  0354 06/24/25  0341   WBC 5.73 5.35 4.93   RBC 4.10* 4.12* 4.04*   HGB 12.1* 12.2* 12.1*   HCT 37.5* 38.8* 37.4*    352 357   MCV 92 94 93   MCH 29.5 29.6 30.0   MCHC 32.3 31.4* 32.4     BNP:  Recent Labs   Lab 06/19/25  1208 06/19/25  1858   * 709*     CMP:  Recent Labs   Lab 06/22/25 0317 06/23/25  0354 06/24/25  0341   * 118* 101   CALCIUM 8.6* 8.5* 8.6*   ALBUMIN 3.1* 2.9* 2.8*   PROT 7.2 6.7 6.9    139 138   K 4.0 4.3 4.4   CO2 21* 21* 21*    107 107   BUN 20 20 20   CREATININE 2.5* 2.4* 2.4*   ALKPHOS 64 60 57   ALT 10 <5* 10   AST 15 9* 14   BILITOT 0.5 0.3 0.6      Coagulation:   Recent Labs   Lab 06/19/25  1214 06/19/25  1858 06/19/25 2122 06/21/25  0935 06/21/25  1536 06/21/25 2122 06/22/25 0317   INR 1.2 1.7*  --  1.4*  --   --   --    APTT 27.7 >150.0*   < > 31.0  32.5* 78.2* 59.2* 58.4*    < > = values in this interval not displayed.     LDH:  No results for input(s): "LDH" in the last 72 hours.  Microbiology:  Microbiology Results (last 7 days)       ** No results " found for the last 168 hours. **            I have reviewed all pertinent labs within the past 24 hours.    Estimated Creatinine Clearance: 43 mL/min (A) (based on SCr of 2.4 mg/dL (H)).    Diagnostic Results:  I have reviewed all pertinent imaging results/findings within the past 24 hours.  Assessment and Plan:     Mr. Alfonso Leger is a 56 y/o AAM w/ PMHx of OHTx 10/29/12 (for EtOH CM) with post-op severe TR and chronic LV dysfunction (EF has been reduced to 40-50% since at least 2015, as low as 35%), hx of WPW, Factor V Leiden with h/o DVT (last 8/2013) and previous hx of IVC filter placement in 2011 previously on Lovenox sub q 100mg bid (non-compliant, stopped taking 5 years ago), HTN (not compliant with clonidine or amlodipine, only on hydralazine), HLD (not compliant with pravastatin), NIDDM2, CKD stage 3 (last known baseline CR 1.5-2 per nephrology note in 2021), and Non-Hodgkin lymphoma s/p 6 cycles of R-CHOP/CEOP in 2013 now in remission. Presented to outside hospital ED today after noting 1 1/2 week hx of bilateral lower extremity leg swelling and primarily left calf pain. Denied any chest pain, SOB, palpitations, nausea, vomiting, orthopnea, PND, abdominal distention, abdominal pain, fever, chills, diarrhea. States the only medications he is compliant with is his prograf 1mg bid and hydralazine 25mg tid. Stopped taking all other medications on his own. Last follow up with a doctor was last year (11/2024) at [facility] clinic.    In the ED,  labs notable hgb 13.5, potassium 3.3, bicarb 19, bun 30, cr 3.5 (baseline appears to be 1.5-2), magnesium 1.6. BNP 300s. VQ scan was negative for PE. Lower extremity U/S showed noncompressible filling defects within the right and left common femoral vein, superficial femoral vein and popliteal veins, and thrombosed calf veins noted bilaterally. Vitals on arrival notable for bp 175/106. EKG notable for typical atrial flutter with rates in the 140s. Patient received Lopressor IV  and PO, and was started on amiodarone gtt and heparin gtt prior to transfer to [facility] [place] management.     Of note, patient was evaluated by EP (Dr. Reddy) back on 8/28/2013 when WPW was first incidentally seen on his EKG. Patient was notably asymptomatic and ablation was not being purused at the time due to him being neutropenic. Patient was discharged with 30 day event monitored (only showed 2 episodes of sinus tachycardia) and told to follow up as outpatient. No evidence in the system that patient ever followed up with EP. No previous history of Atrial flutter or atrial fibrillation seen.       CMV status:    Donor: -   Recipient: -          Prescribed Home Medications:   Clonidine 0.3 mg BID    Tacrolimus 1 mg BID    Hydralazine 25 TID    Amlodipine 10 mg qd  Pravastatin 40mg nightly       Cardiac Imaging:   Stress Echo (11/19/2024):    Left Ventricle: The left ventricle is normal in size. Normal wall thickness. There is concentric remodeling. Regional wall motion abnormalities present. Septal motion is consistent with post-operative status. There is mildly reduced systolic function with a visually estimated ejection fraction of 40 - 50%. Ejection fraction is approximately 40%. There is normal diastolic function.    Right Ventricle: Moderate right ventricular enlargement. Wall thickness is normal. Systolic function is mildly reduced.    Left Atrium: Atrial septum is bulging to the left.    Right Atrium: Right atrium is severely dilated.    Tricuspid Valve: There is severe regurgitation. No pulmonary hypertension.    Pulmonary Artery: The estimated pulmonary artery systolic pressure is 21 mmHg.    IVC/SVC: Normal venous pressure at 3 mmHg.    Stress Protocol: The patient was infused intravenously with dobutamine. The patient received a graduated infusion of the stress agent beginning at 10.0 mcg/kg/min to a peak dose of 10.0 mcg/kg/min. The peak heart rate was 173 bpm, which is 104% of age predicted maximum  heart rate. Low-level exercise was used. The patient reported no symptoms during the stress test. The test was stopped because the end of the protocol was reached.    Baseline ECG: The Baseline ECG reveals sinus tachycardia with RBBB. The axis is normal. The ST segments are normal.    Stress ECG: There is no ST segment deviation identified during the protocol. There are no arrhythmias during stress.    ECG Conclusion: The ECG portion of the study is negative for ischemia.    Post-stress Conclusion: The study is negative with no echocardiographic evidence of stress induced ischemia.      TTE (11/10/2023):    S/p OHT 10/29/12.    Left Ventricle: The left ventricle is dilated. Ventricular mass is normal. Increased wall thickness. regional wall motion abnormalities present. See diagram for wall motion findings. There is mildly reduced systolic function with a visually estimated ejection fraction of 40 - 45%. Ejection fraction by visual approximation is 40%. Grade I diastolic dysfunction. Average E/e' ratio is 5.43.    Right Ventricle: Moderate-severe right ventricular enlargement. Systolic function is borderline low.    Aortic Valve: The aortic valve is a trileaflet valve. There is mild aortic regurgitation.    Tricuspid Valve: There is severe regurgitation with a centrally directed jet.    IVC/SVC: Elevated venous pressure at 15 mmHg.      LHC (8/10/2020):   Normal coronary arteries.   IVUS of LAD did not show features of significant Cardiac allograft vasculopathy (CAV)   Filling pressures normal. 5 successful RVBX, under fluoro guidance, perfomed. A sample was sent for immunofluorescence..   US guided Right femoral artery and Rt IJ vein access (Rt radial artery occluded, noted in US)   In order to adequately assess the patient and form an appropriate treatment plan, it is necessary to perform a comprehensive Right Heart catheterization. A thorough study of the patient's cardiac and hemodynamic functioning, in addition  to an Endomyocardial biopsy, was performed. The information to be obtained from the biopsy alone was insufficient to care for this patient.   Estimated blood loss: <10 mL      Biopsy (8/2020):   1.  The myocardium is C4d negative for antibody mediated rejection.    2.  There are 4 myocardial biopsy fragments. With the material seen in    conjunction with the immunofluorescence procedure, there are 5 fragments. The    myocardium is devoid of a lymphocytic infiltrate. (ISHLT 0). The myocardium    does not have swollen endothelial cells or capillaries distended by enlarged    mononuclear cells, which would have pointed towards antibody mediated    rejection.  The myocardium is devoid of CD68 positive macrophages . The    picture here is graded as pAMR 0.    The positive and negative controls stained appropriately.     * Secondary graft-dysfunction of transplanted heart  Acute on chronic graft dysfunction: known to have impaired graft function, LVEF 40% (but as low as 35%), with baseline RWMA (e.g inferior, posterior and septal walls, as well as proximal anterior walls in prior stress echo). This admission EF down to 15%-20%. More likely to have underlying CAV than acute rejection at this time in transplant course.     Plan:  -s/p RHC + biopsy which was negative acute cellular and antibody mediated rejection.   -Unfortunately renal function precludes LHC at this point in time.Will have PET scan scheduled as outpatient.                 Typical atrial flutter  Patient noted to be in typical atrial flutter with rates in 130-140s. 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 Asymptomatic and hemodyamically stable. [id number] score of 5 (CHF hx, HTN hx, Thromembolism hx, and DM2 hx). Hx of clotting disorder Factor V leiden. Non-compliant with home AC.     - Continue amiodarone gtt for now. Due to concern rate control with BB can lead to decompensation of HF, will continue amiodarone gtt  for now. Patient explained risks of possible stroke if chemically converted and is agreeable with plan.   - Continue eliquis  - ACLS if unstable.   - As per EP, pending LEATHA and DCCV for today.     Acute kidney injury superimposed on CKD  NILES on CKD stage 3 possibly secondary to prograf toxicity vs dehydration. Patient does not appear in ADHF on exam and low likelhood for rejection with hx of HFmrEF. However cannot explain elevated BNP so will need further evaluation. Possible baseline cr 1.5-2. Creatinine around 3 on arrival to OSH     - daily BMP to trend creatinine.   - Improving.   - Strict I and Os.       Venous thromboembolism  History of DVT and factor V Leiden deficiency . Non-compliant with home lovenox. Presented with bilateral LE swelling, found to have bilateral extensive occlusive DVTs. Started on heparin gtt at osh     - Stop heparin gtt. Start eliquis 10mg bid for 7 days followed by eliquis 5mg bid daily.     Status post heart transplantation  OHT on 10/29/12, with post-op severe TR, mild to moderately reduced LVEF.  Post-op developed PTLD, s/p R-CHOP/CEOP 12/2013  Immunosuppression tacrolimus 1 mg BID     Plan:  -continue tacro 1/0.5 mg    Essential hypertension  - continue entresto 24/26mg bid and amlodopine 10mg daily.       Type 2 diabetes mellitus, without long-term current use of insulin  - last known A1c 6  - Currently hyperglycemic with sugars in the 400s. Beta hydroxybutare normal.   - Start moderate SS insulin.   - Management per Endocrinology.       Factor V Leiden  History of DVT. Non-compliant with home lovenox.   Presented with bilateral LE swelling, found to have bilateral extensive occlusive DVTs  Started on heparin gtt at osh     - Continue eliquis.         Jarrell Parry MD  Heart Transplant  [facility]

## 2025-06-24 NOTE — SUBJECTIVE & OBJECTIVE
Objective:     Vital Signs (Most Recent):  Temp: 98 °F (36.7 °C) (06/24/25 0701)  Pulse: 95 (06/24/25 0737)  Resp: (!) 29 (06/24/25 0701)  BP: 115/86 (06/24/25 0701)  SpO2: 99 % (06/24/25 0701) Vital Signs (24h Range):  Temp:  [97.9 °F (36.6 °C)-98.6 °F (37 °C)] 98 °F (36.7 °C)  Pulse:  [] 95  Resp:  [8-40] 29  SpO2:  [86 %-100 %] 99 %  BP: ()/(60-96) 115/86     Weight: 99.5 kg (219 lb 4.8 oz) (06/24/25 0501)  Body mass index is 29.74 kg/m².  Body surface area is 2.25 meters squared.    I/O last 3 completed shifts:  In: 1627 [P.O.:1080; I.V.:347; IV Piggyback:200]  Out: 1925 [Urine:1925]     Physical Exam  Constitutional:       General: He is not in acute distress.  Pulmonary:      Effort: No respiratory distress.   Abdominal:      Palpations: Abdomen is soft.   Musculoskeletal:         General: Swelling present.   Neurological:      Mental Status: He is alert. Mental status is at baseline.   Psychiatric:         Mood and Affect: Mood normal.          Significant Labs:  CBC:   Recent Labs   Lab 06/24/25  0341   WBC 4.93   RBC 4.04*   HGB 12.1*   HCT 37.4*      MCV 93   MCH 30.0   MCHC 32.4     CMP:   Recent Labs   Lab 06/24/25  0341      CALCIUM 8.6*   ALBUMIN 2.8*   PROT 6.9      K 4.4   CO2 21*      BUN 20   CREATININE 2.4*   ALKPHOS 57   ALT 10   AST 14   BILITOT 0.6     All labs within the past 24 hours have been reviewed.

## 2025-06-24 NOTE — PLAN OF CARE
CICU DAILY GOALS     - no acute events overnight  - low UOP throughout night, MD Adrian notified.  - Mag replaced        A: Awake    RASS: Goal -    Actual - RASS (Ziegler Agitation-Sedation Scale): alert and calm   Restraint necessity:    B: Breath   SBT: Not intubated   C: Coordinate A & B, analgesics/sedatives   Pain: managed    SAT: Not intubated  D: Delirium   CAM-ICU:    E: Early(intubated/ Progressive (non-intubated) Mobility   MOVE Screen: Pass   Activity: Activity Management: Rolling - L1  FAS: Feeding/Nutrition   Diet order: Diet/Nutrition Received: 2 gram sodium, low saturated fat/low cholesterol,   Fluid restriction:    T: Thrombus   DVT prophylaxis: VTE Core Measure: Pharmacological prophylaxis initiated/maintained  H: HOB Elevation   Head of Bed (HOB) Positioning: HOB at 20-30 degrees  U: Ulcer Prophylaxis   GI: no  G: Glucose control   managed Glycemic Management: blood glucose monitored  S: Skin   Bathing/Skin Care: bath, complete;dressed/undressed;linen changed (06/24/25 0501)  Wounds: No  Wound care consulted: no  B: Bowel Function   no issues   I: Indwelling Catheters   [place] necessity:     CVC necessity: No  D: De-escalation Antibx   No  Plan for the day     Family/Goals of care/Code Status   Code Status: Full Code     No acute events throughout day, VS and assessment per flow sheet, patient progressing towards goals as tolerated, plan of care reviewed with Alfonso Leger and family, all concerns addressed, will continue to monitor.

## 2025-06-24 NOTE — HOSPITAL COURSE
Upon admission, patient was diagnosed w/ bilateral lower extremity DVT and started on heparin gtt and then switched to eliquis. Patient was also found to be in aflutter w/ RVR. Started on amiodarone gtt then swtiched to PO amiodarone after successful LEATHA/cardioversion. RHC w/ biopsy was done to evaluate for rejcetion and patient was found to be negative for acute cellular and antibody mediated rejection. Patient was unable to have LHC done as inpatient to evaluate for CAV due to NILES and will have PET scan done as outpatient instead. At the time of discharge patient was clinically and hemodynamically stable. He agreed with plan for discharge.

## 2025-06-24 NOTE — ASSESSMENT & PLAN NOTE
- continue entresto 24/26mg bid and amlodopine 10mg daily.      Gabbie Cannon, [facility] [phone]

## 2025-06-24 NOTE — EICU
Intervention Initiated From:  [facility] / EICU    Suzanna intervened regarding:  Rounding (Video assessment)    Comments: Virtual ICU Quality Rounds    Admit Date: 6/19/2025  Hospital Day: 5    ICU Day: 4d 14h    24H Vital Sign Range:  Temp:  [97.9 °F (36.6 °C)-98.6 °F (37 °C)]   Pulse:  []   Resp:  [11-40]   BP: ()/(60-96)   SpO2:  [86 %-100 %]     VICU Surveillance Screening    LDA reconciliation : Yes

## 2025-06-24 NOTE — PLAN OF CARE
Patient discharged to home. AVS, medications, and signs/symptoms to report reviewed with patient and patient's mother. All questions answered to patient's satisfaction. Patient wheeled off unit by RN and accompanied by mother. Patient in no distress with no complaints.

## 2025-06-24 NOTE — SUBJECTIVE & OBJECTIVE
Interval History: NAEON. No acute complaints. S/p cardioversion today.     Continuous Infusions:      Scheduled Meds:   amiodarone  400 mg Oral BID    amLODIPine  10 mg Oral Daily    apixaban  10 mg Oral BID    [START ON 6/29/2025] apixaban  5 mg Oral BID    empagliflozin  10 mg Oral Daily    mupirocin   Nasal BID    polyethylene glycol  17 g Oral Daily    sacubitriL-valsartan  1 tablet Oral BID    tacrolimus  0.5 mg Oral Daily PM    tacrolimus  1 mg Oral Daily AM     PRN Meds:  Current Facility-Administered Medications:     acetaminophen, 650 mg, Oral, Q6H PRN    dextrose 50%, 12.5 g, Intravenous, PRN    glucagon (human recombinant), 1 mg, Intramuscular, PRN    senna, 8.6 mg, Oral, Daily PRN    sodium chloride 0.9%, 10 mL, Intravenous, PRN    sodium chloride 0.9%, 3 mL, Intravenous, PRN    Review of patient's allergies indicates:  No Known Allergies  Objective:     Vital Signs (Most Recent):  Temp: 98 °F (36.7 °C) (06/24/25 0701)  Pulse: 95 (06/24/25 0737)  Resp: (!) 29 (06/24/25 0701)  BP: 115/86 (06/24/25 0701)  SpO2: 99 % (06/24/25 0701) Vital Signs (24h Range):  Temp:  [97.9 °F (36.6 °C)-98.6 °F (37 °C)] 98 °F (36.7 °C)  Pulse:  [] 95  Resp:  [11-40] 29  SpO2:  [86 %-100 %] 99 %  BP: ()/(60-96) 115/86     Patient Vitals for the past 72 hrs (Last 3 readings):   Weight   06/24/25 0501 99.5 kg (219 lb 4.8 oz)   06/23/25 1415 97.5 kg (214 lb 15.2 oz)   06/23/25 0601 97.5 kg (214 lb 14.4 oz)     Body mass index is 29.74 kg/m².      Intake/Output Summary (Last 24 hours) at 6/24/2025 0902  Last data filed at 6/24/2025 0701  Gross per 24 hour   Intake 812.41 ml   Output 1275 ml   Net -462.59 ml             Physical Exam   Constitutional:       Comments: Alert, Oriented, No acute distress   HENT:      Head: Normocephalic and atraumatic.   Eyes:      Conjunctiva/sclera: Conjunctivae normal.   Neck:      Vascular: No hepatojugular reflux or JVD.   Cardiovascular:      Rate and Rhythm: Regular rhythm.  "Tachycardia present.   Pulmonary:      Comments: Normal effort, Clear to auscultation   Abdominal:      Comments: Soft, Non-tender, Non-distended   Musculoskeletal:      Cervical back: Normal range of motion.      Comments: B/l lower extremity swelling. Tenderness to palpation   Skin:     Comments: Dry, Intact, Warm   Neurological:      Mental Status: He is alert and oriented to person, place, and time.      Comments: Normal speech   Psychiatric:         Mood and Affect: Mood and affect normal.     Significant Labs:  CBC:  Recent Labs   Lab 06/22/25 0317 06/23/25  0354 06/24/25  0341   WBC 5.73 5.35 4.93   RBC 4.10* 4.12* 4.04*   HGB 12.1* 12.2* 12.1*   HCT 37.5* 38.8* 37.4*    352 357   MCV 92 94 93   MCH 29.5 29.6 30.0   MCHC 32.3 31.4* 32.4     BNP:  Recent Labs   Lab 06/19/25  1208 06/19/25  1858   * 709*     CMP:  Recent Labs   Lab 06/22/25 0317 06/23/25  0354 06/24/25  0341   * 118* 101   CALCIUM 8.6* 8.5* 8.6*   ALBUMIN 3.1* 2.9* 2.8*   PROT 7.2 6.7 6.9    139 138   K 4.0 4.3 4.4   CO2 21* 21* 21*    107 107   BUN 20 20 20   CREATININE 2.5* 2.4* 2.4*   ALKPHOS 64 60 57   ALT 10 <5* 10   AST 15 9* 14   BILITOT 0.5 0.3 0.6      Coagulation:   Recent Labs   Lab 06/19/25  1214 06/19/25  1858 06/19/25 2122 06/21/25  0935 06/21/25  1536 06/21/25 2122 06/22/25 0317   INR 1.2 1.7*  --  1.4*  --   --   --    APTT 27.7 >150.0*   < > 31.0  32.5* 78.2* 59.2* 58.4*    < > = values in this interval not displayed.     LDH:  No results for input(s): "LDH" in the last 72 hours.  Microbiology:  Microbiology Results (last 7 days)       ** No results found for the last 168 hours. **            I have reviewed all pertinent labs within the past 24 hours.    Estimated Creatinine Clearance: 43 mL/min (A) (based on SCr of 2.4 mg/dL (H)).    Diagnostic Results:  I have reviewed all pertinent imaging results/findings within the past 24 hours.  "

## 2025-06-24 NOTE — PT/OT/SLP EVAL
Physical Therapy Evaluation and Discharge Note    Patient Name:  Alfonso Leger   MRN:  [id number]    Recommendations:     Discharge Recommendations: No Therapy Indicated  Discharge Equipment Recommendations: cane, straight   Barriers to discharge: None    Assessment:     Alfonso Leger is a 55 y.o. male admitted with a medical diagnosis of Secondary graft-dysfunction of transplanted heart. .  At this time, patient is functioning at their prior level of function and does not require further acute PT services.     Recent Surgery: Procedure(s) (LRB):  Transesophageal echo (LEATHA) intra-procedure log documentation (N/A)  Cardioversion or Defibrillation 1 Day Post-Op    Plan:     During this hospitalization, patient does not require further acute PT services.  Please re-consult if situation changes.      Subjective     Chief Complaint: none reported  Patient/Family Comments/goals: To go home  Pain/Comfort:  Pain Rating 1: 0/10  Pain Rating Post-Intervention 1: 0/10    Patients cultural, spiritual, Restorationism conflicts given the current situation: no    Patient History:     Living Environment: Pt lives alone in 2nd floor apartment with ~14 [address] and [employer] HR. Bathroom: tub/shower combo. Pt states once you enter the apartment he has 2nd set of stairs to bed/full bath with L HR   Prior Level of Function: IND  DME owned: none  Caregiver Assistance: mother      Objective:     Communicated with RN prior to session.  Patient found HOB elevated with telemetry, blood pressure cuff, pulse ox (continuous) upon PT entry to room.    General Precautions: Standard, fall    Orthopedic Precautions:N/A   Braces: N/A  Respiratory Status: Room air    Exams:  Gross Motor Coordination:  WFL  Sensation:    -       Intact  RLE ROM: WFL  RLE Strength: WFL  LLE ROM: WFL  LLE Strength: grossly 4-/5    Functional Mobility:    Bed Mobility:   Rolling: to L with stand by assistance  Supine > Sit: stand by assistance  Sit > Supine: stand by  assistance    Transfers:   Sit <> Stand Transfer: stand by assistance from EOB without AD  Bed <> Chair: stand by assistance without AD    Balance:   Sitting balance: GOOD-: Incosistently Maintains balance through MODERATE excursions of active trunk movement,     Standing balance:   FAIR: Maintains without assist but unable to take challenges  FAIR: Needs CONTACT GUARD during gait                 Gait:  Distance: 211 ft   Assistive Device: no AD  Assistance Level: contact guard assistance  Gait Assessment: Pt amb with decreased paco, decreased step length and mild antalgic gait due to LLE pain    Stairs:  Pt ascended/descended 20 stair(s) with No Assistive Device with bilateral handrails with Contact Guard Assistance.   Pt ascended using RLE and descended using LLE with step to gait pattern    AM-PAC 6 CLICK MOBILITY  Total Score:23       Treatment and Education:  Pt educated on tip to reduce fall risk and safety with mobility and using call button for assistance from nursing staff with OOB mobility.  Pt educated on sitting up in chair 2-4 hours of the day  Pt educated on amb 2-3x per day with assistance from staff and increased movement during hospital stay.  All questions answered within the scope of PT.  White board updated accordingly.    AM-PAC 6 CLICK MOBILITY  Total Score:23     Patient left HOB elevated with all lines intact, call button in reach, RN notified, and mother present.    GOALS:   Multidisciplinary Problems       Physical Therapy Goals       Not on file              Multidisciplinary Problems (Resolved)          Problem: Physical Therapy    Goal Priority Disciplines Outcome Interventions   Physical Therapy Goal   (Resolved)     PT, PT/OT Met    Description: Goals to be met by: 25     Patient will increase functional independence with mobility by performin. Gait  x 150 feet with Modified Ralls using LRAD or no AD.                          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    Factor V Leiden 9/7/2012    Hypertension     Immunodeficiency due to treatment with immunosuppressive medication     Other and unspecified hyperlipidemia 10/11/2013    Stroke     Type II or unspecified type diabetes mellitus without mention of complication, uncontrolled 10/11/2013    Typical atrial flutter 6/19/2025       Past Surgical History:   Procedure Laterality Date    ABDOMINAL SURGERY      BIOPSY, CARDIAC  6/20/2025    Procedure: Biopsy, Cardiac;  Surgeon: Niles Hector MD;  Location: [facility] CATH LAB;  Service: Cardiology;;    CARDIAC CATHETERIZATION      CATHETERIZATION OF BOTH LEFT AND RIGHT HEART N/A 8/10/2020    Procedure: CATHETERIZATION, HEART, BOTH LEFT AND RIGHT;  Surgeon: Michael Fernandez MD;  Location: [facility] CATH LAB;  Service: Cardiology;  Laterality: N/A;    CORONARY ANGIOGRAPHY N/A 8/10/2020    Procedure: ANGIOGRAM, CORONARY ARTERY;  Surgeon: Michael Fernandez MD;  Location: [facility] CATH LAB;  Service: Cardiology;  Laterality: N/A;    HEART TRANSPLANT      IVC FILTER RETRIEVAL      pt reports still in place    RIGHT HEART CATHETERIZATION Right 6/20/2025    Procedure: INSERTION, CATHETER, RIGHT HEART;  Surgeon: Niles Hector MD;  Location: [facility] CATH LAB;  Service: Cardiology;  Laterality: Right;    TRANSESOPHAGEAL ECHOCARDIOGRAM WITH POSSIBLE CARDIOVERSION (LEATHA W/ POSS CARDIOVERSION) N/A 6/23/2025    Procedure: Transesophageal echo (LEATHA) intra-procedure log documentation;  Surgeon: Abdirahman Perez MD;  Location: [facility] EP LAB;  Service: Cardiology;  Laterality: N/A;    TREATMENT OF CARDIAC ARRHYTHMIA   6/23/2025    Procedure: Cardioversion or Defibrillation;  Surgeon: Ravinder Menendez MD;  Location: [facility] EP LAB;  Service: Cardiology;;       Time Tracking:     PT Received On: 06/24/25  PT Start Time: 0835     PT Stop Time: 0859  PT Total Time (min): 24 min     Billable Minutes: Evaluation 10 and Gait Training 14      06/24/2025

## 2025-06-24 NOTE — DISCHARGE SUMMARY
[facility] - Cardiac Intensive Care  Heart Transplant  Discharge Summary      Patient Name: Alfonso Leger  MRN: [id number]  Admission Date: 6/19/2025  Hospital Length of Stay: 5 days  Discharge Date and Time: 06/24/2025 11:48 AM  Attending Physician: Yudith Lazar MD   Discharging Provider: Jarrell Parry MD  Primary Care Provider: Moriah, Primary Doctor     HPI: Mr. Alfonso Leger is a 54 y/o AAM w/ PMHx of OHTx 10/29/12 (for EtOH CM) with post-op severe TR and chronic LV dysfunction (EF has been reduced to 40-50% since at least 2015, as low as 35%), hx of WPW, Factor V Leiden with h/o DVT (last 8/2013) and previous hx of IVC filter placement in 2011 previously on Lovenox sub q 100mg bid (non-compliant, stopped taking 5 years ago), HTN (not compliant with clonidine or amlodipine, only on hydralazine), HLD (not compliant with pravastatin), NIDDM2, CKD stage 3 (last known baseline CR 1.5-2 per nephrology note in 2021), and Non-Hodgkin lymphoma s/p 6 cycles of R-CHOP/CEOP in 2013 now in remission. Presented to outside hospital ED today after noting 1 1/2 week hx of bilateral lower extremity leg swelling and primarily left calf pain. Denied any chest pain, SOB, palpitations, nausea, vomiting, orthopnea, PND, abdominal distention, abdominal pain, fever, chills, diarrhea. States the only medications he is compliant with is his prograf 1mg bid and hydralazine 25mg tid. Stopped taking all other medications on his own. Last follow up with a doctor was last year (11/2024) at [facility] clinic.    In the ED,  labs notable hgb 13.5, potassium 3.3, bicarb 19, bun 30, cr 3.5 (baseline appears to be 1.5-2), magnesium 1.6. BNP 300s. VQ scan was negative for PE. Lower extremity U/S showed noncompressible filling defects within the right and left common femoral vein, superficial femoral vein and popliteal veins, and thrombosed calf veins noted bilaterally. Vitals on arrival notable for bp 175/106. EKG notable for typical atrial flutter  with rates in the 140s. Patient received Lopressor IV and PO, and was started on amiodarone gtt and heparin gtt prior to transfer to [facility] for further management.     Of note, patient was evaluated by EP (Dr. Reddy) back on 8/28/2013 when WPW was first incidentally seen on his EKG. Patient was notably asymptomatic and ablation was not being purused at the time due to him being neutropenic. Patient was discharged with 30 day event monitored (only showed 2 episodes of sinus tachycardia) and told to follow up as outpatient. No evidence in the system that patient ever followed up with EP. No previous history of Atrial flutter or atrial fibrillation seen.       CMV status:    Donor: -   Recipient: -          Prescribed Home Medications:   Clonidine 0.3 mg BID    Tacrolimus 1 mg BID    Hydralazine 25 TID    Amlodipine 10 mg qd  Pravastatin 40mg nightly       Cardiac Imaging:   Stress Echo (11/19/2024):    Left Ventricle: The left ventricle is normal in size. Normal wall thickness. There is concentric remodeling. Regional wall motion abnormalities present. Septal motion is consistent with post-operative status. There is mildly reduced systolic function with a visually estimated ejection fraction of 40 - 50%. Ejection fraction is approximately 40%. There is normal diastolic function.    Right Ventricle: Moderate right ventricular enlargement. Wall thickness is normal. Systolic function is mildly reduced.    Left Atrium: Atrial septum is bulging to the left.    Right Atrium: Right atrium is severely dilated.    Tricuspid Valve: There is severe regurgitation. No pulmonary hypertension.    Pulmonary Artery: The estimated pulmonary artery systolic pressure is 21 mmHg.    IVC/SVC: Normal venous pressure at 3 mmHg.    Stress Protocol: The patient was infused intravenously with dobutamine. The patient received a graduated infusion of the stress agent beginning at 10.0 mcg/kg/min to a peak dose of 10.0 mcg/kg/min. The peak heart rate  was 173 bpm, which is 104% of age predicted maximum heart rate. Low-level exercise was used. The patient reported no symptoms during the stress test. The test was stopped because the end of the protocol was reached.    Baseline ECG: The Baseline ECG reveals sinus tachycardia with RBBB. The axis is normal. The ST segments are normal.    Stress ECG: There is no ST segment deviation identified during the protocol. There are no arrhythmias during stress.    ECG Conclusion: The ECG portion of the study is negative for ischemia.    Post-stress Conclusion: The study is negative with no echocardiographic evidence of stress induced ischemia.      TTE (11/10/2023):    S/p OHT 10/29/12.    Left Ventricle: The left ventricle is dilated. Ventricular mass is normal. Increased wall thickness. regional wall motion abnormalities present. See diagram for wall motion findings. There is mildly reduced systolic function with a visually estimated ejection fraction of 40 - 45%. Ejection fraction by visual approximation is 40%. Grade I diastolic dysfunction. Average E/e' ratio is 5.43.    Right Ventricle: Moderate-severe right ventricular enlargement. Systolic function is borderline low.    Aortic Valve: The aortic valve is a trileaflet valve. There is mild aortic regurgitation.    Tricuspid Valve: There is severe regurgitation with a centrally directed jet.    IVC/SVC: Elevated venous pressure at 15 mmHg.      LHC (8/10/2020):   Normal coronary arteries.   IVUS of LAD did not show features of significant Cardiac allograft vasculopathy (CAV)   Filling pressures normal. 5 successful RVBX, under fluoro guidance, perfomed. A sample was sent for immunofluorescence..   US guided Right femoral artery and Rt IJ vein access (Rt radial artery occluded, noted in US)   In order to adequately assess the patient and form an appropriate treatment plan, it is necessary to perform a comprehensive Right Heart catheterization. A thorough study of the  patient's cardiac and hemodynamic functioning, in addition to an Endomyocardial biopsy, was performed. The information to be obtained from the biopsy alone was insufficient to care for this patient.   Estimated blood loss: <10 mL      Biopsy (8/2020):   1.  The myocardium is C4d negative for antibody mediated rejection.    2.  There are 4 myocardial biopsy fragments. With the material seen in    conjunction with the immunofluorescence procedure, there are 5 fragments. The    myocardium is devoid of a lymphocytic infiltrate. (ISHLT 0). The myocardium    does not have swollen endothelial cells or capillaries distended by enlarged    mononuclear cells, which would have pointed towards antibody mediated    rejection.  The myocardium is devoid of CD68 positive macrophages . The    picture here is graded as pAMR 0.    The positive and negative controls stained appropriately.     Procedure(s) (LRB):  Transesophageal echo (LEATHA) intra-procedure log documentation (N/A)  Cardioversion or Defibrillation     Hospital Course: Upon admission, patient was diagnosed w/ bilateral lower extremity DVT and started on heparin gtt and then switched to eliquis. Patient was also found to be in aflutter w/ RVR. Started on amiodarone gtt then swtiched to PO amiodarone after successful LEATHA/cardioversion. RHC w/ biopsy was done to evaluate for rejcetion and patient was found to be negative for acute cellular and antibody mediated rejection. Patient was unable to have LHC done as inpatient to evaluate for CAV due to NILES and will have PET scan done as outpatient instead. At the time of discharge patient was clinically and hemodynamically stable. He agreed with plan for discharge.     Goals of Care Treatment Preferences:  Code Status: Full Code    Living Will: Yes              Consults (From admission, onward)          Status Ordering Provider     Inpatient consult to Nephrology  Once        Provider:  Kushal Patel MD    Completed ARI  LEANNA     Inpatient consult to Interventional Cardiology  Once        Provider:  Niles Hector MD    Completed LEANNA CHAPMAN     Inpatient consult to Electrophysiology  Once        Provider:  Jarrell Parry MD    Completed JARRELL PARRY            Significant Diagnostic Studies: Labs: BMP:   Recent Labs   Lab 06/23/25  0354 06/24/25  0341   * 101    138   K 4.3 4.4    107   CO2 21* 21*   BUN 20 20   CREATININE 2.4* 2.4*   CALCIUM 8.5* 8.6*   MG 1.9 1.8       Pending Diagnostic Studies:       None          Final Active Diagnoses:    Diagnosis Date Noted POA    PRINCIPAL PROBLEM:  Secondary graft-dysfunction of transplanted heart [T86.298] 06/21/2025 Unknown    Acute kidney injury superimposed on CKD [N17.9, N18.9] 06/19/2025 Yes    Typical atrial flutter [I48.3] 06/19/2025 Yes    Venous thromboembolism [I82.90] 11/05/2012 Yes    Status post heart transplantation [Z94.1] 11/04/2012 Not Applicable    Heart failure, acute on chronic, systolic and diastolic [I50.43] 06/22/2025 Yes    Stage 3b chronic kidney disease [N18.32] 06/22/2025 Yes    Type 2 diabetes mellitus, without long-term current use of insulin [E11.9] 10/11/2013 Yes    Essential hypertension [I10] 10/11/2013 Yes    Factor V Leiden [D68.51] 09/07/2012 Yes     Chronic      Problems Resolved During this Admission:    Diagnosis Date Noted Date Resolved POA    Immunodeficiency due to treatment with immunosuppressive medication [D84.821, T45.1X5A, Z79.60]  06/19/2025 Not Applicable    CKD (chronic kidney disease) stage 3, GFR 30-59 ml/min [N18.30] 12/06/2012 06/19/2025 Yes      Discharged Condition: good    Disposition:     Follow Up:    Patient Instructions:      Cardiac PET Scan Stress   Standing Status: Future Standing Exp. Date: 06/24/26     Order Specific Question Answer Comments   Which medicaton for the stress procedure? Regadenoson    Release to patient Immediate      Medications:  Reconciled Home Medications:       Medication List        ASK your doctor about these medications      amLODIPine 10 MG tablet  Commonly known as: NORVASC  TAKE 1 TABLET BY MOUTH ONCE DAILY     cloNIDine 0.1 MG tablet  Commonly known as: CATAPRES  Take 3 tablets (0.3 mg total) by mouth 2 (two) times daily.     hydrALAZINE 25 MG tablet  Commonly known as: APRESOLINE  Take 1 tablet (25 mg total) by mouth 3 (three) times daily.     tacrolimus 1 MG Cap  Commonly known as: PROGRAF  Take 1 capsule (1 mg total) by mouth every 12 (twelve) hours.              Jarrell Parry MD  Heart Transplant  [facility] - [facility]

## (undated) DEVICE — SHEATH INTRODUCER 7FR 11CM

## (undated) DEVICE — INTRODUCER 7FR 45CM

## (undated) DEVICE — GUIDE WIRE BMW 014 X190

## (undated) DEVICE — SHEATH BRITE TIP 7FR 35CM

## (undated) DEVICE — CATH SWAN GANZ STND 7FR

## (undated) DEVICE — GUIDEWIRE EMERALD 150CM PTFE

## (undated) DEVICE — GUIDE LAUNCHER 6FR EBU 3.5

## (undated) DEVICE — TUBING PRSS MON M/M LL 72IN

## (undated) DEVICE — SHEATH INTRODUCER 6FR 11CM

## (undated) DEVICE — CATH IMPULSE 5F 100CM FR4

## (undated) DEVICE — KIT MICROINTRO 4F .018X40X7CM

## (undated) DEVICE — CATH EAGLE EYE PLATINUM

## (undated) DEVICE — FORCEP ENDOMYOCARD BIOPSY7F AD

## (undated) DEVICE — KIT PROBE COVER WITH GEL

## (undated) DEVICE — KIT CUSTOM MANIFOLD

## (undated) DEVICE — OMNIPAQUE 350 200ML

## (undated) DEVICE — FORCEP BIOPSY 50CM

## (undated) DEVICE — PAD DEFIB CADENCE ADULT R2

## (undated) DEVICE — SPIKE CONTRAST CONTROLLER

## (undated) DEVICE — PROTECTION STATION PLUS

## (undated) DEVICE — STOPCOCK 3-WAY

## (undated) DEVICE — TRAY CATH LAB OMC

## (undated) DEVICE — SEE MEDLINE ITEM 157187

## (undated) DEVICE — SEE MEDLINE ITEM 156894

## (undated) DEVICE — WIRE GUIDE SAFE-T-J .035 260CM

## (undated) DEVICE — GUIDEWIRE .021X180CM J-3MM TIP

## (undated) DEVICE — KIT GLIDESHEATH SLEND 6FR 10CM